# Patient Record
Sex: MALE | Race: OTHER | HISPANIC OR LATINO | URBAN - METROPOLITAN AREA
[De-identification: names, ages, dates, MRNs, and addresses within clinical notes are randomized per-mention and may not be internally consistent; named-entity substitution may affect disease eponyms.]

---

## 2020-11-15 ENCOUNTER — INPATIENT (INPATIENT)
Facility: HOSPITAL | Age: 55
LOS: 32 days | Discharge: NURSING, REHAB OR LONG-TERM CARE | DRG: 870 | End: 2020-12-18
Attending: SURGERY | Admitting: SURGERY
Payer: MEDICAID

## 2020-11-15 VITALS
HEIGHT: 62 IN | WEIGHT: 169.98 LBS | OXYGEN SATURATION: 64 % | RESPIRATION RATE: 30 BRPM | HEART RATE: 124 BPM | TEMPERATURE: 99 F

## 2020-11-15 DIAGNOSIS — U07.1 COVID-19: ICD-10-CM

## 2020-11-15 LAB
ALBUMIN SERPL ELPH-MCNC: 2.9 G/DL — LOW (ref 3.5–5)
ALP SERPL-CCNC: 68 U/L — SIGNIFICANT CHANGE UP (ref 40–120)
ALT FLD-CCNC: 55 U/L DA — SIGNIFICANT CHANGE UP (ref 10–60)
ANION GAP SERPL CALC-SCNC: 17 MMOL/L — SIGNIFICANT CHANGE UP (ref 5–17)
APTT BLD: 35.1 SEC — SIGNIFICANT CHANGE UP (ref 27.5–35.5)
AST SERPL-CCNC: 92 U/L — HIGH (ref 10–40)
BASE EXCESS BLDA CALC-SCNC: -9.6 MMOL/L — LOW (ref -2–2)
BASE EXCESS BLDCOV CALC-SCNC: -6.9 MMOL/L — LOW (ref -6–0.3)
BASOPHILS # BLD AUTO: 0.04 K/UL — SIGNIFICANT CHANGE UP (ref 0–0.2)
BASOPHILS NFR BLD AUTO: 0.2 % — SIGNIFICANT CHANGE UP (ref 0–2)
BILIRUB SERPL-MCNC: 0.6 MG/DL — SIGNIFICANT CHANGE UP (ref 0.2–1.2)
BLOOD GAS COMMENTS ARTERIAL: SIGNIFICANT CHANGE UP
BLOOD GAS COMMENTS, CORD VENOUS: SIGNIFICANT CHANGE UP
BUN SERPL-MCNC: 49 MG/DL — HIGH (ref 7–18)
CALCIUM SERPL-MCNC: 8.5 MG/DL — SIGNIFICANT CHANGE UP (ref 8.4–10.5)
CHLORIDE SERPL-SCNC: 102 MMOL/L — SIGNIFICANT CHANGE UP (ref 96–108)
CO2 SERPL-SCNC: 16 MMOL/L — LOW (ref 22–31)
CREAT SERPL-MCNC: 2.2 MG/DL — HIGH (ref 0.5–1.3)
D DIMER BLD IA.RAPID-MCNC: 559 NG/ML DDU — HIGH
EOSINOPHIL # BLD AUTO: 0.02 K/UL — SIGNIFICANT CHANGE UP (ref 0–0.5)
EOSINOPHIL NFR BLD AUTO: 0.1 % — SIGNIFICANT CHANGE UP (ref 0–6)
FIO2 CORD, VENOUS: 100 — SIGNIFICANT CHANGE UP
GAS PNL BLDCOV: 7.32 — SIGNIFICANT CHANGE UP (ref 7.25–7.45)
GLUCOSE SERPL-MCNC: 170 MG/DL — HIGH (ref 70–99)
HCO3 BLDA-SCNC: 18 MMOL/L — LOW (ref 23–27)
HCO3 BLDCOV-SCNC: 18 MMOL/L — SIGNIFICANT CHANGE UP (ref 17–25)
HCT VFR BLD CALC: 43.7 % — SIGNIFICANT CHANGE UP (ref 39–50)
HGB BLD-MCNC: 14.1 G/DL — SIGNIFICANT CHANGE UP (ref 13–17)
HOROWITZ INDEX BLDA+IHG-RTO: 100 — SIGNIFICANT CHANGE UP
IMM GRANULOCYTES NFR BLD AUTO: 1.8 % — HIGH (ref 0–1.5)
INR BLD: 1.2 RATIO — HIGH (ref 0.88–1.16)
LACTATE SERPL-SCNC: 2 MMOL/L — SIGNIFICANT CHANGE UP (ref 0.7–2)
LACTATE SERPL-SCNC: 8.1 MMOL/L — CRITICAL HIGH (ref 0.7–2)
LYMPHOCYTES # BLD AUTO: 2.13 K/UL — SIGNIFICANT CHANGE UP (ref 1–3.3)
LYMPHOCYTES # BLD AUTO: 8.7 % — LOW (ref 13–44)
MCHC RBC-ENTMCNC: 29.2 PG — SIGNIFICANT CHANGE UP (ref 27–34)
MCHC RBC-ENTMCNC: 32.3 GM/DL — SIGNIFICANT CHANGE UP (ref 32–36)
MCV RBC AUTO: 90.5 FL — SIGNIFICANT CHANGE UP (ref 80–100)
MONOCYTES # BLD AUTO: 0.54 K/UL — SIGNIFICANT CHANGE UP (ref 0–0.9)
MONOCYTES NFR BLD AUTO: 2.2 % — SIGNIFICANT CHANGE UP (ref 2–14)
NEUTROPHILS # BLD AUTO: 21.19 K/UL — HIGH (ref 1.8–7.4)
NEUTROPHILS NFR BLD AUTO: 87 % — HIGH (ref 43–77)
NRBC # BLD: 0 /100 WBCS — SIGNIFICANT CHANGE UP (ref 0–0)
PCO2 BLDA: 50 MMHG — HIGH (ref 32–46)
PCO2 BLDCOV: 36 MMHG — SIGNIFICANT CHANGE UP (ref 27–49)
PH BLDA: 7.19 — CRITICAL LOW (ref 7.35–7.45)
PLATELET # BLD AUTO: 253 K/UL — SIGNIFICANT CHANGE UP (ref 150–400)
PO2 BLDA: 49 MMHG — CRITICAL LOW (ref 74–108)
PO2 BLDCOA: 21 MMHG — SIGNIFICANT CHANGE UP (ref 17–41)
POTASSIUM SERPL-MCNC: 3.5 MMOL/L — SIGNIFICANT CHANGE UP (ref 3.5–5.3)
POTASSIUM SERPL-SCNC: 3.5 MMOL/L — SIGNIFICANT CHANGE UP (ref 3.5–5.3)
PROT SERPL-MCNC: 8.8 G/DL — HIGH (ref 6–8.3)
PROTHROM AB SERPL-ACNC: 14.2 SEC — HIGH (ref 10.6–13.6)
RAPID RVP RESULT: DETECTED
RBC # BLD: 4.83 M/UL — SIGNIFICANT CHANGE UP (ref 4.2–5.8)
RBC # FLD: 13.7 % — SIGNIFICANT CHANGE UP (ref 10.3–14.5)
SAO2 % BLDA: 71 % — LOW (ref 92–96)
SAO2 % BLDCOV: 22 % — SIGNIFICANT CHANGE UP (ref 20–75)
SARS-COV-2 RNA SPEC QL NAA+PROBE: DETECTED
SODIUM SERPL-SCNC: 135 MMOL/L — SIGNIFICANT CHANGE UP (ref 135–145)
WBC # BLD: 24.37 K/UL — HIGH (ref 3.8–10.5)
WBC # FLD AUTO: 24.37 K/UL — HIGH (ref 3.8–10.5)

## 2020-11-15 PROCEDURE — 99291 CRITICAL CARE FIRST HOUR: CPT

## 2020-11-15 PROCEDURE — 71045 X-RAY EXAM CHEST 1 VIEW: CPT | Mod: 26,77

## 2020-11-15 PROCEDURE — 71045 X-RAY EXAM CHEST 1 VIEW: CPT | Mod: 26

## 2020-11-15 RX ORDER — SODIUM CHLORIDE 9 MG/ML
2000 INJECTION INTRAMUSCULAR; INTRAVENOUS; SUBCUTANEOUS ONCE
Refills: 0 | Status: COMPLETED | OUTPATIENT
Start: 2020-11-15 | End: 2020-11-15

## 2020-11-15 RX ORDER — PROPOFOL 10 MG/ML
100 INJECTION, EMULSION INTRAVENOUS ONCE
Refills: 0 | Status: DISCONTINUED | OUTPATIENT
Start: 2020-11-15 | End: 2020-11-16

## 2020-11-15 RX ORDER — FENTANYL CITRATE 50 UG/ML
0.5 INJECTION INTRAVENOUS
Qty: 2500 | Refills: 0 | Status: DISCONTINUED | OUTPATIENT
Start: 2020-11-15 | End: 2020-11-16

## 2020-11-15 RX ORDER — PIPERACILLIN AND TAZOBACTAM 4; .5 G/20ML; G/20ML
3.75 INJECTION, POWDER, LYOPHILIZED, FOR SOLUTION INTRAVENOUS ONCE
Refills: 0 | Status: COMPLETED | OUTPATIENT
Start: 2020-11-15 | End: 2020-11-15

## 2020-11-15 RX ORDER — VANCOMYCIN HCL 1 G
1000 VIAL (EA) INTRAVENOUS ONCE
Refills: 0 | Status: COMPLETED | OUTPATIENT
Start: 2020-11-15 | End: 2020-11-15

## 2020-11-15 RX ORDER — PANTOPRAZOLE SODIUM 20 MG/1
40 TABLET, DELAYED RELEASE ORAL DAILY
Refills: 0 | Status: DISCONTINUED | OUTPATIENT
Start: 2020-11-15 | End: 2020-12-08

## 2020-11-15 RX ORDER — DEXAMETHASONE 0.5 MG/5ML
6 ELIXIR ORAL DAILY
Refills: 0 | Status: DISCONTINUED | OUTPATIENT
Start: 2020-11-15 | End: 2020-11-21

## 2020-11-15 RX ORDER — ERGOCALCIFEROL 1.25 MG/1
50000 CAPSULE ORAL
Refills: 0 | Status: DISCONTINUED | OUTPATIENT
Start: 2020-11-15 | End: 2020-11-17

## 2020-11-15 RX ORDER — PHENYLEPHRINE HYDROCHLORIDE 10 MG/ML
0.5 INJECTION INTRAVENOUS
Qty: 40 | Refills: 0 | Status: DISCONTINUED | OUTPATIENT
Start: 2020-11-15 | End: 2020-11-16

## 2020-11-15 RX ORDER — ETOMIDATE 2 MG/ML
20 INJECTION INTRAVENOUS ONCE
Refills: 0 | Status: COMPLETED | OUTPATIENT
Start: 2020-11-15 | End: 2020-11-15

## 2020-11-15 RX ORDER — ZINC SULFATE TAB 220 MG (50 MG ZINC EQUIVALENT) 220 (50 ZN) MG
220 TAB ORAL DAILY
Refills: 0 | Status: DISCONTINUED | OUTPATIENT
Start: 2020-11-15 | End: 2020-11-20

## 2020-11-15 RX ORDER — DEXAMETHASONE 0.5 MG/5ML
6 ELIXIR ORAL ONCE
Refills: 0 | Status: COMPLETED | OUTPATIENT
Start: 2020-11-15 | End: 2020-11-15

## 2020-11-15 RX ORDER — CHLORHEXIDINE GLUCONATE 213 G/1000ML
15 SOLUTION TOPICAL EVERY 12 HOURS
Refills: 0 | Status: DISCONTINUED | OUTPATIENT
Start: 2020-11-15 | End: 2020-11-15

## 2020-11-15 RX ORDER — ACETAMINOPHEN 500 MG
650 TABLET ORAL EVERY 6 HOURS
Refills: 0 | Status: DISCONTINUED | OUTPATIENT
Start: 2020-11-15 | End: 2020-11-17

## 2020-11-15 RX ORDER — INSULIN LISPRO 100/ML
VIAL (ML) SUBCUTANEOUS EVERY 6 HOURS
Refills: 0 | Status: DISCONTINUED | OUTPATIENT
Start: 2020-11-15 | End: 2020-11-20

## 2020-11-15 RX ORDER — CHLORHEXIDINE GLUCONATE 213 G/1000ML
1 SOLUTION TOPICAL
Refills: 0 | Status: DISCONTINUED | OUTPATIENT
Start: 2020-11-15 | End: 2020-12-18

## 2020-11-15 RX ORDER — ENOXAPARIN SODIUM 100 MG/ML
40 INJECTION SUBCUTANEOUS EVERY 12 HOURS
Refills: 0 | Status: DISCONTINUED | OUTPATIENT
Start: 2020-11-15 | End: 2020-11-17

## 2020-11-15 RX ORDER — PROPOFOL 10 MG/ML
30 INJECTION, EMULSION INTRAVENOUS
Qty: 1000 | Refills: 0 | Status: DISCONTINUED | OUTPATIENT
Start: 2020-11-15 | End: 2020-11-16

## 2020-11-15 RX ADMIN — Medication 2: at 23:59

## 2020-11-15 RX ADMIN — Medication 250 MILLIGRAM(S): at 20:12

## 2020-11-15 RX ADMIN — SODIUM CHLORIDE 2000 MILLILITER(S): 9 INJECTION INTRAMUSCULAR; INTRAVENOUS; SUBCUTANEOUS at 20:13

## 2020-11-15 RX ADMIN — ETOMIDATE 20 MILLIGRAM(S): 2 INJECTION INTRAVENOUS at 22:00

## 2020-11-15 RX ADMIN — Medication 6 MILLIGRAM(S): at 18:41

## 2020-11-15 RX ADMIN — FENTANYL CITRATE 3.86 MICROGRAM(S)/KG/HR: 50 INJECTION INTRAVENOUS at 22:59

## 2020-11-15 RX ADMIN — PROPOFOL 13.9 MICROGRAM(S)/KG/MIN: 10 INJECTION, EMULSION INTRAVENOUS at 23:32

## 2020-11-15 RX ADMIN — PIPERACILLIN AND TAZOBACTAM 200 GRAM(S): 4; .5 INJECTION, POWDER, LYOPHILIZED, FOR SOLUTION INTRAVENOUS at 20:12

## 2020-11-15 RX ADMIN — SODIUM CHLORIDE 2000 MILLILITER(S): 9 INJECTION INTRAMUSCULAR; INTRAVENOUS; SUBCUTANEOUS at 18:41

## 2020-11-15 NOTE — ED PROVIDER NOTE - OBJECTIVE STATEMENT
55 year old male with no significant PMHx or PSHx presents to the ED with his son with complaints of progressively worsening shortness of breath and difficulty breathing since testing positive for COVID-19 five days ago. Patient's son states that he brought patient into the ED as he had an oxygen saturation of 64% earlier today. Patient otherwise denies any fevers, chills, and all other acute complaints. Patient reports that currently multiple family members are also sick with COVID-19. NKDA.

## 2020-11-15 NOTE — H&P ADULT - ASSESSMENT
55 year old male with no significant PMHx or PSHx presents to the ED with his son with complaints of progressively worsening shortness of breath and difficulty breathing since testing positive for COVID-19 five days ago. Patient's son states that he brought patient into the ED as he had an oxygen saturation of 64% earlier today. Patient otherwise denies any fevers, chills, and all other acute complaints. Patient reports that currently multiple family members are also sick with COVID-19. NKDA.       55 year old male from home ambulates independently with no significant PMHx or PSHx presents to the ED with his son with complaints of progressively worsening shortness of breath and difficulty breathing since testing positive for COVID-19 five days ago outpatient . Patient's son states that he brought patient into the ED as he had an oxygen saturation of 64% earlier today. Patient otherwise denies any fevers, chills, and all other acute complaints. Patient reports that currently multiple family members are also sick with COVID-19. Denies smoking, alcohol, illicit drug use. NKDA.  Oxygen saturation came up to 85-90% on 15 L nonrebreather, and patient appears less pale and is less short of breath. later Pt was desaturating on NRB 81% on NRB so was started on HFNC . Pt will be transferred to ICU     Assessment:  1. Acute Hypoxic Respiratory failure   2. COVID Pneumonia   3. Acute Kidney Injury   4. elevated LFTs             Plan:    =====================[CNS ]==============================  # No active issues:   - Currently at baseline mental status     =====================[CVS ]===============================  # No active issues:   - BP currently acceptable   - monitor BP   -     =====================[RESP ]==============================  # No issues   Oxygen saturation came up to 85-90% on 15 L nonrebreather, and patient appears less pale and is less short of breath. later Pt was desaturating on NRB 81% on NRB so was started on HFNC   PE : Diffuse B/L Rhonchi ,  Patient short of breath, and is answering questions with one word responses  Not a candidate for Remdesvir giving requoirment of HFNC   f/u Cx   s/p Vanc and Zosyn   CXR : diffuse B/L opacities (f/u official repoprt)   - Decadron   HFNC     =====================[ GI ]================================  #elevated LFTs:     ====================[ RENAL ]=============================   #Acute Kidney Injury:   unlknown         =====================[ ID ]================================  #COVID Pneumonia:     ===================[ HEME/ONC ]===========================  # No active issues:  - elevated D-Dimers 559 likely due to COVID   - monitor CBC daily     =====================[ ENDO ]=============================  # No active issues:  - target CBG < 180  - f/u HgA1c   - c/w NPO and FSQ6   - HSS while on Steroids     ================= Skin/Catheters============================  # No active issues   - No rashes.  - Chris     ==================[ PROPHYLAXIS ]==========================   # Dvt : Lovenox BID and SCD (BMI>30, ICU admission)  # Gi : Protonix     ====================[ DISPO ]==============================   - Monitor in ICU     ===================[ GOC ]===========================  - Full Code          55 year old male from home ambulates independently with no significant PMHx or PSHx presents to the ED with his son with complaints of progressively worsening shortness of breath and difficulty breathing since testing positive for COVID-19 five days ago outpatient . Patient's son states that he brought patient into the ED as he had an oxygen saturation of 64% earlier today. Patient otherwise denies any fevers, chills, and all other acute complaints. Patient reports that currently multiple family members are also sick with COVID-19. Denies smoking, alcohol, illicit drug use. NKDA.  Oxygen saturation came up to 85-90% on 15 L nonrebreather, and patient appears less pale and is less short of breath. later Pt was desaturating on NRB 81% on NRB so was started on HFNC . Pt will be transferred to ICU     Assessment:  1. Acute Hypoxic Respiratory failure   2. COVID Pneumonia   3. Acute Kidney Injury   4. elevated LFTs             Plan:    =====================[CNS ]==============================  # No active issues:   - Currently at baseline mental status     =====================[CVS ]===============================  # No active issues:   - BP currently acceptable   - monitor BP   -     =====================[RESP ]==============================  #Acute Hypoxic Respiratory failure :  - p/w worsening SOB   - PE : B/L expiatory wheezing with rhonchi  , short of breath, and is answering questions with one word responses with tachypnea,  - CXR : diffuse B/L opacities (f/u official repoprt)   - Leukocytosis 24 K   - lymphocyte; WNl   - pt is afebrile  - ABG WNL   - Lactate 8.1 -->2.0  - Oxygen saturation came up to 85-90% on 15 L nonrebreather, later Pt was desaturating on NRB 81% on NRB so was started on HFNC   then got intubated   - s/p 1 dose of Vanc and Zosyn in ED.  - contact and airborne isolation precaution , Aspiration Precautions .  - will start on decadron given requirments of O2   - prone therapy for lung recruitment if needed   - c/w zinc , vitamin d 50,000 U  - Supportive care , AntPyeretic Tylenol PRN   - Not a candidate for Remdesvir giving requoirment of HFNC   - f/u Lactate until Normalization q3hrs  - f/u RVP   - f/u Bl Cx   - f/u CXR AM  - f/u Procalcitonin    - f/u Coags , D-Dimers , ESR , CRP , LDH, ferritin , Cardiac enzymes   ** (please get CRP daily and get ESR ,D-Dimers , LDH, ferritin , Cardiac enzymes , Coags every 3 days)     =====================[ GI ]================================  #elevated LFTs:     ====================[ RENAL ]=============================   #Acute Kidney Injury:   unlknown         =====================[ ID ]================================  #COVID Pneumonia:  - p/w worsening SOB   - PE : B/L expiatory wheezing with rhonchi  , short of breath, and is answering questions with one word responses with tachypnea,  - CXR : diffuse B/L opacities (f/u official repoprt)   - Leukocytosis 24 K   - lymphocyte; WNl   - pt is afebrile  - ABG WNL   - Lactate 8.1 -->2.0  - Oxygen saturation came up to 85-90% on 15 L nonrebreather, later Pt was desaturating on NRB 81% on NRB so was started on HFNC   then got intubated   - s/p 1 dose of Vanc and Zosyn in ED.  - contact and airborne isolation precaution , Aspiration Precautions .  - will start on decadron given requirments of O2   - prone therapy for lung recruitment if needed   - c/w zinc , vitamin d 50,000 U  - Supportive care , AntPyeretic Tylenol PRN   - Not a candidate for Remdesvir giving requoirment of HFNC   - f/u Lactate until Normalization q3hrs  - f/u RVP   - f/u Bl Cx   - f/u CXR AM  - f/u Procalcitonin    - f/u Coags , D-Dimers , ESR , CRP , LDH, ferritin , Cardiac enzymes   ** (please get CRP daily and get ESR ,D-Dimers , LDH, ferritin , Cardiac enzymes , Coags every 3 days)          ===================[ HEME/ONC ]===========================  # No active issues:  - elevated D-Dimers 559 likely due to COVID   - monitor CBC daily     =====================[ ENDO ]=============================  # No active issues:  - target CBG < 180  - f/u HgA1c   - c/w NPO and FSQ6   - HSS while on Steroids     ================= Skin/Catheters============================  # No active issues   - No rashes.  - Chris     ==================[ PROPHYLAXIS ]==========================   # Dvt : Lovenox BID and SCD (BMI>30, ICU admission)  # Gi : Protonix     ====================[ DISPO ]==============================   - Monitor in ICU     ===================[ GOC ]===========================  - Full Code          55 year old male from home ambulates independently with no significant PMHx or PSHx presents to the ED with his son with complaints of progressively worsening shortness of breath and difficulty breathing since testing positive for COVID-19 five days ago outpatient . Patient's son states that he brought patient into the ED as he had an oxygen saturation of 64% earlier today. Patient otherwise denies any fevers, chills, and all other acute complaints. Patient reports that currently multiple family members are also sick with COVID-19. Denies smoking, alcohol, illicit drug use. NKDA.  Oxygen saturation came up to 85-90% on 15 L nonrebreather, and patient appears less pale and is less short of breath. later Pt was desaturating on NRB 81% on NRB so was started on HFNC was still desaturated then got intubated in ED .  Pt will be transferred to ICU     Assessment:  1. Acute Hypoxic Respiratory failure   2. COVID Pneumonia   3. Acute Kidney Injury   4. elevated LFTs           Plan:    =====================[CNS ]==============================  #Intubated:   - sedated with fentanyl and Propofol (get TG levels q3d)    =====================[CVS ]===============================  # No active issues:   - BP currently acceptable   - monitor BP   -     =====================[RESP ]==============================  #Acute Hypoxic Respiratory failure :  - p/w worsening SOB   - PE : B/L expiatory wheezing with rhonchi  , short of breath, and is answering questions with one word responses with tachypnea,  - CXR : diffuse B/L opacities (f/u official repoprt)   - Leukocytosis 24 K   - lymphocyte; WNl   - pt is afebrile  - ABG WNL   - Lactate 8.1 -->2.0  - Oxygen saturation came up to 85-90% on 15 L nonrebreather, later Pt was desaturating on NRB 81% on NRB so was started on HFNC   then got intubated   - s/p 1 dose of Vanc and Zosyn in ED.  - contact and airborne isolation precaution , Aspiration Precautions .  - will start on decadron given requirments of O2   - prone therapy for lung recruitment if needed   - c/w zinc , vitamin d 50,000 U  - Supportive care , AntPyeretic Tylenol PRN   - Not a candidate for Remdesvir giving requoirment of HFNC   - f/u Lactate until Normalization q3hrs  - f/u RVP   - f/u Bl Cx   - f/u CXR AM  - f/u Procalcitonin    - f/u Coags , D-Dimers , ESR , CRP , LDH, ferritin , Cardiac enzymes   ** (please get CRP daily and get ESR ,D-Dimers , LDH, ferritin , Cardiac enzymes , Coags every 3 days)     =====================[ GI ]================================  #elevated LFTs:   - likely 2/2 covid   - f/u LFTs daily       ====================[ RENAL ]=============================   #Acute Kidney Injury:   - p/w BUN/Cr 49/2.2  - unknown baseline  - started IV fluids  - Monitor renal function , intake and output   - f/u BMP , Cr urine , Pr/Cr ratio , Urine lytes , Sodium urine , total protein urine  - f/u FeNa  - f/u UA           =====================[ ID ]================================  #COVID Pneumonia:  - p/w worsening SOB   - PE : B/L expiatory wheezing with rhonchi  , short of breath, and is answering questions with one word responses with tachypnea,  - CXR : diffuse B/L opacities (f/u official repoprt)   - Leukocytosis 24 K   - lymphocyte; WNl   - pt is afebrile  - ABG WNL   - Lactate 8.1 -->2.0  - Oxygen saturation came up to 85-90% on 15 L nonrebreather, later Pt was desaturating on NRB 81% on NRB so was started on HFNC   then got intubated   - s/p 1 dose of Vanc and Zosyn in ED.  - contact and airborne isolation precaution , Aspiration Precautions .  - will start on decadron given requirments of O2   - prone therapy for lung recruitment if needed   - c/w zinc , vitamin d 50,000 U  - Supportive care , AntPyeretic Tylenol PRN   - Not a candidate for Remdesvir giving requoirment of HFNC   - f/u Lactate until Normalization q3hrs  - f/u RVP   - f/u Bl Cx   - f/u CXR AM  - f/u Procalcitonin    - f/u Coags , D-Dimers , ESR , CRP , LDH, ferritin , Cardiac enzymes   ** (please get CRP daily and get ESR ,D-Dimers , LDH, ferritin , Cardiac enzymes , Coags every 3 days)      ===================[ HEME/ONC ]===========================  # No active issues:  - elevated D-Dimers 559 likely due to COVID   - monitor CBC daily     =====================[ ENDO ]=============================  # No active issues:  - target CBG < 180  - f/u HgA1c   - c/w NPO and FSQ6   - HSS while on Steroids     ================= Skin/Catheters============================  # No active issues   - No rashes.  - Chris     ==================[ PROPHYLAXIS ]==========================   # Dvt : Lovenox BID and SCD (BMI>30, ICU admission)  # Gi : Protonix     ====================[ DISPO ]==============================   - Monitor in ICU     ===================[ GOC ]===========================  - Full Code      55 year old male from home ambulates independently with no significant PMHx or PSHx presents to the ED with his son with complaints of progressively worsening shortness of breath and difficulty breathing since testing positive for COVID-19 five days ago outpatient . Patient's son states that he brought patient into the ED as he had an oxygen saturation of 64% earlier today. Patient otherwise denies any fevers, chills, and all other acute complaints. Patient reports that currently multiple family members are also sick with COVID-19. Denies smoking, alcohol, illicit drug use. NKDA.  Oxygen saturation came up to 85-90% on 15 L nonrebreather, and patient appears less pale and is less short of breath. later Pt was desaturating on NRB 81% on NRB so was started on HFNC was still desaturated then got intubated in ED .  Pt will be transferred to ICU     Assessment:  1. Acute Hypoxic Respiratory failure   2. COVID Pneumonia   3. Acute Kidney Injury   4. elevated LFTs           Plan:    =====================[CNS ]==============================  #Intubated:   - sedated with fentanyl and Propofol (get TG levels q3d)    =====================[CVS ]===============================  # No active issues:   - BP currently acceptable   - monitor BP   -     =====================[RESP ]==============================  #Acute Hypoxic Respiratory failure :  - p/w worsening SOB   - PE : B/L expiatory wheezing with rhonchi  , short of breath, and is answering questions with one word responses with tachypnea,  - CXR : diffuse B/L opacities (f/u official repoprt)   - Leukocytosis 24 K   - lymphocyte; WNl   - pt is afebrile  - ABG WNL   - Lactate 8.1 -->2.0  - Oxygen saturation came up to 85-90% on 15 L nonrebreather, later Pt was desaturating on NRB 81% on NRB so was started on HFNC   then got intubated   - s/p 1 dose of Vanc and Zosyn in ED.  - contact and airborne isolation precaution , Aspiration Precautions .  - will start on decadron given requirments of O2   - prone therapy for lung recruitment if needed   - c/w zinc , vitamin d 50,000 U  - Supportive care , AntPyeretic Tylenol PRN   - Not a candidate for Remdisivir giving requirement for invasive mechanical ventilation   - f/u Lactate until Normalization q3hrs  - f/u RVP   - f/u Bl Cx   - f/u CXR AM  - f/u Procalcitonin    - f/u Coags , D-Dimers , ESR , CRP , LDH, ferritin , Cardiac enzymes   ** (please get CRP daily and get ESR ,D-Dimers , LDH, ferritin , Cardiac enzymes , Coags every 3 days)     =====================[ GI ]================================  #elevated LFTs:   - likely 2/2 covid   - f/u LFTs daily       ====================[ RENAL ]=============================   #Acute Kidney Injury:   - p/w BUN/Cr 49/2.2  - unknown baseline  - started IV fluids  - Monitor renal function , intake and output   - f/u BMP , Cr urine , Pr/Cr ratio , Urine lytes , Sodium urine , total protein urine  - f/u FeNa  - f/u UA           =====================[ ID ]================================  #COVID Pneumonia:  - p/w worsening SOB   - PE : B/L expiatory wheezing with rhonchi  , short of breath, and is answering questions with one word responses with tachypnea,  - CXR : diffuse B/L opacities (f/u official repoprt)   - Leukocytosis 24 K   - lymphocyte; WNl   - pt is afebrile  - ABG WNL   - Lactate 8.1 -->2.0  - Oxygen saturation came up to 85-90% on 15 L nonrebreather, later Pt was desaturating on NRB 81% on NRB so was started on HFNC   then got intubated   - s/p 1 dose of Vanc and Zosyn in ED.  - contact and airborne isolation precaution , Aspiration Precautions .  - will start on decadron given requirments of O2   - prone therapy for lung recruitment if needed   - c/w zinc , vitamin d 50,000 U  - Supportive care , AntPyeretic Tylenol PRN   - Not a candidate for Remdesvir giving requoirment of HFNC   - f/u Lactate until Normalization q3hrs  - f/u RVP   - f/u Bl Cx   - f/u CXR AM  - f/u Procalcitonin    - f/u Coags , D-Dimers , ESR , CRP , LDH, ferritin , Cardiac enzymes   ** (please get CRP daily and get ESR ,D-Dimers , LDH, ferritin , Cardiac enzymes , Coags every 3 days)      ===================[ HEME/ONC ]===========================  # No active issues:  - elevated D-Dimers 559 likely due to COVID   - monitor CBC daily     =====================[ ENDO ]=============================  # No active issues:  - target CBG < 180  - f/u HgA1c   - c/w NPO and FSQ6   - HSS while on Steroids     ================= Skin/Catheters============================  # No active issues   - No rashes.  - Chris     ==================[ PROPHYLAXIS ]==========================   # Dvt : Lovenox BID and SCD (BMI>30, ICU admission)  # Gi : Protonix     ====================[ DISPO ]==============================   - Monitor in ICU     ===================[ GOC ]===========================  - Full Code

## 2020-11-15 NOTE — ED ADULT TRIAGE NOTE - ARRIVAL FROM
FUTURE VISIT INFORMATION      SURGERY INFORMATION:    Date: 19    Location: [facility] OR    Surgeon:  Diego Hi    Anesthesia Type:   Other    RECORDS REQUESTED FROM:       Primary Care Provider:  Destin Timmons    Pertinent Medical History: Chronic Kidney Disease stage 3, Anemia    Most recent EKG+ Tracin/3/19      
Home

## 2020-11-15 NOTE — H&P ADULT - ATTENDING COMMENTS
Patient is a 56 y/o M ,with flu like sx admitted with chief complaint of SOB. No significant PMH.  CXR shows bilateral infiltrates and there was arterial desaturation in the ED. ABG- . Consideration being given to coronavirus infection ( patient reports testing + five days ago ) and a nasal specimen was sent for PCR. Will treat with remdesivir and decadron. Patient on a non rebreather mask. Await ABG. Pulseoximetry reads 90%. Dx- severe pneumonia, acute respiratory failure with hypoxia, r/o COVID -19. I reviewed all roentgenographic and laboratory data, nurses notes and discussed the case with the referring/ED physician. I reviewed all laboratory and roentgenographic data and any previous medical record from [facility] that existed. I also discussed the case with the ED attending or referring physician.   I have personally provided  45 minutes of critical care concurrently with the resident. This time excludes time spent teaching and time spent on separate procedures. I have reviewed the resident's documentation and agree with the assessment and plan of care.

## 2020-11-15 NOTE — ED ADULT NURSE NOTE - OBJECTIVE STATEMENT
Pt AOx4, ambulatory, c/o SOB, and fever, worsening since last night. Pt was recently DX Covid positive. Pt hypoxic on arrival to ED. Pt placed on NRB mask by MD Caputo, O2 sat improved. EKG done, pt placed on cardiac monitor.

## 2020-11-15 NOTE — ED ADULT NURSE NOTE - NSIMPLEMENTINTERV_GEN_ALL_ED
Implemented All Fall with Harm Risk Interventions:  Chaplin to call system. Call bell, personal items and telephone within reach. Instruct patient to call for assistance. Room bathroom lighting operational. Non-slip footwear when patient is off stretcher. Physically safe environment: no spills, clutter or unnecessary equipment. Stretcher in lowest position, wheels locked, appropriate side rails in place. Provide visual cue, wrist band, yellow gown, etc. Monitor gait and stability. Monitor for mental status changes and reorient to person, place, and time. Review medications for side effects contributing to fall risk. Reinforce activity limits and safety measures with patient and family. Provide visual clues: red socks.

## 2020-11-15 NOTE — ED PROVIDER NOTE - PROGRESS NOTE DETAILS
Oxygen saturation came up to 85-90% on 15 L nonrebreather, and patient appears less pale and is less short of breath. However, patient is still not saturating to 100%.

## 2020-11-15 NOTE — H&P ADULT - HISTORY OF PRESENT ILLNESS
55 year old male from home ambulates independently with no significant PMHx or PSHx presents to the ED with his son with complaints of progressively worsening shortness of breath and difficulty breathing since testing positive for COVID-19 five days ago outpatient . Patient's son states that he brought patient into the ED as he had an oxygen saturation of 64% earlier today. Patient otherwise denies any fevers, chills, and all other acute complaints. Patient reports that currently multiple family members are also sick with COVID-19. Denies smoking, alcohol, illicit drug use. NKDA.  Oxygen saturation came up to 85-90% on 15 L nonrebreather, and patient appears less pale and is less short of breath. later Pt was desaturating on NRB 81% on NRB so was started on HFNC was still desaturated then got intubated in ED .  Pt will be transferred to ICU

## 2020-11-15 NOTE — H&P ADULT - NSHPPHYSICALEXAM_GEN_ALL_CORE
ICU Vital Signs Last 24 Hrs  T(C): 37.3 (15 Nov 2020 18:07), Max: 37.3 (15 Nov 2020 18:07)  T(F): 99.2 (15 Nov 2020 18:07), Max: 99.2 (15 Nov 2020 18:07)  HR: 104 (15 Nov 2020 18:30) (104 - 124)  BP: --  BP(mean): --  ABP: --  ABP(mean): --  RR: 30 (15 Nov 2020 18:07) (30 - 30)  SpO2: 64% (15 Nov 2020 18:07) (64% - 64%) ICU Vital Signs Last 24 Hrs  T(C): 37.3 (15 Nov 2020 18:07), Max: 37.3 (15 Nov 2020 18:07)  T(F): 99.2 (15 Nov 2020 18:07), Max: 99.2 (15 Nov 2020 18:07)  HR: 104 (15 Nov 2020 18:30) (104 - 124)  BP: --  BP(mean): --  ABP: --  ABP(mean): --  RR: 30 (15 Nov 2020 18:07) (30 - 30)  SpO2: 64% (15 Nov 2020 18:07) (64% - 64%)    PHYSICAL EXAM:   · CONSTITUTIONAL: Patient's face appears gray/ blue  · ENMT: Airway patent, Nasal mucosa clear. Mouth with normal mucosa. Throat has no vesicles, no oropharyngeal exudates and uvula is midline.  · EYES: Clear bilaterally, pupils equal, round and reactive to light.  · CARDIAC: Normal rate, regular rhythm.  Heart sounds S1, S2.  No murmurs, rubs or gallops.  · RESPIRATORY: Diffuse B/L Rhonchi ,  Patient short of breath, and is answering questions with one word responses  · GASTROINTESTINAL: Abdomen soft, non-tender, no guarding.  · MUSCULOSKELETAL: Spine appears normal, range of motion is not limited, no muscle or joint tenderness  · NEUROLOGICAL: Alert and oriented, no focal deficits, no motor or sensory deficits. ICU Vital Signs Last 24 Hrs  T(C): 37.3 (15 Nov 2020 18:07), Max: 37.3 (15 Nov 2020 18:07)  T(F): 99.2 (15 Nov 2020 18:07), Max: 99.2 (15 Nov 2020 18:07)  HR: 104 (15 Nov 2020 18:30) (104 - 124)  BP: --  BP(mean): --  ABP: --  ABP(mean): --  RR: 30 (15 Nov 2020 18:07) (30 - 30)  SpO2: 64% (15 Nov 2020 18:07) (64% - 64%)    PHYSICAL EXAM:   · CONSTITUTIONAL: Patient's face appears gray/ blue  · ENMT: Airway patent, Nasal mucosa clear. Mouth with normal mucosa. Throat has no vesicles, no oropharyngeal exudates and uvula is midline.  · EYES: Clear bilaterally, pupils equal, round and reactive to light.  · CARDIAC: Normal rate, regular rhythm.  Heart sounds S1, S2.  No murmurs, rubs or gallops.  · RESPIRATORY: Intubated Diffuse B/L Rhonchi ,  Patient short of breath, and is answering questions with one word responses  · GASTROINTESTINAL: Abdomen soft, non-tender, no guarding.  · MUSCULOSKELETAL: Spine appears normal, range of motion is not limited, no muscle or joint tenderness  · NEUROLOGICAL: Sedated , no focal deficits, no motor or sensory deficits.

## 2020-11-15 NOTE — ED PROVIDER NOTE - NSCAREINITIATED _GEN_ER
H&P reviewed  After examining the patient I find no changes in the patients condition since the H&P had been written 
Carmela Caputo(Attending)

## 2020-11-16 LAB
24R-OH-CALCIDIOL SERPL-MCNC: 13.5 NG/ML — LOW (ref 30–80)
A1C WITH ESTIMATED AVERAGE GLUCOSE RESULT: 6.1 % — HIGH (ref 4–5.6)
ALBUMIN SERPL ELPH-MCNC: 1.8 G/DL — LOW (ref 3.5–5)
ALBUMIN SERPL ELPH-MCNC: 2.3 G/DL — LOW (ref 3.5–5)
ALP SERPL-CCNC: 80 U/L — SIGNIFICANT CHANGE UP (ref 40–120)
ALP SERPL-CCNC: 82 U/L — SIGNIFICANT CHANGE UP (ref 40–120)
ALT FLD-CCNC: 125 U/L DA — HIGH (ref 10–60)
ALT FLD-CCNC: 197 U/L DA — HIGH (ref 10–60)
ANION GAP SERPL CALC-SCNC: 8 MMOL/L — SIGNIFICANT CHANGE UP (ref 5–17)
ANION GAP SERPL CALC-SCNC: 8 MMOL/L — SIGNIFICANT CHANGE UP (ref 5–17)
ANION GAP SERPL CALC-SCNC: 9 MMOL/L — SIGNIFICANT CHANGE UP (ref 5–17)
APPEARANCE UR: CLEAR — SIGNIFICANT CHANGE UP
AST SERPL-CCNC: 285 U/L — HIGH (ref 10–40)
AST SERPL-CCNC: 425 U/L — HIGH (ref 10–40)
BASE EXCESS BLDA CALC-SCNC: -11.7 MMOL/L — LOW (ref -2–2)
BASE EXCESS BLDA CALC-SCNC: -13 MMOL/L — LOW (ref -2–2)
BASE EXCESS BLDA CALC-SCNC: -4.2 MMOL/L — LOW (ref -2–2)
BASE EXCESS BLDA CALC-SCNC: -5.1 MMOL/L — LOW (ref -2–2)
BASE EXCESS BLDA CALC-SCNC: -6.1 MMOL/L — LOW (ref -2–2)
BASE EXCESS BLDA CALC-SCNC: -9.8 MMOL/L — LOW (ref -2–2)
BASOPHILS # BLD AUTO: 0 K/UL — SIGNIFICANT CHANGE UP (ref 0–0.2)
BASOPHILS NFR BLD AUTO: 0 % — SIGNIFICANT CHANGE UP (ref 0–2)
BILIRUB SERPL-MCNC: 0.4 MG/DL — SIGNIFICANT CHANGE UP (ref 0.2–1.2)
BILIRUB SERPL-MCNC: 0.5 MG/DL — SIGNIFICANT CHANGE UP (ref 0.2–1.2)
BILIRUB UR-MCNC: NEGATIVE — SIGNIFICANT CHANGE UP
BLOOD GAS COMMENTS ARTERIAL: SIGNIFICANT CHANGE UP
BUN SERPL-MCNC: 40 MG/DL — HIGH (ref 7–18)
BUN SERPL-MCNC: 43 MG/DL — HIGH (ref 7–18)
BUN SERPL-MCNC: 49 MG/DL — HIGH (ref 7–18)
CALCIUM SERPL-MCNC: 5.8 MG/DL — CRITICAL LOW (ref 8.4–10.5)
CALCIUM SERPL-MCNC: 6.8 MG/DL — LOW (ref 8.4–10.5)
CALCIUM SERPL-MCNC: 6.8 MG/DL — LOW (ref 8.4–10.5)
CHLORIDE SERPL-SCNC: 109 MMOL/L — HIGH (ref 96–108)
CHLORIDE SERPL-SCNC: 109 MMOL/L — HIGH (ref 96–108)
CHLORIDE SERPL-SCNC: 110 MMOL/L — HIGH (ref 96–108)
CHLORIDE UR-SCNC: 44 MMOL/L — SIGNIFICANT CHANGE UP
CHOLEST SERPL-MCNC: 110 MG/DL — SIGNIFICANT CHANGE UP
CK MB BLD-MCNC: 1.9 % — SIGNIFICANT CHANGE UP (ref 0–3.5)
CK MB BLD-MCNC: 2.1 % — SIGNIFICANT CHANGE UP (ref 0–3.5)
CK MB CFR SERPL CALC: 3.4 NG/ML — SIGNIFICANT CHANGE UP (ref 0–3.6)
CK MB CFR SERPL CALC: 3.5 NG/ML — SIGNIFICANT CHANGE UP (ref 0–3.6)
CK SERPL-CCNC: 170 U/L — SIGNIFICANT CHANGE UP (ref 35–232)
CK SERPL-CCNC: 175 U/L — SIGNIFICANT CHANGE UP (ref 35–232)
CO2 SERPL-SCNC: 21 MMOL/L — LOW (ref 22–31)
CO2 SERPL-SCNC: 22 MMOL/L — SIGNIFICANT CHANGE UP (ref 22–31)
CO2 SERPL-SCNC: 24 MMOL/L — SIGNIFICANT CHANGE UP (ref 22–31)
COLOR SPEC: YELLOW — SIGNIFICANT CHANGE UP
CREAT SERPL-MCNC: 1.88 MG/DL — HIGH (ref 0.5–1.3)
CREAT SERPL-MCNC: 2.2 MG/DL — HIGH (ref 0.5–1.3)
CREAT SERPL-MCNC: 2.73 MG/DL — HIGH (ref 0.5–1.3)
CRP SERPL-MCNC: 30.16 MG/DL — HIGH (ref 0–0.4)
CRP SERPL-MCNC: 34.37 MG/DL — HIGH (ref 0–0.4)
DIFF PNL FLD: ABNORMAL
EOSINOPHIL # BLD AUTO: 0 K/UL — SIGNIFICANT CHANGE UP (ref 0–0.5)
EOSINOPHIL NFR BLD AUTO: 0 % — SIGNIFICANT CHANGE UP (ref 0–6)
ERYTHROCYTE [SEDIMENTATION RATE] IN BLOOD: 78 MM/HR — HIGH (ref 0–20)
ESTIMATED AVERAGE GLUCOSE: 128 MG/DL — HIGH (ref 68–114)
FERRITIN SERPL-MCNC: 4814 NG/ML — HIGH (ref 30–400)
FOLATE SERPL-MCNC: 11.3 NG/ML — SIGNIFICANT CHANGE UP
GLUCOSE SERPL-MCNC: 152 MG/DL — HIGH (ref 70–99)
GLUCOSE SERPL-MCNC: 180 MG/DL — HIGH (ref 70–99)
GLUCOSE SERPL-MCNC: 183 MG/DL — HIGH (ref 70–99)
GLUCOSE UR QL: NEGATIVE — SIGNIFICANT CHANGE UP
HCO3 BLDA-SCNC: 18 MMOL/L — LOW (ref 23–27)
HCO3 BLDA-SCNC: 20 MMOL/L — LOW (ref 23–27)
HCO3 BLDA-SCNC: 21 MMOL/L — LOW (ref 23–27)
HCO3 BLDA-SCNC: 22 MMOL/L — LOW (ref 23–27)
HCO3 BLDA-SCNC: 23 MMOL/L — SIGNIFICANT CHANGE UP (ref 23–27)
HCO3 BLDA-SCNC: 24 MMOL/L — SIGNIFICANT CHANGE UP (ref 23–27)
HCT VFR BLD CALC: 41.5 % — SIGNIFICANT CHANGE UP (ref 39–50)
HDLC SERPL-MCNC: 13 MG/DL — LOW
HGB BLD-MCNC: 12.8 G/DL — LOW (ref 13–17)
HOROWITZ INDEX BLDA+IHG-RTO: 100 — SIGNIFICANT CHANGE UP
HOROWITZ INDEX BLDA+IHG-RTO: 80 — SIGNIFICANT CHANGE UP
HOROWITZ INDEX BLDA+IHG-RTO: 80 — SIGNIFICANT CHANGE UP
KETONES UR-MCNC: NEGATIVE — SIGNIFICANT CHANGE UP
LACTATE SERPL-SCNC: 1.5 MMOL/L — SIGNIFICANT CHANGE UP (ref 0.7–2)
LDH SERPL L TO P-CCNC: 1095 U/L — HIGH (ref 120–225)
LEUKOCYTE ESTERASE UR-ACNC: NEGATIVE — SIGNIFICANT CHANGE UP
LIPID PNL WITH DIRECT LDL SERPL: 8 MG/DL — SIGNIFICANT CHANGE UP
LYMPHOCYTES # BLD AUTO: 1.28 K/UL — SIGNIFICANT CHANGE UP (ref 1–3.3)
LYMPHOCYTES # BLD AUTO: 5 % — LOW (ref 13–44)
MAGNESIUM SERPL-MCNC: 3 MG/DL — HIGH (ref 1.6–2.6)
MCHC RBC-ENTMCNC: 29.5 PG — SIGNIFICANT CHANGE UP (ref 27–34)
MCHC RBC-ENTMCNC: 30.8 GM/DL — LOW (ref 32–36)
MCV RBC AUTO: 95.6 FL — SIGNIFICANT CHANGE UP (ref 80–100)
MONOCYTES # BLD AUTO: 1.03 K/UL — HIGH (ref 0–0.9)
MONOCYTES NFR BLD AUTO: 4 % — SIGNIFICANT CHANGE UP (ref 2–14)
NEUTROPHILS # BLD AUTO: 23.35 K/UL — HIGH (ref 1.8–7.4)
NEUTROPHILS NFR BLD AUTO: 84 % — HIGH (ref 43–77)
NITRITE UR-MCNC: NEGATIVE — SIGNIFICANT CHANGE UP
NON HDL CHOLESTEROL: 97 MG/DL — SIGNIFICANT CHANGE UP
NT-PROBNP SERPL-SCNC: 3079 PG/ML — HIGH (ref 0–125)
OSMOLALITY UR: 543 MOS/KG — SIGNIFICANT CHANGE UP (ref 50–1200)
PCO2 BLDA: 55 MMHG — HIGH (ref 32–46)
PCO2 BLDA: 56 MMHG — HIGH (ref 32–46)
PCO2 BLDA: 61 MMHG — HIGH (ref 32–46)
PCO2 BLDA: 66 MMHG — HIGH (ref 32–46)
PCO2 BLDA: 68 MMHG — HIGH (ref 32–46)
PCO2 BLDA: 83 MMHG — CRITICAL HIGH (ref 32–46)
PH BLDA: 7.03 — CRITICAL LOW (ref 7.35–7.45)
PH BLDA: 7.05 — CRITICAL LOW (ref 7.35–7.45)
PH BLDA: 7.14 — CRITICAL LOW (ref 7.35–7.45)
PH BLDA: 7.18 — CRITICAL LOW (ref 7.35–7.45)
PH BLDA: 7.22 — LOW (ref 7.35–7.45)
PH BLDA: 7.24 — LOW (ref 7.35–7.45)
PH UR: 5 — SIGNIFICANT CHANGE UP (ref 5–8)
PHOSPHATE SERPL-MCNC: 6.1 MG/DL — HIGH (ref 2.5–4.5)
PLATELET # BLD AUTO: 336 K/UL — SIGNIFICANT CHANGE UP (ref 150–400)
PO2 BLDA: 120 MMHG — HIGH (ref 74–108)
PO2 BLDA: 130 MMHG — HIGH (ref 74–108)
PO2 BLDA: 172 MMHG — HIGH (ref 74–108)
PO2 BLDA: 58 MMHG — LOW (ref 74–108)
PO2 BLDA: 62 MMHG — LOW (ref 74–108)
PO2 BLDA: 67 MMHG — LOW (ref 74–108)
POTASSIUM SERPL-MCNC: 4.7 MMOL/L — SIGNIFICANT CHANGE UP (ref 3.5–5.3)
POTASSIUM SERPL-MCNC: 4.9 MMOL/L — SIGNIFICANT CHANGE UP (ref 3.5–5.3)
POTASSIUM SERPL-MCNC: 4.9 MMOL/L — SIGNIFICANT CHANGE UP (ref 3.5–5.3)
POTASSIUM SERPL-SCNC: 4.7 MMOL/L — SIGNIFICANT CHANGE UP (ref 3.5–5.3)
POTASSIUM SERPL-SCNC: 4.9 MMOL/L — SIGNIFICANT CHANGE UP (ref 3.5–5.3)
POTASSIUM SERPL-SCNC: 4.9 MMOL/L — SIGNIFICANT CHANGE UP (ref 3.5–5.3)
PROCALCITONIN SERPL-MCNC: 1.82 NG/ML — HIGH (ref 0.02–0.1)
PROT ?TM UR-MCNC: 136 MG/DL — HIGH (ref 0–12)
PROT SERPL-MCNC: 6 G/DL — SIGNIFICANT CHANGE UP (ref 6–8.3)
PROT SERPL-MCNC: 7 G/DL — SIGNIFICANT CHANGE UP (ref 6–8.3)
PROT UR-MCNC: 100
RBC # BLD: 4.34 M/UL — SIGNIFICANT CHANGE UP (ref 4.2–5.8)
RBC # FLD: 14.3 % — SIGNIFICANT CHANGE UP (ref 10.3–14.5)
SAO2 % BLDA: 76 % — LOW (ref 92–96)
SAO2 % BLDA: 78 % — LOW (ref 92–96)
SAO2 % BLDA: 91 % — LOW (ref 92–96)
SAO2 % BLDA: 95 % — SIGNIFICANT CHANGE UP (ref 92–96)
SAO2 % BLDA: 98 % — HIGH (ref 92–96)
SAO2 % BLDA: 98 % — HIGH (ref 92–96)
SODIUM SERPL-SCNC: 139 MMOL/L — SIGNIFICANT CHANGE UP (ref 135–145)
SODIUM SERPL-SCNC: 140 MMOL/L — SIGNIFICANT CHANGE UP (ref 135–145)
SODIUM SERPL-SCNC: 141 MMOL/L — SIGNIFICANT CHANGE UP (ref 135–145)
SODIUM UR-SCNC: 45 MMOL/L — SIGNIFICANT CHANGE UP
SP GR SPEC: 1.02 — SIGNIFICANT CHANGE UP (ref 1.01–1.02)
TRIGL SERPL-MCNC: 445 MG/DL — HIGH
TROPONIN I SERPL-MCNC: 0.3 NG/ML — HIGH (ref 0–0.04)
TROPONIN I SERPL-MCNC: 0.33 NG/ML — HIGH (ref 0–0.04)
TROPONIN I SERPL-MCNC: 1 NG/ML — HIGH (ref 0–0.04)
TROPONIN I SERPL-MCNC: 2.59 NG/ML — HIGH (ref 0–0.04)
TSH SERPL-MCNC: 0.42 UU/ML — SIGNIFICANT CHANGE UP (ref 0.34–4.82)
UROBILINOGEN FLD QL: NEGATIVE — SIGNIFICANT CHANGE UP
VIT B12 SERPL-MCNC: 1814 PG/ML — HIGH (ref 232–1245)
WBC # BLD: 25.66 K/UL — HIGH (ref 3.8–10.5)
WBC # FLD AUTO: 25.66 K/UL — HIGH (ref 3.8–10.5)

## 2020-11-16 PROCEDURE — 71045 X-RAY EXAM CHEST 1 VIEW: CPT | Mod: 26

## 2020-11-16 RX ORDER — PHENYLEPHRINE HYDROCHLORIDE 10 MG/ML
0.5 INJECTION INTRAVENOUS
Qty: 40 | Refills: 0 | Status: DISCONTINUED | OUTPATIENT
Start: 2020-11-16 | End: 2020-11-17

## 2020-11-16 RX ORDER — AZITHROMYCIN 500 MG/1
500 TABLET, FILM COATED ORAL ONCE
Refills: 0 | Status: COMPLETED | OUTPATIENT
Start: 2020-11-16 | End: 2020-11-16

## 2020-11-16 RX ORDER — CHLORHEXIDINE GLUCONATE 213 G/1000ML
1 SOLUTION TOPICAL
Refills: 0 | Status: DISCONTINUED | OUTPATIENT
Start: 2020-11-16 | End: 2020-11-16

## 2020-11-16 RX ORDER — AZITHROMYCIN 500 MG/1
TABLET, FILM COATED ORAL
Refills: 0 | Status: DISCONTINUED | OUTPATIENT
Start: 2020-11-16 | End: 2020-11-20

## 2020-11-16 RX ORDER — SODIUM BICARBONATE 1 MEQ/ML
100 SYRINGE (ML) INTRAVENOUS ONCE
Refills: 0 | Status: COMPLETED | OUTPATIENT
Start: 2020-11-16 | End: 2020-11-16

## 2020-11-16 RX ORDER — FENTANYL CITRATE 50 UG/ML
5 INJECTION INTRAVENOUS
Qty: 2500 | Refills: 0 | Status: DISCONTINUED | OUTPATIENT
Start: 2020-11-16 | End: 2020-11-17

## 2020-11-16 RX ORDER — PROPOFOL 10 MG/ML
30.05 INJECTION, EMULSION INTRAVENOUS
Qty: 1000 | Refills: 0 | Status: DISCONTINUED | OUTPATIENT
Start: 2020-11-16 | End: 2020-11-16

## 2020-11-16 RX ORDER — MIDAZOLAM HYDROCHLORIDE 1 MG/ML
0.1 INJECTION, SOLUTION INTRAMUSCULAR; INTRAVENOUS
Qty: 100 | Refills: 0 | Status: DISCONTINUED | OUTPATIENT
Start: 2020-11-16 | End: 2020-11-24

## 2020-11-16 RX ORDER — AZITHROMYCIN 500 MG/1
500 TABLET, FILM COATED ORAL EVERY 24 HOURS
Refills: 0 | Status: DISCONTINUED | OUTPATIENT
Start: 2020-11-17 | End: 2020-11-20

## 2020-11-16 RX ORDER — VECURONIUM BROMIDE 20 MG/1
10 INJECTION, POWDER, FOR SOLUTION INTRAVENOUS ONCE
Refills: 0 | Status: COMPLETED | OUTPATIENT
Start: 2020-11-16 | End: 2020-11-16

## 2020-11-16 RX ORDER — SODIUM BICARBONATE 1 MEQ/ML
0.15 SYRINGE (ML) INTRAVENOUS
Qty: 150 | Refills: 0 | Status: DISCONTINUED | OUTPATIENT
Start: 2020-11-16 | End: 2020-11-16

## 2020-11-16 RX ORDER — PHENYLEPHRINE HYDROCHLORIDE 10 MG/ML
1.5 INJECTION INTRAVENOUS
Qty: 40 | Refills: 0 | Status: DISCONTINUED | OUTPATIENT
Start: 2020-11-16 | End: 2020-11-16

## 2020-11-16 RX ORDER — SODIUM CHLORIDE 9 MG/ML
10 INJECTION INTRAMUSCULAR; INTRAVENOUS; SUBCUTANEOUS
Refills: 0 | Status: DISCONTINUED | OUTPATIENT
Start: 2020-11-16 | End: 2020-12-18

## 2020-11-16 RX ORDER — SODIUM CHLORIDE 9 MG/ML
500 INJECTION INTRAMUSCULAR; INTRAVENOUS; SUBCUTANEOUS ONCE
Refills: 0 | Status: COMPLETED | OUTPATIENT
Start: 2020-11-16 | End: 2020-11-16

## 2020-11-16 RX ORDER — CEFTRIAXONE 500 MG/1
1000 INJECTION, POWDER, FOR SOLUTION INTRAMUSCULAR; INTRAVENOUS EVERY 24 HOURS
Refills: 0 | Status: COMPLETED | OUTPATIENT
Start: 2020-11-16 | End: 2020-11-20

## 2020-11-16 RX ORDER — MIDAZOLAM HYDROCHLORIDE 1 MG/ML
0.02 INJECTION, SOLUTION INTRAMUSCULAR; INTRAVENOUS
Qty: 100 | Refills: 0 | Status: DISCONTINUED | OUTPATIENT
Start: 2020-11-16 | End: 2020-11-16

## 2020-11-16 RX ORDER — CALCIUM GLUCONATE 100 MG/ML
2 VIAL (ML) INTRAVENOUS ONCE
Refills: 0 | Status: COMPLETED | OUTPATIENT
Start: 2020-11-16 | End: 2020-11-16

## 2020-11-16 RX ORDER — SODIUM BICARBONATE 1 MEQ/ML
0.09 SYRINGE (ML) INTRAVENOUS
Qty: 150 | Refills: 0 | Status: DISCONTINUED | OUTPATIENT
Start: 2020-11-16 | End: 2020-11-16

## 2020-11-16 RX ORDER — SODIUM CHLORIDE 9 MG/ML
1000 INJECTION INTRAMUSCULAR; INTRAVENOUS; SUBCUTANEOUS
Refills: 0 | Status: DISCONTINUED | OUTPATIENT
Start: 2020-11-16 | End: 2020-11-17

## 2020-11-16 RX ORDER — CISATRACURIUM BESYLATE 2 MG/ML
3 INJECTION INTRAVENOUS
Qty: 200 | Refills: 0 | Status: DISCONTINUED | OUTPATIENT
Start: 2020-11-16 | End: 2020-11-19

## 2020-11-16 RX ORDER — SODIUM CHLORIDE 9 MG/ML
500 INJECTION INTRAMUSCULAR; INTRAVENOUS; SUBCUTANEOUS ONCE
Refills: 0 | Status: DISCONTINUED | OUTPATIENT
Start: 2020-11-16 | End: 2020-11-16

## 2020-11-16 RX ADMIN — Medication 200 GRAM(S): at 15:00

## 2020-11-16 RX ADMIN — Medication 75 MEQ/KG/HR: at 05:05

## 2020-11-16 RX ADMIN — FENTANYL CITRATE 38.6 MICROGRAM(S)/KG/HR: 50 INJECTION INTRAVENOUS at 02:21

## 2020-11-16 RX ADMIN — PHENYLEPHRINE HYDROCHLORIDE 14.5 MICROGRAM(S)/KG/MIN: 10 INJECTION INTRAVENOUS at 10:21

## 2020-11-16 RX ADMIN — ENOXAPARIN SODIUM 40 MILLIGRAM(S): 100 INJECTION SUBCUTANEOUS at 18:39

## 2020-11-16 RX ADMIN — AZITHROMYCIN 255 MILLIGRAM(S): 500 TABLET, FILM COATED ORAL at 12:34

## 2020-11-16 RX ADMIN — MIDAZOLAM HYDROCHLORIDE 8.54 MG/KG/HR: 1 INJECTION, SOLUTION INTRAMUSCULAR; INTRAVENOUS at 21:45

## 2020-11-16 RX ADMIN — SODIUM CHLORIDE 75 MILLILITER(S): 9 INJECTION INTRAMUSCULAR; INTRAVENOUS; SUBCUTANEOUS at 18:40

## 2020-11-16 RX ADMIN — PROPOFOL 13.9 MICROGRAM(S)/KG/MIN: 10 INJECTION, EMULSION INTRAVENOUS at 01:11

## 2020-11-16 RX ADMIN — CISATRACURIUM BESYLATE 13.9 MICROGRAM(S)/KG/MIN: 2 INJECTION INTRAVENOUS at 23:47

## 2020-11-16 RX ADMIN — Medication 6 MILLIGRAM(S): at 05:06

## 2020-11-16 RX ADMIN — PHENYLEPHRINE HYDROCHLORIDE 14.5 MICROGRAM(S)/KG/MIN: 10 INJECTION INTRAVENOUS at 05:49

## 2020-11-16 RX ADMIN — FENTANYL CITRATE 38.6 MICROGRAM(S)/KG/HR: 50 INJECTION INTRAVENOUS at 21:45

## 2020-11-16 RX ADMIN — FENTANYL CITRATE 38.6 MICROGRAM(S)/KG/HR: 50 INJECTION INTRAVENOUS at 10:21

## 2020-11-16 RX ADMIN — ENOXAPARIN SODIUM 40 MILLIGRAM(S): 100 INJECTION SUBCUTANEOUS at 05:06

## 2020-11-16 RX ADMIN — PROPOFOL 13.9 MICROGRAM(S)/KG/MIN: 10 INJECTION, EMULSION INTRAVENOUS at 05:06

## 2020-11-16 RX ADMIN — FENTANYL CITRATE 38.6 MICROGRAM(S)/KG/HR: 50 INJECTION INTRAVENOUS at 03:24

## 2020-11-16 RX ADMIN — PHENYLEPHRINE HYDROCHLORIDE 14.5 MICROGRAM(S)/KG/MIN: 10 INJECTION INTRAVENOUS at 00:00

## 2020-11-16 RX ADMIN — Medication 1: at 05:07

## 2020-11-16 RX ADMIN — CHLORHEXIDINE GLUCONATE 1 APPLICATION(S): 213 SOLUTION TOPICAL at 05:07

## 2020-11-16 RX ADMIN — FENTANYL CITRATE 38.6 MICROGRAM(S)/KG/HR: 50 INJECTION INTRAVENOUS at 16:21

## 2020-11-16 RX ADMIN — CEFTRIAXONE 100 MILLIGRAM(S): 500 INJECTION, POWDER, FOR SOLUTION INTRAMUSCULAR; INTRAVENOUS at 12:35

## 2020-11-16 RX ADMIN — PROPOFOL 13.9 MICROGRAM(S)/KG/MIN: 10 INJECTION, EMULSION INTRAVENOUS at 02:22

## 2020-11-16 RX ADMIN — PHENYLEPHRINE HYDROCHLORIDE 43.4 MICROGRAM(S)/KG/MIN: 10 INJECTION INTRAVENOUS at 02:23

## 2020-11-16 RX ADMIN — VECURONIUM BROMIDE 10 MILLIGRAM(S): 20 INJECTION, POWDER, FOR SOLUTION INTRAVENOUS at 01:12

## 2020-11-16 RX ADMIN — CHLORHEXIDINE GLUCONATE 1 APPLICATION(S): 213 SOLUTION TOPICAL at 05:06

## 2020-11-16 RX ADMIN — CISATRACURIUM BESYLATE 13.9 MICROGRAM(S)/KG/MIN: 2 INJECTION INTRAVENOUS at 07:05

## 2020-11-16 RX ADMIN — SODIUM CHLORIDE 1000 MILLILITER(S): 9 INJECTION INTRAMUSCULAR; INTRAVENOUS; SUBCUTANEOUS at 02:21

## 2020-11-16 RX ADMIN — CISATRACURIUM BESYLATE 13.9 MICROGRAM(S)/KG/MIN: 2 INJECTION INTRAVENOUS at 02:21

## 2020-11-16 RX ADMIN — Medication 100 MILLIEQUIVALENT(S): at 05:06

## 2020-11-16 RX ADMIN — PANTOPRAZOLE SODIUM 40 MILLIGRAM(S): 20 TABLET, DELAYED RELEASE ORAL at 12:38

## 2020-11-16 RX ADMIN — PROPOFOL 13.9 MICROGRAM(S)/KG/MIN: 10 INJECTION, EMULSION INTRAVENOUS at 09:15

## 2020-11-16 NOTE — PROGRESS NOTE ADULT - ASSESSMENT
55 year old male from home ambulates independently with no significant PMHx or PSHx presents to the ED with his son with complaints of progressively worsening shortness of breath and difficulty breathing since testing positive for COVID-19 five days ago outpatient . Patient's son states that he brought patient into the ED as he had an oxygen saturation of 64% earlier today. Patient otherwise denies any fevers, chills, and all other acute complaints. Patient reports that currently multiple family members are also sick with COVID-19. Denies smoking, alcohol, illicit drug use. NKDA.  Oxygen saturation came up to 85-90% on 15 L nonrebreather, and patient appears less pale and is less short of breath. later Pt was desaturating on NRB 81% on NRB so was started on HFNC was still desaturated then got intubated in ED .  Pt will be transferred to ICU     Assessment:  1. Acute Hypoxic Respiratory failure   2. COVID Pneumonia   3. Acute Kidney Injury   4. elevated LFTs           Plan:    =====================[CNS ]==============================  #Intubated:   - sedated with fentanyl and Propofol (get TG levels q3d)    =====================[CVS ]===============================  # No active issues:   - BP currently acceptable   - monitor BP   -     =====================[RESP ]==============================  #Acute Hypoxic Respiratory failure :  - p/w worsening SOB   - PE : B/L expiatory wheezing with rhonchi  , short of breath, and is answering questions with one word responses with tachypnea,  - CXR : diffuse B/L opacities (f/u official repoprt)   - Leukocytosis 24 K   - lymphocyte; WNl   - pt is afebrile  - ABG WNL   - Lactate 8.1 -->2.0  - Oxygen saturation came up to 85-90% on 15 L nonrebreather, later Pt was desaturating on NRB 81% on NRB so was started on HFNC   then got intubated   - s/p 1 dose of Vanc and Zosyn in ED.  - contact and airborne isolation precaution , Aspiration Precautions .  - will start on decadron given requirments of O2   - prone therapy for lung recruitment if needed   - c/w zinc , vitamin d 50,000 U  - Supportive care , AntPyeretic Tylenol PRN   - Not a candidate for Remdisivir giving requirement for invasive mechanical ventilation   - f/u Lactate until Normalization q3hrs  - f/u RVP   - f/u Bl Cx   - f/u CXR AM  - f/u Procalcitonin    - f/u Coags , D-Dimers , ESR , CRP , LDH, ferritin , Cardiac enzymes   ** (please get CRP daily and get ESR ,D-Dimers , LDH, ferritin , Cardiac enzymes , Coags every 3 days)     =====================[ GI ]================================  #elevated LFTs:   - likely 2/2 covid   - f/u LFTs daily       ====================[ RENAL ]=============================   #Acute Kidney Injury:   - p/w BUN/Cr 49/2.2  - unknown baseline  - started IV fluids  - Monitor renal function , intake and output   - f/u BMP , Cr urine , Pr/Cr ratio , Urine lytes , Sodium urine , total protein urine  - f/u FeNa  - f/u UA           =====================[ ID ]================================  #COVID Pneumonia:  - p/w worsening SOB   - PE : B/L expiatory wheezing with rhonchi  , short of breath, and is answering questions with one word responses with tachypnea,  - CXR : diffuse B/L opacities (f/u official repoprt)   - Leukocytosis 24 K   - lymphocyte; WNl   - pt is afebrile  - ABG WNL   - Lactate 8.1 -->2.0  - Oxygen saturation came up to 85-90% on 15 L nonrebreather, later Pt was desaturating on NRB 81% on NRB so was started on HFNC   then got intubated   - s/p 1 dose of Vanc and Zosyn in ED.  - contact and airborne isolation precaution , Aspiration Precautions .  - will start on decadron given requirments of O2   - prone therapy for lung recruitment if needed   - c/w zinc , vitamin d 50,000 U  - Supportive care , AntPyeretic Tylenol PRN   - Not a candidate for Remdesvir giving requoirment of HFNC   - f/u Lactate until Normalization q3hrs  - f/u RVP   - f/u Bl Cx   - f/u CXR AM  - f/u Procalcitonin    - f/u Coags , D-Dimers , ESR , CRP , LDH, ferritin , Cardiac enzymes   ** (please get CRP daily and get ESR ,D-Dimers , LDH, ferritin , Cardiac enzymes , Coags every 3 days)      ===================[ HEME/ONC ]===========================  # No active issues:  - elevated D-Dimers 559 likely due to COVID   - monitor CBC daily     =====================[ ENDO ]=============================  # No active issues:  - target CBG < 180  - f/u HgA1c   - c/w NPO and FSQ6   - HSS while on Steroids     ================= Skin/Catheters============================  # No active issues   - No rashes.  - Chris     ==================[ PROPHYLAXIS ]==========================   # Dvt : Lovenox BID and SCD (BMI>30, ICU admission)  # Gi : Protonix     ====================[ DISPO ]==============================   - Monitor in ICU     ===================[ GOC ]===========================  - Full Code      55 year old male from home ambulates independently with no significant PMHx or PSHx presents to the ED with his son with complaints of progressively worsening shortness of breath and difficulty breathing since testing positive for COVID-19 five days ago outpatient . Patient's son states that he brought patient into the ED as he had an oxygen saturation of 64% earlier today. Patient otherwise denies any fevers, chills, and all other acute complaints. Patient reports that currently multiple family members are also sick with COVID-19. Denies smoking, alcohol, illicit drug use. NKDA.  Oxygen saturation came up to 85-90% on 15 L nonrebreather, and patient appears less pale and is less short of breath. later Pt was desaturating on NRB 81% on NRB so was started on HFNC was still desaturated then got intubated in ED .  Pt will be transferred to ICU     Assessment:  1. Acute Hypoxic Respiratory failure   2. COVID Pneumonia   3. Acute Kidney Injury   4. elevated LFTs           Plan:    =====================[CNS ]==============================  #Intubated:   - sedated with fentany  - Propofol changed to versed for TG elevation    =====================[CVS ]===============================  # No active issues:   - BP currently acceptable   - BP noted to be stable  Monitor BP       =====================[RESP ]==============================  #Acute Hypoxic Respiratory failure :  - PE : B/L expiatory wheezing with rhonchi  , short of breath, and is answering questions with one word responses with tachypnea,  - CXR : diffuse B/L opacities   - Leukocytosis 24 K , procalcitonin 1.8   - ABG - 7.22/ 55/67/22( Improved since am) on PC 25/20/80/14  - Lactate 8.1 -->2.0/ 1.5   - Started on Cef and Azithro for CAP coverage   - c/w decadron 6mg X 10 days   - prone therapy for lung recruitment if needed   - Not a candidate for Remdisivir as RFT and LFT's are elevated   - f/u Bl Cx    - f/u Coags , D-Dimers , ESR , CRP , LDH, ferritin , Cardiac enzymes  daily   ** (please get CRP daily and get ESR ,D-Dimers , LDH, ferritin , Cardiac enzymes , Coags every 3 days)     =====================[ GI ]================================  #elevated LFTs:   - likely 2/2 covid   - f/u LFTs daily       ====================[ RENAL ]=============================   #Acute Kidney Injury:   - p/w BUN/Cr 49/2.2, unknown baseline  Cr - 2.2  - Monitor renal function , intake and output             =====================[ ID ]================================  #COVID Pneumonia:  - p/w worsening SOB   - PE : B/L expiatory wheezing with rhonchi  , short of breath, and is answering questions with one word responses with tachypnea,  - CXR : diffuse B/L opacities   - Leukocytosis 24 K   - lymphocyte; WNl   - ABG - 7.22/ 55/67/22( Improved since am) on PC 25/20/80/14  - Lactate 8.1 -->2.0/ 1.5   - Started on Cef and Azithro for CAP coverage   - c/w decadron 6mg X 10 days - s/p 1 dose of Vanc and Zosyn in ED.  - contact and airborne isolation precaution , Aspiration Precautions .  - prone therapy for lung recruitment if needed   - c/w zinc , vitamin d 50,000 U  - Supportive care , AntPyeretic Tylenol PRN   - not a candidate of remdesevir due to elevated RFT and LFTS   - f/u Coags , D-Dimers , ESR , CRP , LDH, ferritin , Cardiac enzymes   ** (please get CRP daily and get ESR ,D-Dimers , LDH, ferritin , Cardiac enzymes , Coags every 3 days)      ===================[ HEME/ONC ]===========================  # No active issues:  - elevated D-Dimers 559 likely due to COVID   - monitor CBC daily     =====================[ ENDO ]=============================  # No active issues:  - target CBG < 180  - f/u HgA1c   - c/w NPO and FSQ6   - HSS while on Steroids     ================= Skin/Catheters============================  # No active issues   - No rashes.  - Chris     ==================[ PROPHYLAXIS ]==========================   # Dvt : Lovenox BID and SCD (BMI>30, ICU admission)  # Gi : Protonix     ====================[ DISPO ]==============================   - Monitor in ICU     ===================[ GOC ]===========================  - Full Code

## 2020-11-16 NOTE — PROGRESS NOTE ADULT - SUBJECTIVE AND OBJECTIVE BOX
INTERVAL HPI/OVERNIGHT EVENTS: ***    PRESSORS: [ ] YES [ ] NO  WHICH:    ANTIBIOTICS:                  DATE STARTED:  ANTIBIOTICS:                  DATE STARTED:  ANTIBIOTICS:                  DATE STARTED:    Antimicrobial:  azithromycin  IVPB      azithromycin  IVPB 500 milliGRAM(s) IV Intermittent once  cefTRIAXone   IVPB 1000 milliGRAM(s) IV Intermittent every 24 hours    Cardiovascular:  phenylephrine    Infusion 0.502 MICROgram(s)/kG/Min IV Continuous <Continuous>    Pulmonary:    Hematalogic:  enoxaparin Injectable 40 milliGRAM(s) SubCutaneous every 12 hours    Other:  acetaminophen   Tablet .. 650 milliGRAM(s) Oral every 6 hours PRN  chlorhexidine 2% Cloths 1 Application(s) Topical <User Schedule>  cisatracurium Infusion 3 MICROgram(s)/kG/Min IV Continuous <Continuous>  dexAMETHasone  Injectable 6 milliGRAM(s) IV Push daily  ergocalciferol 46325 Unit(s) Oral <User Schedule>  etomidate Injectable 20 milliGRAM(s) IV Push once  fentaNYL   Infusion. 5 MICROgram(s)/kG/Hr IV Continuous <Continuous>  insulin lispro (ADMELOG) corrective regimen sliding scale   SubCutaneous every 6 hours  midazolam Infusion 0.02 mG/kG/Hr IV Continuous <Continuous>  pantoprazole  Injectable 40 milliGRAM(s) IV Push daily  sodium bicarbonate  Infusion 0.146 mEq/kG/Hr IV Continuous <Continuous>  sodium chloride 0.9% lock flush 10 milliLiter(s) IV Push every 1 hour PRN  zinc sulfate 220 milliGRAM(s) Oral daily    acetaminophen   Tablet .. 650 milliGRAM(s) Oral every 6 hours PRN  azithromycin  IVPB      azithromycin  IVPB 500 milliGRAM(s) IV Intermittent once  cefTRIAXone   IVPB 1000 milliGRAM(s) IV Intermittent every 24 hours  chlorhexidine 2% Cloths 1 Application(s) Topical <User Schedule>  cisatracurium Infusion 3 MICROgram(s)/kG/Min IV Continuous <Continuous>  dexAMETHasone  Injectable 6 milliGRAM(s) IV Push daily  enoxaparin Injectable 40 milliGRAM(s) SubCutaneous every 12 hours  ergocalciferol 74044 Unit(s) Oral <User Schedule>  etomidate Injectable 20 milliGRAM(s) IV Push once  fentaNYL   Infusion. 5 MICROgram(s)/kG/Hr IV Continuous <Continuous>  insulin lispro (ADMELOG) corrective regimen sliding scale   SubCutaneous every 6 hours  midazolam Infusion 0.02 mG/kG/Hr IV Continuous <Continuous>  pantoprazole  Injectable 40 milliGRAM(s) IV Push daily  phenylephrine    Infusion 0.502 MICROgram(s)/kG/Min IV Continuous <Continuous>  sodium bicarbonate  Infusion 0.146 mEq/kG/Hr IV Continuous <Continuous>  sodium chloride 0.9% lock flush 10 milliLiter(s) IV Push every 1 hour PRN  zinc sulfate 220 milliGRAM(s) Oral daily    Drug Dosing Weight  Height (cm): 157.5 (15 Nov 2020 23:00)  Weight (kg): 85.4 (15 Nov 2020 23:00)  BMI (kg/m2): 34.4 (15 Nov 2020 23:00)  BSA (m2): 1.86 (15 Nov 2020 23:00)    CENTRAL LINE: [ ] YES [ ] NO  LOCATION:         ANN: [ ] YES [ ] NO          A-LINE:  [ ] YES [ ] NO  LOCATION:             ICU Vital Signs Last 24 Hrs  T(C): 37.9 (2020 07:15), Max: 37.9 (2020 07:15)  T(F): 100.3 (2020 07:15), Max: 100.3 (2020 07:15)  HR: 85 (2020 10:15) (77 - 124)  BP: 109/65 (2020 10:15) (78/50 - 147/73)  BP(mean): 76 (2020 10:15) (34 - 108)  ABP: --  ABP(mean): --  RR: 25 (2020 10:15) (0 - 30)  SpO2: 96% (2020 10:15) (64% - 98%)      ABG - ( 2020 09:03 )  pH, Arterial: 7.22  pH, Blood: x     /  pCO2: 55    /  pO2: 67    / HCO3: 22    / Base Excess: -6.1  /  SaO2: 91                    15 @ 07:01  -   @ 07:00  --------------------------------------------------------  IN: 1320.7 mL / OUT: 475 mL / NET: 845.7 mL        Mode: AC/ CMV (Assist Control/ Continuous Mandatory Ventilation)  RR (machine): 25  FiO2: 80  PEEP: 14  ITime: 1.1  MAP: 23  PC: 20  PIP: 34      REVIEW OF SYSTEMS:    CONSTITUTIONAL: No weakness, fevers or chills  NECK: No pain or stiffness  RESPIRATORY: No cough, wheezing, hemoptysis; No shortness of breath  CARDIOVASCULAR: No chest pain or palpitations  GASTROINTESTINAL: No abdominal or epigastric pain. No nausea, vomiting, No diarrhea or constipation. No melena or hematochezia.  GENITOURINARY: No dysuria, frequency or hematuria  NEUROLOGICAL: No numbness or weakness  All other review of systems is negative unless indicated above      PHYSICAL EXAM:    GENERAL: NAD, well-groomed, well-developed  EYES: EOMI, PERRLA,   NECK: Supple, No JVD; Normal thyroid; Trachea midline  NERVOUS SYSTEM:  Alert & Oriented X3,  Motor Strength 5/5 B/L upper and lower extremities; DTRs 2+ intact and symmetric  CHEST/LUNG: No rales, rhonchi, wheezing   HEART: Regular rate and rhythm; No murmurs,   ABDOMEN: Soft, Nontender, Nondistended; Bowel sounds present  EXTREMITIES:  2+ Peripheral Pulses, No clubbing, cyanosis, or edema        LABS:  CBC Full  -  ( 2020 05:48 )  WBC Count : 25.66 K/uL  RBC Count : 4.34 M/uL  Hemoglobin : 12.8 g/dL  Hematocrit : 41.5 %  Platelet Count - Automated : 336 K/uL  Mean Cell Volume : 95.6 fl  Mean Cell Hemoglobin : 29.5 pg  Mean Cell Hemoglobin Concentration : 30.8 gm/dL  Auto Neutrophil # : 23.35 K/uL  Auto Lymphocyte # : 1.28 K/uL  Auto Monocyte # : 1.03 K/uL  Auto Eosinophil # : 0.00 K/uL  Auto Basophil # : 0.00 K/uL  Auto Neutrophil % : 84.0 %  Auto Lymphocyte % : 5.0 %  Auto Monocyte % : 4.0 %  Auto Eosinophil % : 0.0 %  Auto Basophil % : 0.0 %        139  |  110<H>  |  40<H>  ----------------------------<  152<H>  4.9   |  21<L>  |  1.88<H>    Ca    6.8<L>      2020 05:48  Phos  6.1       Mg     3.0         TPro  7.0  /  Alb  2.3<L>  /  TBili  0.5  /  DBili  x   /  AST  285<H>  /  ALT  125<H>  /  AlkPhos  80  -    PT/INR - ( 15 Nov 2020 18:39 )   PT: 14.2 sec;   INR: 1.20 ratio         PTT - ( 15 Nov 2020 18:39 )  PTT:35.1 sec  Urinalysis Basic - ( 2020 01:30 )    Color: Yellow / Appearance: Clear / S.020 / pH: x  Gluc: x / Ketone: Negative  / Bili: Negative / Urobili: Negative   Blood: x / Protein: 100 / Nitrite: Negative   Leuk Esterase: Negative / RBC: 2-5 /HPF / WBC 0-2 /HPF   Sq Epi: x / Non Sq Epi: Few /HPF / Bacteria: Moderate /HPF          RADIOLOGY & ADDITIONAL STUDIES REVIEWED:  ***    [ ]GOALS OF CARE DISCUSSION WITH PATIENT/FAMILY/PROXY:    CRITICAL CARE TIME SPENT: 35 minutes INTERVAL HPI/OVERNIGHT EVENTS: Pt noted to have Elevated troponins. Pt has abnormal RFT and LFT and thus not a  candidate for Remdesevir. Pt noted to have hypocalcemia ( corrected 7.6), replaced. TG elevation noted. Propofol changed to versed      PRESSORS: [x ] YES [ ] NO  WHICH: PE     ANTIBIOTICS:                  DATE STARTED:  ANTIBIOTICS:                  DATE STARTED:  ANTIBIOTICS:                  DATE STARTED:    Antimicrobial:  azithromycin  IVPB      azithromycin  IVPB 500 milliGRAM(s) IV Intermittent once  cefTRIAXone   IVPB 1000 milliGRAM(s) IV Intermittent every 24 hours    Cardiovascular:  phenylephrine    Infusion 0.502 MICROgram(s)/kG/Min IV Continuous <Continuous>    Pulmonary:    Hematalogic:  enoxaparin Injectable 40 milliGRAM(s) SubCutaneous every 12 hours    Other:  acetaminophen   Tablet .. 650 milliGRAM(s) Oral every 6 hours PRN  chlorhexidine 2% Cloths 1 Application(s) Topical <User Schedule>  cisatracurium Infusion 3 MICROgram(s)/kG/Min IV Continuous <Continuous>  dexAMETHasone  Injectable 6 milliGRAM(s) IV Push daily  ergocalciferol 67581 Unit(s) Oral <User Schedule>  etomidate Injectable 20 milliGRAM(s) IV Push once  fentaNYL   Infusion. 5 MICROgram(s)/kG/Hr IV Continuous <Continuous>  insulin lispro (ADMELOG) corrective regimen sliding scale   SubCutaneous every 6 hours  midazolam Infusion 0.02 mG/kG/Hr IV Continuous <Continuous>  pantoprazole  Injectable 40 milliGRAM(s) IV Push daily  sodium bicarbonate  Infusion 0.146 mEq/kG/Hr IV Continuous <Continuous>  sodium chloride 0.9% lock flush 10 milliLiter(s) IV Push every 1 hour PRN  zinc sulfate 220 milliGRAM(s) Oral daily    acetaminophen   Tablet .. 650 milliGRAM(s) Oral every 6 hours PRN  azithromycin  IVPB      azithromycin  IVPB 500 milliGRAM(s) IV Intermittent once  cefTRIAXone   IVPB 1000 milliGRAM(s) IV Intermittent every 24 hours  chlorhexidine 2% Cloths 1 Application(s) Topical <User Schedule>  cisatracurium Infusion 3 MICROgram(s)/kG/Min IV Continuous <Continuous>  dexAMETHasone  Injectable 6 milliGRAM(s) IV Push daily  enoxaparin Injectable 40 milliGRAM(s) SubCutaneous every 12 hours  ergocalciferol 71619 Unit(s) Oral <User Schedule>  etomidate Injectable 20 milliGRAM(s) IV Push once  fentaNYL   Infusion. 5 MICROgram(s)/kG/Hr IV Continuous <Continuous>  insulin lispro (ADMELOG) corrective regimen sliding scale   SubCutaneous every 6 hours  midazolam Infusion 0.02 mG/kG/Hr IV Continuous <Continuous>  pantoprazole  Injectable 40 milliGRAM(s) IV Push daily  phenylephrine    Infusion 0.502 MICROgram(s)/kG/Min IV Continuous <Continuous>  sodium bicarbonate  Infusion 0.146 mEq/kG/Hr IV Continuous <Continuous>  sodium chloride 0.9% lock flush 10 milliLiter(s) IV Push every 1 hour PRN  zinc sulfate 220 milliGRAM(s) Oral daily    Drug Dosing Weight  Height (cm): 157.5 (15 Nov 2020 23:00)  Weight (kg): 85.4 (15 Nov 2020 23:00)  BMI (kg/m2): 34.4 (15 Nov 2020 23:00)  BSA (m2): 1.86 (15 Nov 2020 23:00)    CENTRAL LINE: [ X] YES [ ] NO  LOCATION:     [facility] 11/15         ANN: [ X] YES [ ] NO          A-LINE:  [ ] YES [ ] NO  LOCATION:             ICU Vital Signs Last 24 Hrs  T(C): 37.9 (2020 07:15), Max: 37.9 (2020 07:15)  T(F): 100.3 (2020 07:15), Max: 100.3 (2020 07:15)  HR: 85 (2020 10:15) (77 - 124)  BP: 109/65 (2020 10:15) (78/50 - 147/73)  BP(mean): 76 (2020 10:15) (34 - 108)  ABP: --  ABP(mean): --  RR: 25 (2020 10:15) (0 - 30)  SpO2: 96% (2020 10:15) (64% - 98%)      ABG - ( 2020 09:03 )  pH, Arterial: 7.22  pH, Blood: x     /  pCO2: 55    /  pO2: 67    / HCO3: 22    / Base Excess: -6.1  /  SaO2: 91                    11-15 @ 07:01  -   @ 07:00  --------------------------------------------------------  IN: 1320.7 mL / OUT: 475 mL / NET: 845.7 mL        Mode: AC/ CMV (Assist Control/ Continuous Mandatory Ventilation)  RR (machine): 25  FiO2: 80  PEEP: 14  ITime: 1.1  MAP: 23  PC: 20  PIP: 34      REVIEW OF SYSTEMS:  could not be obatined as pt is on sedation     PHYSICAL EXAM:    GENERAL: Young ,male, on sedation.   EYES: symm pupillary reaction  NECK: Supple, No JVD; Normal thyroid; Trachea midline  NERVOUS SYSTEM:  sedated for ETT. Eyes open   CHEST/LUNG: b/l diff crackles   HEART: Regular rate and rhythm; No murmurs,   ABDOMEN: Soft, Nontender, Nondistended; Bowel sounds present  EXTREMITIES:  2+ Peripheral Pulses, No clubbing, cyanosis, or edema        LABS:  CBC Full  -  ( 2020 05:48 )  WBC Count : 25.66 K/uL  RBC Count : 4.34 M/uL  Hemoglobin : 12.8 g/dL  Hematocrit : 41.5 %  Platelet Count - Automated : 336 K/uL  Mean Cell Volume : 95.6 fl  Mean Cell Hemoglobin : 29.5 pg  Mean Cell Hemoglobin Concentration : 30.8 gm/dL  Auto Neutrophil # : 23.35 K/uL  Auto Lymphocyte # : 1.28 K/uL  Auto Monocyte # : 1.03 K/uL  Auto Eosinophil # : 0.00 K/uL  Auto Basophil # : 0.00 K/uL  Auto Neutrophil % : 84.0 %  Auto Lymphocyte % : 5.0 %  Auto Monocyte % : 4.0 %  Auto Eosinophil % : 0.0 %  Auto Basophil % : 0.0 %        139  |  110<H>  |  40<H>  ----------------------------<  152<H>  4.9   |  21<L>  |  1.88<H>    Ca    6.8<L>      2020 05:48  Phos  6.1       Mg     3.0         TPro  7.0  /  Alb  2.3<L>  /  TBili  0.5  /  DBili  x   /  AST  285<H>  /  ALT  125<H>  /  AlkPhos  80      PT/INR - ( 15 Nov 2020 18:39 )   PT: 14.2 sec;   INR: 1.20 ratio         PTT - ( 15 Nov 2020 18:39 )  PTT:35.1 sec  Urinalysis Basic - ( 2020 01:30 )    Color: Yellow / Appearance: Clear / S.020 / pH: x  Gluc: x / Ketone: Negative  / Bili: Negative / Urobili: Negative   Blood: x / Protein: 100 / Nitrite: Negative   Leuk Esterase: Negative / RBC: 2-5 /HPF / WBC 0-2 /HPF   Sq Epi: x / Non Sq Epi: Few /HPF / Bacteria: Moderate /HPF          RADIOLOGY & ADDITIONAL STUDIES REVIEWED:  YES     [ ]GOALS OF CARE DISCUSSION WITH PATIENT/FAMILY/PROXY:    CRITICAL CARE TIME SPENT: 35 minutes

## 2020-11-17 LAB
ALBUMIN SERPL ELPH-MCNC: 1.9 G/DL — LOW (ref 3.5–5)
ALP SERPL-CCNC: 75 U/L — SIGNIFICANT CHANGE UP (ref 40–120)
ALT FLD-CCNC: 145 U/L DA — HIGH (ref 10–60)
ANION GAP SERPL CALC-SCNC: 8 MMOL/L — SIGNIFICANT CHANGE UP (ref 5–17)
APTT BLD: 30.2 SEC — SIGNIFICANT CHANGE UP (ref 27.5–35.5)
AST SERPL-CCNC: 169 U/L — HIGH (ref 10–40)
BASE EXCESS BLDA CALC-SCNC: -3.8 MMOL/L — LOW (ref -2–2)
BASOPHILS # BLD AUTO: 0 K/UL — SIGNIFICANT CHANGE UP (ref 0–0.2)
BASOPHILS NFR BLD AUTO: 0 % — SIGNIFICANT CHANGE UP (ref 0–2)
BILIRUB SERPL-MCNC: 0.3 MG/DL — SIGNIFICANT CHANGE UP (ref 0.2–1.2)
BLOOD GAS COMMENTS ARTERIAL: SIGNIFICANT CHANGE UP
BUN SERPL-MCNC: 56 MG/DL — HIGH (ref 7–18)
CALCIUM SERPL-MCNC: 6.7 MG/DL — LOW (ref 8.4–10.5)
CHLORIDE SERPL-SCNC: 110 MMOL/L — HIGH (ref 96–108)
CK MB BLD-MCNC: 4.1 % — HIGH (ref 0–3.5)
CK MB CFR SERPL CALC: 5 NG/ML — HIGH (ref 0–3.6)
CK SERPL-CCNC: 121 U/L — SIGNIFICANT CHANGE UP (ref 35–232)
CO2 SERPL-SCNC: 24 MMOL/L — SIGNIFICANT CHANGE UP (ref 22–31)
CREAT SERPL-MCNC: 3.33 MG/DL — HIGH (ref 0.5–1.3)
CRP SERPL-MCNC: 16.47 MG/DL — HIGH (ref 0–0.4)
CULTURE RESULTS: NO GROWTH — SIGNIFICANT CHANGE UP
EOSINOPHIL # BLD AUTO: 0 K/UL — SIGNIFICANT CHANGE UP (ref 0–0.5)
EOSINOPHIL NFR BLD AUTO: 0 % — SIGNIFICANT CHANGE UP (ref 0–6)
GLUCOSE SERPL-MCNC: 150 MG/DL — HIGH (ref 70–99)
GRAM STN FLD: SIGNIFICANT CHANGE UP
HCO3 BLDA-SCNC: 22 MMOL/L — LOW (ref 23–27)
HCT VFR BLD CALC: 35.1 % — LOW (ref 39–50)
HGB BLD-MCNC: 10.7 G/DL — LOW (ref 13–17)
HOROWITZ INDEX BLDA+IHG-RTO: 70 — SIGNIFICANT CHANGE UP
INR BLD: 1.05 RATIO — SIGNIFICANT CHANGE UP (ref 0.88–1.16)
LG PLATELETS BLD QL AUTO: SLIGHT — SIGNIFICANT CHANGE UP
LYMPHOCYTES # BLD AUTO: 1.16 K/UL — SIGNIFICANT CHANGE UP (ref 1–3.3)
LYMPHOCYTES # BLD AUTO: 10 % — LOW (ref 13–44)
MACROCYTES BLD QL: SLIGHT — SIGNIFICANT CHANGE UP
MAGNESIUM SERPL-MCNC: 2.6 MG/DL — SIGNIFICANT CHANGE UP (ref 1.6–2.6)
MANUAL SMEAR VERIFICATION: SIGNIFICANT CHANGE UP
MCHC RBC-ENTMCNC: 28.9 PG — SIGNIFICANT CHANGE UP (ref 27–34)
MCHC RBC-ENTMCNC: 30.5 GM/DL — LOW (ref 32–36)
MCV RBC AUTO: 94.9 FL — SIGNIFICANT CHANGE UP (ref 80–100)
MONOCYTES # BLD AUTO: 0.46 K/UL — SIGNIFICANT CHANGE UP (ref 0–0.9)
MONOCYTES NFR BLD AUTO: 4 % — SIGNIFICANT CHANGE UP (ref 2–14)
NEUTROPHILS # BLD AUTO: 9.83 K/UL — HIGH (ref 1.8–7.4)
NEUTROPHILS NFR BLD AUTO: 83 % — HIGH (ref 43–77)
NEUTS BAND # BLD: 2 % — SIGNIFICANT CHANGE UP (ref 0–8)
NRBC # BLD: 0 /100 — SIGNIFICANT CHANGE UP (ref 0–0)
PCO2 BLDA: 47 MMHG — HIGH (ref 32–46)
PH BLDA: 7.3 — LOW (ref 7.35–7.45)
PHOSPHATE SERPL-MCNC: 5.6 MG/DL — HIGH (ref 2.5–4.5)
PLAT MORPH BLD: NORMAL — SIGNIFICANT CHANGE UP
PLATELET # BLD AUTO: 237 K/UL — SIGNIFICANT CHANGE UP (ref 150–400)
PO2 BLDA: 92 MMHG — SIGNIFICANT CHANGE UP (ref 74–108)
POLYCHROMASIA BLD QL SMEAR: SLIGHT — SIGNIFICANT CHANGE UP
POTASSIUM SERPL-MCNC: 4.6 MMOL/L — SIGNIFICANT CHANGE UP (ref 3.5–5.3)
POTASSIUM SERPL-SCNC: 4.6 MMOL/L — SIGNIFICANT CHANGE UP (ref 3.5–5.3)
PROT SERPL-MCNC: 5.8 G/DL — LOW (ref 6–8.3)
PROTHROM AB SERPL-ACNC: 12.5 SEC — SIGNIFICANT CHANGE UP (ref 10.6–13.6)
RBC # BLD: 3.7 M/UL — LOW (ref 4.2–5.8)
RBC # FLD: 14.2 % — SIGNIFICANT CHANGE UP (ref 10.3–14.5)
RBC BLD AUTO: ABNORMAL
SAO2 % BLDA: 97 % — HIGH (ref 92–96)
SARS-COV-2 IGG SERPL QL IA: POSITIVE
SARS-COV-2 IGM SERPL IA-ACNC: 3.96 INDEX — HIGH
SODIUM SERPL-SCNC: 142 MMOL/L — SIGNIFICANT CHANGE UP (ref 135–145)
SPECIMEN SOURCE: SIGNIFICANT CHANGE UP
SPECIMEN SOURCE: SIGNIFICANT CHANGE UP
TRIGL SERPL-MCNC: 348 MG/DL — HIGH
TROPONIN I SERPL-MCNC: 2.11 NG/ML — HIGH (ref 0–0.04)
VARIANT LYMPHS # BLD: 1 % — SIGNIFICANT CHANGE UP (ref 0–6)
WBC # BLD: 11.56 K/UL — HIGH (ref 3.8–10.5)
WBC # FLD AUTO: 11.56 K/UL — HIGH (ref 3.8–10.5)

## 2020-11-17 PROCEDURE — 71045 X-RAY EXAM CHEST 1 VIEW: CPT | Mod: 26

## 2020-11-17 PROCEDURE — 71045 X-RAY EXAM CHEST 1 VIEW: CPT | Mod: 26,77

## 2020-11-17 RX ORDER — SENNA PLUS 8.6 MG/1
2 TABLET ORAL AT BEDTIME
Refills: 0 | Status: DISCONTINUED | OUTPATIENT
Start: 2020-11-17 | End: 2020-11-17

## 2020-11-17 RX ORDER — SENNA PLUS 8.6 MG/1
10 TABLET ORAL AT BEDTIME
Refills: 0 | Status: DISCONTINUED | OUTPATIENT
Start: 2020-11-17 | End: 2020-12-05

## 2020-11-17 RX ORDER — ERGOCALCIFEROL 1.25 MG/1
50000 CAPSULE ORAL
Refills: 0 | Status: DISCONTINUED | OUTPATIENT
Start: 2020-11-17 | End: 2020-11-20

## 2020-11-17 RX ORDER — FUROSEMIDE 40 MG
80 TABLET ORAL ONCE
Refills: 0 | Status: COMPLETED | OUTPATIENT
Start: 2020-11-17 | End: 2020-11-17

## 2020-11-17 RX ORDER — ACETAMINOPHEN 500 MG
650 TABLET ORAL EVERY 6 HOURS
Refills: 0 | Status: DISCONTINUED | OUTPATIENT
Start: 2020-11-17 | End: 2020-12-06

## 2020-11-17 RX ORDER — FUROSEMIDE 40 MG
40 TABLET ORAL ONCE
Refills: 0 | Status: COMPLETED | OUTPATIENT
Start: 2020-11-17 | End: 2020-11-17

## 2020-11-17 RX ORDER — HEPARIN SODIUM 5000 [USP'U]/ML
INJECTION INTRAVENOUS; SUBCUTANEOUS
Qty: 25000 | Refills: 0 | Status: DISCONTINUED | OUTPATIENT
Start: 2020-11-17 | End: 2020-11-17

## 2020-11-17 RX ORDER — FENTANYL CITRATE 50 UG/ML
3 INJECTION INTRAVENOUS
Qty: 2500 | Refills: 0 | Status: DISCONTINUED | OUTPATIENT
Start: 2020-11-17 | End: 2020-11-24

## 2020-11-17 RX ORDER — FUROSEMIDE 40 MG
80 TABLET ORAL ONCE
Refills: 0 | Status: DISCONTINUED | OUTPATIENT
Start: 2020-11-17 | End: 2020-11-17

## 2020-11-17 RX ORDER — HEPARIN SODIUM 5000 [USP'U]/ML
INJECTION INTRAVENOUS; SUBCUTANEOUS
Qty: 25000 | Refills: 0 | Status: DISCONTINUED | OUTPATIENT
Start: 2020-11-17 | End: 2020-11-23

## 2020-11-17 RX ORDER — HEPARIN SODIUM 5000 [USP'U]/ML
5000 INJECTION INTRAVENOUS; SUBCUTANEOUS ONCE
Refills: 0 | Status: DISCONTINUED | OUTPATIENT
Start: 2020-11-17 | End: 2020-11-17

## 2020-11-17 RX ORDER — HEPARIN SODIUM 5000 [USP'U]/ML
5000 INJECTION INTRAVENOUS; SUBCUTANEOUS EVERY 6 HOURS
Refills: 0 | Status: DISCONTINUED | OUTPATIENT
Start: 2020-11-17 | End: 2020-11-17

## 2020-11-17 RX ORDER — POLYETHYLENE GLYCOL 3350 17 G/17G
17 POWDER, FOR SOLUTION ORAL DAILY
Refills: 0 | Status: DISCONTINUED | OUTPATIENT
Start: 2020-11-17 | End: 2020-12-04

## 2020-11-17 RX ADMIN — HEPARIN SODIUM 1500 UNIT(S)/HR: 5000 INJECTION INTRAVENOUS; SUBCUTANEOUS at 18:47

## 2020-11-17 RX ADMIN — Medication 1 DROP(S): at 05:08

## 2020-11-17 RX ADMIN — SENNA PLUS 10 MILLILITER(S): 8.6 TABLET ORAL at 21:18

## 2020-11-17 RX ADMIN — FENTANYL CITRATE 38.6 MICROGRAM(S)/KG/HR: 50 INJECTION INTRAVENOUS at 08:24

## 2020-11-17 RX ADMIN — Medication 1: at 00:12

## 2020-11-17 RX ADMIN — FENTANYL CITRATE 38.6 MICROGRAM(S)/KG/HR: 50 INJECTION INTRAVENOUS at 11:43

## 2020-11-17 RX ADMIN — MIDAZOLAM HYDROCHLORIDE 8.54 MG/KG/HR: 1 INJECTION, SOLUTION INTRAMUSCULAR; INTRAVENOUS at 06:29

## 2020-11-17 RX ADMIN — ENOXAPARIN SODIUM 40 MILLIGRAM(S): 100 INJECTION SUBCUTANEOUS at 05:05

## 2020-11-17 RX ADMIN — MIDAZOLAM HYDROCHLORIDE 8.54 MG/KG/HR: 1 INJECTION, SOLUTION INTRAMUSCULAR; INTRAVENOUS at 08:24

## 2020-11-17 RX ADMIN — Medication 40 MILLIGRAM(S): at 06:18

## 2020-11-17 RX ADMIN — Medication 116 MILLIGRAM(S): at 21:18

## 2020-11-17 RX ADMIN — CHLORHEXIDINE GLUCONATE 1 APPLICATION(S): 213 SOLUTION TOPICAL at 05:05

## 2020-11-17 RX ADMIN — ZINC SULFATE TAB 220 MG (50 MG ZINC EQUIVALENT) 220 MILLIGRAM(S): 220 (50 ZN) TAB at 11:43

## 2020-11-17 RX ADMIN — PANTOPRAZOLE SODIUM 40 MILLIGRAM(S): 20 TABLET, DELAYED RELEASE ORAL at 11:43

## 2020-11-17 RX ADMIN — FENTANYL CITRATE 38.6 MICROGRAM(S)/KG/HR: 50 INJECTION INTRAVENOUS at 20:33

## 2020-11-17 RX ADMIN — POLYETHYLENE GLYCOL 3350 17 GRAM(S): 17 POWDER, FOR SOLUTION ORAL at 16:35

## 2020-11-17 RX ADMIN — AZITHROMYCIN 255 MILLIGRAM(S): 500 TABLET, FILM COATED ORAL at 11:43

## 2020-11-17 RX ADMIN — CISATRACURIUM BESYLATE 13.9 MICROGRAM(S)/KG/MIN: 2 INJECTION INTRAVENOUS at 23:17

## 2020-11-17 RX ADMIN — Medication 1: at 23:37

## 2020-11-17 RX ADMIN — Medication 6 MILLIGRAM(S): at 05:05

## 2020-11-17 RX ADMIN — Medication 1 DROP(S): at 08:24

## 2020-11-17 RX ADMIN — CEFTRIAXONE 100 MILLIGRAM(S): 500 INJECTION, POWDER, FOR SOLUTION INTRAMUSCULAR; INTRAVENOUS at 11:43

## 2020-11-17 RX ADMIN — CISATRACURIUM BESYLATE 13.9 MICROGRAM(S)/KG/MIN: 2 INJECTION INTRAVENOUS at 11:43

## 2020-11-17 NOTE — PROGRESS NOTE ADULT - ASSESSMENT
55M from home without PMH with progressively worsening SOB and +COVID test x5 days prior to admission presented with SpO2 64% at home, intubated 2/2 deteriorating respiratory status and admitted to ICU.    Assessment:  1. Acute Hypoxic Respiratory failure   2. COVID Pneumonia   3. Acute Kidney Injury   4. elevated LFTs           Plan:    CNS: #Intubated:   -sedated with fentanyl and versed gtt, paralyzed with Nimbex gtt  -propofol dc yesterday 2/2 TG elevation, 445->348    CVS: #No active issues:   -SBPs stably soft, high 90s-low 100s, monitor  -mild troponemia overnight, 0.997->2.59->2.11, no EKG changes  -follow up TTE     RESP: #AHRF 2/2 COVID  -CXR on admission with diffuse B/L opacities and small left pleural effusion, stable on repeat today  -WBC 24.4 on admission, improved to 11.6 today  -APCs elevated with procal 1.82, CRP 34.4->30.2->16.5, LDH 1095, lactate 8.1->2->1.5, ferritin 4814, d-dimer 559, CK wnl  -ABG continuing to improve, 7.3/92/22/47 this am  -continuing on cftx and azithromycin for CAP coverage (day 1 11/16)  -continuing on Decadron 6mg Qd x10 days (day 1 11/15)  -not candidate for remdesivir 2/2 elevated LFTs  -increased to therpeutic AC given elevated d-dimer  -will prone as needed   -BCx NGTD   -will follow daily CRP and coags, d-dimer, esr, LDH, ferritin, trop every three days to monitor disease progression    GI: #elevated LFTs:   -likely 2/2 COVID  -follow daily, this am AP AST/ALT 75 169/145  -propofol held as above     RENAL: #BECKY:   -BUN/Cr 49/2.2 on admission, unknown baseline  -Cr worsened to 3.3 this am  -s/p NS 75cc/hr x10hr  -holding Lovenox, started heparin gtt  -nephro Dr. Camara consulted  -I/O 30-40cc/hr    ID: #COVID PNA  -Tylenol prn fevers, none given to date  -continue zinc and vit D supplementation  -remainder of history and management as above     HEME/ONC: #hypercoagulability 2/2 COVID  -D-Dimer 559 likely 2/2 COVID   -changed Lovenox to heparin, dose changed to therapeutic (gtt)   -monitor daily CBC     ENDO: #No issues:  -target CBG <180, currently meeting goal  -HgA1c 6.1  -NPO, OG placed, started TF  -HSS while on steroids     Skin/Catheters: #no issues  -no rashes noted  -FC in place     PPx:  #DVT: heparin gtt for therapeutic dosing as above SCD (BMI>30, ICU admission)  #GI: Protonix     Dispo:  -monitor in ICU     GOC: Full Code

## 2020-11-17 NOTE — CONSULT NOTE ADULT - SUBJECTIVE AND OBJECTIVE BOX
[facility] NEPHROLOGY- CONSULTATION NOTE    Patient is a 55y Male with PMH as below presents with     INCOMPLETE FULL NOTE TO FOLLOW.    PAST MEDICAL & SURGICAL HISTORY:  COVID-19    No significant past surgical history      No Known Allergies    Home Medications Reviewed  Hospital Medications:   MEDICATIONS  (STANDING):  azithromycin  IVPB 500 milliGRAM(s) IV Intermittent every 24 hours  azithromycin  IVPB      cefTRIAXone   IVPB 1000 milliGRAM(s) IV Intermittent every 24 hours  chlorhexidine 2% Cloths 1 Application(s) Topical <User Schedule>  cisatracurium Infusion 3 MICROgram(s)/kG/Min (13.9 mL/Hr) IV Continuous <Continuous>  dexAMETHasone  Injectable 6 milliGRAM(s) IV Push daily  ergocalciferol Drops 88644 Unit(s) Enteral Tube <User Schedule>  fentaNYL   Infusion. 5 MICROgram(s)/kG/Hr (38.6 mL/Hr) IV Continuous <Continuous>  heparin  Infusion.  Unit(s)/Hr (15 mL/Hr) IV Continuous <Continuous>  insulin lispro (ADMELOG) corrective regimen sliding scale   SubCutaneous every 6 hours  midazolam Infusion 0.1 mG/kG/Hr (8.54 mL/Hr) IV Continuous <Continuous>  pantoprazole  Injectable 40 milliGRAM(s) IV Push daily  polyethylene glycol 3350 17 Gram(s) Oral daily  senna Syrup 10 milliLiter(s) Oral at bedtime  sodium chloride 0.9%. 1000 milliLiter(s) (75 mL/Hr) IV Continuous <Continuous>  zinc sulfate 220 milliGRAM(s) Oral daily        REVIEW OF SYSTEMS: Unable to be obtained; intubated    VITALS:  T(F): 97.8 (20 @ 15:42), Max: 99.2 (20 @ 20:00)  HR: 65 (20 @ 18:00)  BP: 120/75 (20 @ 18:00)  RR: 25 (20 @ 18:00)  SpO2: 95% (20 @ 18:00)  Wt(kg): --     @ 07:01  -   @ 07:00  --------------------------------------------------------  IN: 4100.2 mL / OUT: 995 mL / NET: 3105.2 mL     @ 07:01  -   @ 18:56  --------------------------------------------------------  IN: 1960.7 mL / OUT: 665 mL / NET: 1295.7 mL        PHYSICAL EXAM:  Gen: NAD, calm  HEENT: MMM  Neck: no JVD  Cards: RRR, +S1/S2, no M/G/R  Resp: CTA B/L  GI: soft, NT/ND, NABS  : no CVA tenderness  Extremities: no LE edema B/L  Derm: no rashes  Neuro: non-focal    LABS:      142  |  110<H>  |  56<H>  ----------------------------<  150<H>  4.6   |  24  |  3.33<H>    Ca    6.7<L>      2020 03:57  Phos  5.6       Mg     2.6         TPro  5.8<L>  /  Alb  1.9<L>  /  TBili  0.3  /  DBili      /  AST  169<H>  /  ALT  145<H>  /  AlkPhos  75      Creatinine Trend: 3.33 <--, 2.73 <--, 2.20 <--, 1.88 <--, 2.20 <--                        10.7   11.56 )-----------( 237      ( 2020 03:57 )             35.1     Urine Studies:  Urinalysis Basic - ( 2020 01:30 )    Color: Yellow / Appearance: Clear / S.020 / pH:   Gluc:  / Ketone: Negative  / Bili: Negative / Urobili: Negative   Blood:  / Protein: 100 / Nitrite: Negative   Leuk Esterase: Negative / RBC: 2-5 /HPF / WBC 0-2 /HPF   Sq Epi:  / Non Sq Epi: Few /HPF / Bacteria: Moderate /HPF      Osmolality, Random Urine: 543 mos/kg ( @ 01:29)  Sodium, Random Urine: 45 mmol/L ( @ :29)  Chloride, Random Urine: 44 mmol/L (:29)    RADIOLOGY & ADDITIONAL STUDIES:                 [facility] NEPHROLOGY- CONSULTATION NOTE    Patient is a 54yo Male with no PMH recently diagnosed with COVID-19 (5 days PTA) p/w SOB a/w acute hypoxic respiratory failure s/p intubated in the ER. Pt a/w ICU with acute hypoxic respiratory failure 2/2 COVID-19 PNA s/p intubated, BECKY with transaminitis. Nephrology consulted for Elevated serum creatinine.    Unable to obtain history from patient; pt sedated and intubated     INCOMPLETE FULL NOTE TO FOLLOW.    PAST MEDICAL & SURGICAL HISTORY:  COVID-19    No significant past surgical history      No Known Allergies    Home Medications Reviewed  Hospital Medications:   MEDICATIONS  (STANDING):  azithromycin  IVPB 500 milliGRAM(s) IV Intermittent every 24 hours  azithromycin  IVPB      cefTRIAXone   IVPB 1000 milliGRAM(s) IV Intermittent every 24 hours  chlorhexidine 2% Cloths 1 Application(s) Topical <User Schedule>  cisatracurium Infusion 3 MICROgram(s)/kG/Min (13.9 mL/Hr) IV Continuous <Continuous>  dexAMETHasone  Injectable 6 milliGRAM(s) IV Push daily  ergocalciferol Drops 63135 Unit(s) Enteral Tube <User Schedule>  fentaNYL   Infusion. 5 MICROgram(s)/kG/Hr (38.6 mL/Hr) IV Continuous <Continuous>  heparin  Infusion.  Unit(s)/Hr (15 mL/Hr) IV Continuous <Continuous>  insulin lispro (ADMELOG) corrective regimen sliding scale   SubCutaneous every 6 hours  midazolam Infusion 0.1 mG/kG/Hr (8.54 mL/Hr) IV Continuous <Continuous>  pantoprazole  Injectable 40 milliGRAM(s) IV Push daily  polyethylene glycol 3350 17 Gram(s) Oral daily  senna Syrup 10 milliLiter(s) Oral at bedtime  sodium chloride 0.9%. 1000 milliLiter(s) (75 mL/Hr) IV Continuous <Continuous>  zinc sulfate 220 milliGRAM(s) Oral daily        REVIEW OF SYSTEMS: Unable to be obtained; intubated    VITALS:  T(F): 97.8 (20 @ 15:42), Max: 99.2 (20 @ 20:00)  HR: 65 (20 @ 18:00)  BP: 120/75 (20 @ 18:00)  RR: 25 (20 @ 18:00)  SpO2: 95% (20 @ 18:00)  Wt(kg): --     @ 07:01  -   @ 07:00  --------------------------------------------------------  IN: 4100.2 mL / OUT: 995 mL / NET: 3105.2 mL     @ 07:01  -   @ 18:56  --------------------------------------------------------  IN: 1960.7 mL / OUT: 665 mL / NET: 1295.7 mL        PHYSICAL EXAM:  Gen: Sedated  HEENT: +ETT, +scleral edema b/l  Neck: no JVD  Cards: RRR, +S1/S2,   Resp: +mechanical BS  GI: soft, ND,   : +isaacs with yellow urine  Extremities: no LE edema B/L  Derm: no rashes  Neuro: sedated    LABS:      142  |  110<H>  |  56<H>  ----------------------------<  150<H>  4.6   |  24  |  3.33<H>    Ca    6.7<L>      2020 03:57  Phos  5.6       Mg     2.6         TPro  5.8<L>  /  Alb  1.9<L>  /  TBili  0.3  /  DBili      /  AST  169<H>  /  ALT  145<H>  /  AlkPhos  75      Creatinine Trend: 3.33 <--, 2.73 <--, 2.20 <--, 1.88 <--, 2.20 <--                        10.7   . )-----------( 237      ( 2020 03:57 )             35.1     Urine Studies:  Urinalysis Basic - ( 2020 01:30 )    Color: Yellow / Appearance: Clear / S.020 / pH:   Gluc:  / Ketone: Negative  / Bili: Negative / Urobili: Negative   Blood:  / Protein: 100 / Nitrite: Negative   Leuk Esterase: Negative / RBC: 2-5 /HPF / WBC 0-2 /HPF   Sq Epi:  / Non Sq Epi: Few /HPF / Bacteria: Moderate /HPF      Osmolality, Random Urine: 543 mos/kg ( @ 01:29)  Sodium, Random Urine: 45 mmol/L ( @ 01:29)  Chloride, Random Urine: 44 mmol/L ( @ 01:29)    RADIOLOGY & ADDITIONAL STUDIES:          EXAM:  XR CHEST PORTABLE URGENT 1V                            PROCEDURE DATE:  2020          INTERPRETATION:  DATE OF STUDY: 2020 at 9:16AM    PRIOR:Earlier 20 exam.    CLINICAL INDICATION: Has Covid. Assess enteric tube.    TECHNIQUE: portable chest.    FINDINGS:  ET tube tip is 1.5cm above the gilbert.  The right central line is unchanged.  NG tube in stomach - tip is off the film.  The cardiomediastinal silhouette is within normal limits.  Persistent diffuse bilateral infiltrates -not significantly changed compared with prior.  Stable small left pleural effusion.  No pneumothorax.  No acute bony finding.    IMPRESSION:  Persistent diffuse bilateral airspace infiltrates.  Stable small left pleural effusion.            LEONA PONCE MD; Attending Radiologist  This document has been electronically signed. 2020 11:21AM

## 2020-11-17 NOTE — DIETITIAN INITIAL EVALUATION ADULT. - ADD RECOMMEND
Please check diet order. Trickle feeds ("10 Ml/hr"  w / Nepro) as feasible. MD to monitor. RD available.

## 2020-11-17 NOTE — DIETITIAN INITIAL EVALUATION ADULT. - PERTINENT MEDS FT
MEDICATIONS  (STANDING):  azithromycin  IVPB 500 milliGRAM(s) IV Intermittent every 24 hours  azithromycin  IVPB      cefTRIAXone   IVPB 1000 milliGRAM(s) IV Intermittent every 24 hours  chlorhexidine 2% Cloths 1 Application(s) Topical <User Schedule>  cisatracurium Infusion 3 MICROgram(s)/kG/Min (13.9 mL/Hr) IV Continuous <Continuous>  dexAMETHasone  Injectable 6 milliGRAM(s) IV Push daily  ergocalciferol Drops 34086 Unit(s) Enteral Tube <User Schedule>  fentaNYL   Infusion. 5 MICROgram(s)/kG/Hr (38.6 mL/Hr) IV Continuous <Continuous>  heparin  Infusion.  Unit(s)/Hr (15 mL/Hr) IV Continuous <Continuous>  insulin lispro (ADMELOG) corrective regimen sliding scale   SubCutaneous every 6 hours  midazolam Infusion 0.1 mG/kG/Hr (8.54 mL/Hr) IV Continuous <Continuous>  pantoprazole  Injectable 40 milliGRAM(s) IV Push daily  polyethylene glycol 3350 17 Gram(s) Oral daily  senna Syrup 10 milliLiter(s) Oral at bedtime  sodium chloride 0.9%. 1000 milliLiter(s) (75 mL/Hr) IV Continuous <Continuous>  zinc sulfate 220 milliGRAM(s) Oral daily    MEDICATIONS  (PRN):  acetaminophen    Suspension .. 650 milliGRAM(s) Enteral Tube every 6 hours PRN Temp greater or equal to 38C (100.4F)  artificial  tears Solution 1 Drop(s) Both EYES every 4 hours PRN Dry Eyes  sodium chloride 0.9% lock flush 10 milliLiter(s) IV Push every 1 hour PRN Pre/post blood products, medications, blood draw, and to maintain line patency

## 2020-11-17 NOTE — DIETITIAN INITIAL EVALUATION ADULT. - PERTINENT LABORATORY DATA
11-17 Na142 mmol/L Glu 150 mg/dL<H> K+ 4.6 mmol/L Cr  3.33 mg/dL<H> BUN 56 mg/dL<H>   11-17 Phos 5.6 mg/dL<H>   11-17 Alb 1.9 g/dL<L>     11-17 Chol --    LDL --    HDL --    Trig 348 mg/dL<H>    11-16-20 @ 10:51 HgbA1C 6.1

## 2020-11-17 NOTE — CONSULT NOTE ADULT - ASSESSMENT
Patient is a 56yo Male with no PMH recently diagnosed with COVID-19 (5 days PTA) p/w SOB s/p intubated in the ER. Pt admitted to ICU with acute hypoxic respiratory failure 2/2 COVID-19 PNA s/p intubated, BECKY with transaminitis. Nephrology consulted for Elevated serum creatinine.    1. BECKY- unknown baseline SCr. BECKY ikely hemodynamically mediated in the setting of septic shock / infection 2/2 COVID-19, hypotension on pressors (now on hold);  likely ATN. UA with 100 protein and trace blood with hyaline cast. Check FeUrea.   Pt with worsening urine o/p with declining urine o/p for which pt was given Lasix 40mg IV x1 with good urine response. No need for HD at this time. Will defer Renal US due to COVID status.   Strict I/Os. Avoid nephrotoxins/ NSAIDs/ RCA. Monitor BMP.  2. Septic Shock due to Multifocal PNA 2/2 COVID-19- Plan per ICU  3. Hypotension- BP improved. Pressors held as per ICU. Monitor BP  4. Acute hypoxic respiratory failure- vent support as per ICU.  5. Hypocalcemia- corrected serum Ca for hypoalbuminemia  9.10. Monitor ionized calcium.

## 2020-11-18 LAB
ALBUMIN SERPL ELPH-MCNC: 1.9 G/DL — LOW (ref 3.5–5)
ALP SERPL-CCNC: 70 U/L — SIGNIFICANT CHANGE UP (ref 40–120)
ALT FLD-CCNC: 106 U/L DA — HIGH (ref 10–60)
ANION GAP SERPL CALC-SCNC: 11 MMOL/L — SIGNIFICANT CHANGE UP (ref 5–17)
ANION GAP SERPL CALC-SCNC: 9 MMOL/L — SIGNIFICANT CHANGE UP (ref 5–17)
APTT BLD: 66.2 SEC — HIGH (ref 27.5–35.5)
APTT BLD: 87.1 SEC — HIGH (ref 27.5–35.5)
AST SERPL-CCNC: 83 U/L — HIGH (ref 10–40)
BASE EXCESS BLDA CALC-SCNC: -4 MMOL/L — LOW (ref -2–2)
BASE EXCESS BLDA CALC-SCNC: -4.1 MMOL/L — LOW (ref -2–2)
BASE EXCESS BLDA CALC-SCNC: -5.3 MMOL/L — LOW (ref -2–2)
BILIRUB SERPL-MCNC: 0.3 MG/DL — SIGNIFICANT CHANGE UP (ref 0.2–1.2)
BLOOD GAS COMMENTS ARTERIAL: SIGNIFICANT CHANGE UP
BUN SERPL-MCNC: 75 MG/DL — HIGH (ref 7–18)
BUN SERPL-MCNC: 82 MG/DL — HIGH (ref 7–18)
CALCIUM SERPL-MCNC: 6.6 MG/DL — LOW (ref 8.4–10.5)
CALCIUM SERPL-MCNC: 6.9 MG/DL — LOW (ref 8.4–10.5)
CHLORIDE SERPL-SCNC: 106 MMOL/L — SIGNIFICANT CHANGE UP (ref 96–108)
CHLORIDE SERPL-SCNC: 108 MMOL/L — SIGNIFICANT CHANGE UP (ref 96–108)
CO2 SERPL-SCNC: 24 MMOL/L — SIGNIFICANT CHANGE UP (ref 22–31)
CO2 SERPL-SCNC: 24 MMOL/L — SIGNIFICANT CHANGE UP (ref 22–31)
CREAT SERPL-MCNC: 4.3 MG/DL — HIGH (ref 0.5–1.3)
CREAT SERPL-MCNC: 4.43 MG/DL — HIGH (ref 0.5–1.3)
CRP SERPL-MCNC: 7.17 MG/DL — HIGH (ref 0–0.4)
D DIMER BLD IA.RAPID-MCNC: 1977 NG/ML DDU — HIGH
GLUCOSE SERPL-MCNC: 159 MG/DL — HIGH (ref 70–99)
GLUCOSE SERPL-MCNC: 164 MG/DL — HIGH (ref 70–99)
HCO3 BLDA-SCNC: 22 MMOL/L — LOW (ref 23–27)
HCT VFR BLD CALC: 36.8 % — LOW (ref 39–50)
HCT VFR BLD CALC: 37.2 % — LOW (ref 39–50)
HGB BLD-MCNC: 11.2 G/DL — LOW (ref 13–17)
HGB BLD-MCNC: 11.3 G/DL — LOW (ref 13–17)
HOROWITZ INDEX BLDA+IHG-RTO: 60 — SIGNIFICANT CHANGE UP
HOROWITZ INDEX BLDA+IHG-RTO: 60 — SIGNIFICANT CHANGE UP
HOROWITZ INDEX BLDA+IHG-RTO: 70 — SIGNIFICANT CHANGE UP
MAGNESIUM SERPL-MCNC: 2.7 MG/DL — HIGH (ref 1.6–2.6)
MCHC RBC-ENTMCNC: 28.8 PG — SIGNIFICANT CHANGE UP (ref 27–34)
MCHC RBC-ENTMCNC: 29 PG — SIGNIFICANT CHANGE UP (ref 27–34)
MCHC RBC-ENTMCNC: 30.1 GM/DL — LOW (ref 32–36)
MCHC RBC-ENTMCNC: 30.7 GM/DL — LOW (ref 32–36)
MCV RBC AUTO: 94.6 FL — SIGNIFICANT CHANGE UP (ref 80–100)
MCV RBC AUTO: 95.6 FL — SIGNIFICANT CHANGE UP (ref 80–100)
NRBC # BLD: 0 /100 WBCS — SIGNIFICANT CHANGE UP (ref 0–0)
NRBC # BLD: 0 /100 WBCS — SIGNIFICANT CHANGE UP (ref 0–0)
PCO2 BLDA: 43 MMHG — SIGNIFICANT CHANGE UP (ref 32–46)
PCO2 BLDA: 49 MMHG — HIGH (ref 32–46)
PCO2 BLDA: 52 MMHG — HIGH (ref 32–46)
PH BLDA: 7.24 — LOW (ref 7.35–7.45)
PH BLDA: 7.28 — LOW (ref 7.35–7.45)
PH BLDA: 7.32 — LOW (ref 7.35–7.45)
PHOSPHATE SERPL-MCNC: 6.9 MG/DL — HIGH (ref 2.5–4.5)
PLATELET # BLD AUTO: 294 K/UL — SIGNIFICANT CHANGE UP (ref 150–400)
PLATELET # BLD AUTO: 306 K/UL — SIGNIFICANT CHANGE UP (ref 150–400)
PO2 BLDA: 53 MMHG — LOW (ref 74–108)
PO2 BLDA: 68 MMHG — LOW (ref 74–108)
PO2 BLDA: 83 MMHG — SIGNIFICANT CHANGE UP (ref 74–108)
POTASSIUM SERPL-MCNC: 4.7 MMOL/L — SIGNIFICANT CHANGE UP (ref 3.5–5.3)
POTASSIUM SERPL-MCNC: 4.8 MMOL/L — SIGNIFICANT CHANGE UP (ref 3.5–5.3)
POTASSIUM SERPL-SCNC: 4.7 MMOL/L — SIGNIFICANT CHANGE UP (ref 3.5–5.3)
POTASSIUM SERPL-SCNC: 4.8 MMOL/L — SIGNIFICANT CHANGE UP (ref 3.5–5.3)
PROT SERPL-MCNC: 5.8 G/DL — LOW (ref 6–8.3)
RBC # BLD: 3.89 M/UL — LOW (ref 4.2–5.8)
RBC # BLD: 3.89 M/UL — LOW (ref 4.2–5.8)
RBC # FLD: 14.4 % — SIGNIFICANT CHANGE UP (ref 10.3–14.5)
RBC # FLD: 14.5 % — SIGNIFICANT CHANGE UP (ref 10.3–14.5)
SAO2 % BLDA: 85 % — LOW (ref 92–96)
SAO2 % BLDA: 91 % — LOW (ref 92–96)
SAO2 % BLDA: 95 % — SIGNIFICANT CHANGE UP (ref 92–96)
SODIUM SERPL-SCNC: 141 MMOL/L — SIGNIFICANT CHANGE UP (ref 135–145)
SODIUM SERPL-SCNC: 141 MMOL/L — SIGNIFICANT CHANGE UP (ref 135–145)
TRIGL SERPL-MCNC: 301 MG/DL — HIGH
UUN UR-MCNC: 502 MG/DL — SIGNIFICANT CHANGE UP
WBC # BLD: 12.27 K/UL — HIGH (ref 3.8–10.5)
WBC # BLD: 12.96 K/UL — HIGH (ref 3.8–10.5)
WBC # FLD AUTO: 12.27 K/UL — HIGH (ref 3.8–10.5)
WBC # FLD AUTO: 12.96 K/UL — HIGH (ref 3.8–10.5)

## 2020-11-18 PROCEDURE — 71045 X-RAY EXAM CHEST 1 VIEW: CPT | Mod: 26

## 2020-11-18 RX ADMIN — HEPARIN SODIUM 1500 UNIT(S)/HR: 5000 INJECTION INTRAVENOUS; SUBCUTANEOUS at 01:03

## 2020-11-18 RX ADMIN — PANTOPRAZOLE SODIUM 40 MILLIGRAM(S): 20 TABLET, DELAYED RELEASE ORAL at 11:53

## 2020-11-18 RX ADMIN — CEFTRIAXONE 100 MILLIGRAM(S): 500 INJECTION, POWDER, FOR SOLUTION INTRAMUSCULAR; INTRAVENOUS at 11:56

## 2020-11-18 RX ADMIN — Medication 1 DROP(S): at 22:01

## 2020-11-18 RX ADMIN — Medication 1: at 11:53

## 2020-11-18 RX ADMIN — Medication 1: at 05:32

## 2020-11-18 RX ADMIN — MIDAZOLAM HYDROCHLORIDE 8.54 MG/KG/HR: 1 INJECTION, SOLUTION INTRAMUSCULAR; INTRAVENOUS at 21:16

## 2020-11-18 RX ADMIN — Medication 6 MILLIGRAM(S): at 05:13

## 2020-11-18 RX ADMIN — MIDAZOLAM HYDROCHLORIDE 8.54 MG/KG/HR: 1 INJECTION, SOLUTION INTRAMUSCULAR; INTRAVENOUS at 05:32

## 2020-11-18 RX ADMIN — CISATRACURIUM BESYLATE 13.9 MICROGRAM(S)/KG/MIN: 2 INJECTION INTRAVENOUS at 12:54

## 2020-11-18 RX ADMIN — CHLORHEXIDINE GLUCONATE 1 APPLICATION(S): 213 SOLUTION TOPICAL at 05:14

## 2020-11-18 RX ADMIN — FENTANYL CITRATE 25.6 MICROGRAM(S)/KG/HR: 50 INJECTION INTRAVENOUS at 05:32

## 2020-11-18 RX ADMIN — FENTANYL CITRATE 25.6 MICROGRAM(S)/KG/HR: 50 INJECTION INTRAVENOUS at 15:53

## 2020-11-18 RX ADMIN — SENNA PLUS 10 MILLILITER(S): 8.6 TABLET ORAL at 21:14

## 2020-11-18 RX ADMIN — POLYETHYLENE GLYCOL 3350 17 GRAM(S): 17 POWDER, FOR SOLUTION ORAL at 11:54

## 2020-11-18 RX ADMIN — ZINC SULFATE TAB 220 MG (50 MG ZINC EQUIVALENT) 220 MILLIGRAM(S): 220 (50 ZN) TAB at 11:53

## 2020-11-18 RX ADMIN — AZITHROMYCIN 255 MILLIGRAM(S): 500 TABLET, FILM COATED ORAL at 11:57

## 2020-11-18 RX ADMIN — Medication 1: at 17:16

## 2020-11-18 RX ADMIN — HEPARIN SODIUM 1500 UNIT(S)/HR: 5000 INJECTION INTRAVENOUS; SUBCUTANEOUS at 07:06

## 2020-11-18 NOTE — PROGRESS NOTE ADULT - ASSESSMENT
Patient is a 56yo Male with no PMH recently diagnosed with COVID-19 (5 days PTA) p/w SOB s/p intubated in the ER. Pt admitted to ICU with acute hypoxic respiratory failure 2/2 COVID-19 PNA s/p intubated, BECKY with transaminitis. Nephrology consulted for Elevated serum creatinine.    1. BECKY- unknown baseline SCr. BECKY likely hemodynamically mediated in the setting of septic shock / infection 2/2 COVID-19, hypotension on pressors (now off pressors);  likely ATN. UA with 100 protein and trace blood with hyaline cast. Check FeUrea. Pt with worsening renal function but good urine o/p (s/p lasix 40mg IV x1 and 80mg IV x1 on 11/17). Pt now urinating 50-100ml/hr. If declining urine o/p can give another Lasix 80mg IVx 1.   No need for HD at this time. Will defer Renal US due to COVID status. Strict I/Os. Avoid nephrotoxins/ NSAIDs/ RCA. Monitor BMP.  2. Septic Shock due to Multifocal PNA 2/2 COVID-19- Plan per ICU  3. Hypotension- BP improved. Now off Pressors as per ICU. Monitor BP  4. Acute hypoxic respiratory failure- vent support as per ICU.  5. Hypocalcemia- corrected serum Ca for hypoalbuminemia  8.28 with hyperphosphatemia; recc calcium carbonate 500mg PO tid. Monitor ionized calcium and serum phos.

## 2020-11-18 NOTE — PROGRESS NOTE ADULT - ASSESSMENT
55M from home without PMH with progressively worsening SOB and +COVID test x5 days prior to admission presented with SpO2 64% at home, intubated 2/2 deteriorating respiratory status and admitted to ICU.    Assessment:  1. Acute Hypoxic Respiratory failure   2. COVID Pneumonia   3. Acute Kidney Injury   4. elevated LFTs       Plan:    CNS: #Intubated:   -sedated with fentanyl and versed gtt, paralyzed with Nimbex gtt    CVS: #No active issues:   -SBPs stable 110s, monitor  -follow up TTE     RESP: #AHRF 2/2 COVID  -CXR on admission with diffuse B/L opacities and small left pleural effusion, stable on repeat 11/17 and 11/18  -WBC 24.4 on admission, stably 12.3 today  -APCs elevated with procal 1.82, CRP 34.4->30.2->16.5->7.17, LDH 1095, lactate 8.1->2->1.5, ferritin 4814, d-dimer 559->1977, CK wnl  -ABG worsened to 7.28/49/83/22, increased TV for 25/400/60/8, will repeat ABG in pm   -continuing on cftx and azithromycin for CAP coverage (day 1 11/16)  -continuing on Decadron 6mg Qd x10 days (day 1 11/15)  -not candidate for remdesivir 2/2 elevated LFTs  -continue therapeutic AC given elevated d-dimer  -will prone if needed   -BCx NGTD   -will follow daily CRP with coags, d-dimer, esr, LDH, ferritin, trop every three days to monitor disease progression    GI: #elevated LFTs:   -likely 2/2 COVID  -follow daily, this am AP 70 AST/ALT 83/106  -->348->301    RENAL: #BECKY:   -BUN/Cr 49/2.2 on admission, unknown baseline  -Cr worsened to 4.3 this am, fu repeat BMP  -continuing heparin gtt, Lovenox held with worsening Cr  -nephro Dr. Camara following, no HD at this time, give Lasix if u/o worsens  -s/p x1 Lasix 80mg overnight, u/o 100-200cc/hr overnight, today approx 75-100cc/hr    ID: #COVID PNA  -Tylenol prn fevers, none given to date  -continue zinc and vit D supplementation  -remainder of history and management as above     HEME/ONC: #hypercoagulability 2/2 COVID  -D-Dimer 559->1977 likely 2/2 COVID   -continue heparin therapeutic (gtt), Lovenox held 2/2 worsening Cr  -monitor daily CBC     ENDO: #No issues:  -target CBG <180, currently meeting goal  -HgA1c 6.1  -NPO, OG placed, started TF  -HSS while on steroids     Skin/Catheters: #no issues  -no rashes noted  -FC in place     PPx:  #DVT: heparin gtt for therapeutic dosing as above SCD (BMI>30, ICU admission)  #GI: Protonix     Dispo:  -monitor in ICU     GOC: Full Code

## 2020-11-18 NOTE — PROGRESS NOTE ADULT - SUBJECTIVE AND OBJECTIVE BOX
Patient is a 55y old  Male who presents with a chief complaint of Acute Hypoxic Respiratory failure (17 Nov 2020 18:55)      INTERVAL HPI/OVERNIGHT EVENTS:  ICU Vital Signs Last 24 Hrs  T(C): 35.8 (18 Nov 2020 04:00), Max: 36.6 (17 Nov 2020 15:42)  T(F): 96.4 (18 Nov 2020 04:00), Max: 97.8 (17 Nov 2020 15:42)  HR: 77 (18 Nov 2020 14:00) (57 - 77)  BP: 103/64 (18 Nov 2020 14:00) (99/60 - 120/75)  BP(mean): 73 (18 Nov 2020 14:00) (69 - 89)  ABP: --  ABP(mean): --  RR: 25 (18 Nov 2020 14:00) (18 - 25)  SpO2: 91% (18 Nov 2020 14:00) (87% - 97%)    I&O's Summary    17 Nov 2020 07:01  -  18 Nov 2020 07:00  --------------------------------------------------------  IN: 3145.8 mL / OUT: 2095 mL / NET: 1050.8 mL    18 Nov 2020 07:01  -  18 Nov 2020 15:01  --------------------------------------------------------  IN: 438.9 mL / OUT: 700 mL / NET: -261.1 mL      Mode: AC/ CMV (Assist Control/ Continuous Mandatory Ventilation)  RR (machine): 25  TV (machine): 350  FiO2: 60  PEEP: 8  ITime: 0.8  MAP: 15  PIP: 29      LABS:                        11.2   12.27 )-----------( 306      ( 18 Nov 2020 04:15 )             37.2     11-18    141  |  108  |  75<H>  ----------------------------<  159<H>  4.8   |  24  |  4.30<H>    Ca    6.9<L>      18 Nov 2020 04:15  Phos  6.9     11-18  Mg     2.7     11-18    TPro  5.8<L>  /  Alb  1.9<L>  /  TBili  0.3  /  DBili  x   /  AST  83<H>  /  ALT  106<H>  /  AlkPhos  70  11-18    PT/INR - ( 17 Nov 2020 15:35 )   PT: 12.5 sec;   INR: 1.05 ratio         PTT - ( 18 Nov 2020 06:43 )  PTT:87.1 sec    CAPILLARY BLOOD GLUCOSE      POCT Blood Glucose.: 160 mg/dL (18 Nov 2020 11:30)  POCT Blood Glucose.: 151 mg/dL (18 Nov 2020 05:23)  POCT Blood Glucose.: 168 mg/dL (17 Nov 2020 23:22)  POCT Blood Glucose.: 155 mg/dL (17 Nov 2020 16:41)    ABG - ( 18 Nov 2020 13:53 )  pH, Arterial: 7.24  pH, Blood: x     /  pCO2: 52    /  pO2: 68    / HCO3: 22    / Base Excess: -5.3  /  SaO2: 91                  RADIOLOGY & ADDITIONAL TESTS:    Consultant(s) Notes Reviewed:  [x ] YES  [ ] NO    MEDICATIONS  (STANDING):  azithromycin  IVPB 500 milliGRAM(s) IV Intermittent every 24 hours  azithromycin  IVPB      cefTRIAXone   IVPB 1000 milliGRAM(s) IV Intermittent every 24 hours  chlorhexidine 2% Cloths 1 Application(s) Topical <User Schedule>  cisatracurium Infusion 3 MICROgram(s)/kG/Min (13.9 mL/Hr) IV Continuous <Continuous>  dexAMETHasone  Injectable 6 milliGRAM(s) IV Push daily  ergocalciferol Drops 67254 Unit(s) Enteral Tube <User Schedule>  fentaNYL   Infusion. 3 MICROgram(s)/kG/Hr (25.6 mL/Hr) IV Continuous <Continuous>  heparin  Infusion.  Unit(s)/Hr (15 mL/Hr) IV Continuous <Continuous>  insulin lispro (ADMELOG) corrective regimen sliding scale   SubCutaneous every 6 hours  midazolam Infusion 0.1 mG/kG/Hr (8.54 mL/Hr) IV Continuous <Continuous>  pantoprazole  Injectable 40 milliGRAM(s) IV Push daily  polyethylene glycol 3350 17 Gram(s) Oral daily  senna Syrup 10 milliLiter(s) Oral at bedtime  zinc sulfate 220 milliGRAM(s) Oral daily    MEDICATIONS  (PRN):  acetaminophen    Suspension .. 650 milliGRAM(s) Enteral Tube every 6 hours PRN Temp greater or equal to 38C (100.4F)  artificial  tears Solution 1 Drop(s) Both EYES every 4 hours PRN Dry Eyes  sodium chloride 0.9% lock flush 10 milliLiter(s) IV Push every 1 hour PRN Pre/post blood products, medications, blood draw, and to maintain line patency      PHYSICAL EXAM:  GENERAL: well built, well nourished  HEAD:  Atraumatic, Normocephalic  EYES: EOMI, PERRLA, conjunctiva and sclera clear  ENT: No tonsillar erythema, exudates, or enlargement; Moist mucous membranes, Good dentition, No lesions  NECK: Supple, No JVD, Normal thyroid, no enlarged nodes  NERVOUS SYSTEM:  Alert & Oriented X3, Good concentration; Motor Strength 5/5 B/L upper and lower extremities; DTRs 2+ intact and symmetric, sensory intact  CHEST/LUNG: B/L good air entry; No rales, rhonchi, or wheezing  HEART: S1S2 normal, no S3, Regular rate and rhythm; No murmurs, rubs, or gallops  ABDOMEN: Soft, Nontender, Nondistended; Bowel sounds present  EXTREMITIES:  2+ Peripheral Pulses, No clubbing, cyanosis, or edema  LYMPH: No lymphadenopathy noted  SKIN: No rashes or lesions    Care Discussed with Consultants/Other Providers [ x] YES  [ ] NO HPI: 55M from home without PMH with progressively worsening SOB and +COVID test x5 days prior to admission presented following SpO2 64% at home.  Started on 15L NRB in ED 2/2 SpO2s 85-90%, desaturated to low 80s and was placed on HFNC, continued to desaturate, was intubated and admitted to ICU.     INTERVAL HPI/OVERNIGHT EVENTS:   ICU Vital Signs Last 24 Hrs  T(C): 35.8 (18 Nov 2020 04:00), Max: 36.6 (17 Nov 2020 15:42)  T(F): 96.4 (18 Nov 2020 04:00), Max: 97.8 (17 Nov 2020 15:42)  HR: 77 (18 Nov 2020 14:00) (57 - 77)  BP: 103/64 (18 Nov 2020 14:00) (99/60 - 120/75)  BP(mean): 73 (18 Nov 2020 14:00) (69 - 89)  ABP: --  ABP(mean): --  RR: 25 (18 Nov 2020 14:00) (18 - 25)  SpO2: 91% (18 Nov 2020 14:00) (87% - 97%)    I&O's Summary    17 Nov 2020 07:01  -  18 Nov 2020 07:00  --------------------------------------------------------  IN: 3145.8 mL / OUT: 2095 mL / NET: 1050.8 mL    18 Nov 2020 07:01  -  18 Nov 2020 15:01  --------------------------------------------------------  IN: 438.9 mL / OUT: 700 mL / NET: -261.1 mL      Mode: AC/ CMV (Assist Control/ Continuous Mandatory Ventilation)  RR (machine): 25  TV (machine): 350  FiO2: 60  PEEP: 8  ITime: 0.8  MAP: 15  PIP: 29      LABS:                        11.2   12.27 )-----------( 306      ( 18 Nov 2020 04:15 )             37.2     11-18    141  |  108  |  75<H>  ----------------------------<  159<H>  4.8   |  24  |  4.30<H>    Ca    6.9<L>      18 Nov 2020 04:15  Phos  6.9     11-18  Mg     2.7     11-18    TPro  5.8<L>  /  Alb  1.9<L>  /  TBili  0.3  /  DBili  x   /  AST  83<H>  /  ALT  106<H>  /  AlkPhos  70  11-18    PT/INR - ( 17 Nov 2020 15:35 )   PT: 12.5 sec;   INR: 1.05 ratio         PTT - ( 18 Nov 2020 06:43 )  PTT:87.1 sec    CAPILLARY BLOOD GLUCOSE      POCT Blood Glucose.: 160 mg/dL (18 Nov 2020 11:30)  POCT Blood Glucose.: 151 mg/dL (18 Nov 2020 05:23)  POCT Blood Glucose.: 168 mg/dL (17 Nov 2020 23:22)  POCT Blood Glucose.: 155 mg/dL (17 Nov 2020 16:41)    ABG - ( 18 Nov 2020 13:53 )  pH, Arterial: 7.24  pH, Blood: x     /  pCO2: 52    /  pO2: 68    / HCO3: 22    / Base Excess: -5.3  /  SaO2: 91                  RADIOLOGY & ADDITIONAL TESTS:    Consultant(s) Notes Reviewed:  [x ] YES  [ ] NO    MEDICATIONS  (STANDING):  azithromycin  IVPB 500 milliGRAM(s) IV Intermittent every 24 hours  azithromycin  IVPB      cefTRIAXone   IVPB 1000 milliGRAM(s) IV Intermittent every 24 hours  chlorhexidine 2% Cloths 1 Application(s) Topical <User Schedule>  cisatracurium Infusion 3 MICROgram(s)/kG/Min (13.9 mL/Hr) IV Continuous <Continuous>  dexAMETHasone  Injectable 6 milliGRAM(s) IV Push daily  ergocalciferol Drops 83402 Unit(s) Enteral Tube <User Schedule>  fentaNYL   Infusion. 3 MICROgram(s)/kG/Hr (25.6 mL/Hr) IV Continuous <Continuous>  heparin  Infusion.  Unit(s)/Hr (15 mL/Hr) IV Continuous <Continuous>  insulin lispro (ADMELOG) corrective regimen sliding scale   SubCutaneous every 6 hours  midazolam Infusion 0.1 mG/kG/Hr (8.54 mL/Hr) IV Continuous <Continuous>  pantoprazole  Injectable 40 milliGRAM(s) IV Push daily  polyethylene glycol 3350 17 Gram(s) Oral daily  senna Syrup 10 milliLiter(s) Oral at bedtime  zinc sulfate 220 milliGRAM(s) Oral daily    MEDICATIONS  (PRN):  acetaminophen    Suspension .. 650 milliGRAM(s) Enteral Tube every 6 hours PRN Temp greater or equal to 38C (100.4F)  artificial  tears Solution 1 Drop(s) Both EYES every 4 hours PRN Dry Eyes  sodium chloride 0.9% lock flush 10 milliLiter(s) IV Push every 1 hour PRN Pre/post blood products, medications, blood draw, and to maintain line patency      PHYSICAL EXAM:  GENERAL: well built, well nourished  HEAD:  Atraumatic, Normocephalic  EYES: EOMI, PERRLA, conjunctiva and sclera clear  ENT: No tonsillar erythema, exudates, or enlargement; Moist mucous membranes, Good dentition, No lesions  NECK: Supple, No JVD, Normal thyroid, no enlarged nodes  NERVOUS SYSTEM:  Alert & Oriented X3, Good concentration; Motor Strength 5/5 B/L upper and lower extremities; DTRs 2+ intact and symmetric, sensory intact  CHEST/LUNG: B/L good air entry; No rales, rhonchi, or wheezing  HEART: S1S2 normal, no S3, Regular rate and rhythm; No murmurs, rubs, or gallops  ABDOMEN: Soft, Nontender, Nondistended; Bowel sounds present  EXTREMITIES:  2+ Peripheral Pulses, No clubbing, cyanosis, or edema  LYMPH: No lymphadenopathy noted  SKIN: No rashes or lesions    Care Discussed with Consultants/Other Providers [ x] YES  [ ] NO HPI: 55M from home without PMH with progressively worsening SOB and +COVID test x5 days prior to admission presented following SpO2 64% at home.  Started on 15L NRB in ED 2/2 SpO2s 85-90%, desaturated to low 80s and was placed on HFNC, continued to desaturate, was intubated and admitted to ICU.     INTERVAL HPI/OVERNIGHT EVENTS: NAEON. Eyad to 50-60 overnight, fentanyl decreased, continuing on fentanyl 3mcg/kg/min, versed 0.1mg/kg/min, Nimbex 3mg/kg/min, and heparin gtt. VS otherwise wnl. ABG early am 7.28/49/83/22, vent settings adjusted to 25/350/60/8. Given x1 dose Lasix 80mg overnight, u/o improved to 100-200cc/hr, continuing to monitor. Cr worsened to 4.3 today. Nephro Dr. Camara following, does not recommend HD at this time.     ICU Vital Signs Last 24 Hrs  T(C): 35.8 (18 Nov 2020 04:00), Max: 36.6 (17 Nov 2020 15:42)  T(F): 96.4 (18 Nov 2020 04:00), Max: 97.8 (17 Nov 2020 15:42)  HR: 77 (18 Nov 2020 14:00) (57 - 77)  BP: 103/64 (18 Nov 2020 14:00) (99/60 - 120/75)  BP(mean): 73 (18 Nov 2020 14:00) (69 - 89)  ABP: --  ABP(mean): --  RR: 25 (18 Nov 2020 14:00) (18 - 25)  SpO2: 91% (18 Nov 2020 14:00) (87% - 97%)    I&O's Summary    17 Nov 2020 07:01  -  18 Nov 2020 07:00  --------------------------------------------------------  IN: 3145.8 mL / OUT: 2095 mL / NET: 1050.8 mL    18 Nov 2020 07:01  -  18 Nov 2020 15:01  --------------------------------------------------------  IN: 438.9 mL / OUT: 700 mL / NET: -261.1 mL      Mode: AC/ CMV (Assist Control/ Continuous Mandatory Ventilation)  RR (machine): 25  TV (machine): 350  FiO2: 60  PEEP: 8  ITime: 0.8  MAP: 15  PIP: 29      LABS:                        11.2   12.27 )-----------( 306      ( 18 Nov 2020 04:15 )             37.2     11-18    141  |  108  |  75<H>  ----------------------------<  159<H>  4.8   |  24  |  4.30<H>    Ca    6.9<L>      18 Nov 2020 04:15  Phos  6.9     11-18  Mg     2.7     11-18    TPro  5.8<L>  /  Alb  1.9<L>  /  TBili  0.3  /  DBili  x   /  AST  83<H>  /  ALT  106<H>  /  AlkPhos  70  11-18    PT/INR - ( 17 Nov 2020 15:35 )   PT: 12.5 sec;   INR: 1.05 ratio         PTT - ( 18 Nov 2020 06:43 )  PTT:87.1 sec    CAPILLARY BLOOD GLUCOSE      POCT Blood Glucose.: 160 mg/dL (18 Nov 2020 11:30)  POCT Blood Glucose.: 151 mg/dL (18 Nov 2020 05:23)  POCT Blood Glucose.: 168 mg/dL (17 Nov 2020 23:22)  POCT Blood Glucose.: 155 mg/dL (17 Nov 2020 16:41)    ABG - ( 18 Nov 2020 13:53 )  pH, Arterial: 7.24  pH, Blood: x     /  pCO2: 52    /  pO2: 68    / HCO3: 22    / Base Excess: -5.3  /  SaO2: 91                  RADIOLOGY & ADDITIONAL TESTS:    Consultant(s) Notes Reviewed:  [x ] YES  [ ] NO    MEDICATIONS  (STANDING):  azithromycin  IVPB 500 milliGRAM(s) IV Intermittent every 24 hours  azithromycin  IVPB      cefTRIAXone   IVPB 1000 milliGRAM(s) IV Intermittent every 24 hours  chlorhexidine 2% Cloths 1 Application(s) Topical <User Schedule>  cisatracurium Infusion 3 MICROgram(s)/kG/Min (13.9 mL/Hr) IV Continuous <Continuous>  dexAMETHasone  Injectable 6 milliGRAM(s) IV Push daily  ergocalciferol Drops 80331 Unit(s) Enteral Tube <User Schedule>  fentaNYL   Infusion. 3 MICROgram(s)/kG/Hr (25.6 mL/Hr) IV Continuous <Continuous>  heparin  Infusion.  Unit(s)/Hr (15 mL/Hr) IV Continuous <Continuous>  insulin lispro (ADMELOG) corrective regimen sliding scale   SubCutaneous every 6 hours  midazolam Infusion 0.1 mG/kG/Hr (8.54 mL/Hr) IV Continuous <Continuous>  pantoprazole  Injectable 40 milliGRAM(s) IV Push daily  polyethylene glycol 3350 17 Gram(s) Oral daily  senna Syrup 10 milliLiter(s) Oral at bedtime  zinc sulfate 220 milliGRAM(s) Oral daily    MEDICATIONS  (PRN):  acetaminophen    Suspension .. 650 milliGRAM(s) Enteral Tube every 6 hours PRN Temp greater or equal to 38C (100.4F)  artificial  tears Solution 1 Drop(s) Both EYES every 4 hours PRN Dry Eyes  sodium chloride 0.9% lock flush 10 milliLiter(s) IV Push every 1 hour PRN Pre/post blood products, medications, blood draw, and to maintain line patency    ROS: unable to obtain 2/2 sedation     PHYSICAL EXAM:  GENERAL: sedated, +ETT, +OG  HEAD:  Atraumatic, Normocephalic  EYES: b/l edema noted  ENT: moist mucous membranes  NECK: Supple  NERVOUS SYSTEM: sedated  CHEST/LUNG: B/L good air entry  HEART: S1S2 normal, RRR  ABDOMEN: Soft, Nontender, distended 2/2 body habitus, Bowel sounds present  EXTREMITIES:  2+ Peripheral Pulses, b/l 1+ edema  SKIN: No rashes or lesions noted      Care Discussed with Consultants/Other Providers [ x] YES  [ ] NO

## 2020-11-18 NOTE — PROGRESS NOTE ADULT - SUBJECTIVE AND OBJECTIVE BOX
[facility] NEPHROLOGY- PROGRESS NOTE    Patient is a 54yo Male with no PMH recently diagnosed with COVID-19 (5 days PTA) p/w SOB s/p intubated in the ER. Pt admitted to ICU with acute hypoxic respiratory failure 2/2 COVID-19 PNA s/p intubated, BECKY with transaminitis. Nephrology consulted for Elevated serum creatinine.    Hospital Medications: Medications reviewed.  REVIEW OF SYSTEMS: Unable to obtain/ intubated & sedated    VITALS:  T(F): 97 (20 @ 16:32), Max: 97 (20 @ 16:32)  HR: 75 (20 @ 16:24)  BP: 103/64 (20 15:00)  RR: 25 (11-18-20 @ 15:)  SpO2: 90% (20 16:24)  Wt(kg): --  Height (cm): 157.5 (11-15 @ 23:00)  Weight (kg): 85.4 (11-15 @ 23:)  BMI (kg/m2): 34.4 (11-15 @ 23:)  BSA (m2): 1.86 (11-15 @ 23:00)     @ 07:01  -   @ 07:00  --------------------------------------------------------  IN: 3145.8 mL / OUT: 2095 mL / NET: 1050.8 mL     @ 07:01  -   @ 16:53  --------------------------------------------------------  IN: 438.9 mL / OUT: 700 mL / NET: -261.1 mL      PHYSICAL EXAM:  Gen: Sedated  HEENT: +ETT, +scleral edema b/l  Cards: RRR, +S1/S2,   Resp: +mechanical BS  GI: soft, ND,   : +isaacs with yellow urine  Extremities: no LE edema, +mild UE edema B/L  Neuro: sedated    LABS:      141  |  106  |  82<H>  ----------------------------<  164<H>  4.7   |  24  |  4.43<H>    Ca    6.6<L>      2020 16:22  Phos  6.9       Mg     2.7         TPro  5.8<L>  /  Alb  1.9<L>  /  TBili  0.3  /  DBili      /  AST  83<H>  /  ALT  106<H>  /  AlkPhos  70      Creatinine Trend: 4.43 <--, 4.30 <--, 3.33 <--, 2.73 <--, 2.20 <--, 1.88 <--, 2.20 <--                        11.2   12.27 )-----------( 306      ( 2020 04:15 )             37.2     Urine Studies:  Urinalysis Basic - ( 2020 01:30 )    Color: Yellow / Appearance: Clear / S.020 / pH:   Gluc:  / Ketone: Negative  / Bili: Negative / Urobili: Negative   Blood:  / Protein: 100 / Nitrite: Negative   Leuk Esterase: Negative / RBC: 2-5 /HPF / WBC 0-2 /HPF   Sq Epi:  / Non Sq Epi: Few /HPF / Bacteria: Moderate /HPF      Osmolality, Random Urine: 543 mos/kg ( @ 01:29)  Sodium, Random Urine: 45 mmol/L ( @ 01:29)  Chloride, Random Urine: 44 mmol/L ( @ 01:29)    RADIOLOGY & ADDITIONAL STUDIES:

## 2020-11-19 LAB
ALBUMIN SERPL ELPH-MCNC: 1.9 G/DL — LOW (ref 3.5–5)
ALP SERPL-CCNC: 67 U/L — SIGNIFICANT CHANGE UP (ref 40–120)
ALT FLD-CCNC: 83 U/L DA — HIGH (ref 10–60)
ANION GAP SERPL CALC-SCNC: 11 MMOL/L — SIGNIFICANT CHANGE UP (ref 5–17)
APTT BLD: 104.6 SEC — HIGH (ref 27.5–35.5)
APTT BLD: 108 SEC — HIGH (ref 27.5–35.5)
APTT BLD: 76.5 SEC — HIGH (ref 27.5–35.5)
AST SERPL-CCNC: 52 U/L — HIGH (ref 10–40)
BASE EXCESS BLDA CALC-SCNC: -3.2 MMOL/L — LOW (ref -2–2)
BASE EXCESS BLDA CALC-SCNC: -3.3 MMOL/L — LOW (ref -2–2)
BILIRUB SERPL-MCNC: 0.3 MG/DL — SIGNIFICANT CHANGE UP (ref 0.2–1.2)
BLOOD GAS COMMENTS ARTERIAL: SIGNIFICANT CHANGE UP
BLOOD GAS COMMENTS ARTERIAL: SIGNIFICANT CHANGE UP
BUN SERPL-MCNC: 87 MG/DL — HIGH (ref 7–18)
CALCIUM SERPL-MCNC: 7.1 MG/DL — LOW (ref 8.4–10.5)
CHLORIDE SERPL-SCNC: 106 MMOL/L — SIGNIFICANT CHANGE UP (ref 96–108)
CO2 SERPL-SCNC: 24 MMOL/L — SIGNIFICANT CHANGE UP (ref 22–31)
CREAT SERPL-MCNC: 4.36 MG/DL — HIGH (ref 0.5–1.3)
CRP SERPL-MCNC: 3.75 MG/DL — HIGH (ref 0–0.4)
CULTURE RESULTS: SIGNIFICANT CHANGE UP
GLUCOSE SERPL-MCNC: 124 MG/DL — HIGH (ref 70–99)
HCO3 BLDA-SCNC: 22 MMOL/L — LOW (ref 23–27)
HCO3 BLDA-SCNC: 22 MMOL/L — LOW (ref 23–27)
HCT VFR BLD CALC: 35.2 % — LOW (ref 39–50)
HGB BLD-MCNC: 10.9 G/DL — LOW (ref 13–17)
HOROWITZ INDEX BLDA+IHG-RTO: 70 — SIGNIFICANT CHANGE UP
HOROWITZ INDEX BLDA+IHG-RTO: 80 — SIGNIFICANT CHANGE UP
MAGNESIUM SERPL-MCNC: 3.1 MG/DL — HIGH (ref 1.6–2.6)
MCHC RBC-ENTMCNC: 29.1 PG — SIGNIFICANT CHANGE UP (ref 27–34)
MCHC RBC-ENTMCNC: 31 GM/DL — LOW (ref 32–36)
MCV RBC AUTO: 94.1 FL — SIGNIFICANT CHANGE UP (ref 80–100)
NRBC # BLD: 0 /100 WBCS — SIGNIFICANT CHANGE UP (ref 0–0)
PCO2 BLDA: 40 MMHG — SIGNIFICANT CHANGE UP (ref 32–46)
PCO2 BLDA: 41 MMHG — SIGNIFICANT CHANGE UP (ref 32–46)
PH BLDA: 7.35 — SIGNIFICANT CHANGE UP (ref 7.35–7.45)
PH BLDA: 7.35 — SIGNIFICANT CHANGE UP (ref 7.35–7.45)
PHOSPHATE SERPL-MCNC: 5.7 MG/DL — HIGH (ref 2.5–4.5)
PLATELET # BLD AUTO: 343 K/UL — SIGNIFICANT CHANGE UP (ref 150–400)
PO2 BLDA: 81 MMHG — SIGNIFICANT CHANGE UP (ref 74–108)
PO2 BLDA: 92 MMHG — SIGNIFICANT CHANGE UP (ref 74–108)
POTASSIUM SERPL-MCNC: 4.5 MMOL/L — SIGNIFICANT CHANGE UP (ref 3.5–5.3)
POTASSIUM SERPL-SCNC: 4.5 MMOL/L — SIGNIFICANT CHANGE UP (ref 3.5–5.3)
PROT SERPL-MCNC: 6.2 G/DL — SIGNIFICANT CHANGE UP (ref 6–8.3)
RBC # BLD: 3.74 M/UL — LOW (ref 4.2–5.8)
RBC # FLD: 14.2 % — SIGNIFICANT CHANGE UP (ref 10.3–14.5)
SAO2 % BLDA: 95 % — SIGNIFICANT CHANGE UP (ref 92–96)
SAO2 % BLDA: 97 % — HIGH (ref 92–96)
SODIUM SERPL-SCNC: 141 MMOL/L — SIGNIFICANT CHANGE UP (ref 135–145)
SPECIMEN SOURCE: SIGNIFICANT CHANGE UP
WBC # BLD: 10.49 K/UL — SIGNIFICANT CHANGE UP (ref 3.8–10.5)
WBC # FLD AUTO: 10.49 K/UL — SIGNIFICANT CHANGE UP (ref 3.8–10.5)

## 2020-11-19 PROCEDURE — 71045 X-RAY EXAM CHEST 1 VIEW: CPT | Mod: 26

## 2020-11-19 RX ORDER — CHLORHEXIDINE GLUCONATE 213 G/1000ML
15 SOLUTION TOPICAL
Refills: 0 | Status: DISCONTINUED | OUTPATIENT
Start: 2020-11-19 | End: 2020-12-02

## 2020-11-19 RX ORDER — CHLORHEXIDINE GLUCONATE 213 G/1000ML
1 SOLUTION TOPICAL DAILY
Refills: 0 | Status: DISCONTINUED | OUTPATIENT
Start: 2020-11-19 | End: 2020-11-23

## 2020-11-19 RX ADMIN — Medication 6 MILLIGRAM(S): at 05:15

## 2020-11-19 RX ADMIN — CHLORHEXIDINE GLUCONATE 1 APPLICATION(S): 213 SOLUTION TOPICAL at 05:15

## 2020-11-19 RX ADMIN — HEPARIN SODIUM 1100 UNIT(S)/HR: 5000 INJECTION INTRAVENOUS; SUBCUTANEOUS at 11:58

## 2020-11-19 RX ADMIN — FENTANYL CITRATE 25.6 MICROGRAM(S)/KG/HR: 50 INJECTION INTRAVENOUS at 16:43

## 2020-11-19 RX ADMIN — POLYETHYLENE GLYCOL 3350 17 GRAM(S): 17 POWDER, FOR SOLUTION ORAL at 12:03

## 2020-11-19 RX ADMIN — MIDAZOLAM HYDROCHLORIDE 8.54 MG/KG/HR: 1 INJECTION, SOLUTION INTRAMUSCULAR; INTRAVENOUS at 16:42

## 2020-11-19 RX ADMIN — HEPARIN SODIUM 1100 UNIT(S)/HR: 5000 INJECTION INTRAVENOUS; SUBCUTANEOUS at 18:58

## 2020-11-19 RX ADMIN — SENNA PLUS 10 MILLILITER(S): 8.6 TABLET ORAL at 21:20

## 2020-11-19 RX ADMIN — Medication 1: at 05:57

## 2020-11-19 RX ADMIN — MIDAZOLAM HYDROCHLORIDE 8.54 MG/KG/HR: 1 INJECTION, SOLUTION INTRAMUSCULAR; INTRAVENOUS at 10:15

## 2020-11-19 RX ADMIN — CEFTRIAXONE 100 MILLIGRAM(S): 500 INJECTION, POWDER, FOR SOLUTION INTRAMUSCULAR; INTRAVENOUS at 11:56

## 2020-11-19 RX ADMIN — AZITHROMYCIN 255 MILLIGRAM(S): 500 TABLET, FILM COATED ORAL at 10:16

## 2020-11-19 RX ADMIN — CISATRACURIUM BESYLATE 13.9 MICROGRAM(S)/KG/MIN: 2 INJECTION INTRAVENOUS at 01:43

## 2020-11-19 RX ADMIN — FENTANYL CITRATE 25.6 MICROGRAM(S)/KG/HR: 50 INJECTION INTRAVENOUS at 01:35

## 2020-11-19 RX ADMIN — FENTANYL CITRATE 25.6 MICROGRAM(S)/KG/HR: 50 INJECTION INTRAVENOUS at 10:15

## 2020-11-19 RX ADMIN — PANTOPRAZOLE SODIUM 40 MILLIGRAM(S): 20 TABLET, DELAYED RELEASE ORAL at 11:56

## 2020-11-19 RX ADMIN — HEPARIN SODIUM 1300 UNIT(S)/HR: 5000 INJECTION INTRAVENOUS; SUBCUTANEOUS at 06:49

## 2020-11-19 RX ADMIN — ZINC SULFATE TAB 220 MG (50 MG ZINC EQUIVALENT) 220 MILLIGRAM(S): 220 (50 ZN) TAB at 11:56

## 2020-11-19 NOTE — PROGRESS NOTE ADULT - SUBJECTIVE AND OBJECTIVE BOX
[facility] NEPHROLOGY- PROGRESS NOTE    Patient is a 56yo Male with no PMH recently diagnosed with COVID-19 (5 days PTA) p/w SOB s/p intubated in the ER. Pt admitted to ICU with acute hypoxic respiratory failure 2/2 COVID-19 PNA s/p intubated, BECKY with transaminitis. Nephrology consulted for Elevated serum creatinine.    Hospital Medications: Medications reviewed.  REVIEW OF SYSTEMS: Unable to obtain/ intubated & sedated    VITALS:  T(F): 99 (20 @ 07:00), Max: 99 (20 @ 07:00)  HR: 61 (20 @ 14:00)  BP: 126/73 (20 @ 14:00)  RR: 22 (20 @ 14:00)  SpO2: 90% (20 @ 14:00)  Wt(kg): --     @ 07:01  -   @ 07:00  --------------------------------------------------------  IN: 1762.8 mL / OUT: 2150 mL / NET: -387.2 mL     @ 07:01  -   @ 16:08  --------------------------------------------------------  IN: 945 mL / OUT: 845 mL / NET: 100 mL        PHYSICAL EXAM:  Gen: Sedated  HEENT: +ETT, +scleral edema b/l  Cards: RRR, +S1/S2,   Resp: +mechanical BS  GI: soft, ND,   : +isaacs with yellow urine  Extremities: no LE edema, +mild UE edema B/L  Neuro: sedated    LABS:      141  |  106  |  87<H>  ----------------------------<  124<H>  4.5   |  24  |  4.36<H>    Ca    7.1<L>      2020 06:11  Phos  5.7       Mg     3.1         TPro  6.2  /  Alb  1.9<L>  /  TBili  0.3  /  DBili      /  AST  52<H>  /  ALT  83<H>  /  AlkPhos  67      Creatinine Trend: 4.36 <--, 4.43 <--, 4.30 <--, 3.33 <--, 2.73 <--, 2.20 <--, 1.88 <--, 2.20 <--                        10.9   10.49 )-----------( 343      ( 2020 06:11 )             35.2     Urine Studies:  Urinalysis Basic - ( 2020 01:30 )    Color: Yellow / Appearance: Clear / S.020 / pH:   Gluc:  / Ketone: Negative  / Bili: Negative / Urobili: Negative   Blood:  / Protein: 100 / Nitrite: Negative   Leuk Esterase: Negative / RBC: 2-5 /HPF / WBC 0-2 /HPF   Sq Epi:  / Non Sq Epi: Few /HPF / Bacteria: Moderate /HPF      Osmolality, Random Urine: 543 mos/kg ( @ 01:29)  Sodium, Random Urine: 45 mmol/L ( @ 01:29)  Chloride, Random Urine: 44 mmol/L ( @ 01:29)

## 2020-11-19 NOTE — PROGRESS NOTE ADULT - ASSESSMENT
Patient is a 54yo Male with no PMH recently diagnosed with COVID-19 (5 days PTA) p/w SOB s/p intubated in the ER. Pt admitted to ICU with acute hypoxic respiratory failure 2/2 COVID-19 PNA s/p intubated, BECKY with transaminitis. Nephrology consulted for Elevated serum creatinine.    1. BECKY- unknown baseline SCr. BECKY likely hemodynamically mediated in the setting of septic shock / infection 2/2 COVID-19, hypotension on pressors (now off pressors);  likely ATN. UA with 100 protein and trace blood with hyaline cast. Renal function plateaued with good urine o/p; will monitor for recovery. Pt now urinating >50-100ml/hr. Will hold off on Lasix for now.    No need for HD at this time. Will defer Renal US due to COVID status. Strict I/Os. Avoid nephrotoxins/ NSAIDs/ RCA. Monitor BMP.  2. Septic Shock due to Multifocal PNA 2/2 COVID-19- Plan per ICU  3. Hypotension- BP improved. PT off Pressors as per ICU. Monitor BP  4. Acute hypoxic respiratory failure- vent support as per ICU.  5. Hypocalcemia- corrected serum Ca for hypoalbuminemia  8.78 with mild hyperphosphatemia; If worsening serum phos- will consider sevelamer powder.  Monitor ionized calcium and serum phos.

## 2020-11-19 NOTE — PROGRESS NOTE ADULT - SUBJECTIVE AND OBJECTIVE BOX
HPI: 55M from home without PMH with progressively worsening SOB and +COVID test x5 days prior to admission presented following SpO2 64% at home.  Started on 15L NRB in ED 2/2 SpO2s 85-90%, desaturated to low 80s and was placed on HFNC, continued to desaturate, was intubated and admitted to ICU.     INTERVAL HPI/OVERNIGHT EVENTS: NAEON. VSS. +ETT, this am vent settings 25/400/80>70/8. Continuing on fentanyl, versed, heparin gtt, holding Nimbex this am in preparation for SBT. U/o 75-100cc/hr overnight and this am, no Lasix required at this time. Cr stably elevated to 4.36. Continuing on azithromycin and cftx, will dc tomorrow after 5-day course of each. Continuing with day 5 of Decadron.       ICU Vital Signs Last 24 Hrs  T(C): 37.2 (19 Nov 2020 07:00), Max: 37.2 (19 Nov 2020 07:00)  T(F): 99 (19 Nov 2020 07:00), Max: 99 (19 Nov 2020 07:00)  HR: 79 (19 Nov 2020 12:00) (58 - 79)  BP: 123/72 (19 Nov 2020 12:00) (99/63 - 139/73)  BP(mean): 84 (19 Nov 2020 12:00) (71 - 94)  ABP: --  ABP(mean): --  RR: 25 (19 Nov 2020 12:00) (25 - 25)  SpO2: 90% (19 Nov 2020 12:00) (87% - 98%)    I&O's Summary    18 Nov 2020 07:01  -  19 Nov 2020 07:00  --------------------------------------------------------  IN: 1762.8 mL / OUT: 2150 mL / NET: -387.2 mL    19 Nov 2020 07:01  -  19 Nov 2020 12:32  --------------------------------------------------------  IN: 788.9 mL / OUT: 520 mL / NET: 269 mL      Mode: AC/ CMV (Assist Control/ Continuous Mandatory Ventilation)  RR (machine): 25  TV (machine): 400  FiO2: 70  PEEP: 8  ITime: 0.85  MAP: 16  PIP: 31      LABS:                        10.9   10.49 )-----------( 343      ( 19 Nov 2020 06:11 )             35.2     11-19    141  |  106  |  87<H>  ----------------------------<  124<H>  4.5   |  24  |  4.36<H>    Ca    7.1<L>      19 Nov 2020 06:11  Phos  5.7     11-19  Mg     3.1     11-19    TPro  6.2  /  Alb  1.9<L>  /  TBili  0.3  /  DBili  x   /  AST  52<H>  /  ALT  83<H>  /  AlkPhos  67  11-19    PT/INR - ( 17 Nov 2020 15:35 )   PT: 12.5 sec;   INR: 1.05 ratio         PTT - ( 19 Nov 2020 10:44 )  PTT:108.0 sec    CAPILLARY BLOOD GLUCOSE      POCT Blood Glucose.: 149 mg/dL (19 Nov 2020 10:42)  POCT Blood Glucose.: 157 mg/dL (19 Nov 2020 05:19)  POCT Blood Glucose.: 138 mg/dL (18 Nov 2020 23:25)  POCT Blood Glucose.: 160 mg/dL (18 Nov 2020 16:22)    ABG - ( 19 Nov 2020 04:48 )  pH, Arterial: 7.35  pH, Blood: x     /  pCO2: 40    /  pO2: 92    / HCO3: 22    / Base Excess: -3.2  /  SaO2: 97        RADIOLOGY & ADDITIONAL TESTS:    Consultant(s) Notes Reviewed:  [x ] YES  [ ] NO    MEDICATIONS  (STANDING):  azithromycin  IVPB      azithromycin  IVPB 500 milliGRAM(s) IV Intermittent every 24 hours  cefTRIAXone   IVPB 1000 milliGRAM(s) IV Intermittent every 24 hours  chlorhexidine 2% Cloths 1 Application(s) Topical <User Schedule>  dexAMETHasone  Injectable 6 milliGRAM(s) IV Push daily  ergocalciferol Drops 65282 Unit(s) Enteral Tube <User Schedule>  fentaNYL   Infusion. 3 MICROgram(s)/kG/Hr (25.6 mL/Hr) IV Continuous <Continuous>  heparin  Infusion.  Unit(s)/Hr (15 mL/Hr) IV Continuous <Continuous>  insulin lispro (ADMELOG) corrective regimen sliding scale   SubCutaneous every 6 hours  midazolam Infusion 0.1 mG/kG/Hr (8.54 mL/Hr) IV Continuous <Continuous>  pantoprazole  Injectable 40 milliGRAM(s) IV Push daily  polyethylene glycol 3350 17 Gram(s) Oral daily  senna Syrup 10 milliLiter(s) Oral at bedtime  zinc sulfate 220 milliGRAM(s) Oral daily    MEDICATIONS  (PRN):  acetaminophen    Suspension .. 650 milliGRAM(s) Enteral Tube every 6 hours PRN Temp greater or equal to 38C (100.4F)  artificial  tears Solution 1 Drop(s) Both EYES every 4 hours PRN Dry Eyes  sodium chloride 0.9% lock flush 10 milliLiter(s) IV Push every 1 hour PRN Pre/post blood products, medications, blood draw, and to maintain line patency    ROS: unable to obtain 2/2 sedation     PHYSICAL EXAM:  GENERAL: sedated, +ETT, +OG  HEAD:  Atraumatic, Normocephalic  EYES: b/l edema noted  ENT: moist mucous membranes  NECK: Supple  NERVOUS SYSTEM: sedated  CHEST/LUNG: B/L good air entry  HEART: S1S2 normal, RRR  ABDOMEN: Soft, Nontender, distended 2/2 body habitus, Bowel sounds present  EXTREMITIES:  2+ Peripheral Pulses, b/l 1+ edema  SKIN: No rashes or lesions noted    Care Discussed with Consultants/Other Providers [ x] YES  [ ] NO

## 2020-11-19 NOTE — PROGRESS NOTE ADULT - ASSESSMENT
55M from home without PMH with progressively worsening SOB and +COVID test x5 days prior to admission presented with SpO2 64% at home, intubated 2/2 deteriorating respiratory status and admitted to ICU.    Assessment:  1. Acute Hypoxic Respiratory failure   2. COVID Pneumonia   3. Acute Kidney Injury   4. elevated LFTs       Plan:    CNS: #intubated:   -sedated with fentanyl and versed gtt,  -holding Nimbex gtt this am in preparation for SBT    CVS: #no active issues:   -SBPs stable 120s, monitor  -follow up TTE (likely once off isolation)     RESP: #AHRF 2/2 COVID  -CXR on admission with diffuse B/L opacities and small left pleural effusion, stable on repeat 11/17-19  -WBC 24.4 on admission, improved to 10.5 today  -APCs elevated with procal 1.82, CRP 34.4->30.2->16.5->7.17->3.75, LDH 1095, lactate 8.1->2->1.5, ferritin 4814, d-dimer 559->1977, CK wnl  -ABG improved to 7.35/40/92/22, decreased FiO2 to 80->70 this am   -off Nimbex in preparation for SBT as above given stable CXR, slowly decreasing FiO2 requirements, and improving ABG  -continuing on cftx and azithromycin for CAP coverage (day 1 11/16, last day 11/20)  -continuing on Decadron 6mg Qd x10 days (day 1 11/15)  -not candidate for remdesivir 2/2 elevated LFTs  -continue therapeutic AC given elevated d-dimer  -will prone if needed   -BCx NGTD   -will follow daily CRP with coags, d-dimer, esr, LDH, ferritin, trop every three days to monitor disease progression, next results tomorrow am (11/20)    GI: #elevated LFTs:   -likely 2/2 COVID  -follow daily, this am AP 67 AST/ALT 52/83  -->348->301    RENAL: #BECKY:   -BUN/Cr 49/2.2 on admission, unknown baseline  -Cr stably high to 4.36 this am  -continuing heparin gtt, Lovenox held with worsening Cr  -nephro Dr. Camara following, no HD at this time, give Lasix if u/o worsens  -no Lasix needed overnight, u/o 75-100cc/hr overnight    ID: #COVID PNA  -Tylenol prn fevers, none given to date  -continue zinc and vit D supplementation  -remainder of history and management as above     HEME/ONC: #hypercoagulability 2/2 COVID  -D-Dimer 559->1977 likely 2/2 COVID, will repeat in am  -continue heparin therapeutic (gtt), Lovenox held 2/2 worsening Cr  -monitor daily CBC     ENDO: #No issues:  -target CBG <180, currently meeting goal  -HgA1c 6.1  -NPO, OG placed, started TF  -HSS while on steroids     Skin/Catheters: #no issues  -no rashes noted  -FC in place     PPx:  #DVT: heparin gtt for therapeutic dosing as above SCD (BMI>30, ICU admission)  #GI: Protonix     Dispo:  -monitor in ICU     GOC: Full Code

## 2020-11-20 LAB
ALBUMIN SERPL ELPH-MCNC: 2 G/DL — LOW (ref 3.5–5)
ALBUMIN SERPL ELPH-MCNC: 2 G/DL — LOW (ref 3.5–5)
ALP SERPL-CCNC: 70 U/L — SIGNIFICANT CHANGE UP (ref 40–120)
ALP SERPL-CCNC: 72 U/L — SIGNIFICANT CHANGE UP (ref 40–120)
ALT FLD-CCNC: 76 U/L DA — HIGH (ref 10–60)
ALT FLD-CCNC: 77 U/L DA — HIGH (ref 10–60)
ANION GAP SERPL CALC-SCNC: 11 MMOL/L — SIGNIFICANT CHANGE UP (ref 5–17)
ANION GAP SERPL CALC-SCNC: 6 MMOL/L — SIGNIFICANT CHANGE UP (ref 5–17)
APTT BLD: 64.1 SEC — HIGH (ref 27.5–35.5)
AST SERPL-CCNC: 65 U/L — HIGH (ref 10–40)
AST SERPL-CCNC: 73 U/L — HIGH (ref 10–40)
BASE EXCESS BLDA CALC-SCNC: -1.9 MMOL/L — SIGNIFICANT CHANGE UP (ref -2–2)
BASE EXCESS BLDA CALC-SCNC: -2 MMOL/L — SIGNIFICANT CHANGE UP (ref -2–2)
BILIRUB SERPL-MCNC: 0.7 MG/DL — SIGNIFICANT CHANGE UP (ref 0.2–1.2)
BILIRUB SERPL-MCNC: 0.7 MG/DL — SIGNIFICANT CHANGE UP (ref 0.2–1.2)
BLOOD GAS COMMENTS ARTERIAL: SIGNIFICANT CHANGE UP
BLOOD GAS COMMENTS ARTERIAL: SIGNIFICANT CHANGE UP
BUN SERPL-MCNC: 88 MG/DL — HIGH (ref 7–18)
BUN SERPL-MCNC: 88 MG/DL — HIGH (ref 7–18)
CALCIUM SERPL-MCNC: 7.6 MG/DL — LOW (ref 8.4–10.5)
CALCIUM SERPL-MCNC: 7.6 MG/DL — LOW (ref 8.4–10.5)
CHLORIDE SERPL-SCNC: 111 MMOL/L — HIGH (ref 96–108)
CHLORIDE SERPL-SCNC: 112 MMOL/L — HIGH (ref 96–108)
CO2 SERPL-SCNC: 21 MMOL/L — LOW (ref 22–31)
CO2 SERPL-SCNC: 27 MMOL/L — SIGNIFICANT CHANGE UP (ref 22–31)
CREAT SERPL-MCNC: 3.22 MG/DL — HIGH (ref 0.5–1.3)
CREAT SERPL-MCNC: 3.44 MG/DL — HIGH (ref 0.5–1.3)
CRP SERPL-MCNC: 3.07 MG/DL — HIGH (ref 0–0.4)
D DIMER BLD IA.RAPID-MCNC: 2763 NG/ML DDU — HIGH
ERYTHROCYTE [SEDIMENTATION RATE] IN BLOOD: 62 MM/HR — HIGH (ref 0–20)
FERRITIN SERPL-MCNC: 1097 NG/ML — HIGH (ref 30–400)
GLUCOSE SERPL-MCNC: 121 MG/DL — HIGH (ref 70–99)
GLUCOSE SERPL-MCNC: 88 MG/DL — SIGNIFICANT CHANGE UP (ref 70–99)
HCO3 BLDA-SCNC: 23 MMOL/L — SIGNIFICANT CHANGE UP (ref 23–27)
HCO3 BLDA-SCNC: 24 MMOL/L — SIGNIFICANT CHANGE UP (ref 23–27)
HCT VFR BLD CALC: 37 % — LOW (ref 39–50)
HGB BLD-MCNC: 11.4 G/DL — LOW (ref 13–17)
HOROWITZ INDEX BLDA+IHG-RTO: 100 — SIGNIFICANT CHANGE UP
HOROWITZ INDEX BLDA+IHG-RTO: 100 — SIGNIFICANT CHANGE UP
INR BLD: 1.08 RATIO — SIGNIFICANT CHANGE UP (ref 0.88–1.16)
LDH SERPL L TO P-CCNC: 706 U/L — HIGH (ref 120–225)
MCHC RBC-ENTMCNC: 28.6 PG — SIGNIFICANT CHANGE UP (ref 27–34)
MCHC RBC-ENTMCNC: 30.8 GM/DL — LOW (ref 32–36)
MCV RBC AUTO: 92.7 FL — SIGNIFICANT CHANGE UP (ref 80–100)
NRBC # BLD: 0 /100 WBCS — SIGNIFICANT CHANGE UP (ref 0–0)
PCO2 BLDA: 42 MMHG — SIGNIFICANT CHANGE UP (ref 32–46)
PCO2 BLDA: 46 MMHG — SIGNIFICANT CHANGE UP (ref 32–46)
PH BLDA: 7.32 — LOW (ref 7.35–7.45)
PH BLDA: 7.36 — SIGNIFICANT CHANGE UP (ref 7.35–7.45)
PLATELET # BLD AUTO: 349 K/UL — SIGNIFICANT CHANGE UP (ref 150–400)
PO2 BLDA: 123 MMHG — HIGH (ref 74–108)
PO2 BLDA: 68 MMHG — LOW (ref 74–108)
POTASSIUM SERPL-MCNC: 4.5 MMOL/L — SIGNIFICANT CHANGE UP (ref 3.5–5.3)
POTASSIUM SERPL-MCNC: 5 MMOL/L — SIGNIFICANT CHANGE UP (ref 3.5–5.3)
POTASSIUM SERPL-SCNC: 4.5 MMOL/L — SIGNIFICANT CHANGE UP (ref 3.5–5.3)
POTASSIUM SERPL-SCNC: 5 MMOL/L — SIGNIFICANT CHANGE UP (ref 3.5–5.3)
PROT SERPL-MCNC: 6.3 G/DL — SIGNIFICANT CHANGE UP (ref 6–8.3)
PROT SERPL-MCNC: 6.4 G/DL — SIGNIFICANT CHANGE UP (ref 6–8.3)
PROTHROM AB SERPL-ACNC: 12.8 SEC — SIGNIFICANT CHANGE UP (ref 10.6–13.6)
RBC # BLD: 3.99 M/UL — LOW (ref 4.2–5.8)
RBC # FLD: 14 % — SIGNIFICANT CHANGE UP (ref 10.3–14.5)
SAO2 % BLDA: 92 % — SIGNIFICANT CHANGE UP (ref 92–96)
SAO2 % BLDA: 98 % — HIGH (ref 92–96)
SODIUM SERPL-SCNC: 143 MMOL/L — SIGNIFICANT CHANGE UP (ref 135–145)
SODIUM SERPL-SCNC: 145 MMOL/L — SIGNIFICANT CHANGE UP (ref 135–145)
TROPONIN I SERPL-MCNC: 0.46 NG/ML — HIGH (ref 0–0.04)
WBC # BLD: 11.77 K/UL — HIGH (ref 3.8–10.5)
WBC # FLD AUTO: 11.77 K/UL — HIGH (ref 3.8–10.5)

## 2020-11-20 PROCEDURE — 71045 X-RAY EXAM CHEST 1 VIEW: CPT | Mod: 26

## 2020-11-20 RX ORDER — INSULIN LISPRO 100/ML
VIAL (ML) SUBCUTANEOUS EVERY 6 HOURS
Refills: 0 | Status: DISCONTINUED | OUTPATIENT
Start: 2020-11-20 | End: 2020-12-02

## 2020-11-20 RX ADMIN — HEPARIN SODIUM 1100 UNIT(S)/HR: 5000 INJECTION INTRAVENOUS; SUBCUTANEOUS at 01:04

## 2020-11-20 RX ADMIN — CHLORHEXIDINE GLUCONATE 15 MILLILITER(S): 213 SOLUTION TOPICAL at 17:20

## 2020-11-20 RX ADMIN — Medication 6 MILLIGRAM(S): at 05:02

## 2020-11-20 RX ADMIN — SENNA PLUS 10 MILLILITER(S): 8.6 TABLET ORAL at 21:00

## 2020-11-20 RX ADMIN — CHLORHEXIDINE GLUCONATE 15 MILLILITER(S): 213 SOLUTION TOPICAL at 05:02

## 2020-11-20 RX ADMIN — Medication 650 MILLIGRAM(S): at 23:41

## 2020-11-20 RX ADMIN — AZITHROMYCIN 255 MILLIGRAM(S): 500 TABLET, FILM COATED ORAL at 10:56

## 2020-11-20 RX ADMIN — CHLORHEXIDINE GLUCONATE 1 APPLICATION(S): 213 SOLUTION TOPICAL at 12:01

## 2020-11-20 RX ADMIN — MIDAZOLAM HYDROCHLORIDE 8.54 MG/KG/HR: 1 INJECTION, SOLUTION INTRAMUSCULAR; INTRAVENOUS at 07:52

## 2020-11-20 RX ADMIN — MIDAZOLAM HYDROCHLORIDE 8.54 MG/KG/HR: 1 INJECTION, SOLUTION INTRAMUSCULAR; INTRAVENOUS at 22:42

## 2020-11-20 RX ADMIN — CEFTRIAXONE 100 MILLIGRAM(S): 500 INJECTION, POWDER, FOR SOLUTION INTRAMUSCULAR; INTRAVENOUS at 12:00

## 2020-11-20 RX ADMIN — Medication 10 MILLIGRAM(S): at 12:00

## 2020-11-20 RX ADMIN — FENTANYL CITRATE 25.6 MICROGRAM(S)/KG/HR: 50 INJECTION INTRAVENOUS at 12:58

## 2020-11-20 RX ADMIN — POLYETHYLENE GLYCOL 3350 17 GRAM(S): 17 POWDER, FOR SOLUTION ORAL at 12:01

## 2020-11-20 RX ADMIN — Medication 650 MILLIGRAM(S): at 23:05

## 2020-11-20 RX ADMIN — Medication 10 MILLIGRAM(S): at 12:04

## 2020-11-20 RX ADMIN — CHLORHEXIDINE GLUCONATE 1 APPLICATION(S): 213 SOLUTION TOPICAL at 05:02

## 2020-11-20 RX ADMIN — ZINC SULFATE TAB 220 MG (50 MG ZINC EQUIVALENT) 220 MILLIGRAM(S): 220 (50 ZN) TAB at 12:01

## 2020-11-20 RX ADMIN — PANTOPRAZOLE SODIUM 40 MILLIGRAM(S): 20 TABLET, DELAYED RELEASE ORAL at 12:01

## 2020-11-20 RX ADMIN — FENTANYL CITRATE 25.6 MICROGRAM(S)/KG/HR: 50 INJECTION INTRAVENOUS at 03:53

## 2020-11-20 NOTE — PROGRESS NOTE ADULT - ASSESSMENT
Patient is a 54yo Male with no PMH recently diagnosed with COVID-19 (5 days PTA) p/w SOB s/p intubated in the ER. Pt admitted to ICU with acute hypoxic respiratory failure 2/2 COVID-19 PNA s/p intubated, BECKY with transaminitis. Nephrology consulted for Elevated serum creatinine.    1. BECKY- unknown baseline SCr. BECKY likely hemodynamically mediated in the setting of septic shock / infection 2/2 COVID-19, hypotension on pressors (now off pressors);  likely ATN. UA with 100 protein and trace blood with hyaline cast. Renal function improving with good urine o/p; renal recovery. Will hold off on Lasix for now.    No need for HD at this time. Will defer Renal US due to COVID status. Strict I/Os. Avoid nephrotoxins/ NSAIDs/ RCA. Monitor BMP.  2. Septic Shock due to Multifocal PNA 2/2 COVID-19- Plan per ICU  3. Hypotension- BP improved. Pt off Pressors as per ICU. Monitor BP  4. Acute hypoxic respiratory failure- vent support as per ICU.  5. Hypocalcemia- corrected serum Ca for hypoalbuminemia  9.2 with mild hyperphosphatemia; If worsening serum phos- will consider sevelamer powder.  Monitor ionized calcium and serum phos.

## 2020-11-20 NOTE — PROGRESS NOTE ADULT - SUBJECTIVE AND OBJECTIVE BOX
[facility] NEPHROLOGY- PROGRESS NOTE    Patient is a 54yo Male with no PMH recently diagnosed with COVID-19 (5 days PTA) p/w SOB s/p intubated in the ER. Pt admitted to ICU with acute hypoxic respiratory failure 2/2 COVID-19 PNA s/p intubated, BECKY with transaminitis. Nephrology consulted for Elevated serum creatinine.    Hospital Medications: Medications reviewed.  REVIEW OF SYSTEMS: Unable to obtain/ intubated & sedated    VITALS:  T(F): 97.9 (20 @ 07:00), Max: 97.9 (20 @ 07:00)  HR: 81 (20 @ 14:00)  BP: 129/72 (20 @ 14:00)  RR: 17 (20 @ 14:00)  SpO2: 93% (20 @ 14:00)  Wt(kg): --     @ 07:01  -   @ 07:00  --------------------------------------------------------  IN: 1758.4 mL / OUT: 2795 mL / NET: -1036.6 mL     @ 07:01  -   @ 14:54  --------------------------------------------------------  IN: 610.4 mL / OUT: 1150 mL / NET: -539.6 mL      PHYSICAL EXAM:  Gen: Sedated  HEENT: +ETT, +eyes covered  Cards: RRR, +S1/S2,   Resp: +mechanical BS  GI: soft, ND,   : +isaacs with yellow urine  Extremities: no LE edema, +mild UE edema B/L  Neuro: sedated    LABS:      143  |  111<H>  |  88<H>  ----------------------------<  88  4.5   |  21<L>  |  3.44<H>    Ca    7.6<L>      2020 06:28  Phos  5.7       Mg     3.1         TPro  6.4  /  Alb  2.0<L>  /  TBili  0.7  /  DBili      /  AST  65<H>  /  ALT  76<H>  /  AlkPhos  70      Creatinine Trend: 3.44 <--, 4.36 <--, 4.43 <--, 4.30 <--, 3.33 <--, 2.73 <--, 2.20 <--, 1.88 <--, 2.20 <--                        11.4   11.77 )-----------( 349      ( 2020 06:28 )             37.0     Urine Studies:  Urinalysis Basic - ( 2020 01:30 )    Color: Yellow / Appearance: Clear / S.020 / pH:   Gluc:  / Ketone: Negative  / Bili: Negative / Urobili: Negative   Blood:  / Protein: 100 / Nitrite: Negative   Leuk Esterase: Negative / RBC: 2-5 /HPF / WBC 0-2 /HPF   Sq Epi:  / Non Sq Epi: Few /HPF / Bacteria: Moderate /HPF      Osmolality, Random Urine: 543 mos/kg ( @ 01:29)  Sodium, Random Urine: 45 mmol/L ( @ 01:29)  Chloride, Random Urine: 44 mmol/L ( @ 01:29)

## 2020-11-20 NOTE — PROGRESS NOTE ADULT - SUBJECTIVE AND OBJECTIVE BOX
HPI: 55M from home without PMH with progressively worsening SOB and +COVID test x5 days prior to admission presented following SpO2 64% at home.  Started on 15L NRB in ED 2/2 SpO2s 85-90%, desaturated to low 80s and was placed on HFNC, continued to desaturate, was intubated and admitted to ICU.     INTERVAL HPI/OVERNIGHT EVENTS: NAEON. SpO2 overnight/early am high 80-90s, SBPs 140s, HR 50s. Off of nimbex since yesterday, continuing with Versed 0.08, fentanyl 3mcg. Vent 25/400/70/8, will continue to titrate down vent requirements as tolerated. ABG this am 7.36/42/68/23, will repeat 3pm. Continuing day 6 decadron. Final day cftx and azithromycin today, 5 days total. Awaiting CXR. LDH, ferritin, trop improving, ddimer worsening, will continue to trend APCs.     ICU Vital Signs Last 24 Hrs  T(C): 36.6 (20 Nov 2020 07:00), Max: 36.6 (20 Nov 2020 04:00)  T(F): 97.9 (20 Nov 2020 07:00), Max: 97.9 (20 Nov 2020 07:00)  HR: 72 (20 Nov 2020 12:09) (55 - 96)  BP: 133/68 (20 Nov 2020 12:00) (117/70 - 152/85)  BP(mean): 84 (20 Nov 2020 12:00) (80 - 105)  ABP: --  ABP(mean): --  RR: 17 (20 Nov 2020 12:00) (0 - 26)  SpO2: 93% (20 Nov 2020 12:09) (88% - 99%)    I&O's Summary    19 Nov 2020 07:01  -  20 Nov 2020 07:00  --------------------------------------------------------  IN: 1758.4 mL / OUT: 2795 mL / NET: -1036.6 mL    20 Nov 2020 07:01  -  20 Nov 2020 12:38  --------------------------------------------------------  IN: 491.2 mL / OUT: 850 mL / NET: -358.8 mL      Mode: AC/ CMV (Assist Control/ Continuous Mandatory Ventilation)  RR (machine): 25  TV (machine): 400  FiO2: 100  PEEP: 8  ITime: 0.85  MAP: 18  PIP: 34      LABS:                        11.4   11.77 )-----------( 349      ( 20 Nov 2020 06:28 )             37.0     11-20    143  |  111<H>  |  88<H>  ----------------------------<  88  4.5   |  21<L>  |  3.44<H>    Ca    7.6<L>      20 Nov 2020 06:28  Phos  5.7     11-19  Mg     3.1     11-19    TPro  6.4  /  Alb  2.0<L>  /  TBili  0.7  /  DBili  x   /  AST  65<H>  /  ALT  76<H>  /  AlkPhos  70  11-20    PT/INR - ( 20 Nov 2020 06:28 )   PT: 12.8 sec;   INR: 1.08 ratio         PTT - ( 20 Nov 2020 00:43 )  PTT:64.1 sec    CAPILLARY BLOOD GLUCOSE      POCT Blood Glucose.: 145 mg/dL (20 Nov 2020 12:03)  POCT Blood Glucose.: 98 mg/dL (20 Nov 2020 05:53)  POCT Blood Glucose.: 137 mg/dL (19 Nov 2020 22:57)  POCT Blood Glucose.: 81 mg/dL (19 Nov 2020 17:14)    ABG - ( 20 Nov 2020 05:40 )  pH, Arterial: 7.36  pH, Blood: x     /  pCO2: 42    /  pO2: 68    / HCO3: 23    / Base Excess: -2.0  /  SaO2: 92        RADIOLOGY & ADDITIONAL TESTS:    Consultant(s) Notes Reviewed:  [x ] YES  [ ] NO    MEDICATIONS  (STANDING):  azithromycin  IVPB 500 milliGRAM(s) IV Intermittent every 24 hours  azithromycin  IVPB      chlorhexidine 0.12% Liquid 15 milliLiter(s) Oral Mucosa two times a day  chlorhexidine 2% Cloths 1 Application(s) Topical daily  chlorhexidine 2% Cloths 1 Application(s) Topical <User Schedule>  dexAMETHasone  Injectable 6 milliGRAM(s) IV Push daily  ergocalciferol Drops 63654 Unit(s) Enteral Tube <User Schedule>  fentaNYL   Infusion. 3 MICROgram(s)/kG/Hr (25.6 mL/Hr) IV Continuous <Continuous>  heparin  Infusion.  Unit(s)/Hr (15 mL/Hr) IV Continuous <Continuous>  insulin lispro (ADMELOG) corrective regimen sliding scale   SubCutaneous every 6 hours  midazolam Infusion 0.1 mG/kG/Hr (8.54 mL/Hr) IV Continuous <Continuous>  pantoprazole  Injectable 40 milliGRAM(s) IV Push daily  polyethylene glycol 3350 17 Gram(s) Oral daily  senna Syrup 10 milliLiter(s) Oral at bedtime  zinc sulfate 220 milliGRAM(s) Oral daily    MEDICATIONS  (PRN):  acetaminophen    Suspension .. 650 milliGRAM(s) Enteral Tube every 6 hours PRN Temp greater or equal to 38C (100.4F)  artificial  tears Solution 1 Drop(s) Both EYES every 4 hours PRN Dry Eyes  sodium chloride 0.9% lock flush 10 milliLiter(s) IV Push every 1 hour PRN Pre/post blood products, medications, blood draw, and to maintain line patency    PHYSICAL EXAM:  GENERAL: sedated, +ETT, +OG  HEAD:  Atraumatic, Normocephalic  EYES: b/l edema continuing  ENT: moist mucous membranes  NECK: Supple  NERVOUS SYSTEM: sedated  CHEST/LUNG: B/L good air entry  HEART: S1S2 normal, RRR  ABDOMEN: Soft, Nontender, distended 2/2 body habitus, Bowel sounds present  EXTREMITIES:  2+ Peripheral Pulses, b/l 1+ edema, mildly improved since yesterday  SKIN: No rashes or lesions noted    Care Discussed with Consultants/Other Providers [ x] YES  [ ] NO

## 2020-11-20 NOTE — PROGRESS NOTE ADULT - ASSESSMENT
55M from home without PMH with progressively worsening SOB and +COVID test x5 days prior to admission presented with SpO2 64% at home, intubated 2/2 deteriorating respiratory status and admitted to ICU.    Assessment:  1. Acute Hypoxic Respiratory failure   2. COVID Pneumonia   3. Acute Kidney Injury   4. elevated LFTs       Plan:    CNS: #intubated:   -sedated with fentanyl and versed gtt  -holding Nimbex gtt since 11/19 am in preparation for SBT    CVS: #no active issues:   -SBPs stable 120-140s, monitor  -follow up TTE (likely once off isolation)     RESP: #AHRF 2/2 COVID  -CXR on admission with diffuse B/L opacities and small left pleural effusion, stable on repeat 11/17-19, awaiting CXR 11/20  -WBC 24.4 on admission, improved to 11.8  -APCs elevated with procal 1.82, CRP 34.4->3.75, LDH 1095->706, ferritin 4814->1097, d-dimer 559->1977->2763, ESR 62, CK and PT/INR wnl, trop 2.1->0.46 (initial and most recent results from 11/19-20 shown)  -ABG improved to 7.36/42/68/223, continuing 25/400/70/8 and will titrate down as tolerated   -off Nimbex in preparation for SBT   -completed 5 days cftx and azithromycin for CAP coverage today (11/16-20)  -continuing on Decadron 6mg Qd x10 days (day 1 11/15)  -not candidate for remdesivir 2/2 CrCl <45 (29.7)  -continue therapeutic AC given elevated d-dimer  -will prone if needed   -BCx NGTD   -will follow daily CRP with coags, d-dimer, esr, LDH, ferritin, trop every three days to monitor disease progression, next results am 11/23    GI: #elevated LFTs:   -likely 2/2 COVID  -follow daily, this am AP 70 AST/ALT 65/76  -->348->301  -continue senna, added dulcolax for bowel regimen    RENAL: #BECKY:   -BUN/Cr 49/2.2 on admission, unknown baseline  -Cr improved to 3.44 this am (peak 4.43)  -continuing heparin gtt, Lovenox held 2/2 elevated Cr  -nephro Dr. Camara following, no HD at this time, hold additional Lasix at this time  -no Lasix needed overnight, u/o continuing with improved 50-100cc/hr, likely diuretic phase ATN    ID: #COVID PNA  -Tylenol prn fevers, none given to date  -dc zinc and vit D supplementation  -remainder of history and management as above     HEME/ONC: #hypercoagulability 2/2 COVID  -D-Dimer 559->1977->2763 likely 2/2 COVID, will repeat in am  -continue heparin therapeutic (gtt), Lovenox held 2/2 worsening Cr  -monitor daily CBC, Hb stable    ENDO: #No issues:  -target CBG <180, 80s-140s overnight  -HgA1c 6.1  -NPO, OG placed, started TF  -HSS while on steroids, decreased to low HSS    Skin/Catheters: #no issues  -no rashes noted  -cw artificial tears  -FC in place     PPx:  #DVT: heparin gtt for therapeutic dosing as above SCD (BMI>30, ICU admission)  #GI: Protonix     Dispo:  -monitor in ICU     GOC: Full Code      55M from home without PMH with progressively worsening SOB and +COVID test x5 days prior to admission presented with SpO2 64% at home, intubated 2/2 deteriorating respiratory status and admitted to ICU.    Assessment:  1. Acute Hypoxic Respiratory failure   2. COVID Pneumonia   3. Acute Kidney Injury   4. elevated LFTs       Plan:    CNS: #intubated:   -sedated with fentanyl and versed gtt  -holding Nimbex gtt since 11/19 am in preparation for SBT    CVS: #no active issues:   -SBPs stable 120-140s, monitor  -follow up TTE (likely once off isolation)     RESP: #AHRF 2/2 COVID  -CXR on admission with diffuse B/L opacities and small left pleural effusion, stable on repeat 11/17-19, awaiting CXR 11/20  -WBC 24.4 on admission, improved to 11.8  -APCs elevated with procal 1.82, CRP 34.4->3.75, LDH 1095->706, ferritin 4814->1097, d-dimer 559->1977->2763, ESR 62, CK and PT/INR wnl, trop 2.1->0.46 (initial and most recent results from 11/19-20 shown)  -ABG improved to 7.36/42/68/223, continuing 25/400/70/8 and will titrate down as tolerated   -off Nimbex in preparation for SBT   -completed 5 days cftx and azithromycin for CAP coverage today (11/16-20)  -continuing on Decadron 6mg Qd x10 days (day 1 11/15)  -not candidate for remdesivir 2/2 CrCl <45 (29.7)  -continue therapeutic AC given elevated d-dimer  -will prone if needed   -BCx NGTD   -will follow daily CRP with coags, d-dimer, esr, LDH, ferritin, trop every three days to monitor disease progression, next results am 11/23    GI: #elevated LFTs:   -likely 2/2 COVID  -follow daily, this am AP 70 AST/ALT 65/76  -->348->301  -continue senna, added dulcolax for bowel regimen    RENAL: #BECKY:   -BUN/Cr 49/2.2 on admission, unknown baseline  -Cr improved to 3.44 this am (peak 4.43)  -continuing heparin gtt, Lovenox held 2/2 elevated Cr  -nephro Dr. Camara following, no HD at this time, hold additional Lasix at this time  -no Lasix needed overnight, u/o continuing with improved 50-100cc/hr, likely diuretic phase ATN    ID: #COVID PNA  -Tylenol prn fevers, none given to date  -dc zinc and vit D supplementation  -remainder of history and management as above     HEME/ONC: #hypercoagulability 2/2 COVID  -D-Dimer 559->1977->2763 likely 2/2 COVID, will repeat in am  -continue heparin therapeutic (gtt), Lovenox held 2/2 worsening Cr  -monitor daily CBC, Hb stable    ENDO: #No issues:  -target CBG <180, 80s-140s overnight  -HgA1c 6.1  -NPO, OG placed, started TF  -HSS while on steroids, decreased to low HSS    Skin/Catheters: #no issues  -no rashes noted  -cw artificial tears  -FC in place     PPx:  #DVT: heparin gtt for therapeutic dosing as above SCD (BMI>30, ICU admission)  #GI: Protonix     Dispo:  -monitor in ICU     GOC: Full Code   -brother Yg updated 11/18, 11/19, 11/20     55M from home without PMH with progressively worsening SOB and +COVID test x5 days prior to admission presented with SpO2 64% at home, intubated 2/2 deteriorating respiratory status and admitted to ICU.    Assessment:  1. Acute Hypoxic Respiratory failure   2. COVID Pneumonia   3. Acute Kidney Injury   4. elevated LFTs       Plan:    CNS: #intubated:   -sedated with fentanyl and versed gtt  -holding Nimbex gtt since 11/19 am in preparation for SBT    CVS: #no active issues:   -SBPs stable 120-140s, monitor  -follow up TTE (likely once off isolation)     RESP: #AHRF 2/2 COVID  -CXR on admission with diffuse B/L opacities and small left pleural effusion, stable on repeat 11/17-19, awaiting CXR 11/20  -WBC 24.4 on admission, improved to 11.8  -APCs elevated with procal 1.82, CRP 34.4->3.75, LDH 1095->706, ferritin 4814->1097, d-dimer 559->1977->2763, ESR 62, CK and PT/INR wnl, trop 2.1->0.46 (initial and most recent results from 11/19-20 shown)  -ABG improved to 7.36/42/68/223, continuing 25/400/70/8 and will titrate down as tolerated   -off Nimbex in preparation for SBT   -completed 5 days cftx and azithromycin for CAP coverage today (11/16-20)  -continuing on Decadron 6mg Qd x10 days (day 1 11/15)  -not candidate for remdesivir 2/2 CrCl <45 (29.7)  -continue therapeutic AC given elevated d-dimer  -will prone if needed   -BCx NGTD   -will follow daily CRP with coags, d-dimer, esr, LDH, ferritin, trop every three days to monitor disease progression, next results am 11/23    GI: #elevated LFTs:   -likely 2/2 COVID  -follow daily, this am AP 70 AST/ALT 65/76  -->348->301  -continue senna, added miralax and dulcolax PA for bowel regimen    RENAL: #BECKY:   -BUN/Cr 49/2.2 on admission, unknown baseline  -Cr improved to 3.44 this am (peak 4.43)  -continuing heparin gtt, Lovenox held 2/2 elevated Cr  -nephro Dr. Camara following, no HD at this time, hold additional Lasix at this time  -no Lasix needed overnight, u/o continuing with improved 50-100cc/hr, likely diuretic phase ATN    ID: #COVID PNA  -Tylenol prn fevers, none given to date  -dc zinc and vit D supplementation  -remainder of history and management as above     HEME/ONC: #hypercoagulability 2/2 COVID  -D-Dimer 559->1977->2763 likely 2/2 COVID, will repeat in am  -continue heparin therapeutic (gtt), Lovenox held 2/2 worsening Cr  -monitor daily CBC, Hb stable    ENDO: #No issues:  -target CBG <180, 80s-140s overnight  -HgA1c 6.1  -NPO, OG placed, started TF  -HSS while on steroids, decreased to low HSS    Skin/Catheters: #no issues  -no rashes noted  -cw artificial tears  -FC in place     PPx:  #DVT: heparin gtt for therapeutic dosing as above SCD (BMI>30, ICU admission)  #GI: Protonix     Dispo:  -monitor in ICU     GOC: Full Code   -brother Yg updated 11/18, 11/19, 11/20

## 2020-11-21 LAB
ALBUMIN SERPL ELPH-MCNC: 1.9 G/DL — LOW (ref 3.5–5)
ALP SERPL-CCNC: 81 U/L — SIGNIFICANT CHANGE UP (ref 40–120)
ALT FLD-CCNC: 78 U/L DA — HIGH (ref 10–60)
ANION GAP SERPL CALC-SCNC: 6 MMOL/L — SIGNIFICANT CHANGE UP (ref 5–17)
APTT BLD: 54.1 SEC — HIGH (ref 27.5–35.5)
APTT BLD: 65.8 SEC — HIGH (ref 27.5–35.5)
APTT BLD: 70 SEC — HIGH (ref 27.5–35.5)
AST SERPL-CCNC: 84 U/L — HIGH (ref 10–40)
BASE EXCESS BLDA CALC-SCNC: -0.1 MMOL/L — SIGNIFICANT CHANGE UP (ref -2–2)
BASE EXCESS BLDA CALC-SCNC: -0.8 MMOL/L — SIGNIFICANT CHANGE UP (ref -2–2)
BASE EXCESS BLDA CALC-SCNC: -2.3 MMOL/L — LOW (ref -2–2)
BASE EXCESS BLDA CALC-SCNC: 0.6 MMOL/L — SIGNIFICANT CHANGE UP (ref -2–2)
BILIRUB SERPL-MCNC: 0.7 MG/DL — SIGNIFICANT CHANGE UP (ref 0.2–1.2)
BLOOD GAS COMMENTS ARTERIAL: SIGNIFICANT CHANGE UP
BUN SERPL-MCNC: 78 MG/DL — HIGH (ref 7–18)
CALCIUM SERPL-MCNC: 8.2 MG/DL — LOW (ref 8.4–10.5)
CHLORIDE SERPL-SCNC: 114 MMOL/L — HIGH (ref 96–108)
CO2 SERPL-SCNC: 26 MMOL/L — SIGNIFICANT CHANGE UP (ref 22–31)
CREAT SERPL-MCNC: 2.75 MG/DL — HIGH (ref 0.5–1.3)
CRP SERPL-MCNC: 9.21 MG/DL — HIGH (ref 0–0.4)
CULTURE RESULTS: SIGNIFICANT CHANGE UP
CULTURE RESULTS: SIGNIFICANT CHANGE UP
GLUCOSE SERPL-MCNC: 88 MG/DL — SIGNIFICANT CHANGE UP (ref 70–99)
HCO3 BLDA-SCNC: 25 MMOL/L — SIGNIFICANT CHANGE UP (ref 23–27)
HCO3 BLDA-SCNC: 25 MMOL/L — SIGNIFICANT CHANGE UP (ref 23–27)
HCO3 BLDA-SCNC: 26 MMOL/L — SIGNIFICANT CHANGE UP (ref 23–27)
HCO3 BLDA-SCNC: 27 MMOL/L — SIGNIFICANT CHANGE UP (ref 23–27)
HCT VFR BLD CALC: 35.8 % — LOW (ref 39–50)
HGB BLD-MCNC: 10.8 G/DL — LOW (ref 13–17)
HOROWITZ INDEX BLDA+IHG-RTO: 100 — SIGNIFICANT CHANGE UP
HOROWITZ INDEX BLDA+IHG-RTO: 80 — SIGNIFICANT CHANGE UP
MAGNESIUM SERPL-MCNC: 3 MG/DL — HIGH (ref 1.6–2.6)
MCHC RBC-ENTMCNC: 28.3 PG — SIGNIFICANT CHANGE UP (ref 27–34)
MCHC RBC-ENTMCNC: 30.2 GM/DL — LOW (ref 32–36)
MCV RBC AUTO: 94 FL — SIGNIFICANT CHANGE UP (ref 80–100)
NRBC # BLD: 0 /100 WBCS — SIGNIFICANT CHANGE UP (ref 0–0)
PCO2 BLDA: 44 MMHG — SIGNIFICANT CHANGE UP (ref 32–46)
PCO2 BLDA: 45 MMHG — SIGNIFICANT CHANGE UP (ref 32–46)
PCO2 BLDA: 52 MMHG — HIGH (ref 32–46)
PCO2 BLDA: 73 MMHG — CRITICAL HIGH (ref 32–46)
PH BLDA: 7.19 — CRITICAL LOW (ref 7.35–7.45)
PH BLDA: 7.31 — LOW (ref 7.35–7.45)
PH BLDA: 7.36 — SIGNIFICANT CHANGE UP (ref 7.35–7.45)
PH BLDA: 7.38 — SIGNIFICANT CHANGE UP (ref 7.35–7.45)
PHOSPHATE SERPL-MCNC: 4.6 MG/DL — HIGH (ref 2.5–4.5)
PLATELET # BLD AUTO: 332 K/UL — SIGNIFICANT CHANGE UP (ref 150–400)
PO2 BLDA: 163 MMHG — HIGH (ref 74–108)
PO2 BLDA: 201 MMHG — HIGH (ref 74–108)
PO2 BLDA: 72 MMHG — LOW (ref 74–108)
PO2 BLDA: 73 MMHG — LOW (ref 74–108)
POTASSIUM SERPL-MCNC: 4.4 MMOL/L — SIGNIFICANT CHANGE UP (ref 3.5–5.3)
POTASSIUM SERPL-SCNC: 4.4 MMOL/L — SIGNIFICANT CHANGE UP (ref 3.5–5.3)
PROT SERPL-MCNC: 6.5 G/DL — SIGNIFICANT CHANGE UP (ref 6–8.3)
RBC # BLD: 3.81 M/UL — LOW (ref 4.2–5.8)
RBC # FLD: 13.9 % — SIGNIFICANT CHANGE UP (ref 10.3–14.5)
SAO2 % BLDA: 92 % — SIGNIFICANT CHANGE UP (ref 92–96)
SAO2 % BLDA: 94 % — SIGNIFICANT CHANGE UP (ref 92–96)
SAO2 % BLDA: 98 % — HIGH (ref 92–96)
SAO2 % BLDA: 99 % — HIGH (ref 92–96)
SODIUM SERPL-SCNC: 146 MMOL/L — HIGH (ref 135–145)
SPECIMEN SOURCE: SIGNIFICANT CHANGE UP
SPECIMEN SOURCE: SIGNIFICANT CHANGE UP
WBC # BLD: 13.67 K/UL — HIGH (ref 3.8–10.5)
WBC # FLD AUTO: 13.67 K/UL — HIGH (ref 3.8–10.5)

## 2020-11-21 PROCEDURE — 71045 X-RAY EXAM CHEST 1 VIEW: CPT | Mod: 26

## 2020-11-21 RX ORDER — CISATRACURIUM BESYLATE 2 MG/ML
3 INJECTION INTRAVENOUS
Qty: 200 | Refills: 0 | Status: DISCONTINUED | OUTPATIENT
Start: 2020-11-21 | End: 2020-11-23

## 2020-11-21 RX ORDER — DEXAMETHASONE 0.5 MG/5ML
6 ELIXIR ORAL DAILY
Refills: 0 | Status: DISCONTINUED | OUTPATIENT
Start: 2020-11-21 | End: 2020-11-24

## 2020-11-21 RX ADMIN — PANTOPRAZOLE SODIUM 40 MILLIGRAM(S): 20 TABLET, DELAYED RELEASE ORAL at 11:46

## 2020-11-21 RX ADMIN — CHLORHEXIDINE GLUCONATE 15 MILLILITER(S): 213 SOLUTION TOPICAL at 17:03

## 2020-11-21 RX ADMIN — CISATRACURIUM BESYLATE 15.4 MICROGRAM(S)/KG/MIN: 2 INJECTION INTRAVENOUS at 10:07

## 2020-11-21 RX ADMIN — HEPARIN SODIUM 1300 UNIT(S)/HR: 5000 INJECTION INTRAVENOUS; SUBCUTANEOUS at 21:02

## 2020-11-21 RX ADMIN — CHLORHEXIDINE GLUCONATE 1 APPLICATION(S): 213 SOLUTION TOPICAL at 05:07

## 2020-11-21 RX ADMIN — HEPARIN SODIUM 1300 UNIT(S)/HR: 5000 INJECTION INTRAVENOUS; SUBCUTANEOUS at 08:25

## 2020-11-21 RX ADMIN — FENTANYL CITRATE 25.6 MICROGRAM(S)/KG/HR: 50 INJECTION INTRAVENOUS at 08:24

## 2020-11-21 RX ADMIN — CHLORHEXIDINE GLUCONATE 1 APPLICATION(S): 213 SOLUTION TOPICAL at 11:01

## 2020-11-21 RX ADMIN — MIDAZOLAM HYDROCHLORIDE 8.54 MG/KG/HR: 1 INJECTION, SOLUTION INTRAMUSCULAR; INTRAVENOUS at 13:15

## 2020-11-21 RX ADMIN — HEPARIN SODIUM 1300 UNIT(S)/HR: 5000 INJECTION INTRAVENOUS; SUBCUTANEOUS at 14:44

## 2020-11-21 RX ADMIN — Medication 6 MILLIGRAM(S): at 05:08

## 2020-11-21 RX ADMIN — CISATRACURIUM BESYLATE 15.4 MICROGRAM(S)/KG/MIN: 2 INJECTION INTRAVENOUS at 18:42

## 2020-11-21 RX ADMIN — MIDAZOLAM HYDROCHLORIDE 8.54 MG/KG/HR: 1 INJECTION, SOLUTION INTRAMUSCULAR; INTRAVENOUS at 13:17

## 2020-11-21 RX ADMIN — FENTANYL CITRATE 25.6 MICROGRAM(S)/KG/HR: 50 INJECTION INTRAVENOUS at 18:41

## 2020-11-21 RX ADMIN — CHLORHEXIDINE GLUCONATE 15 MILLILITER(S): 213 SOLUTION TOPICAL at 05:07

## 2020-11-21 RX ADMIN — CISATRACURIUM BESYLATE 15.4 MICROGRAM(S)/KG/MIN: 2 INJECTION INTRAVENOUS at 21:46

## 2020-11-21 NOTE — PROGRESS NOTE ADULT - ASSESSMENT
55M from home without PMH with progressively worsening SOB and +COVID test x5 days prior to admission presented with SpO2 64% at home, intubated 2/2 deteriorating respiratory status and admitted to ICU.    Assessment:  1. Acute Hypoxic Respiratory failure   2. COVID Pneumonia   3. Acute Kidney Injury   4. elevated LFTs       Plan:    CNS: #intubated:   -sedated with fentanyl and versed gtt  -holding Nimbex gtt since 11/19 am in preparation for SBT    CVS: #no active issues:   -SBPs stable 120-140s, monitor  -follow up TTE (likely once off isolation)     RESP: #AHRF 2/2 COVID  -CXR on admission with diffuse B/L opacities and small left pleural effusion, stable on repeat 11/17-19, awaiting CXR 11/20  -WBC 24.4 on admission, improved to 11.8  -APCs elevated with procal 1.82, CRP 34.4->3.75, LDH 1095->706, ferritin 4814->1097, d-dimer 559->1977->2763, ESR 62, CK and PT/INR wnl, trop 2.1->0.46 (initial and most recent results from 11/19-20 shown)  -ABG improved to 7.36/42/68/223, continuing 25/400/70/8 and will titrate down as tolerated   -off Nimbex in preparation for SBT   -completed 5 days cftx and azithromycin for CAP coverage today (11/16-20)  -continuing on Decadron 6mg Qd x10 days (day 1 11/15)  -not candidate for remdesivir 2/2 CrCl <45 (29.7)  -continue therapeutic AC given elevated d-dimer  -will prone if needed   -BCx NGTD   -will follow daily CRP with coags, d-dimer, esr, LDH, ferritin, trop every three days to monitor disease progression, next results am 11/23    GI: #elevated LFTs:   -likely 2/2 COVID  -follow daily, this am AP 70 AST/ALT 65/76  -->348->301  -continue senna, added miralax and dulcolax [place] for bowel regimen    RENAL: #BECKY:   -BUN/Cr 49/2.2 on admission, unknown baseline  -Cr improved to 3.44 this am (peak 4.43)  -continuing heparin gtt, Lovenox held 2/2 elevated Cr  -nephro Dr. Camara following, no HD at this time, hold additional Lasix at this time  -no Lasix needed overnight, u/o continuing with improved 50-100cc/hr, likely diuretic phase ATN    ID: #COVID PNA  -Tylenol prn fevers, none given to date  -dc zinc and vit D supplementation  -remainder of history and management as above     HEME/ONC: #hypercoagulability 2/2 COVID  -D-Dimer 559->1977->2763 likely 2/2 COVID, will repeat in am  -continue heparin therapeutic (gtt), Lovenox held 2/2 worsening Cr  -monitor daily CBC, Hb stable    ENDO: #No issues:  -target CBG <180, 80s-140s overnight  -HgA1c 6.1  -NPO, OG placed, started TF  -HSS while on steroids, decreased to low HSS    Skin/Catheters: #no issues  -no rashes noted  -cw artificial tears  -FC in place     PPx:  #DVT: heparin gtt for therapeutic dosing as above SCD (BMI>30, ICU admission)  #GI: Protonix     Dispo:  -monitor in ICU     GOC: Full Code   -brother Yg updated 11/18, 11/19, 11/20     55M from home without PMH with progressively worsening SOB and +COVID test x5 days prior to admission presented with SpO2 64% at home, intubated 2/2 deteriorating respiratory status and admitted to ICU.    Assessment:  1. Acute Hypoxic Respiratory failure   2. COVID Pneumonia   3. Acute Kidney Injury   4. elevated LFTs       Plan:    CNS: #intubated:   -sedated with fentanyl and versed gtt  -holding Nimbex gtt since 11/19 am in preparation for SBT    CVS: #no active issues:   -SBPs stable 120-140s, monitor  -follow up TTE (likely once off isolation)     RESP: #AHRF 2/2 COVID  -CXR on admission with diffuse B/L opacities and small left pleural effusion, stable on repeat 11/17-19,  -WBC 24.4 on admission, improved to 11.8  -APCs elevated with procal 1.82, CRP 34.4->3.75, LDH 1095->706, ferritin 4814->1097, d-dimer 559->1977->2763, ESR 62, CK and PT/INR wnl, trop 2.1->0.46 (initial and most recent results from 11/19-20 shown)  -ABG  7.32/46/123/24, continuing 25/400/80/8 and will titrate down as tolerated   -off Nimbex in preparation for SBT   -completed 5 days cftx and azithromycin for CAP coverage (11/16-20)  -continuing on Decadron 6mg Qd x10 days (day 1 11/15)  -not candidate for remdesivir 2/2 CrCl <45 (29.7)  -continue therapeutic AC given elevated d-dimer  -will prone if needed   -BCx NGTD   -will follow daily CRP with coags, d-dimer, esr, LDH, ferritin, trop every three days to monitor disease progression, next results am 11/23    GI: #elevated LFTs:   -likely 2/2 COVID  -follow daily, this am AP 70 AST/ALT 65/76  -->348->301  -continue senna, added miralax and dulcolax [place] for bowel regimen    RENAL: #BECKY:   -BUN/Cr 49/2.2 on admission, unknown baseline  -Cr improving to 3.22 (peak 4.43)  -continuing heparin gtt for elevated D-dimer, Lovenox held 2/2 elevated Cr  -nephro Dr. Camara following, no HD at this time, hold additional Lasix at this time  -no Lasix needed overnight, u/o  100cc/hr, likely diuretic phase ATN    ID: #COVID PNA  -Tylenol prn fevers, none given to date  -dc zinc and vit D supplementation  -remainder of history and management as above     HEME/ONC: #hypercoagulability 2/2 COVID  -D-Dimer 559->1977->2763 likely 2/2 COVID, will repeat in am  -continue heparin therapeutic (gtt), Lovenox held 2/2 worsening Cr  -monitor daily CBC, Hb stable    ENDO: #No issues:  -target CBG <180, 100s overnight  -HgA1c 6.1  -NPO, OG placed, on TF  -HSS while on steroids, decreased to low HSS    Skin/Catheters: #no issues  -no rashes noted  -cw artificial tears  -FC in place     PPx:  #DVT: heparin gtt for therapeutic dosing as above SCD (BMI>30, ICU admission)  #GI: Protonix     Dispo:  -monitor in ICU     GOC: Full Code   -brother Yg updated 11/18, 11/19, 11/20     55M from home without PMH with progressively worsening SOB and +COVID test x5 days prior to admission presented with SpO2 64% at home, intubated 2/2 deteriorating respiratory status and admitted to ICU.    Assessment:  1. Acute Hypoxic Respiratory failure   2. COVID Pneumonia   3. Acute Kidney Injury   4. elevated LFTs       Plan:    CNS: #intubated:   -sedated with fentanyl and versed gtt  -n added Nimbex today     CVS: #no active issues:   -SBPs stable 120-140s, monitor  -follow up TTE (likely once off isolation)     RESP: #AHRF 2/2 COVID  -CXR on admission with diffuse B/L opacities and small left pleural effusion, stable on repeat 11/17-19,  -WBC 24.4 on admission, improved to 11.8  -APCs elevated with procal 1.82, CRP 34.4->3.75, LDH 1095->706, ferritin 4814->1097, d-dimer 559->1977->2763, ESR 62, CK and PT/INR wnl, trop 2.1->0.46 (initial and most recent results from 11/19-20 shown)  -completed 5 days cftx and azithromycin for CAP coverage (11/16-20)  -continuing on Decadron 6mg Qd x10 days (day 1 11/15)  -not candidate for remdesivir 2/2 CrCl <45 (29.7)  - Pt proned today at 4.30pm   -BCx NGTD   -will follow daily CRP with coags, d-dimer, esr, LDH, ferritin, trop every three days to monitor disease progression, next results am 11/23    GI: #elevated LFTs:   -likely 2/2 COVID  -follow daily, this am AP 70 AST/ALT 65/76  -->348->301  -continue senna, added miralax and dulcolax [place] for bowel regimen    RENAL: #BECKY:   -BUN/Cr 49/2.2 on admission, unknown baseline  -Cr improving to 3.22 (peak 4.43)  -continuing heparin gtt for elevated D-dimer, Lovenox held 2/2 elevated Cr  -nephro Dr. Camara following, no HD at this time, hold additional Lasix at this time  -no Lasix needed overnight, u/o  100cc/hr, likely diuretic phase ATN    ID: #COVID PNA  -Tylenol prn fevers, none given to date  -dc zinc and vit D supplementation  -remainder of history and management as above     HEME/ONC: #hypercoagulability 2/2 COVID  -D-Dimer 559->1977->2763 likely 2/2 COVID, will repeat in am  -continue heparin therapeutic (gtt), Lovenox held 2/2 worsening Cr  -monitor daily CBC, Hb stable    ENDO: #No issues:  -target CBG <180, 100s overnight  -HgA1c 6.1  -NPO, OG placed, on TF  -HSS while on steroids, decreased to low HSS    Skin/Catheters: #no issues  -no rashes noted  -cw artificial tears  -FC in place     PPx:  #DVT: heparin gtt for therapeutic dosing as above SCD (BMI>30, ICU admission)  #GI: Protonix     Dispo:  -monitor in ICU     GOC: Full Code   -brother Yg updated 11/18, 11/19, 11/20

## 2020-11-21 NOTE — PROGRESS NOTE ADULT - SUBJECTIVE AND OBJECTIVE BOX
[facility] NEPHROLOGY- PROGRESS NOTE    Patient is a 54yo Male with no PMH recently diagnosed with COVID-19 (5 days PTA) p/w SOB s/p intubated in the ER. Pt admitted to ICU with acute hypoxic respiratory failure 2/2 COVID-19 PNA s/p intubated, BECKY with transaminitis. Nephrology consulted for Elevated serum creatinine.    Hospital Medications: Medications reviewed.  REVIEW OF SYSTEMS: Unable to obtain/ intubated & sedated    VITALS:  T(F): 97 (20 @ 12:00), Max: 101.6 (20 @ 02:00)  HR: 82 (20 @ 14:00)  BP: 132/91 (20 @ 14:00)  RR: 20 (20 @ 14:00)  SpO2: 90% (20 @ 14:00)  Wt(kg): --     @ 07:01  -   @ 07:00  --------------------------------------------------------  IN: 1080.5 mL / OUT: 3450 mL / NET: -2369.5 mL     @ 07:01  -   @ 15:18  --------------------------------------------------------  IN: 426.6 mL / OUT: 600 mL / NET: -173.4 mL        PHYSICAL EXAM:  Gen: Sedated  HEENT: +ETT, +eyes covered  Cards: RRR, +S1/S2,   Resp: +mechanical BS  GI: soft, ND,   : +isaacs with yellow urine  Extremities: no LE edema,   Neuro: sedated    LABS:      146<H>  |  114<H>  |  78<H>  ----------------------------<  88  4.4   |  26  |  2.75<H>    Ca    8.2<L>      2020 06:17  Phos  4.6       Mg     3.0         TPro  6.5  /  Alb  1.9<L>  /  TBili  0.7  /  DBili      /  AST  84<H>  /  ALT  78<H>  /  AlkPhos  81      Creatinine Trend: 2.75 <--, 3.22 <--, 3.44 <--, 4.36 <--, 4.43 <--, 4.30 <--, 3.33 <--, 2.73 <--, 2.20 <--, 1.88 <--, 2.20 <--                        10.8   13.67 )-----------( 332      ( 2020 06:17 )             35.8     Urine Studies:  Urinalysis Basic - ( 2020 01:30 )    Color: Yellow / Appearance: Clear / S.020 / pH:   Gluc:  / Ketone: Negative  / Bili: Negative / Urobili: Negative   Blood:  / Protein: 100 / Nitrite: Negative   Leuk Esterase: Negative / RBC: 2-5 /HPF / WBC 0-2 /HPF   Sq Epi:  / Non Sq Epi: Few /HPF / Bacteria: Moderate /HPF      Osmolality, Random Urine: 543 mos/kg ( @ 01:29)  Sodium, Random Urine: 45 mmol/L ( @ 01:29)  Chloride, Random Urine: 44 mmol/L ( @ 01:29)

## 2020-11-21 NOTE — PROGRESS NOTE ADULT - SUBJECTIVE AND OBJECTIVE BOX
INTERVAL HPI/OVERNIGHT EVENTS: ***    PRESSORS: [ ] YES [ ] NO  WHICH:    ANTIBIOTICS:                  DATE STARTED:  ANTIBIOTICS:                  DATE STARTED:  ANTIBIOTICS:                  DATE STARTED:    Antimicrobial:    Cardiovascular:    Pulmonary:    Hematalogic:  heparin  Infusion.  Unit(s)/Hr IV Continuous <Continuous>    Other:  acetaminophen    Suspension .. 650 milliGRAM(s) Enteral Tube every 6 hours PRN  artificial  tears Solution 1 Drop(s) Both EYES every 4 hours PRN  bisacodyl Suppository 10 milliGRAM(s) Rectal daily  chlorhexidine 0.12% Liquid 15 milliLiter(s) Oral Mucosa two times a day  chlorhexidine 2% Cloths 1 Application(s) Topical daily  chlorhexidine 2% Cloths 1 Application(s) Topical <User Schedule>  dexAMETHasone  Injectable 6 milliGRAM(s) IV Push daily  fentaNYL   Infusion. 3 MICROgram(s)/kG/Hr IV Continuous <Continuous>  insulin lispro (ADMELOG) corrective regimen sliding scale   SubCutaneous every 6 hours  midazolam Infusion 0.1 mG/kG/Hr IV Continuous <Continuous>  pantoprazole  Injectable 40 milliGRAM(s) IV Push daily  polyethylene glycol 3350 17 Gram(s) Oral daily  senna Syrup 10 milliLiter(s) Oral at bedtime  sodium chloride 0.9% lock flush 10 milliLiter(s) IV Push every 1 hour PRN    acetaminophen    Suspension .. 650 milliGRAM(s) Enteral Tube every 6 hours PRN  artificial  tears Solution 1 Drop(s) Both EYES every 4 hours PRN  bisacodyl Suppository 10 milliGRAM(s) Rectal daily  chlorhexidine 0.12% Liquid 15 milliLiter(s) Oral Mucosa two times a day  chlorhexidine 2% Cloths 1 Application(s) Topical daily  chlorhexidine 2% Cloths 1 Application(s) Topical <User Schedule>  dexAMETHasone  Injectable 6 milliGRAM(s) IV Push daily  fentaNYL   Infusion. 3 MICROgram(s)/kG/Hr IV Continuous <Continuous>  heparin  Infusion.  Unit(s)/Hr IV Continuous <Continuous>  insulin lispro (ADMELOG) corrective regimen sliding scale   SubCutaneous every 6 hours  midazolam Infusion 0.1 mG/kG/Hr IV Continuous <Continuous>  pantoprazole  Injectable 40 milliGRAM(s) IV Push daily  polyethylene glycol 3350 17 Gram(s) Oral daily  senna Syrup 10 milliLiter(s) Oral at bedtime  sodium chloride 0.9% lock flush 10 milliLiter(s) IV Push every 1 hour PRN    Drug Dosing Weight  Height (cm): 157.5 (15 Nov 2020 23:00)  Weight (kg): 85.4 (15 Nov 2020 23:00)  BMI (kg/m2): 34.4 (15 Nov 2020 23:00)  BSA (m2): 1.86 (15 Nov 2020 23:00)    CENTRAL LINE: [ ] YES [ ] NO  LOCATION:   DATE INSERTED:  REMOVE: [ ] YES [ ] NO  EXPLAIN:    ANN: [ ] YES [ ] NO    DATE INSERTED:  REMOVE:  [ ] YES [ ] NO  EXPLAIN:    A-LINE:  [ ] YES [ ] NO  LOCATION:   DATE INSERTED:  REMOVE:  [ ] YES [ ] NO  EXPLAIN:    PMH -reviewed admission note, no change since admission  PAST MEDICAL & SURGICAL HISTORY:  COVID-19    No significant past surgical history        ICU Vital Signs Last 24 Hrs  T(C): 38.3 (20 Nov 2020 23:37), Max: 38.3 (20 Nov 2020 23:37)  T(F): 101 (20 Nov 2020 23:37), Max: 101 (20 Nov 2020 23:37)  HR: 89 (21 Nov 2020 00:00) (61 - 97)  BP: 145/75 (21 Nov 2020 00:00) (126/64 - 152/85)  BP(mean): 92 (21 Nov 2020 00:00) (77 - 105)  ABP: --  ABP(mean): --  RR: 17 (21 Nov 2020 00:00) (0 - 26)  SpO2: 95% (21 Nov 2020 00:00) (91% - 99%)      ABG - ( 20 Nov 2020 16:58 )  pH, Arterial: 7.32  pH, Blood: x     /  pCO2: 46    /  pO2: 123   / HCO3: 24    / Base Excess: -1.9  /  SaO2: 98                    11-19 @ 07:01  -  11-20 @ 07:00  --------------------------------------------------------  IN: 1758.4 mL / OUT: 2795 mL / NET: -1036.6 mL        Mode: AC/ CMV (Assist Control/ Continuous Mandatory Ventilation)  RR (machine): 20  TV (machine): 400  FiO2: 100  PEEP: 8  ITime: 0.85  MAP: 18  PIP: 32      PHYSICAL EXAM:    GENERAL: [ ]NAD, [ ]well-groomed, [ ]well-developed  HEAD:  [ ]Atraumatic, [ ]Normocephalic  EYES: [ ]EOMI, [ ]PERRLA, [ ]conjunctiva and sclera clear  ENMT: [ ]No tonsillar erythema, exudates, or enlargement; [ ]Moist mucous membranes, [ ]Good dentition, [ ]No lesions  NECK: [ ]Supple, normal appearance, [ ]No JVD; [ ]Normal thyroid; [ ]Trachea midline  NERVOUS SYSTEM:  [ ]Alert & Oriented X3, [ ]Good concentration; [ ]Motor Strength 5/5 B/L upper and lower extremities; [ ]DTRs 2+ intact and symmetric  CHEST/LUNG: [ ]No chest deformity; [ ]Normal percussion bilaterally; [ ]No rales, rhonchi, wheezing   HEART: [ ]Regular rate and rhythm; [ ]No murmurs, rubs, or gallops  ABDOMEN: [ ]Soft, Nontender, Nondistended; [ ]Bowel sounds present  EXTREMITIES:  [ ]2+ Peripheral Pulses, [ ]No clubbing, cyanosis, or edema  LYMPH: [ ]No lymphadenopathy noted  SKIN: [ ]No rashes or lesions; [ ]Good capillary refill      LABS:  CBC Full  -  ( 20 Nov 2020 06:28 )  WBC Count : 11.77 K/uL  RBC Count : 3.99 M/uL  Hemoglobin : 11.4 g/dL  Hematocrit : 37.0 %  Platelet Count - Automated : 349 K/uL  Mean Cell Volume : 92.7 fl  Mean Cell Hemoglobin : 28.6 pg  Mean Cell Hemoglobin Concentration : 30.8 gm/dL  Auto Neutrophil # : x  Auto Lymphocyte # : x  Auto Monocyte # : x  Auto Eosinophil # : x  Auto Basophil # : x  Auto Neutrophil % : x  Auto Lymphocyte % : x  Auto Monocyte % : x  Auto Eosinophil % : x  Auto Basophil % : x    11-20    145  |  112<H>  |  88<H>  ----------------------------<  121<H>  5.0   |  27  |  3.22<H>    Ca    7.6<L>      20 Nov 2020 16:34  Phos  5.7     11-19  Mg     3.1     11-19    TPro  6.3  /  Alb  2.0<L>  /  TBili  0.7  /  DBili  x   /  AST  73<H>  /  ALT  77<H>  /  AlkPhos  72  11-20    PT/INR - ( 20 Nov 2020 06:28 )   PT: 12.8 sec;   INR: 1.08 ratio         PTT - ( 20 Nov 2020 00:43 )  PTT:64.1 sec        RADIOLOGY & ADDITIONAL STUDIES REVIEWED:  ***    [ ]GOALS OF CARE DISCUSSION WITH PATIENT/FAMILY/PROXY:    CRITICAL CARE TIME SPENT: 35 minutes INTERVAL HPI/OVERNIGHT EVENTS:   Pt spiked fever 101F, didn't go down with tylenol, started on cooling blanket.      PRESSORS: [ ] YES [ ] NO  WHICH:    Antimicrobial:    Cardiovascular:    Pulmonary:    Hematalogic:  heparin  Infusion.  Unit(s)/Hr IV Continuous <Continuous>    Other:  acetaminophen    Suspension .. 650 milliGRAM(s) Enteral Tube every 6 hours PRN  artificial  tears Solution 1 Drop(s) Both EYES every 4 hours PRN  bisacodyl Suppository 10 milliGRAM(s) Rectal daily  chlorhexidine 0.12% Liquid 15 milliLiter(s) Oral Mucosa two times a day  chlorhexidine 2% Cloths 1 Application(s) Topical daily  chlorhexidine 2% Cloths 1 Application(s) Topical <User Schedule>  dexAMETHasone  Injectable 6 milliGRAM(s) IV Push daily  fentaNYL   Infusion. 3 MICROgram(s)/kG/Hr IV Continuous <Continuous>  insulin lispro (ADMELOG) corrective regimen sliding scale   SubCutaneous every 6 hours  midazolam Infusion 0.1 mG/kG/Hr IV Continuous <Continuous>  pantoprazole  Injectable 40 milliGRAM(s) IV Push daily  polyethylene glycol 3350 17 Gram(s) Oral daily  senna Syrup 10 milliLiter(s) Oral at bedtime  sodium chloride 0.9% lock flush 10 milliLiter(s) IV Push every 1 hour PRN    acetaminophen    Suspension .. 650 milliGRAM(s) Enteral Tube every 6 hours PRN  artificial  tears Solution 1 Drop(s) Both EYES every 4 hours PRN  bisacodyl Suppository 10 milliGRAM(s) Rectal daily  chlorhexidine 0.12% Liquid 15 milliLiter(s) Oral Mucosa two times a day  chlorhexidine 2% Cloths 1 Application(s) Topical daily  chlorhexidine 2% Cloths 1 Application(s) Topical <User Schedule>  dexAMETHasone  Injectable 6 milliGRAM(s) IV Push daily  fentaNYL   Infusion. 3 MICROgram(s)/kG/Hr IV Continuous <Continuous>  heparin  Infusion.  Unit(s)/Hr IV Continuous <Continuous>  insulin lispro (ADMELOG) corrective regimen sliding scale   SubCutaneous every 6 hours  midazolam Infusion 0.1 mG/kG/Hr IV Continuous <Continuous>  pantoprazole  Injectable 40 milliGRAM(s) IV Push daily  polyethylene glycol 3350 17 Gram(s) Oral daily  senna Syrup 10 milliLiter(s) Oral at bedtime  sodium chloride 0.9% lock flush 10 milliLiter(s) IV Push every 1 hour PRN    Drug Dosing Weight  Height (cm): 157.5 (15 Nov 2020 23:00)  Weight (kg): 85.4 (15 Nov 2020 23:00)  BMI (kg/m2): 34.4 (15 Nov 2020 23:00)  BSA (m2): 1.86 (15 Nov 2020 23:00)    CENTRAL LINE: [x ] YES [ ] NO  LOCATION: [facility]  DATE INSERTED: 11/15  REMOVE: [ ] YES [ ] NO  EXPLAIN:    ANN: [x ] YES [ ] NO    DATE INSERTED:  REMOVE:  [ ] YES [ ] NO  EXPLAIN:    A-LINE:  [ ] YES [x ] NO  LOCATION:   DATE INSERTED:  REMOVE:  [ ] YES [ ] NO  EXPLAIN:    PMH -reviewed admission note, no change since admission  PAST MEDICAL & SURGICAL HISTORY:  COVID-19    No significant past surgical history        ICU Vital Signs Last 24 Hrs  T(C): 38.3 (20 Nov 2020 23:37), Max: 38.3 (20 Nov 2020 23:37)  T(F): 101 (20 Nov 2020 23:37), Max: 101 (20 Nov 2020 23:37)  HR: 89 (21 Nov 2020 00:00) (61 - 97)  BP: 145/75 (21 Nov 2020 00:00) (126/64 - 152/85)  BP(mean): 92 (21 Nov 2020 00:00) (77 - 105)  ABP: --  ABP(mean): --  RR: 17 (21 Nov 2020 00:00) (0 - 26)  SpO2: 95% (21 Nov 2020 00:00) (91% - 99%)      ABG - ( 20 Nov 2020 16:58 )  pH, Arterial: 7.32  pH, Blood: x     /  pCO2: 46    /  pO2: 123   / HCO3: 24    / Base Excess: -1.9  /  SaO2: 98                    11-19 @ 07:01  -  11-20 @ 07:00  --------------------------------------------------------  IN: 1758.4 mL / OUT: 2795 mL / NET: -1036.6 mL        Mode: AC/ CMV (Assist Control/ Continuous Mandatory Ventilation)  RR (machine): 20  TV (machine): 400  FiO2: 100  PEEP: 8  ITime: 0.85  MAP: 18  PIP: 32      PHYSICAL EXAM:    GENERAL: sedated, +ETT, +OG  HEAD:  Atraumatic, Normocephalic  EYES: b/l edema continuing  ENT: moist mucous membranes  NECK: Supple  NERVOUS SYSTEM: sedated  CHEST/LUNG: B/L good air entry  HEART: S1S2 normal, RRR  ABDOMEN: Soft, Nontender, distended 2/2 body habitus, Bowel sounds present  EXTREMITIES:  2+ Peripheral Pulses, b/l 1+ edema, mildly improved since yesterday  SKIN: No rashes or lesions noted      LABS:  CBC Full  -  ( 20 Nov 2020 06:28 )  WBC Count : 11.77 K/uL  RBC Count : 3.99 M/uL  Hemoglobin : 11.4 g/dL  Hematocrit : 37.0 %  Platelet Count - Automated : 349 K/uL  Mean Cell Volume : 92.7 fl  Mean Cell Hemoglobin : 28.6 pg  Mean Cell Hemoglobin Concentration : 30.8 gm/dL  Auto Neutrophil # : x  Auto Lymphocyte # : x  Auto Monocyte # : x  Auto Eosinophil # : x  Auto Basophil # : x  Auto Neutrophil % : x  Auto Lymphocyte % : x  Auto Monocyte % : x  Auto Eosinophil % : x  Auto Basophil % : x    11-20    145  |  112<H>  |  88<H>  ----------------------------<  121<H>  5.0   |  27  |  3.22<H>    Ca    7.6<L>      20 Nov 2020 16:34  Phos  5.7     11-19  Mg     3.1     11-19    TPro  6.3  /  Alb  2.0<L>  /  TBili  0.7  /  DBili  x   /  AST  73<H>  /  ALT  77<H>  /  AlkPhos  72  11-20    PT/INR - ( 20 Nov 2020 06:28 )   PT: 12.8 sec;   INR: 1.08 ratio         PTT - ( 20 Nov 2020 00:43 )  PTT:64.1 sec        RADIOLOGY & ADDITIONAL STUDIES REVIEWED:  ***    [ ]GOALS OF CARE DISCUSSION WITH PATIENT/FAMILY/PROXY:    CRITICAL CARE TIME SPENT: 35 minutes INTERVAL HPI/OVERNIGHT EVENTS:   Pt spiked fever 101F, didn't go down with tylenol, started on cooling blanket. Slowly desaturated to 88%, FiO2 went up from 90% to 100%.      PRESSORS: [ ] YES [ ] NO  WHICH:    Antimicrobial:    Cardiovascular:    Pulmonary:    Hematalogic:  heparin  Infusion.  Unit(s)/Hr IV Continuous <Continuous>    Other:  acetaminophen    Suspension .. 650 milliGRAM(s) Enteral Tube every 6 hours PRN  artificial  tears Solution 1 Drop(s) Both EYES every 4 hours PRN  bisacodyl Suppository 10 milliGRAM(s) Rectal daily  chlorhexidine 0.12% Liquid 15 milliLiter(s) Oral Mucosa two times a day  chlorhexidine 2% Cloths 1 Application(s) Topical daily  chlorhexidine 2% Cloths 1 Application(s) Topical <User Schedule>  dexAMETHasone  Injectable 6 milliGRAM(s) IV Push daily  fentaNYL   Infusion. 3 MICROgram(s)/kG/Hr IV Continuous <Continuous>  insulin lispro (ADMELOG) corrective regimen sliding scale   SubCutaneous every 6 hours  midazolam Infusion 0.1 mG/kG/Hr IV Continuous <Continuous>  pantoprazole  Injectable 40 milliGRAM(s) IV Push daily  polyethylene glycol 3350 17 Gram(s) Oral daily  senna Syrup 10 milliLiter(s) Oral at bedtime  sodium chloride 0.9% lock flush 10 milliLiter(s) IV Push every 1 hour PRN    acetaminophen    Suspension .. 650 milliGRAM(s) Enteral Tube every 6 hours PRN  artificial  tears Solution 1 Drop(s) Both EYES every 4 hours PRN  bisacodyl Suppository 10 milliGRAM(s) Rectal daily  chlorhexidine 0.12% Liquid 15 milliLiter(s) Oral Mucosa two times a day  chlorhexidine 2% Cloths 1 Application(s) Topical daily  chlorhexidine 2% Cloths 1 Application(s) Topical <User Schedule>  dexAMETHasone  Injectable 6 milliGRAM(s) IV Push daily  fentaNYL   Infusion. 3 MICROgram(s)/kG/Hr IV Continuous <Continuous>  heparin  Infusion.  Unit(s)/Hr IV Continuous <Continuous>  insulin lispro (ADMELOG) corrective regimen sliding scale   SubCutaneous every 6 hours  midazolam Infusion 0.1 mG/kG/Hr IV Continuous <Continuous>  pantoprazole  Injectable 40 milliGRAM(s) IV Push daily  polyethylene glycol 3350 17 Gram(s) Oral daily  senna Syrup 10 milliLiter(s) Oral at bedtime  sodium chloride 0.9% lock flush 10 milliLiter(s) IV Push every 1 hour PRN    Drug Dosing Weight  Height (cm): 157.5 (15 Nov 2020 23:00)  Weight (kg): 85.4 (15 Nov 2020 23:00)  BMI (kg/m2): 34.4 (15 Nov 2020 23:00)  BSA (m2): 1.86 (15 Nov 2020 23:00)    CENTRAL LINE: [x ] YES [ ] NO  LOCATION: [facility]  DATE INSERTED: 11/15  REMOVE: [ ] YES [ ] NO  EXPLAIN:    ANN: [x ] YES [ ] NO    DATE INSERTED:  REMOVE:  [ ] YES [ ] NO  EXPLAIN:    A-LINE:  [ ] YES [x ] NO  LOCATION:   DATE INSERTED:  REMOVE:  [ ] YES [ ] NO  EXPLAIN:    PMH -reviewed admission note, no change since admission  PAST MEDICAL & SURGICAL HISTORY:  COVID-19    No significant past surgical history        ICU Vital Signs Last 24 Hrs  T(C): 38.3 (20 Nov 2020 23:37), Max: 38.3 (20 Nov 2020 23:37)  T(F): 101 (20 Nov 2020 23:37), Max: 101 (20 Nov 2020 23:37)  HR: 89 (21 Nov 2020 00:00) (61 - 97)  BP: 145/75 (21 Nov 2020 00:00) (126/64 - 152/85)  BP(mean): 92 (21 Nov 2020 00:00) (77 - 105)  ABP: --  ABP(mean): --  RR: 17 (21 Nov 2020 00:00) (0 - 26)  SpO2: 95% (21 Nov 2020 00:00) (91% - 99%)      ABG - ( 20 Nov 2020 16:58 )  pH, Arterial: 7.32  pH, Blood: x     /  pCO2: 46    /  pO2: 123   / HCO3: 24    / Base Excess: -1.9  /  SaO2: 98                    11-19 @ 07:01  -  11-20 @ 07:00  --------------------------------------------------------  IN: 1758.4 mL / OUT: 2795 mL / NET: -1036.6 mL        Mode: AC/ CMV (Assist Control/ Continuous Mandatory Ventilation)  RR (machine): 20  TV (machine): 400  FiO2: 100  PEEP: 8  ITime: 0.85  MAP: 18  PIP: 32      PHYSICAL EXAM:    GENERAL: sedated, +ETT, +OG  HEAD:  Atraumatic, Normocephalic  EYES: b/l edema continuing  ENT: moist mucous membranes  NECK: Supple  NERVOUS SYSTEM: sedated  CHEST/LUNG: B/L good air entry  HEART: S1S2 normal, RRR  ABDOMEN: Soft, Nontender, distended 2/2 body habitus, Bowel sounds present  EXTREMITIES:  2+ Peripheral Pulses, b/l 1+ edema, mildly improved since yesterday  SKIN: No rashes or lesions noted      LABS:  CBC Full  -  ( 20 Nov 2020 06:28 )  WBC Count : 11.77 K/uL  RBC Count : 3.99 M/uL  Hemoglobin : 11.4 g/dL  Hematocrit : 37.0 %  Platelet Count - Automated : 349 K/uL  Mean Cell Volume : 92.7 fl  Mean Cell Hemoglobin : 28.6 pg  Mean Cell Hemoglobin Concentration : 30.8 gm/dL  Auto Neutrophil # : x  Auto Lymphocyte # : x  Auto Monocyte # : x  Auto Eosinophil # : x  Auto Basophil # : x  Auto Neutrophil % : x  Auto Lymphocyte % : x  Auto Monocyte % : x  Auto Eosinophil % : x  Auto Basophil % : x    11-20    145  |  112<H>  |  88<H>  ----------------------------<  121<H>  5.0   |  27  |  3.22<H>    Ca    7.6<L>      20 Nov 2020 16:34  Phos  5.7     11-19  Mg     3.1     11-19    TPro  6.3  /  Alb  2.0<L>  /  TBili  0.7  /  DBili  x   /  AST  73<H>  /  ALT  77<H>  /  AlkPhos  72  11-20    PT/INR - ( 20 Nov 2020 06:28 )   PT: 12.8 sec;   INR: 1.08 ratio         PTT - ( 20 Nov 2020 00:43 )  PTT:64.1 sec        RADIOLOGY & ADDITIONAL STUDIES REVIEWED:  ***    [ ]GOALS OF CARE DISCUSSION WITH PATIENT/FAMILY/PROXY:    CRITICAL CARE TIME SPENT: 35 minutes INTERVAL HPI/OVERNIGHT EVENTS:   Pt spiked a fever last night, Pt was started on Nimbex and proned today. ABG's improved after pronation.       PRESSORS: [x ] YES [ ] NO  WHICH:    Antimicrobial:    Cardiovascular:    Pulmonary:    Hematalogic:  heparin  Infusion.  Unit(s)/Hr IV Continuous <Continuous>    Other:  acetaminophen    Suspension .. 650 milliGRAM(s) Enteral Tube every 6 hours PRN  artificial  tears Solution 1 Drop(s) Both EYES every 4 hours PRN  bisacodyl Suppository 10 milliGRAM(s) Rectal daily  chlorhexidine 0.12% Liquid 15 milliLiter(s) Oral Mucosa two times a day  chlorhexidine 2% Cloths 1 Application(s) Topical daily  chlorhexidine 2% Cloths 1 Application(s) Topical <User Schedule>  dexAMETHasone  Injectable 6 milliGRAM(s) IV Push daily  fentaNYL   Infusion. 3 MICROgram(s)/kG/Hr IV Continuous <Continuous>  insulin lispro (ADMELOG) corrective regimen sliding scale   SubCutaneous every 6 hours  midazolam Infusion 0.1 mG/kG/Hr IV Continuous <Continuous>  pantoprazole  Injectable 40 milliGRAM(s) IV Push daily  polyethylene glycol 3350 17 Gram(s) Oral daily  senna Syrup 10 milliLiter(s) Oral at bedtime  sodium chloride 0.9% lock flush 10 milliLiter(s) IV Push every 1 hour PRN    acetaminophen    Suspension .. 650 milliGRAM(s) Enteral Tube every 6 hours PRN  artificial  tears Solution 1 Drop(s) Both EYES every 4 hours PRN  bisacodyl Suppository 10 milliGRAM(s) Rectal daily  chlorhexidine 0.12% Liquid 15 milliLiter(s) Oral Mucosa two times a day  chlorhexidine 2% Cloths 1 Application(s) Topical daily  chlorhexidine 2% Cloths 1 Application(s) Topical <User Schedule>  dexAMETHasone  Injectable 6 milliGRAM(s) IV Push daily  fentaNYL   Infusion. 3 MICROgram(s)/kG/Hr IV Continuous <Continuous>  heparin  Infusion.  Unit(s)/Hr IV Continuous <Continuous>  insulin lispro (ADMELOG) corrective regimen sliding scale   SubCutaneous every 6 hours  midazolam Infusion 0.1 mG/kG/Hr IV Continuous <Continuous>  pantoprazole  Injectable 40 milliGRAM(s) IV Push daily  polyethylene glycol 3350 17 Gram(s) Oral daily  senna Syrup 10 milliLiter(s) Oral at bedtime  sodium chloride 0.9% lock flush 10 milliLiter(s) IV Push every 1 hour PRN    Drug Dosing Weight  Height (cm): 157.5 (15 Nov 2020 23:00)  Weight (kg): 85.4 (15 Nov 2020 23:00)  BMI (kg/m2): 34.4 (15 Nov 2020 23:00)  BSA (m2): 1.86 (15 Nov 2020 23:00)    CENTRAL LINE: [x ] YES [ ] NO  LOCATION: [facility]  DATE INSERTED: 11/15  REMOVE: [ ] YES [ ] NO  EXPLAIN:    ANN: [x ] YES [ ] NO    DATE INSERTED:  REMOVE:  [ ] YES [ ] NO  EXPLAIN:    A-LINE:  [ ] YES [x ] NO  LOCATION:   DATE INSERTED:  REMOVE:  [ ] YES [ ] NO  EXPLAIN:    PMH -reviewed admission note, no change since admission  PAST MEDICAL & SURGICAL HISTORY:  COVID-19    No significant past surgical history        ICU Vital Signs Last 24 Hrs  T(C): 38.3 (20 Nov 2020 23:37), Max: 38.3 (20 Nov 2020 23:37)  T(F): 101 (20 Nov 2020 23:37), Max: 101 (20 Nov 2020 23:37)  HR: 89 (21 Nov 2020 00:00) (61 - 97)  BP: 145/75 (21 Nov 2020 00:00) (126/64 - 152/85)  BP(mean): 92 (21 Nov 2020 00:00) (77 - 105)  ABP: --  ABP(mean): --  RR: 17 (21 Nov 2020 00:00) (0 - 26)  SpO2: 95% (21 Nov 2020 00:00) (91% - 99%)      ABG - ( 20 Nov 2020 16:58 )  pH, Arterial: 7.32  pH, Blood: x     /  pCO2: 46    /  pO2: 123   / HCO3: 24    / Base Excess: -1.9  /  SaO2: 98                    11-19 @ 07:01  -  11-20 @ 07:00  --------------------------------------------------------  IN: 1758.4 mL / OUT: 2795 mL / NET: -1036.6 mL        Mode: AC/ CMV (Assist Control/ Continuous Mandatory Ventilation)  RR (machine): 20  TV (machine): 400  FiO2: 100  PEEP: 8  ITime: 0.85  MAP: 18  PIP: 32      PHYSICAL EXAM:    GENERAL: sedated, +ETT, +OG  HEAD:  Atraumatic, Normocephalic  EYES: b/l edema continuing  ENT: moist mucous membranes  NECK: Supple  NERVOUS SYSTEM: sedated  CHEST/LUNG: B/L good air entry  HEART: S1S2 normal, RRR  ABDOMEN: Soft, Nontender, distended 2/2 body habitus, Bowel sounds present  EXTREMITIES:  2+ Peripheral Pulses, b/l 1+ edema, mildly improved since yesterday  SKIN: No rashes or lesions noted      LABS:  CBC Full  -  ( 20 Nov 2020 06:28 )  WBC Count : 11.77 K/uL  RBC Count : 3.99 M/uL  Hemoglobin : 11.4 g/dL  Hematocrit : 37.0 %  Platelet Count - Automated : 349 K/uL  Mean Cell Volume : 92.7 fl  Mean Cell Hemoglobin : 28.6 pg  Mean Cell Hemoglobin Concentration : 30.8 gm/dL  Auto Neutrophil # : x  Auto Lymphocyte # : x  Auto Monocyte # : x  Auto Eosinophil # : x  Auto Basophil # : x  Auto Neutrophil % : x  Auto Lymphocyte % : x  Auto Monocyte % : x  Auto Eosinophil % : x  Auto Basophil % : x    11-20    145  |  112<H>  |  88<H>  ----------------------------<  121<H>  5.0   |  27  |  3.22<H>    Ca    7.6<L>      20 Nov 2020 16:34  Phos  5.7     11-19  Mg     3.1     11-19    TPro  6.3  /  Alb  2.0<L>  /  TBili  0.7  /  DBili  x   /  AST  73<H>  /  ALT  77<H>  /  AlkPhos  72  11-20    PT/INR - ( 20 Nov 2020 06:28 )   PT: 12.8 sec;   INR: 1.08 ratio         PTT - ( 20 Nov 2020 00:43 )  PTT:64.1 sec        RADIOLOGY & ADDITIONAL STUDIES REVIEWED:  ***    [ ]GOALS OF CARE DISCUSSION WITH PATIENT/FAMILY/PROXY:    CRITICAL CARE TIME SPENT: 35 minutes

## 2020-11-21 NOTE — PROGRESS NOTE ADULT - ASSESSMENT
Patient is a 56yo Male with no PMH recently diagnosed with COVID-19 (5 days PTA) p/w SOB s/p intubated in the ER. Pt admitted to ICU with acute hypoxic respiratory failure 2/2 COVID-19 PNA s/p intubated, BECKY with transaminitis. Nephrology consulted for Elevated serum creatinine.    1. BECKY- unknown baseline SCr. BECKY likely hemodynamically mediated in the setting of septic shock / infection 2/2 COVID-19, hypotension on pressors (now off pressors);  likely ATN. UA with 100 protein and trace blood with hyaline cast. Renal function improving with good urine o/p; likely diuretic phos; Renal recovery. Recc free water via NGT 300ml q6hrs.   No need for HD. Will defer Renal US due to COVID status. Strict I/Os. Avoid nephrotoxins/ NSAIDs/ RCA. Monitor BMP.  2. Septic Shock due to Multifocal PNA 2/2 COVID-19- Plan per ICU  3. Hypotension- BP improved. Pt off Pressors as per ICU. Monitor BP  4. Acute hypoxic respiratory failure- vent support as per ICU.  5. Hypocalcemia- corrected serum Ca for hypoalbuminemia  9.88 with mild hyperphosphatemia; No need for binders at this time.  Monitor ionized calcium and serum phos.

## 2020-11-22 LAB
ALBUMIN SERPL ELPH-MCNC: 1.8 G/DL — LOW (ref 3.5–5)
ALP SERPL-CCNC: 73 U/L — SIGNIFICANT CHANGE UP (ref 40–120)
ALT FLD-CCNC: 68 U/L DA — HIGH (ref 10–60)
ANION GAP SERPL CALC-SCNC: 4 MMOL/L — LOW (ref 5–17)
APTT BLD: 63 SEC — HIGH (ref 27.5–35.5)
AST SERPL-CCNC: 46 U/L — HIGH (ref 10–40)
BASE EXCESS BLDA CALC-SCNC: 1.7 MMOL/L — SIGNIFICANT CHANGE UP (ref -2–2)
BASE EXCESS BLDA CALC-SCNC: 2.4 MMOL/L — HIGH (ref -2–2)
BILIRUB SERPL-MCNC: 0.5 MG/DL — SIGNIFICANT CHANGE UP (ref 0.2–1.2)
BLOOD GAS COMMENTS ARTERIAL: SIGNIFICANT CHANGE UP
BLOOD GAS COMMENTS ARTERIAL: SIGNIFICANT CHANGE UP
BUN SERPL-MCNC: 68 MG/DL — HIGH (ref 7–18)
CALCIUM SERPL-MCNC: 8.5 MG/DL — SIGNIFICANT CHANGE UP (ref 8.4–10.5)
CHLORIDE SERPL-SCNC: 114 MMOL/L — HIGH (ref 96–108)
CO2 SERPL-SCNC: 29 MMOL/L — SIGNIFICANT CHANGE UP (ref 22–31)
CREAT SERPL-MCNC: 2.1 MG/DL — HIGH (ref 0.5–1.3)
CRP SERPL-MCNC: 17.02 MG/DL — HIGH (ref 0–0.4)
GLUCOSE SERPL-MCNC: 112 MG/DL — HIGH (ref 70–99)
HCO3 BLDA-SCNC: 26 MMOL/L — SIGNIFICANT CHANGE UP (ref 23–27)
HCO3 BLDA-SCNC: 26 MMOL/L — SIGNIFICANT CHANGE UP (ref 23–27)
HCT VFR BLD CALC: 33.7 % — LOW (ref 39–50)
HGB BLD-MCNC: 10.4 G/DL — LOW (ref 13–17)
HOROWITZ INDEX BLDA+IHG-RTO: 70 — SIGNIFICANT CHANGE UP
HOROWITZ INDEX BLDA+IHG-RTO: 90 — SIGNIFICANT CHANGE UP
MAGNESIUM SERPL-MCNC: 2.8 MG/DL — HIGH (ref 1.6–2.6)
MCHC RBC-ENTMCNC: 29.1 PG — SIGNIFICANT CHANGE UP (ref 27–34)
MCHC RBC-ENTMCNC: 30.9 GM/DL — LOW (ref 32–36)
MCV RBC AUTO: 94.4 FL — SIGNIFICANT CHANGE UP (ref 80–100)
NRBC # BLD: 0 /100 WBCS — SIGNIFICANT CHANGE UP (ref 0–0)
PCO2 BLDA: 38 MMHG — SIGNIFICANT CHANGE UP (ref 32–46)
PCO2 BLDA: 40 MMHG — SIGNIFICANT CHANGE UP (ref 32–46)
PH BLDA: 7.43 — SIGNIFICANT CHANGE UP (ref 7.35–7.45)
PH BLDA: 7.45 — SIGNIFICANT CHANGE UP (ref 7.35–7.45)
PHOSPHATE SERPL-MCNC: 4.5 MG/DL — SIGNIFICANT CHANGE UP (ref 2.5–4.5)
PLATELET # BLD AUTO: 319 K/UL — SIGNIFICANT CHANGE UP (ref 150–400)
PO2 BLDA: 123 MMHG — HIGH (ref 74–108)
PO2 BLDA: 70 MMHG — LOW (ref 74–108)
POTASSIUM SERPL-MCNC: 4.9 MMOL/L — SIGNIFICANT CHANGE UP (ref 3.5–5.3)
POTASSIUM SERPL-SCNC: 4.9 MMOL/L — SIGNIFICANT CHANGE UP (ref 3.5–5.3)
PROT SERPL-MCNC: 6.5 G/DL — SIGNIFICANT CHANGE UP (ref 6–8.3)
RBC # BLD: 3.57 M/UL — LOW (ref 4.2–5.8)
RBC # FLD: 14 % — SIGNIFICANT CHANGE UP (ref 10.3–14.5)
SAO2 % BLDA: 94 % — SIGNIFICANT CHANGE UP (ref 92–96)
SAO2 % BLDA: 98 % — HIGH (ref 92–96)
SODIUM SERPL-SCNC: 147 MMOL/L — HIGH (ref 135–145)
WBC # BLD: 11.74 K/UL — HIGH (ref 3.8–10.5)
WBC # FLD AUTO: 11.74 K/UL — HIGH (ref 3.8–10.5)

## 2020-11-22 PROCEDURE — 71045 X-RAY EXAM CHEST 1 VIEW: CPT | Mod: 26

## 2020-11-22 PROCEDURE — 71045 X-RAY EXAM CHEST 1 VIEW: CPT | Mod: 26,77

## 2020-11-22 RX ADMIN — CISATRACURIUM BESYLATE 15.4 MICROGRAM(S)/KG/MIN: 2 INJECTION INTRAVENOUS at 17:45

## 2020-11-22 RX ADMIN — FENTANYL CITRATE 25.6 MICROGRAM(S)/KG/HR: 50 INJECTION INTRAVENOUS at 06:03

## 2020-11-22 RX ADMIN — MIDAZOLAM HYDROCHLORIDE 8.54 MG/KG/HR: 1 INJECTION, SOLUTION INTRAMUSCULAR; INTRAVENOUS at 00:38

## 2020-11-22 RX ADMIN — HEPARIN SODIUM 1300 UNIT(S)/HR: 5000 INJECTION INTRAVENOUS; SUBCUTANEOUS at 06:18

## 2020-11-22 RX ADMIN — MIDAZOLAM HYDROCHLORIDE 8.54 MG/KG/HR: 1 INJECTION, SOLUTION INTRAMUSCULAR; INTRAVENOUS at 07:43

## 2020-11-22 RX ADMIN — CISATRACURIUM BESYLATE 15.4 MICROGRAM(S)/KG/MIN: 2 INJECTION INTRAVENOUS at 04:30

## 2020-11-22 RX ADMIN — FENTANYL CITRATE 25.6 MICROGRAM(S)/KG/HR: 50 INJECTION INTRAVENOUS at 12:39

## 2020-11-22 RX ADMIN — Medication 6 MILLIGRAM(S): at 05:42

## 2020-11-22 RX ADMIN — MIDAZOLAM HYDROCHLORIDE 8.54 MG/KG/HR: 1 INJECTION, SOLUTION INTRAMUSCULAR; INTRAVENOUS at 23:07

## 2020-11-22 RX ADMIN — Medication 0: at 12:53

## 2020-11-22 RX ADMIN — CHLORHEXIDINE GLUCONATE 15 MILLILITER(S): 213 SOLUTION TOPICAL at 17:03

## 2020-11-22 RX ADMIN — CHLORHEXIDINE GLUCONATE 1 APPLICATION(S): 213 SOLUTION TOPICAL at 05:42

## 2020-11-22 RX ADMIN — CHLORHEXIDINE GLUCONATE 1 APPLICATION(S): 213 SOLUTION TOPICAL at 12:38

## 2020-11-22 RX ADMIN — CHLORHEXIDINE GLUCONATE 15 MILLILITER(S): 213 SOLUTION TOPICAL at 05:42

## 2020-11-22 RX ADMIN — PANTOPRAZOLE SODIUM 40 MILLIGRAM(S): 20 TABLET, DELAYED RELEASE ORAL at 12:53

## 2020-11-22 NOTE — PROGRESS NOTE ADULT - SUBJECTIVE AND OBJECTIVE BOX
[facility] NEPHROLOGY- PROGRESS NOTE    Patient is a 54yo Male with no PMH recently diagnosed with COVID-19 (5 days PTA) p/w SOB s/p intubated in the ER. Pt admitted to ICU with acute hypoxic respiratory failure 2/2 COVID-19 PNA s/p intubated, BECKY with transaminitis. Nephrology consulted for Elevated serum creatinine.    Hospital Medications: Medications reviewed.  REVIEW OF SYSTEMS: Unable to obtain/ intubated & sedated    VITALS:  T(F): 99.1 (20 @ 08:00), Max: 99.1 (20 @ 08:00)  HR: 64 (20 @ 12:17)  BP: 132/70 (20 @ 10:00)  RR: 18 (20 @ 10:00)  SpO2: 94% (20 @ 12:17)  Wt(kg): --     @ 07:01  -   @ 07:00  --------------------------------------------------------  IN: 1624.4 mL / OUT: 2715 mL / NET: -1090.6 mL     @ 07:01  -   @ 14:31  --------------------------------------------------------  IN: 200.7 mL / OUT: 350 mL / NET: -149.3 mL      PHYSICAL EXAM:  Gen: Sedated  HEENT: +ETT, +eyes covered  Cards: RRR, +S1/S2,   Resp: +mechanical BS  GI: soft, ND,   : +isaacs with yellow urine  Extremities: no LE edema, +UE edema  Neuro: sedated    LABS:      147<H>  |  114<H>  |  68<H>  ----------------------------<  112<H>  4.9   |  29  |  2.10<H>    Ca    8.5      2020 05:44  Phos  4.5       Mg     2.8         TPro  6.5  /  Alb  1.8<L>  /  TBili  0.5  /  DBili      /  AST  46<H>  /  ALT  68<H>  /  AlkPhos  73      Creatinine Trend: 2.10 <--, 2.75 <--, 3.22 <--, 3.44 <--, 4.36 <--, 4.43 <--, 4.30 <--, 3.33 <--, 2.73 <--, 2.20 <--, 1.88 <--, 2.20 <--                        10.4   11.74 )-----------( 319      ( 2020 05:44 )             33.7     Urine Studies:  Urinalysis Basic - ( 2020 01:30 )    Color: Yellow / Appearance: Clear / S.020 / pH:   Gluc:  / Ketone: Negative  / Bili: Negative / Urobili: Negative   Blood:  / Protein: 100 / Nitrite: Negative   Leuk Esterase: Negative / RBC: 2-5 /HPF / WBC 0-2 /HPF   Sq Epi:  / Non Sq Epi: Few /HPF / Bacteria: Moderate /HPF      Osmolality, Random Urine: 543 mos/kg ( @ 01:29)  Sodium, Random Urine: 45 mmol/L ( @ 01:29)  Chloride, Random Urine: 44 mmol/L ( @ 01:29)

## 2020-11-22 NOTE — PROGRESS NOTE ADULT - ASSESSMENT
Patient is a 54yo Male with no PMH recently diagnosed with COVID-19 (5 days PTA) p/w SOB s/p intubated in the ER. Pt admitted to ICU with acute hypoxic respiratory failure 2/2 COVID-19 PNA s/p intubated, BECKY with transaminitis. Nephrology consulted for Elevated serum creatinine.    1. BECKY- unknown baseline SCr. BECKY likely hemodynamically mediated in the setting of septic shock / infection 2/2 COVID-19, hypotension on pressors (now off pressors);  likely ATN. UA with 100 protein and trace blood with hyaline cast. Renal function improving with good urine o/p; likely diuretic/ recovery phase. Recc free water via NGT @ 350ml q6hrs.   Will defer Renal US due to COVID status. Strict I/Os. Avoid nephrotoxins/ NSAIDs/ RCA. Monitor BMP.  2. Septic Shock due to Multifocal PNA 2/2 COVID-19- Plan per ICU  3. Hypotension- BP improved. Pt off Pressors as per ICU. Monitor BP  4. Acute hypoxic respiratory failure- vent support as per ICU.  5. Hypocalcemia- corrected serum Ca for hypoalbuminemia  10.26  with resolving hyperphosphatemia; If elevated Ca, recc IVF. Monitor ionized calcium and serum phos.

## 2020-11-22 NOTE — PROGRESS NOTE ADULT - SUBJECTIVE AND OBJECTIVE BOX
HPI: 55M from home without PMH with progressively worsening SOB and +COVID test x5 days prior to admission presented following SpO2 64% at home.  Started on 15L NRB in ED 2/2 SpO2s 85-90%, desaturated to low 80s and was placed on HFNC, continued to desaturate, was intubated and admitted to ICU.     INTERVAL HPI/OVERNIGHT EVENTS: NAEON. VS wnl, SpO2 90s on 25/450/90/8. ABG overnight improved to 7.38/44/201/25. Continuing with fentanyl, versed, and Nimbex gtt. Proned at 4:30pm 11/21. CXR with worsened b/l opacities. U/O continues to remain improved, approx. 100cc/hr, Cr decreasing to 2.75, free water boluses added per nephro.     ICU Vital Signs Last 24 Hrs  T(C): 36.4 (22 Nov 2020 00:00), Max: 36.4 (22 Nov 2020 00:00)  T(F): 97.6 (22 Nov 2020 00:00), Max: 97.6 (22 Nov 2020 00:00)  HR: 59 (22 Nov 2020 02:00) (55 - 84)  BP: 143/70 (22 Nov 2020 02:00) (111/58 - 143/70)  BP(mean): 88 (22 Nov 2020 02:00) (70 - 99)  ABP: --  ABP(mean): --  RR: 25 (22 Nov 2020 02:00) (16 - 25)  SpO2: 98% (22 Nov 2020 02:00) (90% - 100%)    I&O's Summary    20 Nov 2020 07:01  -  21 Nov 2020 07:00  --------------------------------------------------------  IN: 1080.5 mL / OUT: 3450 mL / NET: -2369.5 mL    21 Nov 2020 07:01  -  22 Nov 2020 03:50  --------------------------------------------------------  IN: 1289.9 mL / OUT: 2105 mL / NET: -815.1 mL      Mode: AC/ CMV (Assist Control/ Continuous Mandatory Ventilation)  RR (machine): 25  TV (machine): 450  FiO2: 90  PEEP: 8  ITime: 0.85  MAP: 17  PIP: 32      LABS:                        10.8   13.67 )-----------( 332      ( 21 Nov 2020 06:17 )             35.8     11-21    146<H>  |  114<H>  |  78<H>  ----------------------------<  88  4.4   |  26  |  2.75<H>    Ca    8.2<L>      21 Nov 2020 06:17  Phos  4.6     11-21  Mg     3.0     11-21    TPro  6.5  /  Alb  1.9<L>  /  TBili  0.7  /  DBili  x   /  AST  84<H>  /  ALT  78<H>  /  AlkPhos  81  11-21    PT/INR - ( 20 Nov 2020 06:28 )   PT: 12.8 sec;   INR: 1.08 ratio         PTT - ( 21 Nov 2020 20:47 )  PTT:65.8 sec    CAPILLARY BLOOD GLUCOSE      POCT Blood Glucose.: 115 mg/dL (21 Nov 2020 23:08)  POCT Blood Glucose.: 146 mg/dL (21 Nov 2020 18:01)  POCT Blood Glucose.: 131 mg/dL (21 Nov 2020 11:52)  POCT Blood Glucose.: 89 mg/dL (21 Nov 2020 05:38)    ABG - ( 21 Nov 2020 20:41 )  pH, Arterial: 7.38  pH, Blood: x     /  pCO2: 44    /  pO2: 201   / HCO3: 25    / Base Excess: 0.6   /  SaO2: 99        RADIOLOGY & ADDITIONAL TESTS:    Consultant(s) Notes Reviewed:  [x ] YES  [ ] NO    MEDICATIONS  (STANDING):  bisacodyl Suppository 10 milliGRAM(s) Rectal daily  chlorhexidine 0.12% Liquid 15 milliLiter(s) Oral Mucosa two times a day  chlorhexidine 2% Cloths 1 Application(s) Topical <User Schedule>  chlorhexidine 2% Cloths 1 Application(s) Topical daily  cisatracurium Infusion 3 MICROgram(s)/kG/Min (15.4 mL/Hr) IV Continuous <Continuous>  dexAMETHasone  Injectable 6 milliGRAM(s) IV Push daily  fentaNYL   Infusion. 3 MICROgram(s)/kG/Hr (25.6 mL/Hr) IV Continuous <Continuous>  heparin  Infusion.  Unit(s)/Hr (15 mL/Hr) IV Continuous <Continuous>  insulin lispro (ADMELOG) corrective regimen sliding scale   SubCutaneous every 6 hours  midazolam Infusion 0.1 mG/kG/Hr (8.54 mL/Hr) IV Continuous <Continuous>  pantoprazole  Injectable 40 milliGRAM(s) IV Push daily  polyethylene glycol 3350 17 Gram(s) Oral daily  senna Syrup 10 milliLiter(s) Oral at bedtime    MEDICATIONS  (PRN):  acetaminophen    Suspension .. 650 milliGRAM(s) Enteral Tube every 6 hours PRN Temp greater or equal to 38C (100.4F)  artificial  tears Solution 1 Drop(s) Both EYES every 4 hours PRN Dry Eyes  sodium chloride 0.9% lock flush 10 milliLiter(s) IV Push every 1 hour PRN Pre/post blood products, medications, blood draw, and to maintain line patency    PHYSICAL EXAM:    GENERAL: sedated, +ETT, +OG  HEAD:  Atraumatic, Normocephalic  EYES: b/l edema continuing  ENT: moist mucous membranes  NECK: Supple  NERVOUS SYSTEM: sedated  CHEST/LUNG: B/L good air entry  HEART: S1S2 normal, RRR  ABDOMEN: Soft, Nontender, distended 2/2 body habitus, Bowel sounds present  EXTREMITIES:  2+ Peripheral Pulses, b/l 1+ edema, mildly improved since yesterday  SKIN: No rashes or lesions noted    Care Discussed with Consultants/Other Providers [ x] YES  [ ] NO

## 2020-11-23 LAB
ALBUMIN SERPL ELPH-MCNC: 1.9 G/DL — LOW (ref 3.5–5)
ALP SERPL-CCNC: 62 U/L — SIGNIFICANT CHANGE UP (ref 40–120)
ALT FLD-CCNC: 53 U/L DA — SIGNIFICANT CHANGE UP (ref 10–60)
ANION GAP SERPL CALC-SCNC: 5 MMOL/L — SIGNIFICANT CHANGE UP (ref 5–17)
ANION GAP SERPL CALC-SCNC: 6 MMOL/L — SIGNIFICANT CHANGE UP (ref 5–17)
APTT BLD: 58.2 SEC — HIGH (ref 27.5–35.5)
AST SERPL-CCNC: 27 U/L — SIGNIFICANT CHANGE UP (ref 10–40)
BASE EXCESS BLDA CALC-SCNC: 2.1 MMOL/L — HIGH (ref -2–2)
BASE EXCESS BLDA CALC-SCNC: 3.2 MMOL/L — HIGH (ref -2–2)
BILIRUB SERPL-MCNC: 0.5 MG/DL — SIGNIFICANT CHANGE UP (ref 0.2–1.2)
BLOOD GAS COMMENTS ARTERIAL: SIGNIFICANT CHANGE UP
BUN SERPL-MCNC: 60 MG/DL — HIGH (ref 7–18)
BUN SERPL-MCNC: 65 MG/DL — HIGH (ref 7–18)
CALCIUM SERPL-MCNC: 8.3 MG/DL — LOW (ref 8.4–10.5)
CALCIUM SERPL-MCNC: 8.4 MG/DL — SIGNIFICANT CHANGE UP (ref 8.4–10.5)
CHLORIDE SERPL-SCNC: 112 MMOL/L — HIGH (ref 96–108)
CHLORIDE SERPL-SCNC: 112 MMOL/L — HIGH (ref 96–108)
CO2 SERPL-SCNC: 27 MMOL/L — SIGNIFICANT CHANGE UP (ref 22–31)
CO2 SERPL-SCNC: 30 MMOL/L — SIGNIFICANT CHANGE UP (ref 22–31)
CREAT SERPL-MCNC: 1.93 MG/DL — HIGH (ref 0.5–1.3)
CREAT SERPL-MCNC: 2.06 MG/DL — HIGH (ref 0.5–1.3)
CRP SERPL-MCNC: 6.77 MG/DL — HIGH (ref 0–0.4)
D DIMER BLD IA.RAPID-MCNC: 1200 NG/ML DDU — HIGH
ERYTHROCYTE [SEDIMENTATION RATE] IN BLOOD: 88 MM/HR — HIGH (ref 0–20)
FERRITIN SERPL-MCNC: 1355 NG/ML — HIGH (ref 30–400)
GLUCOSE SERPL-MCNC: 110 MG/DL — HIGH (ref 70–99)
GLUCOSE SERPL-MCNC: 149 MG/DL — HIGH (ref 70–99)
HCO3 BLDA-SCNC: 25 MMOL/L — SIGNIFICANT CHANGE UP (ref 23–27)
HCO3 BLDA-SCNC: 28 MMOL/L — HIGH (ref 23–27)
HCT VFR BLD CALC: 33.4 % — LOW (ref 39–50)
HGB BLD-MCNC: 10.3 G/DL — LOW (ref 13–17)
HOROWITZ INDEX BLDA+IHG-RTO: 60 — SIGNIFICANT CHANGE UP
HOROWITZ INDEX BLDA+IHG-RTO: 70 — SIGNIFICANT CHANGE UP
INR BLD: 1.19 RATIO — HIGH (ref 0.88–1.16)
LDH SERPL L TO P-CCNC: 427 U/L — HIGH (ref 120–225)
MAGNESIUM SERPL-MCNC: 2.5 MG/DL — SIGNIFICANT CHANGE UP (ref 1.6–2.6)
MCHC RBC-ENTMCNC: 28.9 PG — SIGNIFICANT CHANGE UP (ref 27–34)
MCHC RBC-ENTMCNC: 30.8 GM/DL — LOW (ref 32–36)
MCV RBC AUTO: 93.8 FL — SIGNIFICANT CHANGE UP (ref 80–100)
NRBC # BLD: 0 /100 WBCS — SIGNIFICANT CHANGE UP (ref 0–0)
PCO2 BLDA: 37 MMHG — SIGNIFICANT CHANGE UP (ref 32–46)
PCO2 BLDA: 44 MMHG — SIGNIFICANT CHANGE UP (ref 32–46)
PH BLDA: 7.42 — SIGNIFICANT CHANGE UP (ref 7.35–7.45)
PH BLDA: 7.46 — HIGH (ref 7.35–7.45)
PHOSPHATE SERPL-MCNC: 3.7 MG/DL — SIGNIFICANT CHANGE UP (ref 2.5–4.5)
PLATELET # BLD AUTO: 257 K/UL — SIGNIFICANT CHANGE UP (ref 150–400)
PO2 BLDA: 79 MMHG — SIGNIFICANT CHANGE UP (ref 74–108)
PO2 BLDA: 94 MMHG — SIGNIFICANT CHANGE UP (ref 74–108)
POTASSIUM SERPL-MCNC: 4.7 MMOL/L — SIGNIFICANT CHANGE UP (ref 3.5–5.3)
POTASSIUM SERPL-MCNC: 4.8 MMOL/L — SIGNIFICANT CHANGE UP (ref 3.5–5.3)
POTASSIUM SERPL-SCNC: 4.7 MMOL/L — SIGNIFICANT CHANGE UP (ref 3.5–5.3)
POTASSIUM SERPL-SCNC: 4.8 MMOL/L — SIGNIFICANT CHANGE UP (ref 3.5–5.3)
PROT SERPL-MCNC: 6.5 G/DL — SIGNIFICANT CHANGE UP (ref 6–8.3)
PROTHROM AB SERPL-ACNC: 14 SEC — HIGH (ref 10.6–13.6)
RBC # BLD: 3.56 M/UL — LOW (ref 4.2–5.8)
RBC # FLD: 13.9 % — SIGNIFICANT CHANGE UP (ref 10.3–14.5)
SAO2 % BLDA: 95 % — SIGNIFICANT CHANGE UP (ref 92–96)
SAO2 % BLDA: 97 % — HIGH (ref 92–96)
SODIUM SERPL-SCNC: 145 MMOL/L — SIGNIFICANT CHANGE UP (ref 135–145)
SODIUM SERPL-SCNC: 147 MMOL/L — HIGH (ref 135–145)
TROPONIN I SERPL-MCNC: 0.09 NG/ML — HIGH (ref 0–0.04)
WBC # BLD: 9.97 K/UL — SIGNIFICANT CHANGE UP (ref 3.8–10.5)
WBC # FLD AUTO: 9.97 K/UL — SIGNIFICANT CHANGE UP (ref 3.8–10.5)

## 2020-11-23 PROCEDURE — 71045 X-RAY EXAM CHEST 1 VIEW: CPT | Mod: 26

## 2020-11-23 RX ORDER — PIPERACILLIN AND TAZOBACTAM 4; .5 G/20ML; G/20ML
3.38 INJECTION, POWDER, LYOPHILIZED, FOR SOLUTION INTRAVENOUS EVERY 8 HOURS
Refills: 0 | Status: DISCONTINUED | OUTPATIENT
Start: 2020-11-23 | End: 2020-12-02

## 2020-11-23 RX ORDER — SODIUM CHLORIDE 9 MG/ML
1000 INJECTION INTRAMUSCULAR; INTRAVENOUS; SUBCUTANEOUS
Refills: 0 | Status: DISCONTINUED | OUTPATIENT
Start: 2020-11-23 | End: 2020-11-23

## 2020-11-23 RX ORDER — VANCOMYCIN HCL 1 G
1000 VIAL (EA) INTRAVENOUS EVERY 24 HOURS
Refills: 0 | Status: DISCONTINUED | OUTPATIENT
Start: 2020-11-23 | End: 2020-11-30

## 2020-11-23 RX ORDER — HEPARIN SODIUM 5000 [USP'U]/ML
1300 INJECTION INTRAVENOUS; SUBCUTANEOUS
Qty: 25000 | Refills: 0 | Status: DISCONTINUED | OUTPATIENT
Start: 2020-11-23 | End: 2020-11-24

## 2020-11-23 RX ORDER — PIPERACILLIN AND TAZOBACTAM 4; .5 G/20ML; G/20ML
3.38 INJECTION, POWDER, LYOPHILIZED, FOR SOLUTION INTRAVENOUS EVERY 8 HOURS
Refills: 0 | Status: DISCONTINUED | OUTPATIENT
Start: 2020-11-23 | End: 2020-11-23

## 2020-11-23 RX ORDER — HEPARIN SODIUM 5000 [USP'U]/ML
INJECTION INTRAVENOUS; SUBCUTANEOUS
Qty: 25000 | Refills: 0 | Status: DISCONTINUED | OUTPATIENT
Start: 2020-11-23 | End: 2020-11-23

## 2020-11-23 RX ORDER — SODIUM CHLORIDE 9 MG/ML
1000 INJECTION, SOLUTION INTRAVENOUS
Refills: 0 | Status: DISCONTINUED | OUTPATIENT
Start: 2020-11-23 | End: 2020-11-23

## 2020-11-23 RX ORDER — PIPERACILLIN AND TAZOBACTAM 4; .5 G/20ML; G/20ML
3.38 INJECTION, POWDER, LYOPHILIZED, FOR SOLUTION INTRAVENOUS ONCE
Refills: 0 | Status: COMPLETED | OUTPATIENT
Start: 2020-11-23 | End: 2020-11-23

## 2020-11-23 RX ADMIN — Medication 650 MILLIGRAM(S): at 08:50

## 2020-11-23 RX ADMIN — FENTANYL CITRATE 25.6 MICROGRAM(S)/KG/HR: 50 INJECTION INTRAVENOUS at 00:11

## 2020-11-23 RX ADMIN — SENNA PLUS 10 MILLILITER(S): 8.6 TABLET ORAL at 21:34

## 2020-11-23 RX ADMIN — Medication 650 MILLIGRAM(S): at 07:54

## 2020-11-23 RX ADMIN — CHLORHEXIDINE GLUCONATE 15 MILLILITER(S): 213 SOLUTION TOPICAL at 05:05

## 2020-11-23 RX ADMIN — CISATRACURIUM BESYLATE 15.4 MICROGRAM(S)/KG/MIN: 2 INJECTION INTRAVENOUS at 02:49

## 2020-11-23 RX ADMIN — HEPARIN SODIUM 1300 UNIT(S)/HR: 5000 INJECTION INTRAVENOUS; SUBCUTANEOUS at 05:39

## 2020-11-23 RX ADMIN — FENTANYL CITRATE 25.6 MICROGRAM(S)/KG/HR: 50 INJECTION INTRAVENOUS at 08:16

## 2020-11-23 RX ADMIN — CHLORHEXIDINE GLUCONATE 1 APPLICATION(S): 213 SOLUTION TOPICAL at 05:04

## 2020-11-23 RX ADMIN — HEPARIN SODIUM 1300 UNIT(S)/HR: 5000 INJECTION INTRAVENOUS; SUBCUTANEOUS at 23:55

## 2020-11-23 RX ADMIN — FENTANYL CITRATE 25.6 MICROGRAM(S)/KG/HR: 50 INJECTION INTRAVENOUS at 16:28

## 2020-11-23 RX ADMIN — PANTOPRAZOLE SODIUM 40 MILLIGRAM(S): 20 TABLET, DELAYED RELEASE ORAL at 11:57

## 2020-11-23 RX ADMIN — Medication 10 MILLIGRAM(S): at 14:56

## 2020-11-23 RX ADMIN — POLYETHYLENE GLYCOL 3350 17 GRAM(S): 17 POWDER, FOR SOLUTION ORAL at 11:57

## 2020-11-23 RX ADMIN — PIPERACILLIN AND TAZOBACTAM 200 GRAM(S): 4; .5 INJECTION, POWDER, LYOPHILIZED, FOR SOLUTION INTRAVENOUS at 12:04

## 2020-11-23 RX ADMIN — PIPERACILLIN AND TAZOBACTAM 25 GRAM(S): 4; .5 INJECTION, POWDER, LYOPHILIZED, FOR SOLUTION INTRAVENOUS at 21:34

## 2020-11-23 RX ADMIN — CHLORHEXIDINE GLUCONATE 15 MILLILITER(S): 213 SOLUTION TOPICAL at 17:47

## 2020-11-23 RX ADMIN — Medication 6 MILLIGRAM(S): at 05:04

## 2020-11-23 RX ADMIN — Medication 100 MILLIGRAM(S): at 11:59

## 2020-11-23 NOTE — PROGRESS NOTE ADULT - SUBJECTIVE AND OBJECTIVE BOX
HPI: 55M from home without PMH with progressively worsening SOB and +COVID test x5 days prior to admission presented following SpO2 64% at home.  Started on 15L NRB in ED 2/2 SpO2s 85-90%, desaturated to low 80s and was placed on HFNC, continued to desaturate, was intubated and admitted to ICU.     INTERVAL HPI/OVERNIGHT EVENTS: Newly febrile to 101.5F this am, given Tylenol. SBPs 130-160s, SpO2 high 90s. 25/450/70/8, ABG 7.42/44/94/28, will decrease FiO2. Na 147, started 0.45NS at 75cc/hr. U/o remaining improved to 200cc/hr. Will repeat BMP this pm. Added vanc and zosyn given new fevers, sent MRSA and procal. Will change central line today. CXR and Cr slightly improving.     ICU Vital Signs Last 24 Hrs  T(C): 37.2 (23 Nov 2020 13:00), Max: 38.6 (23 Nov 2020 08:00)  T(F): 98.9 (23 Nov 2020 13:00), Max: 101.5 (23 Nov 2020 08:00)  HR: 52 (23 Nov 2020 13:00) (52 - 75)  BP: 159/74 (23 Nov 2020 13:00) (128/66 - 160/84)  BP(mean): 95 (23 Nov 2020 13:00) (82 - 103)  ABP: --  ABP(mean): --  RR: 25 (23 Nov 2020 13:00) (20 - 26)  SpO2: 95% (23 Nov 2020 13:00) (91% - 100%)    I&O's Summary    22 Nov 2020 07:01  -  23 Nov 2020 07:00  --------------------------------------------------------  IN: 1471.8 mL / OUT: 2635 mL / NET: -1163.2 mL    23 Nov 2020 07:01  -  23 Nov 2020 14:42  --------------------------------------------------------  IN: 0 mL / OUT: 350 mL / NET: -350 mL      Mode: AC/ CMV (Assist Control/ Continuous Mandatory Ventilation)  RR (machine): 25  TV (machine): 450  FiO2: 70  PEEP: 8  ITime: 0.85  MAP: 16  PIP: 30      LABS:                        10.3   9.97  )-----------( 257      ( 23 Nov 2020 05:06 )             33.4     11-23    147<H>  |  112<H>  |  65<H>  ----------------------------<  110<H>  4.7   |  30  |  2.06<H>    Ca    8.4      23 Nov 2020 05:06  Phos  3.7     11-23  Mg     2.5     11-23    TPro  6.5  /  Alb  1.9<L>  /  TBili  0.5  /  DBili  x   /  AST  27  /  ALT  53  /  AlkPhos  62  11-23    PT/INR - ( 23 Nov 2020 05:06 )   PT: 14.0 sec;   INR: 1.19 ratio         PTT - ( 23 Nov 2020 05:06 )  PTT:58.2 sec    CAPILLARY BLOOD GLUCOSE      POCT Blood Glucose.: 135 mg/dL (23 Nov 2020 11:51)  POCT Blood Glucose.: 118 mg/dL (23 Nov 2020 05:17)  POCT Blood Glucose.: 130 mg/dL (22 Nov 2020 23:28)  POCT Blood Glucose.: 150 mg/dL (22 Nov 2020 17:01)    ABG - ( 23 Nov 2020 03:26 )  pH, Arterial: 7.42  pH, Blood: x     /  pCO2: 44    /  pO2: 94    / HCO3: 28    / Base Excess: 3.2   /  SaO2: 97                  RADIOLOGY & ADDITIONAL TESTS:    Consultant(s) Notes Reviewed:  [x ] YES  [ ] NO    MEDICATIONS  (STANDING):  bisacodyl Suppository 10 milliGRAM(s) Rectal daily  chlorhexidine 0.12% Liquid 15 milliLiter(s) Oral Mucosa two times a day  chlorhexidine 2% Cloths 1 Application(s) Topical <User Schedule>  dexAMETHasone  Injectable 6 milliGRAM(s) IV Push daily  fentaNYL   Infusion. 3 MICROgram(s)/kG/Hr (25.6 mL/Hr) IV Continuous <Continuous>  heparin  Infusion.  Unit(s)/Hr (15 mL/Hr) IV Continuous <Continuous>  insulin lispro (ADMELOG) corrective regimen sliding scale   SubCutaneous every 6 hours  midazolam Infusion 0.1 mG/kG/Hr (8.54 mL/Hr) IV Continuous <Continuous>  pantoprazole  Injectable 40 milliGRAM(s) IV Push daily  piperacillin/tazobactam IVPB.. 3.375 Gram(s) IV Intermittent every 8 hours  polyethylene glycol 3350 17 Gram(s) Oral daily  senna Syrup 10 milliLiter(s) Oral at bedtime  sodium chloride 0.45%. 1000 milliLiter(s) (75 mL/Hr) IV Continuous <Continuous>  vancomycin  IVPB 1000 milliGRAM(s) IV Intermittent every 24 hours    MEDICATIONS  (PRN):  acetaminophen    Suspension .. 650 milliGRAM(s) Enteral Tube every 6 hours PRN Temp greater or equal to 38C (100.4F)  artificial  tears Solution 1 Drop(s) Both EYES every 4 hours PRN Dry Eyes  sodium chloride 0.9% lock flush 10 milliLiter(s) IV Push every 1 hour PRN Pre/post blood products, medications, blood draw, and to maintain line patency    PHYSICAL EXAM:    GENERAL: sedated, +ETT, +OG  HEAD:  Atraumatic, Normocephalic  EYES: b/l edema continuing  ENT: moist mucous membranes  NECK: Supple  NERVOUS SYSTEM: sedated  CHEST/LUNG: B/L good air entry  HEART: S1S2 normal, RRR  ABDOMEN: Soft, Nontender, distended 2/2 body habitus, Bowel sounds present  EXTREMITIES:  2+ Peripheral Pulses, b/l 1+ edema, mildly improving daily  SKIN: No rashes or lesions noted    Care Discussed with Consultants/Other Providers [ x] YES  [ ] NO

## 2020-11-23 NOTE — PROGRESS NOTE ADULT - SUBJECTIVE AND OBJECTIVE BOX
[facility] NEPHROLOGY- PROGRESS NOTE    Patient is a 54yo Male with no PMH recently diagnosed with COVID-19 (5 days PTA) p/w SOB s/p intubated in the ER. Pt admitted to ICU with acute hypoxic respiratory failure 2/2 COVID-19 PNA s/p intubated, BECKY with transaminitis. Nephrology consulted for Elevated serum creatinine.    Hospital Medications: Medications reviewed.  REVIEW OF SYSTEMS: Unable to obtain/ intubated & sedated    VITALS:  T(F): 101.1 (11-23-20 @ 09:00), Max: 101.5 (11-23-20 @ 08:00)  HR: 57 (11-23-20 @ 12:14)  BP: 142/68 (11-23-20 @ 10:00)  RR: 25 (11-23-20 @ 10:00)  SpO2: 99% (11-23-20 @ 12:14)  Wt(kg): --    11-22 @ 07:01  -  11-23 @ 07:00  --------------------------------------------------------  IN: 1471.8 mL / OUT: 2635 mL / NET: -1163.2 mL    11-23 @ 07:01  -  11-23 @ 12:42  --------------------------------------------------------  IN: 0 mL / OUT: 350 mL / NET: -350 mL          PHYSICAL EXAM:  Gen: Sedated/ proned  HEENT: +ETT, +eyes covered  Cards: RRR, +S1/S2,   Resp: +mechanical BS  GI: soft, ND,   : +isaacs with yellow urine  Extremities: no LE edema, +UE edema  Neuro: sedated    LABS:  11-23    147<H>  |  112<H>  |  65<H>  ----------------------------<  110<H>  4.7   |  30  |  2.06<H>    Ca    8.4      23 Nov 2020 05:06  Phos  3.7     11-23  Mg     2.5     11-23    TPro  6.5  /  Alb  1.9<L>  /  TBili  0.5  /  DBili      /  AST  27  /  ALT  53  /  AlkPhos  62  11-23    Creatinine Trend: 2.06 <--, 2.10 <--, 2.75 <--, 3.22 <--, 3.44 <--, 4.36 <--, 4.43 <--, 4.30 <--, 3.33 <--, 2.73 <--, 2.20 <--                        10.3   9.97  )-----------( 257      ( 23 Nov 2020 05:06 )             33.4     Urine Studies:

## 2020-11-23 NOTE — PROGRESS NOTE ADULT - ASSESSMENT
55M from home without PMH with progressively worsening SOB and +COVID test x5 days prior to admission presented with SpO2 64% at home, intubated 2/2 deteriorating respiratory status and admitted to ICU.    Assessment:  1. Acute Hypoxic Respiratory failure   2. COVID Pneumonia   3. Acute Kidney Injury   4. elevated LFTs       Plan:    CNS: #intubated:   -sedated with fentanyl and versed gtt  -will hold Nimbex given 4/4 TOF and current supine position     CVS: #no active issues:   -SBPs stable 130-160s, monitor  -follow up TTE (likely once off isolation)     RESP: #AHRF 2/2 COVID  -CXR on admission with diffuse B/L opacities and small left pleural effusion, worsening b/l opacities 11/21, slight improvement on repeat this am                          ,  -WBC 24.4 on admission, wnl today  -APRs elevated with procal 1.82, CRP 34.4->3.7->17.025, LDH 1095->706->427, ferritin 4814->1097->1355, d-dimer 559->1977->2763->1200, ESR 62->88, CK and PT/INR mildly elevated, trop 2.1->0.46->0.091 (initial and most recent results from 11/19-23 shown)  -completed 5 days cftx and azithromycin for CAP coverage (11/16-20)  -will start vanc and zosyn given new fever  -fu MRSA and procal  -continuing on Decadron 6mg Qd x10 days (day 1 11/15)  -not candidate for remdesivir 2/2 CrCl <45 (29.7)  -supined at noon today   -BCx NGTD   -will follow daily CRP with coags, d-dimer, esr, LDH, ferritin, trop every three days to monitor disease progression, next results am 11/26    GI: #elevated LFTs:   -likely 2/2 COVID  -follow daily, wnl today  -->348->301  -continue senna, added miralax and dulcolax OK for bowel regimen    RENAL: #BECKY:   -BUN/Cr 49/2.2 on admission, unknown baseline  -Cr improving to 2.06 (peak 4.43)  -continuing heparin gtt for elevated D-dimer, Lovenox held 2/2 elevated Cr  -nephro Dr. Camara following, no HD at this time, hold additional Lasix at this time  -u/o 200cc/hr, likely diuretic phase ATN    ID: #COVID PNA  -Tylenol prn fevers  -new fever as above, will change central line today  -remainder of history and management as above     HEME/ONC: #hypercoagulability 2/2 COVID  -D-Dimer 559->1977->2763->1200 likely 2/2 COVID, will repeat in am  -continue heparin therapeutic (gtt), Lovenox held 2/2 worsening Cr  -monitor daily CBC, Hb stable    ENDO: #No issues:  -target CBG <180, 100s overnight  -HgA1c 6.1  -NPO, OG placed, on TF  -HSS while on steroids, decreased to low HSS    Skin/Catheters: #no issues  -RIJ 11/15, will change today  -no rashes noted  -cw artificial tears  -FC in place     PPx:  #DVT: heparin gtt for therapeutic dosing as above SCD (BMI>30, ICU admission)  #GI: Protonix     Dispo:  -monitor in ICU     GOC: Full Code   -brother Yg updated 11/18, 11/19, 11/20, 11/21, 11/23     55M from home without PMH with progressively worsening SOB and +COVID test x5 days prior to admission presented with SpO2 64% at home, intubated 2/2 deteriorating respiratory status and admitted to ICU.    Assessment:  1. Acute Hypoxic Respiratory failure   2. COVID Pneumonia   3. Acute Kidney Injury   4. elevated LFTs       Plan:    CNS: #intubated:   -sedated with fentanyl and versed gtt  -will hold Nimbex given 4/4 TOF and current supine position     CVS: #no active issues:   -SBPs stable 130-160s, monitor  -follow up TTE (likely once off isolation)     RESP: #AHRF 2/2 COVID  -CXR on admission with diffuse B/L opacities and small left pleural effusion, worsening b/l opacities 11/21, slight improvement on repeat this am                          ,  -WBC 24.4 on admission, wnl today  -APRs elevated with procal 1.82, CRP 34.4->3.7->17.025, LDH 1095->706->427, ferritin 4814->1097->1355, d-dimer 559->1977->2763->1200, ESR 62->88, CK and PT/INR mildly elevated, trop 2.1->0.46->0.091 (initial and most recent results from 11/19-23 shown)  -completed 5 days cftx and azithromycin for CAP coverage (11/16-20)  -will start vanc and zosyn given new fever  -fu MRSA and procal  -continuing on Decadron 6mg Qd x10 days (day 1 11/15)  -not candidate for remdesivir 2/2 CrCl <45 (29.7)  -supined at noon today   -BCx NGTD   -will follow daily CRP with coags, d-dimer, esr, LDH, ferritin, trop every three days to monitor disease progression, next results am 11/26    GI: #elevated LFTs:   -likely 2/2 COVID  -follow daily, wnl today  -->348->301  -continue senna, added miralax and dulcolax LA for bowel regimen    RENAL: #BECKY:   -BUN/Cr 49/2.2 on admission, unknown baseline  -Cr improving to 2.06 (peak 4.43)  -continuing heparin gtt for elevated D-dimer, Lovenox held 2/2 elevated Cr  -nephro Dr. Camara following, no HD at this time, hold additional Lasix at this time  -u/o 200cc/hr, likely diuretic phase ATN  -Na 147 today, started 0.45NS 75cc/hr    ID: #COVID PNA  -Tylenol prn fevers  -new fever as above, will change central line today  -remainder of history and management as above     HEME/ONC: #hypercoagulability 2/2 COVID  -D-Dimer 559->1977->2763->1200 likely 2/2 COVID, will repeat in am  -continue heparin therapeutic (gtt), Lovenox held 2/2 worsening Cr  -monitor daily CBC, Hb stable    ENDO: #No issues:  -target CBG <180, 100s overnight  -HgA1c 6.1  -NPO, OG placed, on TF  -HSS while on steroids, decreased to low HSS    Skin/Catheters: #no issues  -RIJ 11/15, will change today  -no rashes noted  -cw artificial tears  -FC in place     PPx:  #DVT: heparin gtt for therapeutic dosing as above SCD (BMI>30, ICU admission)  #GI: Protonix     Dispo:  -monitor in ICU     GOC: Full Code   -brother Yg updated 11/18, 11/19, 11/20, 11/21, 11/23

## 2020-11-23 NOTE — PROGRESS NOTE ADULT - ASSESSMENT
Patient is a 56yo Male with no PMH recently diagnosed with COVID-19 (5 days PTA) p/w SOB s/p intubated in the ER. Pt admitted to ICU with acute hypoxic respiratory failure 2/2 COVID-19 PNA s/p intubated, BECKY with transaminitis. Nephrology consulted for Elevated serum creatinine.    1. BECKY- unknown baseline SCr. BECKY likely hemodynamically mediated in the setting of septic shock / infection 2/2 COVID-19, hypotension on pressors (now off pressors);  likely ATN. UA with 100 protein and trace blood with hyaline cast. Renal function improving with good urine o/p; likely diuretic/ recovery phase. Recc to change IVF to 1/2 NS @75 ml/hr due ot hypernatremia. Recc free water via NGT @ 350ml q6hrs.   Will defer Renal US due to COVID status. Strict I/Os. Avoid nephrotoxins/ NSAIDs/ RCA. Monitor BMP.  2. Septic Shock due to Multifocal PNA 2/2 COVID-19- Plan per ICU  3. Hypotension- BP improved. Pt off Pressors as per ICU. Monitor BP  4. Acute hypoxic respiratory failure- vent support as per ICU.  5. Hypocalcemia- & Hyperphosphatemia resolved. Monitor ionized calcium and serum phos.

## 2020-11-24 LAB
ALBUMIN SERPL ELPH-MCNC: 2 G/DL — LOW (ref 3.5–5)
ALP SERPL-CCNC: 60 U/L — SIGNIFICANT CHANGE UP (ref 40–120)
ALT FLD-CCNC: 53 U/L DA — SIGNIFICANT CHANGE UP (ref 10–60)
ANION GAP SERPL CALC-SCNC: 8 MMOL/L — SIGNIFICANT CHANGE UP (ref 5–17)
APTT BLD: 53.6 SEC — HIGH (ref 27.5–35.5)
AST SERPL-CCNC: 38 U/L — SIGNIFICANT CHANGE UP (ref 10–40)
BASE EXCESS BLDA CALC-SCNC: 8.7 MMOL/L — HIGH (ref -2–2)
BILIRUB SERPL-MCNC: 0.8 MG/DL — SIGNIFICANT CHANGE UP (ref 0.2–1.2)
BLOOD GAS COMMENTS ARTERIAL: SIGNIFICANT CHANGE UP
BLOOD GAS COMMENTS ARTERIAL: SIGNIFICANT CHANGE UP
BUN SERPL-MCNC: 56 MG/DL — HIGH (ref 7–18)
CALCIUM SERPL-MCNC: 8.3 MG/DL — LOW (ref 8.4–10.5)
CHLORIDE SERPL-SCNC: 110 MMOL/L — HIGH (ref 96–108)
CO2 SERPL-SCNC: 26 MMOL/L — SIGNIFICANT CHANGE UP (ref 22–31)
CREAT SERPL-MCNC: 1.73 MG/DL — HIGH (ref 0.5–1.3)
CRP SERPL-MCNC: 3.29 MG/DL — HIGH (ref 0–0.4)
GLUCOSE SERPL-MCNC: 97 MG/DL — SIGNIFICANT CHANGE UP (ref 70–99)
HCO3 BLDA-SCNC: 26 MMOL/L — SIGNIFICANT CHANGE UP (ref 23–27)
HCT VFR BLD CALC: 32.3 % — LOW (ref 39–50)
HGB BLD-MCNC: 10.2 G/DL — LOW (ref 13–17)
HOROWITZ INDEX BLDA+IHG-RTO: 60 — SIGNIFICANT CHANGE UP
MAGNESIUM SERPL-MCNC: 2.2 MG/DL — SIGNIFICANT CHANGE UP (ref 1.6–2.6)
MCHC RBC-ENTMCNC: 29.3 PG — SIGNIFICANT CHANGE UP (ref 27–34)
MCHC RBC-ENTMCNC: 31.6 GM/DL — LOW (ref 32–36)
MCV RBC AUTO: 92.8 FL — SIGNIFICANT CHANGE UP (ref 80–100)
MRSA PCR RESULT.: SIGNIFICANT CHANGE UP
NRBC # BLD: 0 /100 WBCS — SIGNIFICANT CHANGE UP (ref 0–0)
PCO2 BLDA: 37 MMHG — SIGNIFICANT CHANGE UP (ref 32–46)
PH BLDA: 7.46 — HIGH (ref 7.35–7.45)
PHOSPHATE SERPL-MCNC: 3.8 MG/DL — SIGNIFICANT CHANGE UP (ref 2.5–4.5)
PLATELET # BLD AUTO: 217 K/UL — SIGNIFICANT CHANGE UP (ref 150–400)
PO2 BLDA: 67 MMHG — LOW (ref 74–108)
POTASSIUM SERPL-MCNC: 4.4 MMOL/L — SIGNIFICANT CHANGE UP (ref 3.5–5.3)
POTASSIUM SERPL-SCNC: 4.4 MMOL/L — SIGNIFICANT CHANGE UP (ref 3.5–5.3)
PROCALCITONIN SERPL-MCNC: 0.52 NG/ML — HIGH (ref 0.02–0.1)
PROT SERPL-MCNC: 6.5 G/DL — SIGNIFICANT CHANGE UP (ref 6–8.3)
RBC # BLD: 3.48 M/UL — LOW (ref 4.2–5.8)
RBC # FLD: 13.6 % — SIGNIFICANT CHANGE UP (ref 10.3–14.5)
S AUREUS DNA NOSE QL NAA+PROBE: SIGNIFICANT CHANGE UP
SAO2 % BLDA: 92 % — SIGNIFICANT CHANGE UP (ref 92–96)
SARS-COV-2 RNA SPEC QL NAA+PROBE: DETECTED
SODIUM SERPL-SCNC: 144 MMOL/L — SIGNIFICANT CHANGE UP (ref 135–145)
WBC # BLD: 10.43 K/UL — SIGNIFICANT CHANGE UP (ref 3.8–10.5)
WBC # FLD AUTO: 10.43 K/UL — SIGNIFICANT CHANGE UP (ref 3.8–10.5)

## 2020-11-24 PROCEDURE — 71045 X-RAY EXAM CHEST 1 VIEW: CPT | Mod: 26

## 2020-11-24 RX ORDER — MIDAZOLAM HYDROCHLORIDE 1 MG/ML
0.1 INJECTION, SOLUTION INTRAMUSCULAR; INTRAVENOUS
Qty: 100 | Refills: 0 | Status: DISCONTINUED | OUTPATIENT
Start: 2020-11-24 | End: 2020-11-26

## 2020-11-24 RX ORDER — HEPARIN SODIUM 5000 [USP'U]/ML
INJECTION INTRAVENOUS; SUBCUTANEOUS
Qty: 25000 | Refills: 0 | Status: DISCONTINUED | OUTPATIENT
Start: 2020-11-24 | End: 2020-11-25

## 2020-11-24 RX ADMIN — HEPARIN SODIUM 1500 UNIT(S)/HR: 5000 INJECTION INTRAVENOUS; SUBCUTANEOUS at 21:08

## 2020-11-24 RX ADMIN — Medication 10 MILLIGRAM(S): at 11:07

## 2020-11-24 RX ADMIN — CHLORHEXIDINE GLUCONATE 1 APPLICATION(S): 213 SOLUTION TOPICAL at 05:28

## 2020-11-24 RX ADMIN — PIPERACILLIN AND TAZOBACTAM 25 GRAM(S): 4; .5 INJECTION, POWDER, LYOPHILIZED, FOR SOLUTION INTRAVENOUS at 05:27

## 2020-11-24 RX ADMIN — FENTANYL CITRATE 25.6 MICROGRAM(S)/KG/HR: 50 INJECTION INTRAVENOUS at 17:57

## 2020-11-24 RX ADMIN — SENNA PLUS 10 MILLILITER(S): 8.6 TABLET ORAL at 21:06

## 2020-11-24 RX ADMIN — PIPERACILLIN AND TAZOBACTAM 25 GRAM(S): 4; .5 INJECTION, POWDER, LYOPHILIZED, FOR SOLUTION INTRAVENOUS at 13:13

## 2020-11-24 RX ADMIN — Medication 1 DROP(S): at 05:29

## 2020-11-24 RX ADMIN — CHLORHEXIDINE GLUCONATE 15 MILLILITER(S): 213 SOLUTION TOPICAL at 05:28

## 2020-11-24 RX ADMIN — PIPERACILLIN AND TAZOBACTAM 25 GRAM(S): 4; .5 INJECTION, POWDER, LYOPHILIZED, FOR SOLUTION INTRAVENOUS at 21:06

## 2020-11-24 RX ADMIN — MIDAZOLAM HYDROCHLORIDE 8.54 MG/KG/HR: 1 INJECTION, SOLUTION INTRAMUSCULAR; INTRAVENOUS at 21:39

## 2020-11-24 RX ADMIN — POLYETHYLENE GLYCOL 3350 17 GRAM(S): 17 POWDER, FOR SOLUTION ORAL at 11:06

## 2020-11-24 RX ADMIN — Medication 650 MILLIGRAM(S): at 11:06

## 2020-11-24 RX ADMIN — FENTANYL CITRATE 25.6 MICROGRAM(S)/KG/HR: 50 INJECTION INTRAVENOUS at 21:38

## 2020-11-24 RX ADMIN — HEPARIN SODIUM 1500 UNIT(S)/HR: 5000 INJECTION INTRAVENOUS; SUBCUTANEOUS at 06:46

## 2020-11-24 RX ADMIN — CHLORHEXIDINE GLUCONATE 15 MILLILITER(S): 213 SOLUTION TOPICAL at 17:07

## 2020-11-24 RX ADMIN — Medication 100 MILLIGRAM(S): at 11:05

## 2020-11-24 RX ADMIN — FENTANYL CITRATE 25.6 MICROGRAM(S)/KG/HR: 50 INJECTION INTRAVENOUS at 00:46

## 2020-11-24 RX ADMIN — MIDAZOLAM HYDROCHLORIDE 8.54 MG/KG/HR: 1 INJECTION, SOLUTION INTRAMUSCULAR; INTRAVENOUS at 00:45

## 2020-11-24 RX ADMIN — Medication 6 MILLIGRAM(S): at 05:28

## 2020-11-24 RX ADMIN — FENTANYL CITRATE 25.6 MICROGRAM(S)/KG/HR: 50 INJECTION INTRAVENOUS at 08:47

## 2020-11-24 RX ADMIN — Medication 650 MILLIGRAM(S): at 12:00

## 2020-11-24 RX ADMIN — PANTOPRAZOLE SODIUM 40 MILLIGRAM(S): 20 TABLET, DELAYED RELEASE ORAL at 11:06

## 2020-11-24 NOTE — PROGRESS NOTE ADULT - ASSESSMENT
Patient is a 54yo Male with no PMH recently diagnosed with COVID-19 (5 days PTA) p/w SOB s/p intubated in the ER. Pt admitted to ICU with acute hypoxic respiratory failure 2/2 COVID-19 PNA s/p intubated, BECKY with transaminitis. Nephrology consulted for Elevated serum creatinine.    1. BECKY- unknown baseline SCr. BECKY likely hemodynamically mediated in the setting of septic shock / infection 2/2 COVID-19, hypotension on pressors (now off pressors);  likely ATN. UA with 100 protein and trace blood with hyaline cast. Renal function improving with good urine o/p; likely diuretic/ recovery phase. s/p IVF for hypernatremia, now resolved. Recc free water via NGT @ 350ml q6hrs.   Will defer Renal US due to COVID status. Strict I/Os. Avoid nephrotoxins/ NSAIDs/ RCA. Monitor BMP.  2. Septic Shock due to Multifocal PNA 2/2 COVID-19- Plan per ICU  3. Hypotension- BP improved. Pt off Pressors as per ICU. Monitor BP  4. Acute hypoxic respiratory failure- vent support as per ICU.  5. Hypocalcemia- & Hyperphosphatemia resolved. Monitor ionized calcium and serum phos.

## 2020-11-24 NOTE — PROGRESS NOTE ADULT - ASSESSMENT
55M from home without PMH with progressively worsening SOB and +COVID test x5 days prior to admission presented with SpO2 64% at home, intubated 2/2 deteriorating respiratory status and admitted to ICU.    Assessment:  1. Acute Hypoxic Respiratory failure   2. COVID Pneumonia   3. Acute Kidney Injury   4. elevated LFTs       Plan:    CNS: #intubated:   -sedated with fentanyl and versed gtt  -will hold Nimbex given 4/4 TOF and current supine position     CVS: #no active issues:   -SBPs stable 130-160s, monitor  -follow up TTE (likely once off isolation)     RESP: #AHRF 2/2 COVID  -CXR on admission with diffuse B/L opacities and small left pleural effusion, worsening b/l opacities 11/21, slight improvement on repeat this am                          ,  -WBC 24.4 on admission, wnl today  -APRs elevated with procal 1.82, CRP 34.4->3.7->17.025, LDH 1095->706->427, ferritin 4814->1097->1355, d-dimer 559->1977->2763->1200, ESR 62->88, CK and PT/INR mildly elevated, trop 2.1->0.46->0.091 (initial and most recent results from 11/19-23 shown)  -completed 5 days cftx and azithromycin for CAP coverage (11/16-20)  -will start vanc and zosyn given new fever  -fu MRSA and procal  -continuing on Decadron 6mg Qd x10 days (day 1 11/15)  -not candidate for remdesivir 2/2 CrCl <45 (29.7)  -supined at noon today   -BCx NGTD   -will follow daily CRP with coags, d-dimer, esr, LDH, ferritin, trop every three days to monitor disease progression, next results am 11/26    GI: #elevated LFTs:   -likely 2/2 COVID  -follow daily, wnl today  -->348->301  -continue senna, added miralax and dulcolax [place] for bowel regimen    RENAL: #BECKY:   -BUN/Cr 49/2.2 on admission, unknown baseline  -Cr improving to 2.06 (peak 4.43)  -continuing heparin gtt for elevated D-dimer, Lovenox held 2/2 elevated Cr  -nephro Dr. Camara following, no HD at this time, hold additional Lasix at this time  -u/o 200cc/hr, likely diuretic phase ATN  -Na 147 today, started 0.45NS 75cc/hr    ID: #COVID PNA  -Tylenol prn fevers  -new fever as above, will change central line today  -remainder of history and management as above     HEME/ONC: #hypercoagulability 2/2 COVID  -D-Dimer 559->1977->2763->1200 likely 2/2 COVID, will repeat in am  -continue heparin therapeutic (gtt), Lovenox held 2/2 worsening Cr  -monitor daily CBC, Hb stable    ENDO: #No issues:  -target CBG <180, 100s overnight  -HgA1c 6.1  -NPO, OG placed, on TF  -HSS while on steroids, decreased to low HSS    Skin/Catheters: #no issues  -RIJ 11/15, will change today  -no rashes noted  -cw artificial tears  -FC in place     PPx:  #DVT: heparin gtt for therapeutic dosing as above SCD (BMI>30, ICU admission)  #GI: Protonix     Dispo:  -monitor in ICU     GOC: Full Code   -brother Yg updated 11/18, 11/19, 11/20, 11/21, 11/23     55M from home without PMH with progressively worsening SOB and +COVID test x5 days prior to admission presented with SpO2 64% at home, intubated 2/2 deteriorating respiratory status and admitted to ICU.    Assessment:  1. Acute Hypoxic Respiratory failure   2. COVID Pneumonia   3. Acute Kidney Injury   4. elevated LFTs       Plan:    CNS: #intubated:   -sedated with fentanyl and versed gtt  -will hold Nimbex given 4/4 TOF and current supine position     CVS: #no active issues:   -SBPs stable 130-150s, improved s/p holding fluids  -follow up TTE (likely once off isolation)     RESP: #AHRF 2/2 COVID  -vent 25/450/60/8, ABG improved at 7.46/37/67/26, will decrease PEEP to 5 today  -CXR on admission with diffuse B/L opacities and small left pleural effusion, worsening b/l opacities 11/21, slight improvement past x2 days                        ,  -WBC 24.4 on admission, wnl x2 days  -APRs elevated with procal 1.82->0.52, CRP 34.4->3.7->17->3.29, LDH 1095->706->427, ferritin 4814->1097->1355, d-dimer 559->1977->2763->1200, ESR 62->88, CK and PT/INR mildly elevated, trop 2.1->0.46->0.091 (initial and most recent results from 11/19-24 shown)  -completed 5 days cftx and azithromycin for CAP coverage (11/16-20)  -day 2 empiric vanc and zosyn given new fevers  -fu MRSA and COVID reswab  -last day of Decadron 6mg Qd x10 days (day 1 11/15)  -not candidate for remdesivir 2/2 CrCl <45 (29.7)   -BCx NGTD   -will follow daily CRP with coags, d-dimer, esr, LDH, ferritin, trop every three days to monitor disease progression, next results am 11/26    GI: #elevated LFTs:   -likely 2/2 COVID  -follow daily, wnl today  -continue senna, added miralax and dulcolax [place] for bowel regimen    RENAL: #BECKY:   -BUN/Cr 49/2.2 on admission, unknown baseline  -Cr improving to 1.73 (peak 4.43)  -continuing heparin gtt for elevated D-dimer, Lovenox held 2/2 elevated Cr  -nephro Dr. Camara following, no HD at this time, hold additional Lasix at this time  -u/o >100cc/hr, likely diuretic phase ATN  -hypernatremia resolved s/p FWB and 0.45NS 75cc/hr    ID: #COVID PNA  -Tylenol prn fevers  -new fever yesterday as above, failed attempt to change central line yesterday, will try again today (heparin gtt held for attempt/procedure and then restarted)  -remainder of history and management as above     HEME/ONC: #hypercoagulability 2/2 COVID  -D-Dimer 559->1977->2763->1200 likely 2/2 COVID, will repeat 11/26  -continue heparin therapeutic (gtt), Lovenox held 2/2 worsening Cr (hold for line placement as above)  -monitor daily CBC, Hb stable    ENDO: #No issues:  -target CBG <180, 100-130s overnight  -HgA1c 6.1  -NPO, OG placed, on TF  -low HSS while on steroids    Skin/Catheters: #no issues  -RIJ 11/15, will change today (failed attempt yesterday)  -no rashes noted  -cw artificial tears  -FC in place     PPx:  #DVT: heparin gtt for therapeutic dosing as above SCD (BMI>30, ICU admission)  #GI: Protonix     Dispo:  -monitor in ICU     GOC: Full Code   -brother Yg updated 11/18, 11/19, 11/20, 11/21, 11/23

## 2020-11-24 NOTE — PROGRESS NOTE ADULT - SUBJECTIVE AND OBJECTIVE BOX
[facility] NEPHROLOGY- PROGRESS NOTE    Patient is a 54yo Male with no PMH recently diagnosed with COVID-19 (5 days PTA) p/w SOB s/p intubated in the ER. Pt admitted to ICU with acute hypoxic respiratory failure 2/2 COVID-19 PNA s/p intubated, BECKY with transaminitis. Nephrology consulted for Elevated serum creatinine.    Hospital Medications: Medications reviewed.  REVIEW OF SYSTEMS: Unable to obtain/ intubated & sedated    VITALS:  T(F): 101 (11-24-20 @ 11:00), Max: 101 (11-24-20 @ 11:00)  HR: 71 (11-24-20 @ 12:02)  BP: 156/81 (11-24-20 @ 12:00)  RR: 25 (11-24-20 @ 12:00)  SpO2: 95% (11-24-20 @ 12:02)  Wt(kg): --    11-23 @ 07:01  -  11-24 @ 07:00  --------------------------------------------------------  IN: 1746.8 mL / OUT: 3165 mL / NET: -1418.2 mL    11-24 @ 07:01  -  11-24 @ 12:40  --------------------------------------------------------  IN: 189.8 mL / OUT: 250 mL / NET: -60.2 mL      PHYSICAL EXAM:  Gen: Sedated  HEENT: +ETT  Cards: RRR, +S1/S2,   Resp: +mechanical BS  GI: soft, ND,   : +isaacs with yellow urine  Extremities: no LE edema, +UE edema  Neuro: sedated    LABS:  11-24  144 <--, 145 <--, 147 <--, 147 <--, 146 <--, 145 <--, 143 <--  144  |  110<H>  |  56<H>  ----------------------------<  97  4.4   |  26  |  1.73<H>    Ca    8.3<L>      24 Nov 2020 06:21  Phos  3.8     11-24  Mg     2.2     11-24    TPro  6.5  /  Alb  2.0<L>  /  TBili  0.8  /  DBili      /  AST  38  /  ALT  53  /  AlkPhos  60  11-24    Creatinine Trend: 1.73 <--, 1.93 <--, 2.06 <--, 2.10 <--, 2.75 <--, 3.22 <--, 3.44 <--, 4.36 <--, 4.43 <--, 4.30 <--                        10.2   10.43 )-----------( 217      ( 24 Nov 2020 06:21 )             32.3     Urine Studies:

## 2020-11-24 NOTE — PROGRESS NOTE ADULT - SUBJECTIVE AND OBJECTIVE BOX
HPI: 55M from home without PMH with progressively worsening SOB and +COVID test x5 days prior to admission presented following SpO2 64% at home.  Started on 15L NRB in ED 2/2 SpO2s 85-90%, desaturated to low 80s and was placed on HFNC, continued to desaturate, was intubated and admitted to ICU.     INTERVAL HPI/OVERNIGHT EVENTS:  ICU Vital Signs Last 24 Hrs  T(C): 37.2 (24 Nov 2020 04:30), Max: 38.6 (23 Nov 2020 08:00)  T(F): 99 (24 Nov 2020 04:30), Max: 101.5 (23 Nov 2020 08:00)  HR: 54 (24 Nov 2020 07:00) (48 - 67)  BP: 134/68 (24 Nov 2020 07:00) (129/70 - 175/93)  BP(mean): 85 (24 Nov 2020 07:00) (82 - 113)  ABP: --  ABP(mean): --  RR: 25 (24 Nov 2020 07:00) (22 - 33)  SpO2: 94% (24 Nov 2020 07:00) (90% - 99%)    I&O's Summary    23 Nov 2020 07:01  -  24 Nov 2020 07:00  --------------------------------------------------------  IN: 1650.2 mL / OUT: 3100 mL / NET: -1449.8 mL      Mode: AC/ CMV (Assist Control/ Continuous Mandatory Ventilation)  RR (machine): 25  TV (machine): 450  FiO2: 60  PEEP: 8  ITime: 0.85  MAP: 17  PIP: 34      LABS:                        10.2   10.43 )-----------( 217      ( 24 Nov 2020 06:21 )             32.3     11-24    144  |  110<H>  |  56<H>  ----------------------------<  97  4.4   |  26  |  1.73<H>    Ca    8.3<L>      24 Nov 2020 06:21  Phos  3.8     11-24  Mg     2.2     11-24    TPro  6.5  /  Alb  2.0<L>  /  TBili  0.8  /  DBili  x   /  AST  38  /  ALT  53  /  AlkPhos  60  11-24    PT/INR - ( 23 Nov 2020 05:06 )   PT: 14.0 sec;   INR: 1.19 ratio         PTT - ( 24 Nov 2020 06:21 )  PTT:53.6 sec    CAPILLARY BLOOD GLUCOSE      POCT Blood Glucose.: 112 mg/dL (24 Nov 2020 05:39)  POCT Blood Glucose.: 126 mg/dL (23 Nov 2020 23:20)  POCT Blood Glucose.: 139 mg/dL (23 Nov 2020 17:29)  POCT Blood Glucose.: 135 mg/dL (23 Nov 2020 11:51)    ABG - ( 24 Nov 2020 04:38 )  pH, Arterial: 7.46  pH, Blood: x     /  pCO2: 37    /  pO2: 67    / HCO3: 26    / Base Excess: 8.7   /  SaO2: 92                  RADIOLOGY & ADDITIONAL TESTS:    Consultant(s) Notes Reviewed:  [x ] YES  [ ] NO    MEDICATIONS  (STANDING):  bisacodyl Suppository 10 milliGRAM(s) Rectal daily  chlorhexidine 0.12% Liquid 15 milliLiter(s) Oral Mucosa two times a day  chlorhexidine 2% Cloths 1 Application(s) Topical <User Schedule>  dexAMETHasone  Injectable 6 milliGRAM(s) IV Push daily  fentaNYL   Infusion. 3 MICROgram(s)/kG/Hr (25.6 mL/Hr) IV Continuous <Continuous>  heparin  Infusion. 1300 Unit(s)/Hr (13 mL/Hr) IV Continuous <Continuous>  insulin lispro (ADMELOG) corrective regimen sliding scale   SubCutaneous every 6 hours  midazolam Infusion 0.1 mG/kG/Hr (8.54 mL/Hr) IV Continuous <Continuous>  pantoprazole  Injectable 40 milliGRAM(s) IV Push daily  piperacillin/tazobactam IVPB.. 3.375 Gram(s) IV Intermittent every 8 hours  polyethylene glycol 3350 17 Gram(s) Oral daily  senna Syrup 10 milliLiter(s) Oral at bedtime  vancomycin  IVPB 1000 milliGRAM(s) IV Intermittent every 24 hours    MEDICATIONS  (PRN):  acetaminophen    Suspension .. 650 milliGRAM(s) Enteral Tube every 6 hours PRN Temp greater or equal to 38C (100.4F)  artificial  tears Solution 1 Drop(s) Both EYES every 4 hours PRN Dry Eyes  sodium chloride 0.9% lock flush 10 milliLiter(s) IV Push every 1 hour PRN Pre/post blood products, medications, blood draw, and to maintain line patency    PHYSICAL EXAM:    GENERAL: sedated, +ETT, +OG  HEAD:  Atraumatic, Normocephalic  EYES: b/l edema continuing  ENT: moist mucous membranes  NECK: Supple  NERVOUS SYSTEM: sedated  CHEST/LUNG: B/L good air entry  HEART: S1S2 normal, RRR  ABDOMEN: Soft, Nontender, distended 2/2 body habitus, Bowel sounds present  EXTREMITIES:  2+ Peripheral Pulses, b/l 1+ edema, mildly improving daily  SKIN: No rashes or lesions noted    Care Discussed with Consultants/Other Providers [ x] YES  [ ] NO HPI: 55M from home without PMH with progressively worsening SOB and +COVID test x5 days prior to admission presented following SpO2 64% at home.  Started on 15L NRB in ED 2/2 SpO2s 85-90%, desaturated to low 80s and was placed on HFNC, continued to desaturate, was intubated and admitted to ICU.     INTERVAL HPI/OVERNIGHT EVENTS: NAEON. Tmax 100F overnight, Spo2 90s on vent 25/450/60/8. ABG this am 7.46/37/67/26, will decrease PEEP to 5 and obtain repeat COVID swab. Continuing on day 2 vanc and zosyn, will obtain trough before 4th dose 11/26. U/O continuing to remain improved at >100cc/hr. Hypernatremia resolved, holding subsequent FWB. Procal decreased, MRSA pending. Attempted line change from RIJ to LIJ CVC failed, will attempt again today. Heparin gtt held 1pm-12am for line change, will hold from 1pm today for reattempt.     ICU Vital Signs Last 24 Hrs  T(C): 37.2 (24 Nov 2020 04:30), Max: 38.6 (23 Nov 2020 08:00)  T(F): 99 (24 Nov 2020 04:30), Max: 101.5 (23 Nov 2020 08:00)  HR: 54 (24 Nov 2020 07:00) (48 - 67)  BP: 134/68 (24 Nov 2020 07:00) (129/70 - 175/93)  BP(mean): 85 (24 Nov 2020 07:00) (82 - 113)  ABP: --  ABP(mean): --  RR: 25 (24 Nov 2020 07:00) (22 - 33)  SpO2: 94% (24 Nov 2020 07:00) (90% - 99%)    I&O's Summary    23 Nov 2020 07:01  -  24 Nov 2020 07:00  --------------------------------------------------------  IN: 1650.2 mL / OUT: 3100 mL / NET: -1449.8 mL      Mode: AC/ CMV (Assist Control/ Continuous Mandatory Ventilation)  RR (machine): 25  TV (machine): 450  FiO2: 60  PEEP: 8  ITime: 0.85  MAP: 17  PIP: 34      LABS:                        10.2   10.43 )-----------( 217      ( 24 Nov 2020 06:21 )             32.3     11-24    144  |  110<H>  |  56<H>  ----------------------------<  97  4.4   |  26  |  1.73<H>    Ca    8.3<L>      24 Nov 2020 06:21  Phos  3.8     11-24  Mg     2.2     11-24    TPro  6.5  /  Alb  2.0<L>  /  TBili  0.8  /  DBili  x   /  AST  38  /  ALT  53  /  AlkPhos  60  11-24    PT/INR - ( 23 Nov 2020 05:06 )   PT: 14.0 sec;   INR: 1.19 ratio         PTT - ( 24 Nov 2020 06:21 )  PTT:53.6 sec    CAPILLARY BLOOD GLUCOSE      POCT Blood Glucose.: 112 mg/dL (24 Nov 2020 05:39)  POCT Blood Glucose.: 126 mg/dL (23 Nov 2020 23:20)  POCT Blood Glucose.: 139 mg/dL (23 Nov 2020 17:29)  POCT Blood Glucose.: 135 mg/dL (23 Nov 2020 11:51)    ABG - ( 24 Nov 2020 04:38 )  pH, Arterial: 7.46  pH, Blood: x     /  pCO2: 37    /  pO2: 67    / HCO3: 26    / Base Excess: 8.7   /  SaO2: 92        RADIOLOGY & ADDITIONAL TESTS:    Consultant(s) Notes Reviewed:  [x ] YES  [ ] NO    MEDICATIONS  (STANDING):  bisacodyl Suppository 10 milliGRAM(s) Rectal daily  chlorhexidine 0.12% Liquid 15 milliLiter(s) Oral Mucosa two times a day  chlorhexidine 2% Cloths 1 Application(s) Topical <User Schedule>  dexAMETHasone  Injectable 6 milliGRAM(s) IV Push daily  fentaNYL   Infusion. 3 MICROgram(s)/kG/Hr (25.6 mL/Hr) IV Continuous <Continuous>  heparin  Infusion. 1300 Unit(s)/Hr (13 mL/Hr) IV Continuous <Continuous>  insulin lispro (ADMELOG) corrective regimen sliding scale   SubCutaneous every 6 hours  midazolam Infusion 0.1 mG/kG/Hr (8.54 mL/Hr) IV Continuous <Continuous>  pantoprazole  Injectable 40 milliGRAM(s) IV Push daily  piperacillin/tazobactam IVPB.. 3.375 Gram(s) IV Intermittent every 8 hours  polyethylene glycol 3350 17 Gram(s) Oral daily  senna Syrup 10 milliLiter(s) Oral at bedtime  vancomycin  IVPB 1000 milliGRAM(s) IV Intermittent every 24 hours    MEDICATIONS  (PRN):  acetaminophen    Suspension .. 650 milliGRAM(s) Enteral Tube every 6 hours PRN Temp greater or equal to 38C (100.4F)  artificial  tears Solution 1 Drop(s) Both EYES every 4 hours PRN Dry Eyes  sodium chloride 0.9% lock flush 10 milliLiter(s) IV Push every 1 hour PRN Pre/post blood products, medications, blood draw, and to maintain line patency    PHYSICAL EXAM:    GENERAL: sedated, +ETT, +OG  HEAD:  Atraumatic, Normocephalic  EYES: b/l edema improved  ENT: moist mucous membranes  NECK: Supple  NERVOUS SYSTEM: sedated  CHEST/LUNG: B/L good air entry  HEART: S1S2 normal, RRR  ABDOMEN: Soft, Nontender, distended 2/2 body habitus, Bowel sounds present  EXTREMITIES:  2+ Peripheral Pulses, b/l 1+ edema, mild improvements noted daily  SKIN: No rashes or lesions noted    Care Discussed with Consultants/Other Providers [ x] YES  [ ] NO

## 2020-11-25 LAB
ALBUMIN SERPL ELPH-MCNC: 2 G/DL — LOW (ref 3.5–5)
ALP SERPL-CCNC: 56 U/L — SIGNIFICANT CHANGE UP (ref 40–120)
ALT FLD-CCNC: 53 U/L DA — SIGNIFICANT CHANGE UP (ref 10–60)
ANION GAP SERPL CALC-SCNC: 9 MMOL/L — SIGNIFICANT CHANGE UP (ref 5–17)
APTT BLD: 123.5 SEC — CRITICAL HIGH (ref 27.5–35.5)
APTT BLD: 88.9 SEC — HIGH (ref 27.5–35.5)
AST SERPL-CCNC: 35 U/L — SIGNIFICANT CHANGE UP (ref 10–40)
BASE EXCESS BLDA CALC-SCNC: 2.8 MMOL/L — HIGH (ref -2–2)
BASE EXCESS BLDV CALC-SCNC: 2.7 MMOL/L — HIGH (ref -2–2)
BILIRUB SERPL-MCNC: 0.9 MG/DL — SIGNIFICANT CHANGE UP (ref 0.2–1.2)
BLOOD GAS COMMENTS ARTERIAL: SIGNIFICANT CHANGE UP
BLOOD GAS COMMENTS, VENOUS: SIGNIFICANT CHANGE UP
BUN SERPL-MCNC: 48 MG/DL — HIGH (ref 7–18)
CALCIUM SERPL-MCNC: 8 MG/DL — LOW (ref 8.4–10.5)
CHLORIDE SERPL-SCNC: 108 MMOL/L — SIGNIFICANT CHANGE UP (ref 96–108)
CO2 SERPL-SCNC: 25 MMOL/L — SIGNIFICANT CHANGE UP (ref 22–31)
CREAT SERPL-MCNC: 1.58 MG/DL — HIGH (ref 0.5–1.3)
GLUCOSE SERPL-MCNC: 104 MG/DL — HIGH (ref 70–99)
HCO3 BLDA-SCNC: 26 MMOL/L — SIGNIFICANT CHANGE UP (ref 23–27)
HCO3 BLDV-SCNC: 27 MMOL/L — SIGNIFICANT CHANGE UP (ref 21–29)
HCT VFR BLD CALC: 31.1 % — LOW (ref 39–50)
HGB BLD-MCNC: 9.8 G/DL — LOW (ref 13–17)
HOROWITZ INDEX BLDA+IHG-RTO: 50 — SIGNIFICANT CHANGE UP
HOROWITZ INDEX BLDV+IHG-RTO: 60 — SIGNIFICANT CHANGE UP
MAGNESIUM SERPL-MCNC: 2 MG/DL — SIGNIFICANT CHANGE UP (ref 1.6–2.6)
MCHC RBC-ENTMCNC: 28.7 PG — SIGNIFICANT CHANGE UP (ref 27–34)
MCHC RBC-ENTMCNC: 31.5 GM/DL — LOW (ref 32–36)
MCV RBC AUTO: 90.9 FL — SIGNIFICANT CHANGE UP (ref 80–100)
NRBC # BLD: 0 /100 WBCS — SIGNIFICANT CHANGE UP (ref 0–0)
PCO2 BLDA: 37 MMHG — SIGNIFICANT CHANGE UP (ref 32–46)
PCO2 BLDV: 40 MMHG — SIGNIFICANT CHANGE UP (ref 35–50)
PH BLDA: 7.46 — HIGH (ref 7.35–7.45)
PH BLDV: 7.43 — SIGNIFICANT CHANGE UP (ref 7.35–7.45)
PHOSPHATE SERPL-MCNC: 4 MG/DL — SIGNIFICANT CHANGE UP (ref 2.5–4.5)
PLATELET # BLD AUTO: 192 K/UL — SIGNIFICANT CHANGE UP (ref 150–400)
PO2 BLDA: 58 MMHG — LOW (ref 74–108)
PO2 BLDV: 39 MMHG — SIGNIFICANT CHANGE UP (ref 25–45)
POTASSIUM SERPL-MCNC: 3.7 MMOL/L — SIGNIFICANT CHANGE UP (ref 3.5–5.3)
POTASSIUM SERPL-SCNC: 3.7 MMOL/L — SIGNIFICANT CHANGE UP (ref 3.5–5.3)
PROT SERPL-MCNC: 6.1 G/DL — SIGNIFICANT CHANGE UP (ref 6–8.3)
RBC # BLD: 3.42 M/UL — LOW (ref 4.2–5.8)
RBC # FLD: 13.5 % — SIGNIFICANT CHANGE UP (ref 10.3–14.5)
SAO2 % BLDA: 88 % — LOW (ref 92–96)
SAO2 % BLDV: 70 % — SIGNIFICANT CHANGE UP (ref 67–88)
SODIUM SERPL-SCNC: 142 MMOL/L — SIGNIFICANT CHANGE UP (ref 135–145)
WBC # BLD: 11.75 K/UL — HIGH (ref 3.8–10.5)
WBC # FLD AUTO: 11.75 K/UL — HIGH (ref 3.8–10.5)

## 2020-11-25 PROCEDURE — 71045 X-RAY EXAM CHEST 1 VIEW: CPT | Mod: 26

## 2020-11-25 RX ORDER — HEPARIN SODIUM 5000 [USP'U]/ML
1300 INJECTION INTRAVENOUS; SUBCUTANEOUS
Qty: 25000 | Refills: 0 | Status: DISCONTINUED | OUTPATIENT
Start: 2020-11-25 | End: 2020-12-02

## 2020-11-25 RX ORDER — FENTANYL CITRATE 50 UG/ML
3 INJECTION INTRAVENOUS
Qty: 2500 | Refills: 0 | Status: DISCONTINUED | OUTPATIENT
Start: 2020-11-25 | End: 2020-11-26

## 2020-11-25 RX ADMIN — Medication 1 DROP(S): at 01:28

## 2020-11-25 RX ADMIN — Medication 10 MILLIGRAM(S): at 11:01

## 2020-11-25 RX ADMIN — FENTANYL CITRATE 25.6 MICROGRAM(S)/KG/HR: 50 INJECTION INTRAVENOUS at 07:25

## 2020-11-25 RX ADMIN — POLYETHYLENE GLYCOL 3350 17 GRAM(S): 17 POWDER, FOR SOLUTION ORAL at 11:00

## 2020-11-25 RX ADMIN — PANTOPRAZOLE SODIUM 40 MILLIGRAM(S): 20 TABLET, DELAYED RELEASE ORAL at 11:00

## 2020-11-25 RX ADMIN — HEPARIN SODIUM 1300 UNIT(S)/HR: 5000 INJECTION INTRAVENOUS; SUBCUTANEOUS at 16:18

## 2020-11-25 RX ADMIN — PIPERACILLIN AND TAZOBACTAM 25 GRAM(S): 4; .5 INJECTION, POWDER, LYOPHILIZED, FOR SOLUTION INTRAVENOUS at 05:10

## 2020-11-25 RX ADMIN — MIDAZOLAM HYDROCHLORIDE 8.54 MG/KG/HR: 1 INJECTION, SOLUTION INTRAMUSCULAR; INTRAVENOUS at 13:07

## 2020-11-25 RX ADMIN — CHLORHEXIDINE GLUCONATE 15 MILLILITER(S): 213 SOLUTION TOPICAL at 05:10

## 2020-11-25 RX ADMIN — HEPARIN SODIUM 1300 UNIT(S)/HR: 5000 INJECTION INTRAVENOUS; SUBCUTANEOUS at 12:57

## 2020-11-25 RX ADMIN — Medication 100 MILLIGRAM(S): at 10:51

## 2020-11-25 RX ADMIN — FENTANYL CITRATE 25.6 MICROGRAM(S)/KG/HR: 50 INJECTION INTRAVENOUS at 16:16

## 2020-11-25 RX ADMIN — CHLORHEXIDINE GLUCONATE 15 MILLILITER(S): 213 SOLUTION TOPICAL at 17:00

## 2020-11-25 RX ADMIN — PIPERACILLIN AND TAZOBACTAM 25 GRAM(S): 4; .5 INJECTION, POWDER, LYOPHILIZED, FOR SOLUTION INTRAVENOUS at 22:41

## 2020-11-25 RX ADMIN — HEPARIN SODIUM 1500 UNIT(S)/HR: 5000 INJECTION INTRAVENOUS; SUBCUTANEOUS at 05:12

## 2020-11-25 RX ADMIN — Medication 1 DROP(S): at 11:00

## 2020-11-25 RX ADMIN — PIPERACILLIN AND TAZOBACTAM 25 GRAM(S): 4; .5 INJECTION, POWDER, LYOPHILIZED, FOR SOLUTION INTRAVENOUS at 13:00

## 2020-11-25 RX ADMIN — SENNA PLUS 10 MILLILITER(S): 8.6 TABLET ORAL at 22:42

## 2020-11-25 RX ADMIN — CHLORHEXIDINE GLUCONATE 1 APPLICATION(S): 213 SOLUTION TOPICAL at 05:10

## 2020-11-25 NOTE — PROCEDURE NOTE - NSPROCDETAILS_GEN_ALL_CORE
lumen(s) aspirated and flushed/ultrasound guidance/sterile dressing applied/guidewire recovered/sterile technique, catheter placed
sterile technique, catheter placed/ultrasound guidance/lumen(s) aspirated and flushed/guidewire recovered/sterile dressing applied

## 2020-11-25 NOTE — PROCEDURE NOTE - ADDITIONAL PROCEDURE DETAILS
`Blood Gas Profile - Venous (11.25.20 @ 14:54)   pH, Venous: 7.43   pCO2, Venous: 40 mmHg   pO2, Venous: 39 mmHg   HCO3, Venous: 27 mmol/L   Base Excess, Venous: 2.7 mmol/L   Oxygen Saturation, Venous: 70 %   FIO2, Venous: 60   Blood Gas Comments, Venous: vbg drawn by md

## 2020-11-25 NOTE — PROGRESS NOTE ADULT - ASSESSMENT
55M from home without PMH with progressively worsening SOB and +COVID test x5 days prior to admission presented with SpO2 64% at home, intubated 2/2 deteriorating respiratory status and admitted to ICU.    Assessment:  1. Acute Hypoxic Respiratory failure   2. COVID Pneumonia   3. Acute Kidney Injury   4. elevated LFTs     Plan:    CNS: #intubated:   -sedated with fentanyl and versed gtt  -will hold Nimbex given 4/4 TOF and current supine position     CVS: #no active issues:   -SBPs stable 110-130s  -follow up TTE (likely once off isolation)     RESP: #AHRF 2/2 COVID  -vent this am 25/450/50/5, O2 80 overnight, ABG 7.46/37/58/26  -CXR on admission with diffuse B/L opacities and small left pleural effusion, worsening b/l opacities 11/21, slight right-sided worsening noted today  -WBC 24.4 on admission, 11.8 today  -APRs elevated with procal 1.82->0.52, CRP 34.4->3.7->17->3.29, LDH 1095->706->427, ferritin 4814->1097->1355, d-dimer 559->1977->2763->1200, ESR 62->88, CK and PT/INR mildly elevated, trop 2.1->0.46->0.091 (initial and most recent results from 11/19-24 shown)  -completed 5 days cftx and azithromycin for CAP coverage (11/16-20)  -day 2 empiric vanc and zosyn given new fevers  -fu MRSA and COVID reswab  -last day of Decadron 6mg Qd x10 days (day 1 11/15)  -not candidate for remdesivir 2/2 CrCl <45 (29.7)   -BCx NGTD   -will follow daily CRP with coags, d-dimer, esr, LDH, ferritin, trop every three days to monitor disease progression, next results am 11/26    GI: #elevated LFTs:   -likely 2/2 COVID  -follow daily, wnl today  -continue senna, added miralax and dulcolax [place] for bowel regimen    RENAL: #BECKY:   -BUN/Cr 49/2.2 on admission, unknown baseline  -Cr improving to 1.73 (peak 4.43)  -continuing heparin gtt for elevated D-dimer, Lovenox held 2/2 elevated Cr  -nephro Dr. Camara following, no HD at this time, hold additional Lasix at this time  -u/o >100cc/hr, likely diuretic phase ATN  -hypernatremia resolved s/p FWB and 0.45NS 75cc/hr    ID: #COVID PNA  -Tylenol prn fevers  -new fever yesterday as above, failed attempt to change central line yesterday, will try again today (heparin gtt held for attempt/procedure and then restarted)  -remainder of history and management as above     HEME/ONC: #hypercoagulability 2/2 COVID  -D-Dimer 559->1977->2763->1200 likely 2/2 COVID, will repeat 11/26  -continue heparin therapeutic (gtt), Lovenox held 2/2 worsening Cr (hold for line placement as above)  -monitor daily CBC, Hb stable    ENDO: #No issues:  -target CBG <180, 100-130s overnight  -HgA1c 6.1  -NPO, OG placed, on TF  -low HSS while on steroids    Skin/Catheters: #no issues  -RIJ 11/15, will change today (failed attempt yesterday)  -no rashes noted  -cw artificial tears  -FC in place     PPx:  #DVT: heparin gtt for therapeutic dosing as above SCD (BMI>30, ICU admission)  #GI: Protonix     Dispo:  -monitor in ICU     GOC: Full Code   -brother Yg updated 11/18, 11/19, 11/20, 11/21, 11/23     55M from home without PMH with progressively worsening SOB and +COVID test x5 days prior to admission presented with SpO2 64% at home, intubated 2/2 deteriorating respiratory status and admitted to ICU.    Assessment:  1. Acute Hypoxic Respiratory failure   2. COVID Pneumonia   3. Acute Kidney Injury   4. elevated LFTs     Plan:    CNS: #intubated:   -sedated with fentanyl and versed gtt  -will hold Nimbex given 4/4 TOF and current supine position     CVS: #no active issues:   -SBPs stable 110-130s  -follow up TTE (likely once off isolation)     RESP: #AHRF 2/2 COVID  -vent this am 25/450/50/5, O2 80 overnight, ABG 7.46/37/58/26  -CXR on admission with diffuse B/L opacities and small left pleural effusion, worsening b/l opacities 11/21, slight right-sided worsening noted today  -WBC 24.4 on admission, 11.8 today  -APRs elevated with procal 1.82->0.52, CRP 34.4->3.7->17->3.29, LDH 1095->706->427, ferritin 4814->1097->1355, d-dimer 559->1977->2763->1200, ESR 62->88, CK and PT/INR mildly elevated, trop 2.1->0.46->0.091 (initial and most recent results from 11/19-24 shown)  -completed 5 days cftx and azithromycin for CAP coverage (11/16-20)  -day 3 empiric vanc and zosyn given new fevers  -MRSA negative and COVID positive  -s/p Decadron 6mg Qd x10 days (day 1 11/15)  -not candidate for remdesivir 2/2 CrCl <45 (29.7)   -BCx NGTD   -will follow daily CRP with coags, d-dimer, esr, LDH, ferritin, trop every three days to monitor disease progression, next results am 11/26    GI: #elevated LFTs:   -likely 2/2 COVID  -follow daily, wnl today  -continue senna, added miralax and dulcolax [place] for bowel regimen, normal stooling    RENAL: #BECKY:   -BUN/Cr 49/2.2 on admission, unknown baseline  -Cr improving to 1.58 (peak 4.43)  -holding heparin gtt 2/2 elevated PTT, will resume once appropriate for elevated D-dimer, Lovenox held 2/2 elevated Cr  -nephro Dr. Camara following, no HD at this time, hold additional Lasix at this time  -u/o 100cc/hr, likely diuretic phase ATN  -hypernatremia resolved s/p FWB and 0.45NS 75cc/hr    ID: #COVID PNA  -Tylenol prn fevers  -new fever 11/23 RIJ changed to right femoral line, brothbronson Ronquillo consented/informed  -remainder of history and management as above     HEME/ONC: #hypercoagulability 2/2 COVID  -D-Dimer 559->1977->2763->1200 likely 2/2 COVID, will repeat 11/26  -heparin therapeutic (gtt) on temporary hold as above, Lovenox held 2/2 worsening Cr   -monitor daily CBC, Hb stable    ENDO: #No issues:  -target CBG <180, low 100s overnight  -HgA1c 6.1  -NPO, OG placed, on TF  -low HSS while on steroids    Skin/Catheters: #no issues  -RIJ 11/15-11/25  -r femoral line 11/25  -no rashes noted  -cw artificial tears, cover eyes  -FC in place     PPx:  #DVT: heparin gtt for therapeutic dosing, temporary hold as above, continue SCD (BMI>30, ICU admission)  #GI: Protonix     Dispo:  -monitor in ICU     GOC: Full Code   -brothbronson Ronquillo updated daily 11/18-25

## 2020-11-25 NOTE — PROGRESS NOTE ADULT - SUBJECTIVE AND OBJECTIVE BOX
HPI: 55M from home without PMH with progressively worsening SOB and +COVID test x5 days prior to admission presented following SpO2 64% at home.  Started on 15L NRB in ED 2/2 SpO2s 85-90%, desaturated to low 80s and was placed on HFNC, continued to desaturate, was intubated and admitted to ICU.     INTERVAL HPI/OVERNIGHT EVENTS: NAEON. Tmax 99.2, SBPs stably 110-130s. Satting low 90s on 25/450/50/5, ABG 7.46/37/58/26. U/o continues to remain acceptable, approx 100cc/hr, net negative 1134cc. Cr improved to 1.58. Stooling normally. Left-sided worsening noted on CXR this am despite clinical improvement. RIJ changed to r. femoral line today, brother Yg consented.     ICU Vital Signs Last 24 Hrs  T(C): 37.3 (25 Nov 2020 14:00), Max: 38.2 (24 Nov 2020 15:00)  T(F): 99.2 (25 Nov 2020 14:00), Max: 100.8 (24 Nov 2020 15:00)  HR: 64 (25 Nov 2020 14:00) (46 - 76)  BP: 107/56 (25 Nov 2020 14:00) (80/60 - 172/85)  BP(mean): 68 (25 Nov 2020 14:00) (64 - 105)  ABP: --  ABP(mean): --  RR: 13 (25 Nov 2020 14:00) (3 - 29)  SpO2: 92% (25 Nov 2020 14:00) (89% - 97%)    I&O's Summary    24 Nov 2020 07:01  -  25 Nov 2020 07:00  --------------------------------------------------------  IN: 1708.4 mL / OUT: 2861 mL / NET: -1152.6 mL    25 Nov 2020 07:01  -  25 Nov 2020 14:59  --------------------------------------------------------  IN: 175.2 mL / OUT: 345 mL / NET: -169.8 mL      Mode: AC/ CMV (Assist Control/ Continuous Mandatory Ventilation)  RR (machine): 25  TV (machine): 450  FiO2: 60  PEEP: 8  ITime: 0.85  MAP: 17  PIP: 35      LABS:                        9.8    11.75 )-----------( 192      ( 25 Nov 2020 04:15 )             31.1     11-25    142  |  108  |  48<H>  ----------------------------<  104<H>  3.7   |  25  |  1.58<H>    Ca    8.0<L>      25 Nov 2020 04:15  Phos  4.0     11-25  Mg     2.0     11-25    TPro  6.1  /  Alb  2.0<L>  /  TBili  0.9  /  DBili  x   /  AST  35  /  ALT  53  /  AlkPhos  56  11-25    PTT - ( 25 Nov 2020 11:56 )  PTT:123.5 sec    CAPILLARY BLOOD GLUCOSE      POCT Blood Glucose.: 100 mg/dL (25 Nov 2020 11:04)  POCT Blood Glucose.: 110 mg/dL (25 Nov 2020 04:11)  POCT Blood Glucose.: 122 mg/dL (24 Nov 2020 22:30)  POCT Blood Glucose.: 119 mg/dL (24 Nov 2020 16:30)    ABG - ( 25 Nov 2020 04:23 )  pH, Arterial: 7.46  pH, Blood: x     /  pCO2: 37    /  pO2: 58    / HCO3: 26    / Base Excess: 2.8   /  SaO2: 88        RADIOLOGY & ADDITIONAL TESTS:    Consultant(s) Notes Reviewed:  [x ] YES  [ ] NO    MEDICATIONS  (STANDING):  bisacodyl Suppository 10 milliGRAM(s) Rectal daily  chlorhexidine 0.12% Liquid 15 milliLiter(s) Oral Mucosa two times a day  chlorhexidine 2% Cloths 1 Application(s) Topical <User Schedule>  fentaNYL   Infusion. 3 MICROgram(s)/kG/Hr (25.6 mL/Hr) IV Continuous <Continuous>  insulin lispro (ADMELOG) corrective regimen sliding scale   SubCutaneous every 6 hours  midazolam Infusion 0.1 mG/kG/Hr (8.54 mL/Hr) IV Continuous <Continuous>  pantoprazole  Injectable 40 milliGRAM(s) IV Push daily  piperacillin/tazobactam IVPB.. 3.375 Gram(s) IV Intermittent every 8 hours  polyethylene glycol 3350 17 Gram(s) Oral daily  senna Syrup 10 milliLiter(s) Oral at bedtime  vancomycin  IVPB 1000 milliGRAM(s) IV Intermittent every 24 hours    MEDICATIONS  (PRN):  acetaminophen    Suspension .. 650 milliGRAM(s) Enteral Tube every 6 hours PRN Temp greater or equal to 38C (100.4F)  artificial  tears Solution 1 Drop(s) Both EYES every 4 hours PRN Dry Eyes  sodium chloride 0.9% lock flush 10 milliLiter(s) IV Push every 1 hour PRN Pre/post blood products, medications, blood draw, and to maintain line patency    PHYSICAL EXAM:  GENERAL: sedated, +ETT, +OG  HEAD:  Atraumatic, Normocephalic  EYES: b/l edema ongoing  ENT: moist mucous membranes  NECK: Supple  NERVOUS SYSTEM: sedated  CHEST/LUNG: B/L good air entry  HEART: S1S2 normal, RRR  ABDOMEN: Soft, Nontender, distended 2/2 body habitus, Bowel sounds present  EXTREMITIES:  2+ Peripheral Pulses, edema improved  SKIN: No rashes or lesions noted    Care Discussed with Consultants/Other Providers [ x] YES  [ ] NO

## 2020-11-25 NOTE — PROGRESS NOTE ADULT - SUBJECTIVE AND OBJECTIVE BOX
[facility] NEPHROLOGY- PROGRESS NOTE    Patient is a 54yo Male with no PMH recently diagnosed with COVID-19 (5 days PTA) p/w SOB s/p intubated in the ER. Pt admitted to ICU with acute hypoxic respiratory failure 2/2 COVID-19 PNA s/p intubated, BECKY with transaminitis. Nephrology consulted for Elevated serum creatinine.    Hospital Medications: Medications reviewed.  REVIEW OF SYSTEMS: Unable to obtain/ intubated & sedated    VITALS:  T(F): 98.8 (11-25-20 @ 13:00), Max: 101.1 (11-24-20 @ 14:00)  HR: 59 (11-25-20 @ 13:00)  BP: 107/57 (11-25-20 @ 13:00)  RR: 25 (11-25-20 @ 13:00)  SpO2: 91% (11-25-20 @ 13:00)  Wt(kg): --    11-24 @ 07:01  -  11-25 @ 07:00  --------------------------------------------------------  IN: 1708.4 mL / OUT: 2861 mL / NET: -1152.6 mL    11-25 @ 07:01  -  11-25 @ 13:37  --------------------------------------------------------  IN: 175.2 mL / OUT: 345 mL / NET: -169.8 mL        PHYSICAL EXAM:  Gen: Sedated  HEENT: +ETT  Cards: RRR, +S1/S2,   Resp: +mechanical BS  GI: soft, ND,   : +isaacs with yellow urine  Extremities: no LE edema, +UE edema  Neuro: sedated    LABS:  11-25    142  |  108  |  48<H>  ----------------------------<  104<H>  3.7   |  25  |  1.58<H>    Ca    8.0<L>      25 Nov 2020 04:15  Phos  4.0     11-25  Mg     2.0     11-25    TPro  6.1  /  Alb  2.0<L>  /  TBili  0.9  /  DBili      /  AST  35  /  ALT  53  /  AlkPhos  56  11-25    Creatinine Trend: 1.58 <--, 1.73 <--, 1.93 <--, 2.06 <--, 2.10 <--, 2.75 <--, 3.22 <--, 3.44 <--, 4.36 <--, 4.43 <--                        9.8    11.75 )-----------( 192      ( 25 Nov 2020 04:15 )             31.1     Urine Studies:

## 2020-11-26 LAB
ALBUMIN SERPL ELPH-MCNC: 2 G/DL — LOW (ref 3.5–5)
ALBUMIN SERPL ELPH-MCNC: 2.3 G/DL — LOW (ref 3.5–5)
ALP SERPL-CCNC: 68 U/L — SIGNIFICANT CHANGE UP (ref 40–120)
ALP SERPL-CCNC: 77 U/L — SIGNIFICANT CHANGE UP (ref 40–120)
ALT FLD-CCNC: 71 U/L DA — HIGH (ref 10–60)
ALT FLD-CCNC: 76 U/L DA — HIGH (ref 10–60)
ANION GAP SERPL CALC-SCNC: 6 MMOL/L — SIGNIFICANT CHANGE UP (ref 5–17)
ANION GAP SERPL CALC-SCNC: 9 MMOL/L — SIGNIFICANT CHANGE UP (ref 5–17)
APTT BLD: 67.7 SEC — HIGH (ref 27.5–35.5)
APTT BLD: 67.8 SEC — HIGH (ref 27.5–35.5)
AST SERPL-CCNC: 57 U/L — HIGH (ref 10–40)
AST SERPL-CCNC: 62 U/L — HIGH (ref 10–40)
BASE EXCESS BLDA CALC-SCNC: 0.9 MMOL/L — SIGNIFICANT CHANGE UP (ref -2–2)
BASE EXCESS BLDA CALC-SCNC: 1.5 MMOL/L — SIGNIFICANT CHANGE UP (ref -2–2)
BILIRUB SERPL-MCNC: 1.2 MG/DL — SIGNIFICANT CHANGE UP (ref 0.2–1.2)
BILIRUB SERPL-MCNC: 1.2 MG/DL — SIGNIFICANT CHANGE UP (ref 0.2–1.2)
BLOOD GAS COMMENTS ARTERIAL: SIGNIFICANT CHANGE UP
BLOOD GAS COMMENTS ARTERIAL: SIGNIFICANT CHANGE UP
BUN SERPL-MCNC: 34 MG/DL — HIGH (ref 7–18)
BUN SERPL-MCNC: 38 MG/DL — HIGH (ref 7–18)
CALCIUM SERPL-MCNC: 8.2 MG/DL — LOW (ref 8.4–10.5)
CALCIUM SERPL-MCNC: 8.4 MG/DL — SIGNIFICANT CHANGE UP (ref 8.4–10.5)
CHLORIDE SERPL-SCNC: 103 MMOL/L — SIGNIFICANT CHANGE UP (ref 96–108)
CHLORIDE SERPL-SCNC: 105 MMOL/L — SIGNIFICANT CHANGE UP (ref 96–108)
CO2 SERPL-SCNC: 27 MMOL/L — SIGNIFICANT CHANGE UP (ref 22–31)
CO2 SERPL-SCNC: 28 MMOL/L — SIGNIFICANT CHANGE UP (ref 22–31)
CREAT SERPL-MCNC: 1.47 MG/DL — HIGH (ref 0.5–1.3)
CREAT SERPL-MCNC: 1.62 MG/DL — HIGH (ref 0.5–1.3)
GLUCOSE SERPL-MCNC: 104 MG/DL — HIGH (ref 70–99)
GLUCOSE SERPL-MCNC: 89 MG/DL — SIGNIFICANT CHANGE UP (ref 70–99)
HCO3 BLDA-SCNC: 24 MMOL/L — SIGNIFICANT CHANGE UP (ref 23–27)
HCO3 BLDA-SCNC: 25 MMOL/L — SIGNIFICANT CHANGE UP (ref 23–27)
HCT VFR BLD CALC: 30.8 % — LOW (ref 39–50)
HCT VFR BLD CALC: 38.6 % — LOW (ref 39–50)
HGB BLD-MCNC: 12.1 G/DL — LOW (ref 13–17)
HGB BLD-MCNC: 9.7 G/DL — LOW (ref 13–17)
HOROWITZ INDEX BLDA+IHG-RTO: 100 — SIGNIFICANT CHANGE UP
HOROWITZ INDEX BLDA+IHG-RTO: 60 — SIGNIFICANT CHANGE UP
MAGNESIUM SERPL-MCNC: 2.1 MG/DL — SIGNIFICANT CHANGE UP (ref 1.6–2.6)
MCHC RBC-ENTMCNC: 28.8 PG — SIGNIFICANT CHANGE UP (ref 27–34)
MCHC RBC-ENTMCNC: 28.9 PG — SIGNIFICANT CHANGE UP (ref 27–34)
MCHC RBC-ENTMCNC: 31.3 GM/DL — LOW (ref 32–36)
MCHC RBC-ENTMCNC: 31.5 GM/DL — LOW (ref 32–36)
MCV RBC AUTO: 91.7 FL — SIGNIFICANT CHANGE UP (ref 80–100)
MCV RBC AUTO: 91.9 FL — SIGNIFICANT CHANGE UP (ref 80–100)
NRBC # BLD: 0 /100 WBCS — SIGNIFICANT CHANGE UP (ref 0–0)
NRBC # BLD: 0 /100 WBCS — SIGNIFICANT CHANGE UP (ref 0–0)
PCO2 BLDA: 35 MMHG — SIGNIFICANT CHANGE UP (ref 32–46)
PCO2 BLDA: 37 MMHG — SIGNIFICANT CHANGE UP (ref 32–46)
PH BLDA: 7.45 — SIGNIFICANT CHANGE UP (ref 7.35–7.45)
PH BLDA: 7.45 — SIGNIFICANT CHANGE UP (ref 7.35–7.45)
PHOSPHATE SERPL-MCNC: 5.1 MG/DL — HIGH (ref 2.5–4.5)
PLATELET # BLD AUTO: 179 K/UL — SIGNIFICANT CHANGE UP (ref 150–400)
PLATELET # BLD AUTO: 212 K/UL — SIGNIFICANT CHANGE UP (ref 150–400)
PO2 BLDA: 320 MMHG — HIGH (ref 74–108)
PO2 BLDA: 83 MMHG — SIGNIFICANT CHANGE UP (ref 74–108)
POTASSIUM SERPL-MCNC: 3.4 MMOL/L — LOW (ref 3.5–5.3)
POTASSIUM SERPL-MCNC: 3.9 MMOL/L — SIGNIFICANT CHANGE UP (ref 3.5–5.3)
POTASSIUM SERPL-SCNC: 3.4 MMOL/L — LOW (ref 3.5–5.3)
POTASSIUM SERPL-SCNC: 3.9 MMOL/L — SIGNIFICANT CHANGE UP (ref 3.5–5.3)
PROT SERPL-MCNC: 6.3 G/DL — SIGNIFICANT CHANGE UP (ref 6–8.3)
PROT SERPL-MCNC: 7.3 G/DL — SIGNIFICANT CHANGE UP (ref 6–8.3)
RBC # BLD: 3.36 M/UL — LOW (ref 4.2–5.8)
RBC # BLD: 4.2 M/UL — SIGNIFICANT CHANGE UP (ref 4.2–5.8)
RBC # FLD: 13.4 % — SIGNIFICANT CHANGE UP (ref 10.3–14.5)
RBC # FLD: 13.4 % — SIGNIFICANT CHANGE UP (ref 10.3–14.5)
SAO2 % BLDA: 96 % — SIGNIFICANT CHANGE UP (ref 92–96)
SAO2 % BLDA: 99 % — HIGH (ref 92–96)
SODIUM SERPL-SCNC: 139 MMOL/L — SIGNIFICANT CHANGE UP (ref 135–145)
SODIUM SERPL-SCNC: 139 MMOL/L — SIGNIFICANT CHANGE UP (ref 135–145)
VANCOMYCIN TROUGH SERPL-MCNC: 8.9 UG/ML — LOW (ref 10–20)
WBC # BLD: 12.18 K/UL — HIGH (ref 3.8–10.5)
WBC # BLD: 15.55 K/UL — HIGH (ref 3.8–10.5)
WBC # FLD AUTO: 12.18 K/UL — HIGH (ref 3.8–10.5)
WBC # FLD AUTO: 15.55 K/UL — HIGH (ref 3.8–10.5)

## 2020-11-26 PROCEDURE — 71045 X-RAY EXAM CHEST 1 VIEW: CPT | Mod: 26

## 2020-11-26 RX ORDER — MIDAZOLAM HYDROCHLORIDE 1 MG/ML
0.1 INJECTION, SOLUTION INTRAMUSCULAR; INTRAVENOUS
Qty: 100 | Refills: 0 | Status: DISCONTINUED | OUTPATIENT
Start: 2020-11-26 | End: 2020-11-27

## 2020-11-26 RX ORDER — NOREPINEPHRINE BITARTRATE/D5W 8 MG/250ML
0.2 PLASTIC BAG, INJECTION (ML) INTRAVENOUS
Qty: 16 | Refills: 0 | Status: DISCONTINUED | OUTPATIENT
Start: 2020-11-26 | End: 2020-11-29

## 2020-11-26 RX ORDER — POTASSIUM CHLORIDE 20 MEQ
40 PACKET (EA) ORAL ONCE
Refills: 0 | Status: COMPLETED | OUTPATIENT
Start: 2020-11-26 | End: 2020-11-26

## 2020-11-26 RX ORDER — FENTANYL CITRATE 50 UG/ML
1 INJECTION INTRAVENOUS
Qty: 2500 | Refills: 0 | Status: DISCONTINUED | OUTPATIENT
Start: 2020-11-26 | End: 2020-11-26

## 2020-11-26 RX ORDER — DEXMEDETOMIDINE HYDROCHLORIDE IN 0.9% SODIUM CHLORIDE 4 UG/ML
0.5 INJECTION INTRAVENOUS
Qty: 400 | Refills: 0 | Status: DISCONTINUED | OUTPATIENT
Start: 2020-11-26 | End: 2020-11-26

## 2020-11-26 RX ORDER — SODIUM CHLORIDE 9 MG/ML
1000 INJECTION, SOLUTION INTRAVENOUS ONCE
Refills: 0 | Status: COMPLETED | OUTPATIENT
Start: 2020-11-26 | End: 2020-11-26

## 2020-11-26 RX ORDER — FENTANYL CITRATE 50 UG/ML
3 INJECTION INTRAVENOUS
Qty: 2500 | Refills: 0 | Status: DISCONTINUED | OUTPATIENT
Start: 2020-11-26 | End: 2020-11-29

## 2020-11-26 RX ORDER — MIDAZOLAM HYDROCHLORIDE 1 MG/ML
0.3 INJECTION, SOLUTION INTRAMUSCULAR; INTRAVENOUS
Qty: 100 | Refills: 0 | Status: DISCONTINUED | OUTPATIENT
Start: 2020-11-26 | End: 2020-11-26

## 2020-11-26 RX ADMIN — PIPERACILLIN AND TAZOBACTAM 25 GRAM(S): 4; .5 INJECTION, POWDER, LYOPHILIZED, FOR SOLUTION INTRAVENOUS at 13:23

## 2020-11-26 RX ADMIN — Medication 10 MILLIGRAM(S): at 11:19

## 2020-11-26 RX ADMIN — MIDAZOLAM HYDROCHLORIDE 8.54 MG/KG/HR: 1 INJECTION, SOLUTION INTRAMUSCULAR; INTRAVENOUS at 13:34

## 2020-11-26 RX ADMIN — PIPERACILLIN AND TAZOBACTAM 25 GRAM(S): 4; .5 INJECTION, POWDER, LYOPHILIZED, FOR SOLUTION INTRAVENOUS at 21:46

## 2020-11-26 RX ADMIN — POLYETHYLENE GLYCOL 3350 17 GRAM(S): 17 POWDER, FOR SOLUTION ORAL at 11:18

## 2020-11-26 RX ADMIN — Medication 1 DROP(S): at 01:14

## 2020-11-26 RX ADMIN — Medication 1 DROP(S): at 11:20

## 2020-11-26 RX ADMIN — FENTANYL CITRATE 25.6 MICROGRAM(S)/KG/HR: 50 INJECTION INTRAVENOUS at 01:12

## 2020-11-26 RX ADMIN — Medication 100 MILLIGRAM(S): at 11:50

## 2020-11-26 RX ADMIN — PANTOPRAZOLE SODIUM 40 MILLIGRAM(S): 20 TABLET, DELAYED RELEASE ORAL at 11:17

## 2020-11-26 RX ADMIN — CHLORHEXIDINE GLUCONATE 15 MILLILITER(S): 213 SOLUTION TOPICAL at 05:09

## 2020-11-26 RX ADMIN — HEPARIN SODIUM 1300 UNIT(S)/HR: 5000 INJECTION INTRAVENOUS; SUBCUTANEOUS at 08:27

## 2020-11-26 RX ADMIN — SENNA PLUS 10 MILLILITER(S): 8.6 TABLET ORAL at 21:46

## 2020-11-26 RX ADMIN — Medication 80.1 MICROGRAM(S)/KG/MIN: at 18:28

## 2020-11-26 RX ADMIN — SODIUM CHLORIDE 1000 MILLILITER(S): 9 INJECTION, SOLUTION INTRAVENOUS at 10:44

## 2020-11-26 RX ADMIN — CHLORHEXIDINE GLUCONATE 1 APPLICATION(S): 213 SOLUTION TOPICAL at 05:09

## 2020-11-26 RX ADMIN — CHLORHEXIDINE GLUCONATE 15 MILLILITER(S): 213 SOLUTION TOPICAL at 17:15

## 2020-11-26 RX ADMIN — MIDAZOLAM HYDROCHLORIDE 8.54 MG/KG/HR: 1 INJECTION, SOLUTION INTRAMUSCULAR; INTRAVENOUS at 01:12

## 2020-11-26 RX ADMIN — DEXMEDETOMIDINE HYDROCHLORIDE IN 0.9% SODIUM CHLORIDE 10.7 MICROGRAM(S)/KG/HR: 4 INJECTION INTRAVENOUS at 09:00

## 2020-11-26 RX ADMIN — Medication 40 MILLIEQUIVALENT(S): at 10:45

## 2020-11-26 RX ADMIN — FENTANYL CITRATE 25.6 MICROGRAM(S)/KG/HR: 50 INJECTION INTRAVENOUS at 13:34

## 2020-11-26 RX ADMIN — HEPARIN SODIUM 1300 UNIT(S)/HR: 5000 INJECTION INTRAVENOUS; SUBCUTANEOUS at 01:13

## 2020-11-26 RX ADMIN — PIPERACILLIN AND TAZOBACTAM 25 GRAM(S): 4; .5 INJECTION, POWDER, LYOPHILIZED, FOR SOLUTION INTRAVENOUS at 05:09

## 2020-11-26 NOTE — PROGRESS NOTE ADULT - ASSESSMENT
55M from home without PMH with progressively worsening SOB and +COVID test x5 days prior to admission presented with SpO2 64% at home, intubated 2/2 deteriorating respiratory status and admitted to ICU.    Assessment:  1. Acute Hypoxic Respiratory failure   2. COVID Pneumonia   3. Acute Kidney Injury   4. elevated LFTs       Plan:    CNS: #intubated:   -sedated with fentanyl gtt, decreased to 1mcg, versed dc, started Precedex 0.5mcg  -will trial awakening today, wrist restraints in place to protect tube in case of agitation  -will hold Nimbex given 4/4 TOF and current supine position     CVS: #no active issues:   -SBPs increased to 160-180s, possibly 2/2 awakening/agitation  -follow up TTE (likely once off isolation)     RESP: #AHRF 2/2 COVID  -vent 25/450/60/5, ABG improved at 7.45/35/83/24, will decrease FiO2 to 50% this am and continue to titrate down as tolerated, SpO2 mid-high 90s  -CXR on admission with diffuse B/L opacities and small left pleural effusion, worsening b/l opacities 11/21, slight improvement past x2 days, fu am CXR                     ,  -WBC 24.4 on admission, improved to 10-11, worsened to 15.6 this am  -APRs elevated with procal 1.82->0.52, CRP 34.4->3.7->17->3.29, LDH 1095->706->427, ferritin 4814->1097->1355, d-dimer 559->1977->2763->1200, ESR 62->88, CK and PT/INR mildly elevated, trop 2.1->0.46->0.091 (initial and most recent results from 11/19-24 shown)  -completed 5 days cftx and azithromycin for CAP coverage (11/16-20)  -day 4 empiric vanc and zosyn given new fevers, Tmax 99.2 overnight  -MRSA negative, COVID positive  -s/p Decadron 6mg Qd x10 days (day 1 11/15)  -not candidate for remdesivir 2/2 CrCl <45 (29.7)   -BCx NGTD   -will follow daily CRP with coags, d-dimer, esr, LDH, ferritin, trop every three days to monitor disease progression, next results am 11/27    GI: #elevated LFTs:   -likely 2/2 COVID  -follow daily, mildly elevated AP 73, AST/ALT 62/76  -continue senna, miralax, and dulcolax [place] for bowel regimen    RENAL: #BECKY:   -BUN/Cr 49/2.2 on admission, unknown baseline  -Cr improving to 1.62, slightly worse than yesterday 1.5 (peak 4.43)  -continuing heparin gtt for elevated D-dimer, Lovenox held 2/2 elevated Cr  -nephro Dr. Camara following, no HD at this time, hold additional Lasix at this time, rec FWB 350cc q6  -u/o 25cc/hr, monitor    ID: #COVID PNA  -Tylenol prn fevers  -new fever 11/23 as above, entral line changed to right femoral yesterday and RIJ pulled today  -remainder of history and management as above     HEME/ONC: #hypercoagulability 2/2 COVID  -D-Dimer 559->1977->2763->1200 likely 2/2 COVID, will repeat 11/27  -continue heparin therapeutic (gtt), Lovenox held 2/2 worsening Cr (hold for line placement as above)  -monitor daily CBC, Hb stable    ENDO: #No issues:  -target CBG <180, 100s overnight  -HgA1c 6.1  -NPO, OG placed, on TF  -low HSS while on steroids    Skin/Catheters: #no issues  -RIJ 11/15-25  -right femoral line  -no rashes noted  -cw artificial tears  -FC in place     PPx:  #DVT: heparin gtt for therapeutic dosing as above SCD (BMI>30, ICU admission)  #GI: Protonix     Dispo:  -monitor in ICU     GOC: Full Code   -brother Yg updated 11/18-25     55M from home without PMH with progressively worsening SOB and +COVID test x5 days prior to admission presented with SpO2 64% at home, intubated 2/2 deteriorating respiratory status and admitted to ICU.    Assessment:  1. Acute Hypoxic Respiratory failure   2. COVID Pneumonia   3. Acute Kidney Injury   4. elevated LFTs       Plan:    CNS: #intubated:   -sedated with fentanyl gtt, decreased to 1mcg, versed dc, started Precedex 0.5mcg  -will trial awakening today, wrist restraints in place to protect tube in case of agitation  -will hold Nimbex given 4/4 TOF and current supine position     CVS: #no active issues:   -SBPs increased to 160-180s, possibly 2/2 awakening/agitation  -follow up TTE (likely once off isolation)     RESP: #AHRF 2/2 COVID  -vent 25/450/60/5, ABG improved at 7.45/35/83/24, will decrease FiO2 to 50% this am and continue to titrate down as tolerated, SpO2 mid-high 90s  -CXR on admission with diffuse B/L opacities and small left pleural effusion, worsening b/l opacities 11/21, slight improvement past x2 days, fu am CXR                     ,  -WBC 24.4 on admission, improved to 10-11, worsened to 15.6 this am  -APRs elevated with procal 1.82->0.52, CRP 34.4->3.7->17->3.29, LDH 1095->706->427, ferritin 4814->1097->1355, d-dimer 559->1977->2763->1200, ESR 62->88, CK and PT/INR mildly elevated, trop 2.1->0.46->0.091 (initial and most recent results from 11/19-24 shown)  -completed 5 days cftx and azithromycin for CAP coverage (11/16-20)  -day 4 empiric vanc and zosyn given new fevers, Tmax 99.2 overnight, VT before next dose today  -MRSA negative, COVID positive  -s/p Decadron 6mg Qd x10 days (day 1 11/15)  -not candidate for remdesivir 2/2 CrCl <45 (29.7)   -BCx NGTD   -will follow daily CRP with coags, d-dimer, esr, LDH, ferritin, trop every three days to monitor disease progression, next results am 11/27    GI: #elevated LFTs:   -likely 2/2 COVID  -follow daily, mildly elevated AP 73, AST/ALT 62/76  -continue senna, miralax, and dulcolax [place] for bowel regimen    RENAL: #BECKY:   -BUN/Cr 49/2.2 on admission, unknown baseline  -Cr improving to 1.62, slightly worse than yesterday 1.5 (peak 4.43)  -continuing heparin gtt for elevated D-dimer, Lovenox held 2/2 elevated Cr  -nephro Dr. Camara following, no HD at this time, hold additional Lasix at this time, rec FWB 350cc q6, will hold off at this time given Na  -trial x2 LR 1L boluses  -u/o 25cc/hr, monitor    ID: #COVID PNA  -Tylenol prn fevers  -new fever 11/23 as above, entral line changed to right femoral yesterday and RIJ pulled today  -remainder of history and management as above     HEME/ONC: #hypercoagulability 2/2 COVID  -D-Dimer 559->1977->2763->1200 likely 2/2 COVID, will repeat 11/27  -continue heparin therapeutic (gtt), Lovenox held 2/2 worsening Cr (hold for line placement as above)  -monitor daily CBC, Hb stable    ENDO: #No issues:  -target CBG <180, 100s overnight  -HgA1c 6.1  -NPO, OG placed, on TF  -low HSS while on steroids    Skin/Catheters: #no issues  -RIJ 11/15-25  -right femoral line  -no rashes noted  -cw artificial tears  -FC in place     PPx:  #DVT: heparin gtt for therapeutic dosing as above SCD (BMI>30, ICU admission)  #GI: Protonix     Dispo:  -monitor in ICU     GOC: Full Code   -brothbronson Ronquillo updated 11/18-25

## 2020-11-26 NOTE — PROGRESS NOTE ADULT - SUBJECTIVE AND OBJECTIVE BOX
[facility] NEPHROLOGY- PROGRESS NOTE    Patient is a 56yo Male with no PMH recently diagnosed with COVID-19 (5 days PTA) p/w SOB s/p intubated in the ER. Pt admitted to ICU with acute hypoxic respiratory failure 2/2 COVID-19 PNA s/p intubated, BECKY with transaminitis. Nephrology consulted for Elevated serum creatinine.    Hospital Medications: Medications reviewed.  REVIEW OF SYSTEMS: Unable to obtain/ intubated & sedated    VITALS:  T(F): 100.3 (11-26-20 @ 08:00), Max: 100.3 (11-25-20 @ 17:00)  HR: 72 (11-26-20 @ 10:00)  BP: 131/71 (11-26-20 @ 10:00)  RR: 25 (11-26-20 @ 10:00)  SpO2: 96% (11-26-20 @ 10:00)  Wt(kg): --    11-25 @ 07:01  -  11-26 @ 07:00  --------------------------------------------------------  IN: 1555.1 mL / OUT: 2565 mL / NET: -1009.9 mL    11-26 @ 07:01  -  11-26 @ 10:35  --------------------------------------------------------  IN: 122.2 mL / OUT: 175 mL / NET: -52.8 mL      PHYSICAL EXAM:  Gen: Sedated  HEENT: +ETT  Cards: RRR, +S1/S2,   Resp: +mechanical BS  GI: soft, ND,   : +isaacs with yellow urine  Extremities: no LE edema, +UE edema  Neuro: sedated    LABS:  11-26    139  |  103  |  38<H>  ----------------------------<  89  3.4<L>   |  27  |  1.62<H>    Ca    8.4      26 Nov 2020 06:50  Phos  5.1     11-26  Mg     2.1     11-26    TPro  7.3  /  Alb  2.3<L>  /  TBili  1.2  /  DBili      /  AST  62<H>  /  ALT  76<H>  /  AlkPhos  77  11-26    Creatinine Trend: 1.62 <--, 1.58 <--, 1.73 <--, 1.93 <--, 2.06 <--, 2.10 <--, 2.75 <--, 3.22 <--, 3.44 <--                        12.1   15.55 )-----------( 212      ( 26 Nov 2020 06:50 )             38.6

## 2020-11-26 NOTE — PROGRESS NOTE ADULT - SUBJECTIVE AND OBJECTIVE BOX
HPI: 55M from home without PMH with progressively worsening SOB and +COVID test x5 days prior to admission presented following SpO2 64% at home.  Started on 15L NRB in ED 2/2 SpO2s 85-90%, desaturated to low 80s and was placed on HFNC, continued to desaturate, was intubated and admitted to ICU.     INTERVAL HPI/OVERNIGHT EVENTS: NAEON. T max 99.2 overnight, BP elevated to 160-180s, SpO2 high 90s on vent 25/450/60/5, ABG improved 7.45/35/83/24, decreased FiO2 to 50%. Holding versed, started precedex 0.5, decreased fentanyl to 1mcg, will begin to wake patient up. Right femoral line placed yesterday, RIJ pulled this am, no bleeding noted. Heparin gtt continued. Cr slightly worsened to 1.62, u/o 25cc/hr.     ICU Vital Signs Last 24 Hrs  T(C): 37.3 (26 Nov 2020 03:00), Max: 37.9 (25 Nov 2020 17:00)  T(F): 99.2 (26 Nov 2020 03:00), Max: 100.3 (25 Nov 2020 17:00)  HR: 70 (26 Nov 2020 08:29) (48 - 80)  BP: 161/90 (26 Nov 2020 06:00) (104/56 - 189/118)  BP(mean): 108 (26 Nov 2020 06:00) (67 - 134)  ABP: --  ABP(mean): --  RR: 24 (26 Nov 2020 06:00) (13 - 25)  SpO2: 94% (26 Nov 2020 08:29) (91% - 99%)    I&O's Summary    25 Nov 2020 07:01  -  26 Nov 2020 07:00  --------------------------------------------------------  IN: 1498 mL / OUT: 2465 mL / NET: -967 mL      Mode: AC/ CMV (Assist Control/ Continuous Mandatory Ventilation)  RR (machine): 25  TV (machine): 450  FiO2: 60  PEEP: 5  ITime: 0.85  MAP: 13  PIP: 30      LABS:                        12.1   15.55 )-----------( 212      ( 26 Nov 2020 06:50 )             38.6     11-26    139  |  103  |  38<H>  ----------------------------<  89  3.4<L>   |  27  |  1.62<H>    Ca    8.4      26 Nov 2020 06:50  Phos  5.1     11-26  Mg     2.1     11-26    TPro  7.3  /  Alb  2.3<L>  /  TBili  1.2  /  DBili  x   /  AST  62<H>  /  ALT  76<H>  /  AlkPhos  77  11-26    PTT - ( 26 Nov 2020 06:50 )  PTT:67.7 sec    CAPILLARY BLOOD GLUCOSE      POCT Blood Glucose.: 114 mg/dL (26 Nov 2020 06:04)  POCT Blood Glucose.: 93 mg/dL (25 Nov 2020 22:20)  POCT Blood Glucose.: 89 mg/dL (25 Nov 2020 15:45)  POCT Blood Glucose.: 100 mg/dL (25 Nov 2020 11:04)    ABG - ( 26 Nov 2020 04:07 )  pH, Arterial: 7.45  pH, Blood: x     /  pCO2: 35    /  pO2: 83    / HCO3: 24    / Base Excess: 0.9   /  SaO2: 96                  RADIOLOGY & ADDITIONAL TESTS:    Consultant(s) Notes Reviewed:  [x ] YES  [ ] NO    MEDICATIONS  (STANDING):  bisacodyl Suppository 10 milliGRAM(s) Rectal daily  chlorhexidine 0.12% Liquid 15 milliLiter(s) Oral Mucosa two times a day  chlorhexidine 2% Cloths 1 Application(s) Topical <User Schedule>  dexMEDEtomidine Infusion 0.501 MICROgram(s)/kG/Hr (10.7 mL/Hr) IV Continuous <Continuous>  fentaNYL   Infusion. 1 MICROgram(s)/kG/Hr (8.54 mL/Hr) IV Continuous <Continuous>  heparin  Infusion. 1300 Unit(s)/Hr (13 mL/Hr) IV Continuous <Continuous>  insulin lispro (ADMELOG) corrective regimen sliding scale   SubCutaneous every 6 hours  pantoprazole  Injectable 40 milliGRAM(s) IV Push daily  piperacillin/tazobactam IVPB.. 3.375 Gram(s) IV Intermittent every 8 hours  polyethylene glycol 3350 17 Gram(s) Oral daily  potassium chloride   Powder 40 milliEquivalent(s) Oral once  senna Syrup 10 milliLiter(s) Oral at bedtime  vancomycin  IVPB 1000 milliGRAM(s) IV Intermittent every 24 hours    MEDICATIONS  (PRN):  acetaminophen    Suspension .. 650 milliGRAM(s) Enteral Tube every 6 hours PRN Temp greater or equal to 38C (100.4F)  artificial  tears Solution 1 Drop(s) Both EYES every 4 hours PRN Dry Eyes  sodium chloride 0.9% lock flush 10 milliLiter(s) IV Push every 1 hour PRN Pre/post blood products, medications, blood draw, and to maintain line patency    PHYSICAL EXAM:    GENERAL: sedated, some eye opening/grimacing, +ETT, +OG  HEAD:  Atraumatic, Normocephalic  EYES: b/l edema improved on left, worsened on right  ENT: moist mucous membranes  NECK: Supple  NERVOUS SYSTEM: sedated, some eye opening/grimacing as above  CHEST/LUNG: B/L good air entry  HEART: S1S2 normal, RRR  ABDOMEN: Soft, Nontender, distended 2/2 body habitus, Bowel sounds present  EXTREMITIES:  2+ Peripheral Pulses, edema improved  SKIN: No rashes or lesions noted    Care Discussed with Consultants/Other Providers [ x] YES  [ ] NO HPI: 55M from home without PMH with progressively worsening SOB and +COVID test x5 days prior to admission presented following SpO2 64% at home.  Started on 15L NRB in ED 2/2 SpO2s 85-90%, desaturated to low 80s and was placed on HFNC, continued to desaturate, was intubated and admitted to ICU.     INTERVAL HPI/OVERNIGHT EVENTS: NAEON. T max 99.2 overnight, BP elevated to 160-180s, SpO2 high 90s on vent 25/450/60/5, ABG improved 7.45/35/83/24, decreased FiO2 to 50%. Holding versed, started precedex 0.5, decreased fentanyl to 1mcg, will begin to wake patient up. Right femoral line placed yesterday, RIJ pulled this am, no bleeding noted. Heparin gtt continued. Cr slightly worsened to 1.62, u/o 25cc/hr. Will give x2 LR 1L boluses. Continuing on vanc and zosyn, VT today prior to 4th dose.     ICU Vital Signs Last 24 Hrs  T(C): 37.3 (26 Nov 2020 03:00), Max: 37.9 (25 Nov 2020 17:00)  T(F): 99.2 (26 Nov 2020 03:00), Max: 100.3 (25 Nov 2020 17:00)  HR: 70 (26 Nov 2020 08:29) (48 - 80)  BP: 161/90 (26 Nov 2020 06:00) (104/56 - 189/118)  BP(mean): 108 (26 Nov 2020 06:00) (67 - 134)  ABP: --  ABP(mean): --  RR: 24 (26 Nov 2020 06:00) (13 - 25)  SpO2: 94% (26 Nov 2020 08:29) (91% - 99%)    I&O's Summary    25 Nov 2020 07:01  -  26 Nov 2020 07:00  --------------------------------------------------------  IN: 1498 mL / OUT: 2465 mL / NET: -967 mL      Mode: AC/ CMV (Assist Control/ Continuous Mandatory Ventilation)  RR (machine): 25  TV (machine): 450  FiO2: 60  PEEP: 5  ITime: 0.85  MAP: 13  PIP: 30      LABS:                        12.1   15.55 )-----------( 212      ( 26 Nov 2020 06:50 )             38.6     11-26    139  |  103  |  38<H>  ----------------------------<  89  3.4<L>   |  27  |  1.62<H>    Ca    8.4      26 Nov 2020 06:50  Phos  5.1     11-26  Mg     2.1     11-26    TPro  7.3  /  Alb  2.3<L>  /  TBili  1.2  /  DBili  x   /  AST  62<H>  /  ALT  76<H>  /  AlkPhos  77  11-26    PTT - ( 26 Nov 2020 06:50 )  PTT:67.7 sec    CAPILLARY BLOOD GLUCOSE      POCT Blood Glucose.: 114 mg/dL (26 Nov 2020 06:04)  POCT Blood Glucose.: 93 mg/dL (25 Nov 2020 22:20)  POCT Blood Glucose.: 89 mg/dL (25 Nov 2020 15:45)  POCT Blood Glucose.: 100 mg/dL (25 Nov 2020 11:04)    ABG - ( 26 Nov 2020 04:07 )  pH, Arterial: 7.45  pH, Blood: x     /  pCO2: 35    /  pO2: 83    / HCO3: 24    / Base Excess: 0.9   /  SaO2: 96                  RADIOLOGY & ADDITIONAL TESTS:    Consultant(s) Notes Reviewed:  [x ] YES  [ ] NO    MEDICATIONS  (STANDING):  bisacodyl Suppository 10 milliGRAM(s) Rectal daily  chlorhexidine 0.12% Liquid 15 milliLiter(s) Oral Mucosa two times a day  chlorhexidine 2% Cloths 1 Application(s) Topical <User Schedule>  dexMEDEtomidine Infusion 0.501 MICROgram(s)/kG/Hr (10.7 mL/Hr) IV Continuous <Continuous>  fentaNYL   Infusion. 1 MICROgram(s)/kG/Hr (8.54 mL/Hr) IV Continuous <Continuous>  heparin  Infusion. 1300 Unit(s)/Hr (13 mL/Hr) IV Continuous <Continuous>  insulin lispro (ADMELOG) corrective regimen sliding scale   SubCutaneous every 6 hours  pantoprazole  Injectable 40 milliGRAM(s) IV Push daily  piperacillin/tazobactam IVPB.. 3.375 Gram(s) IV Intermittent every 8 hours  polyethylene glycol 3350 17 Gram(s) Oral daily  potassium chloride   Powder 40 milliEquivalent(s) Oral once  senna Syrup 10 milliLiter(s) Oral at bedtime  vancomycin  IVPB 1000 milliGRAM(s) IV Intermittent every 24 hours    MEDICATIONS  (PRN):  acetaminophen    Suspension .. 650 milliGRAM(s) Enteral Tube every 6 hours PRN Temp greater or equal to 38C (100.4F)  artificial  tears Solution 1 Drop(s) Both EYES every 4 hours PRN Dry Eyes  sodium chloride 0.9% lock flush 10 milliLiter(s) IV Push every 1 hour PRN Pre/post blood products, medications, blood draw, and to maintain line patency    PHYSICAL EXAM:    GENERAL: sedated, some eye opening/grimacing, +ETT, +OG  HEAD:  Atraumatic, Normocephalic  EYES: b/l edema improved on left, worsened on right  ENT: moist mucous membranes  NECK: Supple  NERVOUS SYSTEM: sedated, some eye opening/grimacing as above  CHEST/LUNG: B/L good air entry  HEART: S1S2 normal, RRR  ABDOMEN: Soft, Nontender, distended 2/2 body habitus, Bowel sounds present  EXTREMITIES:  2+ Peripheral Pulses, edema improved  SKIN: No rashes or lesions noted    Care Discussed with Consultants/Other Providers [ x] YES  [ ] NO

## 2020-11-26 NOTE — PROGRESS NOTE ADULT - ASSESSMENT
Patient is a 56yo Male with no PMH recently diagnosed with COVID-19 (5 days PTA) p/w SOB s/p intubated in the ER. Pt admitted to ICU with acute hypoxic respiratory failure 2/2 COVID-19 PNA s/p intubated, BECKY with transaminitis. Nephrology consulted for Elevated serum creatinine.    1. BECKY- unknown baseline SCr. BECKY likely hemodynamically mediated in the setting of septic shock / infection 2/2 COVID-19, hypotension on pressors (now off pressors);  likely ATN. UA with 100 protein and trace blood with hyaline cast. Renal function improving with good urine o/p; likely diuretic/ recovery phase. s/p IVF for hypernatremia, now resolved. Recc free water via NGT @ 350ml q6hrs and agree with IVF as well.  Will defer Renal US due to COVID status. Strict I/Os. Avoid nephrotoxins/ NSAIDs/ RCA. Monitor BMP.  2. Septic Shock due to Multifocal PNA 2/2 COVID-19- Plan per ICU  3. Hypotension- BP improved. Pt off Pressors as per ICU. Monitor BP  4. Acute hypoxic respiratory failure- vent support as per ICU.  5. Hypocalcemia- & Hyperphosphatemia resolved. Monitor ionized calcium and serum phos.

## 2020-11-27 LAB
ALBUMIN SERPL ELPH-MCNC: 1.8 G/DL — LOW (ref 3.5–5)
ALP SERPL-CCNC: 60 U/L — SIGNIFICANT CHANGE UP (ref 40–120)
ALT FLD-CCNC: 58 U/L DA — SIGNIFICANT CHANGE UP (ref 10–60)
ANION GAP SERPL CALC-SCNC: 8 MMOL/L — SIGNIFICANT CHANGE UP (ref 5–17)
ANION GAP SERPL CALC-SCNC: 9 MMOL/L — SIGNIFICANT CHANGE UP (ref 5–17)
APTT BLD: 71.4 SEC — HIGH (ref 27.5–35.5)
AST SERPL-CCNC: 36 U/L — SIGNIFICANT CHANGE UP (ref 10–40)
BASE EXCESS BLDA CALC-SCNC: 0.9 MMOL/L — SIGNIFICANT CHANGE UP (ref -2–2)
BILIRUB SERPL-MCNC: 0.9 MG/DL — SIGNIFICANT CHANGE UP (ref 0.2–1.2)
BLOOD GAS COMMENTS ARTERIAL: SIGNIFICANT CHANGE UP
BUN SERPL-MCNC: 28 MG/DL — HIGH (ref 7–18)
BUN SERPL-MCNC: 32 MG/DL — HIGH (ref 7–18)
CALCIUM SERPL-MCNC: 7.8 MG/DL — LOW (ref 8.4–10.5)
CALCIUM SERPL-MCNC: 7.9 MG/DL — LOW (ref 8.4–10.5)
CHLORIDE SERPL-SCNC: 104 MMOL/L — SIGNIFICANT CHANGE UP (ref 96–108)
CHLORIDE SERPL-SCNC: 104 MMOL/L — SIGNIFICANT CHANGE UP (ref 96–108)
CO2 SERPL-SCNC: 26 MMOL/L — SIGNIFICANT CHANGE UP (ref 22–31)
CO2 SERPL-SCNC: 27 MMOL/L — SIGNIFICANT CHANGE UP (ref 22–31)
CREAT SERPL-MCNC: 1.64 MG/DL — HIGH (ref 0.5–1.3)
CREAT SERPL-MCNC: 1.78 MG/DL — HIGH (ref 0.5–1.3)
CRP SERPL-MCNC: 11.76 MG/DL — HIGH (ref 0–0.4)
D DIMER BLD IA.RAPID-MCNC: 956 NG/ML DDU — HIGH
ERYTHROCYTE [SEDIMENTATION RATE] IN BLOOD: 16 MM/HR — SIGNIFICANT CHANGE UP (ref 0–20)
FERRITIN SERPL-MCNC: 1081 NG/ML — HIGH (ref 30–400)
GLUCOSE SERPL-MCNC: 121 MG/DL — HIGH (ref 70–99)
GLUCOSE SERPL-MCNC: 148 MG/DL — HIGH (ref 70–99)
HCO3 BLDA-SCNC: 25 MMOL/L — SIGNIFICANT CHANGE UP (ref 23–27)
HCT VFR BLD CALC: 30.6 % — LOW (ref 39–50)
HGB BLD-MCNC: 9.5 G/DL — LOW (ref 13–17)
HOROWITZ INDEX BLDA+IHG-RTO: 40 — SIGNIFICANT CHANGE UP
INR BLD: 1.36 RATIO — HIGH (ref 0.88–1.16)
LDH SERPL L TO P-CCNC: 286 U/L — HIGH (ref 120–225)
MAGNESIUM SERPL-MCNC: 2 MG/DL — SIGNIFICANT CHANGE UP (ref 1.6–2.6)
MCHC RBC-ENTMCNC: 28.8 PG — SIGNIFICANT CHANGE UP (ref 27–34)
MCHC RBC-ENTMCNC: 31 GM/DL — LOW (ref 32–36)
MCV RBC AUTO: 92.7 FL — SIGNIFICANT CHANGE UP (ref 80–100)
NRBC # BLD: 0 /100 WBCS — SIGNIFICANT CHANGE UP (ref 0–0)
PCO2 BLDA: 40 MMHG — SIGNIFICANT CHANGE UP (ref 32–46)
PH BLDA: 7.42 — SIGNIFICANT CHANGE UP (ref 7.35–7.45)
PHOSPHATE SERPL-MCNC: 4.3 MG/DL — SIGNIFICANT CHANGE UP (ref 2.5–4.5)
PLATELET # BLD AUTO: 214 K/UL — SIGNIFICANT CHANGE UP (ref 150–400)
PO2 BLDA: 81 MMHG — SIGNIFICANT CHANGE UP (ref 74–108)
POTASSIUM SERPL-MCNC: 3.3 MMOL/L — LOW (ref 3.5–5.3)
POTASSIUM SERPL-MCNC: 3.8 MMOL/L — SIGNIFICANT CHANGE UP (ref 3.5–5.3)
POTASSIUM SERPL-SCNC: 3.3 MMOL/L — LOW (ref 3.5–5.3)
POTASSIUM SERPL-SCNC: 3.8 MMOL/L — SIGNIFICANT CHANGE UP (ref 3.5–5.3)
PROCALCITONIN SERPL-MCNC: 0.57 NG/ML — HIGH (ref 0.02–0.1)
PROT SERPL-MCNC: 5.8 G/DL — LOW (ref 6–8.3)
PROTHROM AB SERPL-ACNC: 16 SEC — HIGH (ref 10.6–13.6)
RBC # BLD: 3.3 M/UL — LOW (ref 4.2–5.8)
RBC # FLD: 13.5 % — SIGNIFICANT CHANGE UP (ref 10.3–14.5)
SAO2 % BLDA: 95 % — SIGNIFICANT CHANGE UP (ref 92–96)
SODIUM SERPL-SCNC: 139 MMOL/L — SIGNIFICANT CHANGE UP (ref 135–145)
SODIUM SERPL-SCNC: 139 MMOL/L — SIGNIFICANT CHANGE UP (ref 135–145)
WBC # BLD: 12.06 K/UL — HIGH (ref 3.8–10.5)
WBC # FLD AUTO: 12.06 K/UL — HIGH (ref 3.8–10.5)

## 2020-11-27 PROCEDURE — 71045 X-RAY EXAM CHEST 1 VIEW: CPT | Mod: 26

## 2020-11-27 RX ORDER — POTASSIUM CHLORIDE 20 MEQ
10 PACKET (EA) ORAL
Refills: 0 | Status: COMPLETED | OUTPATIENT
Start: 2020-11-27 | End: 2020-11-27

## 2020-11-27 RX ORDER — DEXMEDETOMIDINE HYDROCHLORIDE IN 0.9% SODIUM CHLORIDE 4 UG/ML
1 INJECTION INTRAVENOUS
Qty: 200 | Refills: 0 | Status: DISCONTINUED | OUTPATIENT
Start: 2020-11-27 | End: 2020-11-27

## 2020-11-27 RX ORDER — DEXMEDETOMIDINE HYDROCHLORIDE IN 0.9% SODIUM CHLORIDE 4 UG/ML
0.2 INJECTION INTRAVENOUS
Qty: 400 | Refills: 0 | Status: DISCONTINUED | OUTPATIENT
Start: 2020-11-27 | End: 2020-12-01

## 2020-11-27 RX ADMIN — PIPERACILLIN AND TAZOBACTAM 25 GRAM(S): 4; .5 INJECTION, POWDER, LYOPHILIZED, FOR SOLUTION INTRAVENOUS at 23:55

## 2020-11-27 RX ADMIN — HEPARIN SODIUM 1300 UNIT(S)/HR: 5000 INJECTION INTRAVENOUS; SUBCUTANEOUS at 07:04

## 2020-11-27 RX ADMIN — DEXMEDETOMIDINE HYDROCHLORIDE IN 0.9% SODIUM CHLORIDE 21.4 MICROGRAM(S)/KG/HR: 4 INJECTION INTRAVENOUS at 11:17

## 2020-11-27 RX ADMIN — Medication 100 MILLIEQUIVALENT(S): at 19:15

## 2020-11-27 RX ADMIN — PIPERACILLIN AND TAZOBACTAM 25 GRAM(S): 4; .5 INJECTION, POWDER, LYOPHILIZED, FOR SOLUTION INTRAVENOUS at 14:00

## 2020-11-27 RX ADMIN — DEXMEDETOMIDINE HYDROCHLORIDE IN 0.9% SODIUM CHLORIDE 21.4 MICROGRAM(S)/KG/HR: 4 INJECTION INTRAVENOUS at 14:45

## 2020-11-27 RX ADMIN — FENTANYL CITRATE 25.6 MICROGRAM(S)/KG/HR: 50 INJECTION INTRAVENOUS at 23:55

## 2020-11-27 RX ADMIN — PANTOPRAZOLE SODIUM 40 MILLIGRAM(S): 20 TABLET, DELAYED RELEASE ORAL at 11:23

## 2020-11-27 RX ADMIN — FENTANYL CITRATE 25.6 MICROGRAM(S)/KG/HR: 50 INJECTION INTRAVENOUS at 11:20

## 2020-11-27 RX ADMIN — CHLORHEXIDINE GLUCONATE 15 MILLILITER(S): 213 SOLUTION TOPICAL at 17:10

## 2020-11-27 RX ADMIN — PIPERACILLIN AND TAZOBACTAM 25 GRAM(S): 4; .5 INJECTION, POWDER, LYOPHILIZED, FOR SOLUTION INTRAVENOUS at 05:13

## 2020-11-27 RX ADMIN — DEXMEDETOMIDINE HYDROCHLORIDE IN 0.9% SODIUM CHLORIDE 4.27 MICROGRAM(S)/KG/HR: 4 INJECTION INTRAVENOUS at 21:08

## 2020-11-27 RX ADMIN — POLYETHYLENE GLYCOL 3350 17 GRAM(S): 17 POWDER, FOR SOLUTION ORAL at 11:24

## 2020-11-27 RX ADMIN — FENTANYL CITRATE 25.6 MICROGRAM(S)/KG/HR: 50 INJECTION INTRAVENOUS at 01:06

## 2020-11-27 RX ADMIN — Medication 16 MICROGRAM(S)/KG/MIN: at 19:19

## 2020-11-27 RX ADMIN — DEXMEDETOMIDINE HYDROCHLORIDE IN 0.9% SODIUM CHLORIDE 21.4 MICROGRAM(S)/KG/HR: 4 INJECTION INTRAVENOUS at 18:19

## 2020-11-27 RX ADMIN — CHLORHEXIDINE GLUCONATE 1 APPLICATION(S): 213 SOLUTION TOPICAL at 05:13

## 2020-11-27 RX ADMIN — Medication 100 MILLIGRAM(S): at 11:22

## 2020-11-27 RX ADMIN — Medication 100 MILLIEQUIVALENT(S): at 20:30

## 2020-11-27 RX ADMIN — MIDAZOLAM HYDROCHLORIDE 8.54 MG/KG/HR: 1 INJECTION, SOLUTION INTRAMUSCULAR; INTRAVENOUS at 05:10

## 2020-11-27 RX ADMIN — CHLORHEXIDINE GLUCONATE 15 MILLILITER(S): 213 SOLUTION TOPICAL at 05:13

## 2020-11-27 NOTE — PROGRESS NOTE ADULT - SUBJECTIVE AND OBJECTIVE BOX
HPI: 55M from home without PMH with progressively worsening SOB and +COVID test x5 days prior to admission presented following SpO2 64% at home.  Started on 15L NRB in ED 2/2 SpO2s 85-90%, desaturated to low 80s and was placed on HFNC, continued to desaturate, was intubated and admitted to ICU.     INTERVAL HPI/OVERNIGHT EVENTS: NAEON. Febrile to 100.6, VS otherwise wnl. Continuing on vent 25/450/40/5 with SpO2 mid-high 90s. ABG this am 7.42/40/81/25. VT 8.9 yesterday, continuing with vancomycin and zosyn. APRs remain elevated. Failed awake trial yesterday 2/2 fighting vent, will trial again this am. Cr slightly worsened  ICU Vital Signs Last 24 Hrs  T(C): 37.2 (27 Nov 2020 07:45), Max: 38.1 (27 Nov 2020 04:00)  T(F): 99 (27 Nov 2020 07:45), Max: 100.6 (27 Nov 2020 04:00)  HR: 86 (27 Nov 2020 08:59) (47 - 107)  BP: 128/69 (27 Nov 2020 08:15) (77/45 - 158/52)  BP(mean): 83 (27 Nov 2020 08:15) (52 - 118)  ABP: --  ABP(mean): --  RR: 25 (27 Nov 2020 08:30) (17 - 29)  SpO2: 96% (27 Nov 2020 08:59) (92% - 100%)    I&O's Summary    26 Nov 2020 07:01  -  27 Nov 2020 07:00  --------------------------------------------------------  IN: 2854.3 mL / OUT: 2030 mL / NET: 824.3 mL      Mode: AC/ CMV (Assist Control/ Continuous Mandatory Ventilation)  RR (machine): 25  TV (machine): 450  FiO2: 40  PEEP: 5  ITime: 0.85  MAP: 15  PIP: 34      LABS:                        9.5    12.06 )-----------( 214      ( 27 Nov 2020 06:12 )             30.6     11-27    139  |  104  |  32<H>  ----------------------------<  148<H>  3.8   |  27  |  1.78<H>    Ca    7.8<L>      27 Nov 2020 06:12  Phos  4.3     11-27  Mg     2.0     11-27    TPro  5.8<L>  /  Alb  1.8<L>  /  TBili  0.9  /  DBili  x   /  AST  36  /  ALT  58  /  AlkPhos  60  11-27    PT/INR - ( 27 Nov 2020 06:12 )   PT: 16.0 sec;   INR: 1.36 ratio         PTT - ( 27 Nov 2020 06:12 )  PTT:71.4 sec    CAPILLARY BLOOD GLUCOSE      POCT Blood Glucose.: 134 mg/dL (27 Nov 2020 06:14)  POCT Blood Glucose.: 117 mg/dL (26 Nov 2020 22:40)  POCT Blood Glucose.: 113 mg/dL (26 Nov 2020 16:53)  POCT Blood Glucose.: 110 mg/dL (26 Nov 2020 10:56)    ABG - ( 27 Nov 2020 04:07 )  pH, Arterial: 7.42  pH, Blood: x     /  pCO2: 40    /  pO2: 81    / HCO3: 25    / Base Excess: 0.9   /  SaO2: 95                  RADIOLOGY & ADDITIONAL TESTS:    Consultant(s) Notes Reviewed:  [x ] YES  [ ] NO    MEDICATIONS  (STANDING):  chlorhexidine 0.12% Liquid 15 milliLiter(s) Oral Mucosa two times a day  chlorhexidine 2% Cloths 1 Application(s) Topical <User Schedule>  fentaNYL   Infusion. 3 MICROgram(s)/kG/Hr (25.6 mL/Hr) IV Continuous <Continuous>  heparin  Infusion. 1300 Unit(s)/Hr (13 mL/Hr) IV Continuous <Continuous>  insulin lispro (ADMELOG) corrective regimen sliding scale   SubCutaneous every 6 hours  midazolam Infusion 0.1 mG/kG/Hr (8.54 mL/Hr) IV Continuous <Continuous>  norepinephrine Infusion 0.2 MICROgram(s)/kG/Min (16 mL/Hr) IV Continuous <Continuous>  pantoprazole  Injectable 40 milliGRAM(s) IV Push daily  piperacillin/tazobactam IVPB.. 3.375 Gram(s) IV Intermittent every 8 hours  polyethylene glycol 3350 17 Gram(s) Oral daily  senna Syrup 10 milliLiter(s) Oral at bedtime  vancomycin  IVPB 1000 milliGRAM(s) IV Intermittent every 24 hours    MEDICATIONS  (PRN):  acetaminophen    Suspension .. 650 milliGRAM(s) Enteral Tube every 6 hours PRN Temp greater or equal to 38C (100.4F)  artificial  tears Solution 1 Drop(s) Both EYES every 4 hours PRN Dry Eyes  sodium chloride 0.9% lock flush 10 milliLiter(s) IV Push every 1 hour PRN Pre/post blood products, medications, blood draw, and to maintain line patency      PHYSICAL EXAM:  GENERAL: well built, well nourished  HEAD:  Atraumatic, Normocephalic  EYES: EOMI, PERRLA, conjunctiva and sclera clear  ENT: No tonsillar erythema, exudates, or enlargement; Moist mucous membranes, Good dentition, No lesions  NECK: Supple, No JVD, Normal thyroid, no enlarged nodes  NERVOUS SYSTEM:  Alert & Oriented X3, Good concentration; Motor Strength 5/5 B/L upper and lower extremities; DTRs 2+ intact and symmetric, sensory intact  CHEST/LUNG: B/L good air entry; No rales, rhonchi, or wheezing  HEART: S1S2 normal, no S3, Regular rate and rhythm; No murmurs, rubs, or gallops  ABDOMEN: Soft, Nontender, Nondistended; Bowel sounds present  EXTREMITIES:  2+ Peripheral Pulses, No clubbing, cyanosis, or edema  LYMPH: No lymphadenopathy noted  SKIN: No rashes or lesions    Care Discussed with Consultants/Other Providers [ x] YES  [ ] NO HPI: 55M from home without PMH with progressively worsening SOB and +COVID test x5 days prior to admission presented following SpO2 64% at home.  Started on 15L NRB in ED 2/2 SpO2s 85-90%, desaturated to low 80s and was placed on HFNC, continued to desaturate, was intubated and admitted to ICU.     INTERVAL HPI/OVERNIGHT EVENTS: NAEON. Febrile to 100.6, VS otherwise wnl. Continuing on vent 25/450/40/5 with SpO2 mid-high 90s. ABG this am 7.42/40/81/25. VT 8.9 yesterday, continuing with vancomycin and zosyn. APRs remain elevated. Failed awake trial yesterday 2/2 fighting vent, will trial again this am. Cr slightly worsened to 1.78  ICU Vital Signs Last 24 Hrs  T(C): 37.2 (27 Nov 2020 07:45), Max: 38.1 (27 Nov 2020 04:00)  T(F): 99 (27 Nov 2020 07:45), Max: 100.6 (27 Nov 2020 04:00)  HR: 86 (27 Nov 2020 08:59) (47 - 107)  BP: 128/69 (27 Nov 2020 08:15) (77/45 - 158/52)  BP(mean): 83 (27 Nov 2020 08:15) (52 - 118)  ABP: --  ABP(mean): --  RR: 25 (27 Nov 2020 08:30) (17 - 29)  SpO2: 96% (27 Nov 2020 08:59) (92% - 100%)    I&O's Summary    26 Nov 2020 07:01  -  27 Nov 2020 07:00  --------------------------------------------------------  IN: 2854.3 mL / OUT: 2030 mL / NET: 824.3 mL      Mode: AC/ CMV (Assist Control/ Continuous Mandatory Ventilation)  RR (machine): 25  TV (machine): 450  FiO2: 40  PEEP: 5  ITime: 0.85  MAP: 15  PIP: 34      LABS:                        9.5    12.06 )-----------( 214      ( 27 Nov 2020 06:12 )             30.6     11-27    139  |  104  |  32<H>  ----------------------------<  148<H>  3.8   |  27  |  1.78<H>    Ca    7.8<L>      27 Nov 2020 06:12  Phos  4.3     11-27  Mg     2.0     11-27    TPro  5.8<L>  /  Alb  1.8<L>  /  TBili  0.9  /  DBili  x   /  AST  36  /  ALT  58  /  AlkPhos  60  11-27    PT/INR - ( 27 Nov 2020 06:12 )   PT: 16.0 sec;   INR: 1.36 ratio         PTT - ( 27 Nov 2020 06:12 )  PTT:71.4 sec    CAPILLARY BLOOD GLUCOSE      POCT Blood Glucose.: 134 mg/dL (27 Nov 2020 06:14)  POCT Blood Glucose.: 117 mg/dL (26 Nov 2020 22:40)  POCT Blood Glucose.: 113 mg/dL (26 Nov 2020 16:53)  POCT Blood Glucose.: 110 mg/dL (26 Nov 2020 10:56)    ABG - ( 27 Nov 2020 04:07 )  pH, Arterial: 7.42  pH, Blood: x     /  pCO2: 40    /  pO2: 81    / HCO3: 25    / Base Excess: 0.9   /  SaO2: 95                  RADIOLOGY & ADDITIONAL TESTS:    Consultant(s) Notes Reviewed:  [x ] YES  [ ] NO    MEDICATIONS  (STANDING):  chlorhexidine 0.12% Liquid 15 milliLiter(s) Oral Mucosa two times a day  chlorhexidine 2% Cloths 1 Application(s) Topical <User Schedule>  fentaNYL   Infusion. 3 MICROgram(s)/kG/Hr (25.6 mL/Hr) IV Continuous <Continuous>  heparin  Infusion. 1300 Unit(s)/Hr (13 mL/Hr) IV Continuous <Continuous>  insulin lispro (ADMELOG) corrective regimen sliding scale   SubCutaneous every 6 hours  midazolam Infusion 0.1 mG/kG/Hr (8.54 mL/Hr) IV Continuous <Continuous>  norepinephrine Infusion 0.2 MICROgram(s)/kG/Min (16 mL/Hr) IV Continuous <Continuous>  pantoprazole  Injectable 40 milliGRAM(s) IV Push daily  piperacillin/tazobactam IVPB.. 3.375 Gram(s) IV Intermittent every 8 hours  polyethylene glycol 3350 17 Gram(s) Oral daily  senna Syrup 10 milliLiter(s) Oral at bedtime  vancomycin  IVPB 1000 milliGRAM(s) IV Intermittent every 24 hours    MEDICATIONS  (PRN):  acetaminophen    Suspension .. 650 milliGRAM(s) Enteral Tube every 6 hours PRN Temp greater or equal to 38C (100.4F)  artificial  tears Solution 1 Drop(s) Both EYES every 4 hours PRN Dry Eyes  sodium chloride 0.9% lock flush 10 milliLiter(s) IV Push every 1 hour PRN Pre/post blood products, medications, blood draw, and to maintain line patency      PHYSICAL EXAM:  GENERAL: well built, well nourished  HEAD:  Atraumatic, Normocephalic  EYES: EOMI, PERRLA, conjunctiva and sclera clear  ENT: No tonsillar erythema, exudates, or enlargement; Moist mucous membranes, Good dentition, No lesions  NECK: Supple, No JVD, Normal thyroid, no enlarged nodes  NERVOUS SYSTEM:  Alert & Oriented X3, Good concentration; Motor Strength 5/5 B/L upper and lower extremities; DTRs 2+ intact and symmetric, sensory intact  CHEST/LUNG: B/L good air entry; No rales, rhonchi, or wheezing  HEART: S1S2 normal, no S3, Regular rate and rhythm; No murmurs, rubs, or gallops  ABDOMEN: Soft, Nontender, Nondistended; Bowel sounds present  EXTREMITIES:  2+ Peripheral Pulses, No clubbing, cyanosis, or edema  LYMPH: No lymphadenopathy noted  SKIN: No rashes or lesions    Care Discussed with Consultants/Other Providers [ x] YES  [ ] NO HPI: 55M from home without PMH with progressively worsening SOB and +COVID test x5 days prior to admission presented following SpO2 64% at home.  Started on 15L NRB in ED 2/2 SpO2s 85-90%, desaturated to low 80s and was placed on HFNC, continued to desaturate, was intubated and admitted to ICU.     INTERVAL HPI/OVERNIGHT EVENTS: NAEON. Febrile to 100.6, VS otherwise wnl. Continuing on vent 25/450/40/5 with SpO2 mid-high 90s. ABG this am 7.42/40/81/25. VT 8.9 yesterday, continuing with vancomycin and zosyn. APRs remain elevated. Failed awake trial yesterday 2/2 fighting vent, will trial again this am. Cr slightly worsened to 1.78, u/o improved from yesterday to approx 85cc/hr, will continue to monitor.     ICU Vital Signs Last 24 Hrs  T(C): 37.2 (27 Nov 2020 07:45), Max: 38.1 (27 Nov 2020 04:00)  T(F): 99 (27 Nov 2020 07:45), Max: 100.6 (27 Nov 2020 04:00)  HR: 86 (27 Nov 2020 08:59) (47 - 107)  BP: 128/69 (27 Nov 2020 08:15) (77/45 - 158/52)  BP(mean): 83 (27 Nov 2020 08:15) (52 - 118)  ABP: --  ABP(mean): --  RR: 25 (27 Nov 2020 08:30) (17 - 29)  SpO2: 96% (27 Nov 2020 08:59) (92% - 100%)    I&O's Summary    26 Nov 2020 07:01  -  27 Nov 2020 07:00  --------------------------------------------------------  IN: 2854.3 mL / OUT: 2030 mL / NET: 824.3 mL      Mode: AC/ CMV (Assist Control/ Continuous Mandatory Ventilation)  RR (machine): 25  TV (machine): 450  FiO2: 40  PEEP: 5  ITime: 0.85  MAP: 15  PIP: 34      LABS:                        9.5    12.06 )-----------( 214      ( 27 Nov 2020 06:12 )             30.6     11-27    139  |  104  |  32<H>  ----------------------------<  148<H>  3.8   |  27  |  1.78<H>    Ca    7.8<L>      27 Nov 2020 06:12  Phos  4.3     11-27  Mg     2.0     11-27    TPro  5.8<L>  /  Alb  1.8<L>  /  TBili  0.9  /  DBili  x   /  AST  36  /  ALT  58  /  AlkPhos  60  11-27    PT/INR - ( 27 Nov 2020 06:12 )   PT: 16.0 sec;   INR: 1.36 ratio         PTT - ( 27 Nov 2020 06:12 )  PTT:71.4 sec    CAPILLARY BLOOD GLUCOSE      POCT Blood Glucose.: 134 mg/dL (27 Nov 2020 06:14)  POCT Blood Glucose.: 117 mg/dL (26 Nov 2020 22:40)  POCT Blood Glucose.: 113 mg/dL (26 Nov 2020 16:53)  POCT Blood Glucose.: 110 mg/dL (26 Nov 2020 10:56)    ABG - ( 27 Nov 2020 04:07 )  pH, Arterial: 7.42  pH, Blood: x     /  pCO2: 40    /  pO2: 81    / HCO3: 25    / Base Excess: 0.9   /  SaO2: 95                  RADIOLOGY & ADDITIONAL TESTS:    Consultant(s) Notes Reviewed:  [x ] YES  [ ] NO    MEDICATIONS  (STANDING):  chlorhexidine 0.12% Liquid 15 milliLiter(s) Oral Mucosa two times a day  chlorhexidine 2% Cloths 1 Application(s) Topical <User Schedule>  fentaNYL   Infusion. 3 MICROgram(s)/kG/Hr (25.6 mL/Hr) IV Continuous <Continuous>  heparin  Infusion. 1300 Unit(s)/Hr (13 mL/Hr) IV Continuous <Continuous>  insulin lispro (ADMELOG) corrective regimen sliding scale   SubCutaneous every 6 hours  midazolam Infusion 0.1 mG/kG/Hr (8.54 mL/Hr) IV Continuous <Continuous>  norepinephrine Infusion 0.2 MICROgram(s)/kG/Min (16 mL/Hr) IV Continuous <Continuous>  pantoprazole  Injectable 40 milliGRAM(s) IV Push daily  piperacillin/tazobactam IVPB.. 3.375 Gram(s) IV Intermittent every 8 hours  polyethylene glycol 3350 17 Gram(s) Oral daily  senna Syrup 10 milliLiter(s) Oral at bedtime  vancomycin  IVPB 1000 milliGRAM(s) IV Intermittent every 24 hours    MEDICATIONS  (PRN):  acetaminophen    Suspension .. 650 milliGRAM(s) Enteral Tube every 6 hours PRN Temp greater or equal to 38C (100.4F)  artificial  tears Solution 1 Drop(s) Both EYES every 4 hours PRN Dry Eyes  sodium chloride 0.9% lock flush 10 milliLiter(s) IV Push every 1 hour PRN Pre/post blood products, medications, blood draw, and to maintain line patency    PHYSICAL EXAM:    GENERAL: sedated, some eye opening/grimacing, +ETT, +OG  HEAD:  Atraumatic, Normocephalic  EYES: b/l edema improved on left, worsened on right  ENT: moist mucous membranes  NECK: Supple  NERVOUS SYSTEM: sedated, some eye opening/grimacing as above  CHEST/LUNG: B/L good air entry  HEART: S1S2 normal, RRR  ABDOMEN: Soft, Nontender, distended 2/2 body habitus, Bowel sounds present  EXTREMITIES:  2+ Peripheral Pulses, edema improved  SKIN: No rashes or lesions noted  Care Discussed with Consultants/Other Providers [ x] YES  [ ] NO HPI: 55M from home without PMH with progressively worsening SOB and +COVID test x5 days prior to admission presented following SpO2 64% at home.  Started on 15L NRB in ED 2/2 SpO2s 85-90%, desaturated to low 80s and was placed on HFNC, continued to desaturate, was intubated and admitted to ICU.     INTERVAL HPI/OVERNIGHT EVENTS: NAEON. Febrile to 100.6, VS otherwise wnl. Continuing on vent 25/450/40/5 with SpO2 mid-high 90s. ABG this am 7.42/40/81/25. VT 8.9 yesterday, continuing with vancomycin and zosyn. APRs remain elevated. Failed awake trial yesterday 2/2 fighting vent, will SBT again this am. Cr slightly worsened to 1.78, u/o improved from yesterday to approx 85cc/hr, will continue to monitor.     ICU Vital Signs Last 24 Hrs  T(C): 37.2 (27 Nov 2020 07:45), Max: 38.1 (27 Nov 2020 04:00)  T(F): 99 (27 Nov 2020 07:45), Max: 100.6 (27 Nov 2020 04:00)  HR: 86 (27 Nov 2020 08:59) (47 - 107)  BP: 128/69 (27 Nov 2020 08:15) (77/45 - 158/52)  BP(mean): 83 (27 Nov 2020 08:15) (52 - 118)  ABP: --  ABP(mean): --  RR: 25 (27 Nov 2020 08:30) (17 - 29)  SpO2: 96% (27 Nov 2020 08:59) (92% - 100%)    I&O's Summary    26 Nov 2020 07:01  -  27 Nov 2020 07:00  --------------------------------------------------------  IN: 2854.3 mL / OUT: 2030 mL / NET: 824.3 mL      Mode: AC/ CMV (Assist Control/ Continuous Mandatory Ventilation)  RR (machine): 25  TV (machine): 450  FiO2: 40  PEEP: 5  ITime: 0.85  MAP: 15  PIP: 34      LABS:                        9.5    12.06 )-----------( 214      ( 27 Nov 2020 06:12 )             30.6     11-27    139  |  104  |  32<H>  ----------------------------<  148<H>  3.8   |  27  |  1.78<H>    Ca    7.8<L>      27 Nov 2020 06:12  Phos  4.3     11-27  Mg     2.0     11-27    TPro  5.8<L>  /  Alb  1.8<L>  /  TBili  0.9  /  DBili  x   /  AST  36  /  ALT  58  /  AlkPhos  60  11-27    PT/INR - ( 27 Nov 2020 06:12 )   PT: 16.0 sec;   INR: 1.36 ratio         PTT - ( 27 Nov 2020 06:12 )  PTT:71.4 sec    CAPILLARY BLOOD GLUCOSE      POCT Blood Glucose.: 134 mg/dL (27 Nov 2020 06:14)  POCT Blood Glucose.: 117 mg/dL (26 Nov 2020 22:40)  POCT Blood Glucose.: 113 mg/dL (26 Nov 2020 16:53)  POCT Blood Glucose.: 110 mg/dL (26 Nov 2020 10:56)    ABG - ( 27 Nov 2020 04:07 )  pH, Arterial: 7.42  pH, Blood: x     /  pCO2: 40    /  pO2: 81    / HCO3: 25    / Base Excess: 0.9   /  SaO2: 95                  RADIOLOGY & ADDITIONAL TESTS:    Consultant(s) Notes Reviewed:  [x ] YES  [ ] NO    MEDICATIONS  (STANDING):  chlorhexidine 0.12% Liquid 15 milliLiter(s) Oral Mucosa two times a day  chlorhexidine 2% Cloths 1 Application(s) Topical <User Schedule>  fentaNYL   Infusion. 3 MICROgram(s)/kG/Hr (25.6 mL/Hr) IV Continuous <Continuous>  heparin  Infusion. 1300 Unit(s)/Hr (13 mL/Hr) IV Continuous <Continuous>  insulin lispro (ADMELOG) corrective regimen sliding scale   SubCutaneous every 6 hours  midazolam Infusion 0.1 mG/kG/Hr (8.54 mL/Hr) IV Continuous <Continuous>  norepinephrine Infusion 0.2 MICROgram(s)/kG/Min (16 mL/Hr) IV Continuous <Continuous>  pantoprazole  Injectable 40 milliGRAM(s) IV Push daily  piperacillin/tazobactam IVPB.. 3.375 Gram(s) IV Intermittent every 8 hours  polyethylene glycol 3350 17 Gram(s) Oral daily  senna Syrup 10 milliLiter(s) Oral at bedtime  vancomycin  IVPB 1000 milliGRAM(s) IV Intermittent every 24 hours    MEDICATIONS  (PRN):  acetaminophen    Suspension .. 650 milliGRAM(s) Enteral Tube every 6 hours PRN Temp greater or equal to 38C (100.4F)  artificial  tears Solution 1 Drop(s) Both EYES every 4 hours PRN Dry Eyes  sodium chloride 0.9% lock flush 10 milliLiter(s) IV Push every 1 hour PRN Pre/post blood products, medications, blood draw, and to maintain line patency    PHYSICAL EXAM:    GENERAL: sedated, some eye opening/grimacing, +ETT, +OG  HEAD:  Atraumatic, Normocephalic  EYES: b/l edema improved on left, worsened on right  ENT: moist mucous membranes  NECK: Supple  NERVOUS SYSTEM: sedated, some eye opening/grimacing as above  CHEST/LUNG: B/L good air entry  HEART: S1S2 normal, RRR  ABDOMEN: Soft, Nontender, distended 2/2 body habitus, Bowel sounds present  EXTREMITIES:  2+ Peripheral Pulses, edema improved  SKIN: No rashes or lesions noted  Care Discussed with Consultants/Other Providers [ x] YES  [ ] NO

## 2020-11-27 NOTE — PROGRESS NOTE ADULT - ASSESSMENT
Patient is a 56yo Male with no PMH recently diagnosed with COVID-19 (5 days PTA) p/w SOB s/p intubated in the ER. Pt admitted to ICU with acute hypoxic respiratory failure 2/2 COVID-19 PNA s/p intubated, BECKY with transaminitis. Nephrology consulted for Elevated serum creatinine.    1. BECKY- unknown baseline SCr. BECKY likely hemodynamically mediated in the setting of septic shock / infection 2/2 COVID-19, hypotension on pressors (now off pressors);  likely ATN. UA with 100 protein and trace blood with hyaline cast. Renal function improving with good urine o/p; likely diuretic/ recovery phase. s/p IVF for hypernatremia, now resolved. Recc free water via NGT @ 350ml q6hrs and IVF as well.  Will defer Renal US due to COVID status. Strict I/Os. Avoid nephrotoxins/ NSAIDs/ RCA. Monitor BMP.  2. Septic Shock due to Multifocal PNA 2/2 COVID-19- Plan per ICU  3. Hypotension- BP improved. Pt off Pressors as per ICU. Monitor BP  4. Acute hypoxic respiratory failure- vent support as per ICU.  5. Hypocalcemia- & Hyperphosphatemia resolved. Monitor ionized calcium and serum phos.

## 2020-11-27 NOTE — CHART NOTE - TREATMENT: THE FOLLOWING DIET HAS BEEN RECOMMENDED
Diet, NPO with Tube Feed:   Tube Feeding Modality: Orogastric  Nepro with Carb Steady  Total Volume for 24 Hours (mL): 240  Continuous  Starting Tube Feed Rate {mL per Hour}: 5  Increase Tube Feed Rate by (mL): 5     Every 10 hours  Until Goal Tube Feed Rate (mL per Hour): 10  Tube Feed Duration (in Hours): 24  Tube Feed Start Time: 10:00 (11-17-20 @ 09:49) [Active]

## 2020-11-27 NOTE — PROGRESS NOTE ADULT - ASSESSMENT
55M from home without PMH with progressively worsening SOB and +COVID test x5 days prior to admission presented with SpO2 64% at home, intubated 2/2 deteriorating respiratory status and admitted to ICU.    Assessment:  1. Acute Hypoxic Respiratory failure   2. COVID Pneumonia   3. Acute Kidney Injury   4. elevated LFTs       Plan:    CNS: #intubated:   -unable to tolerate awake trial yesterdaysedated with fentanyl gtt, decreased to 1mcg, versed dc, started Precedex 0.5mcg  -will trial awakening today, wrist restraints in place to protect tube in case of agitation  -will hold Nimbex given 4/4 TOF and current supine position     CVS: #no active issues:   -SBPs increased to 160-180s, possibly 2/2 awakening/agitation  -follow up TTE (likely once off isolation)     RESP: #AHRF 2/2 COVID  -vent 25/450/60/5, ABG improved at 7.45/35/83/24, will decrease FiO2 to 50% this am and continue to titrate down as tolerated, SpO2 mid-high 90s  -CXR on admission with diffuse B/L opacities and small left pleural effusion, worsening b/l opacities 11/21, slight improvement past x2 days, fu am CXR                     ,  -WBC 24.4 on admission, improved to 10-11, worsened to 15.6 this am  -APRs elevated with procal 1.82->0.52, CRP 34.4->3.7->17->3.29, LDH 1095->706->427, ferritin 4814->1097->1355, d-dimer 559->1977->2763->1200, ESR 62->88, CK and PT/INR mildly elevated, trop 2.1->0.46->0.091 (initial and most recent results from 11/19-24 shown)  -completed 5 days cftx and azithromycin for CAP coverage (11/16-20)  -day 4 empiric vanc and zosyn given new fevers, Tmax 99.2 overnight, VT before next dose today  -MRSA negative, COVID positive  -s/p Decadron 6mg Qd x10 days (day 1 11/15)  -not candidate for remdesivir 2/2 CrCl <45 (29.7)   -BCx NGTD   -will follow daily CRP with coags, d-dimer, esr, LDH, ferritin, trop every three days to monitor disease progression, next results am 11/27    GI: #elevated LFTs:   -likely 2/2 COVID  -follow daily, mildly elevated AP 73, AST/ALT 62/76  -continue senna, miralax, and dulcolax MI for bowel regimen    RENAL: #BECKY:   -BUN/Cr 49/2.2 on admission, unknown baseline  -Cr improving to 1.62, slightly worse than yesterday 1.5 (peak 4.43)  -continuing heparin gtt for elevated D-dimer, Lovenox held 2/2 elevated Cr  -nephro Dr. aCmara following, no HD at this time, hold additional Lasix at this time, rec FWB 350cc q6, will hold off at this time given Na  -trial x2 LR 1L boluses  -u/o 25cc/hr, monitor    ID: #COVID PNA  -Tylenol prn fevers  -new fever 11/23 as above, entral line changed to right femoral yesterday and RIJ pulled today  -remainder of history and management as above     HEME/ONC: #hypercoagulability 2/2 COVID  -D-Dimer 559->1977->2763->1200 likely 2/2 COVID, will repeat 11/27  -continue heparin therapeutic (gtt), Lovenox held 2/2 worsening Cr (hold for line placement as above)  -monitor daily CBC, Hb stable    ENDO: #No issues:  -target CBG <180, 100s overnight  -HgA1c 6.1  -NPO, OG placed, on TF  -low HSS while on steroids    Skin/Catheters: #no issues  -RIJ 11/15-25  -right femoral line  -no rashes noted  -cw artificial tears  -FC in place     PPx:  #DVT: heparin gtt for therapeutic dosing as above SCD (BMI>30, ICU admission)  #GI: Protonix     Dispo:  -monitor in ICU     GOC: Full Code   -brothbronson Ronquillo updated 11/18-25     55M from home without PMH with progressively worsening SOB and +COVID test x5 days prior to admission presented with SpO2 64% at home, intubated 2/2 deteriorating respiratory status and admitted to ICU.    Assessment:  1. Acute Hypoxic Respiratory failure   2. COVID Pneumonia   3. Acute Kidney Injury   4. elevated LFTs       Plan:    CNS: #intubated:   -unable to tolerate awake trial yesterday, started back on versed 0.1mcg, increased fentanyl to 3mcg sedated with fentanyl gtt, decreased to 1mcg, versed dc, started Precedex 0.5mcg  -will trial awakening today, wrist restraints in place to protect tube in case of agitation  -will hold Nimbex given 4/4 TOF and current supine position     CVS: #no active issues:   -SBPs increased to 160-180s, possibly 2/2 awakening/agitation  -follow up TTE (likely once off isolation)     RESP: #AHRF 2/2 COVID  -vent 25/450/60/5, ABG improved at 7.45/35/83/24, will decrease FiO2 to 50% this am and continue to titrate down as tolerated, SpO2 mid-high 90s  -CXR on admission with diffuse B/L opacities and small left pleural effusion, worsening b/l opacities 11/21, slight improvement past x2 days, fu am CXR                     ,  -WBC 24.4 on admission, improved to 10-11, worsened to 15.6 this am  -APRs elevated with procal 1.82->0.52, CRP 34.4->3.7->17->3.29, LDH 1095->706->427, ferritin 4814->1097->1355, d-dimer 559->1977->2763->1200, ESR 62->88, CK and PT/INR mildly elevated, trop 2.1->0.46->0.091 (initial and most recent results from 11/19-24 shown)  -completed 5 days cftx and azithromycin for CAP coverage (11/16-20)  -day 4 empiric vanc and zosyn given new fevers, Tmax 99.2 overnight, VT before next dose today  -MRSA negative, COVID positive  -s/p Decadron 6mg Qd x10 days (day 1 11/15)  -not candidate for remdesivir 2/2 CrCl <45 (29.7)   -BCx NGTD   -will follow daily CRP with coags, d-dimer, esr, LDH, ferritin, trop every three days to monitor disease progression, next results am 11/27    GI: #elevated LFTs:   -likely 2/2 COVID  -follow daily, mildly elevated AP 73, AST/ALT 62/76  -continue senna, miralax, and dulcolax ID for bowel regimen    RENAL: #BECKY:   -BUN/Cr 49/2.2 on admission, unknown baseline  -Cr improving to 1.62, slightly worse than yesterday 1.5 (peak 4.43)  -continuing heparin gtt for elevated D-dimer, Lovenox held 2/2 elevated Cr  -nephro Dr. Camara following, no HD at this time, hold additional Lasix at this time, rec FWB 350cc q6, will hold off at this time given Na  -trial x2 LR 1L boluses  -u/o 25cc/hr, monitor    ID: #COVID PNA  -Tylenol prn fevers  -new fever 11/23 as above, entral line changed to right femoral yesterday and RIJ pulled today  -remainder of history and management as above     HEME/ONC: #hypercoagulability 2/2 COVID  -D-Dimer 559->1977->2763->1200 likely 2/2 COVID, will repeat 11/27  -continue heparin therapeutic (gtt), Lovenox held 2/2 worsening Cr (hold for line placement as above)  -monitor daily CBC, Hb stable    ENDO: #No issues:  -target CBG <180, 100s overnight  -HgA1c 6.1  -NPO, OG placed, on TF  -low HSS while on steroids    Skin/Catheters: #no issues  -RIJ 11/15-25  -right femoral line 11/24  -no rashes noted  -cw artificial tears  -FC in place     PPx:  #DVT: heparin gtt for therapeutic dosing as above SCD (BMI>30, ICU admission)  #GI: Protonix     Dispo:  -monitor in ICU     GOC: Full Code   -brother Yg updated 11/18-25     55M from home without PMH with progressively worsening SOB and +COVID test x5 days prior to admission presented with SpO2 64% at home, intubated 2/2 deteriorating respiratory status and admitted to ICU.    Assessment:  1. Acute Hypoxic Respiratory failure   2. COVID Pneumonia   3. Acute Kidney Injury   4. elevated LFTs       Plan:    CNS: #intubated:   -unable to tolerate awake trial yesterday, started back on versed 0.1mcg, increased fentanyl to 3mcg  -will SBT today, will dc versed and start precedex  -will hold Nimbex given 4/4 TOF and current supine position     CVS: #no active issues:   -SBPs improved 120-140s  -follow up TTE (likely once off isolation)     RESP: #AHRF 2/2 COVID/  -vent 25/450/40/5, ABG remains improved to 7.42/40/81/25, will decrease FiO2 to 30% and monitor, SpO2 mid-high 90s  -CXR on admission with diffuse B/L opacities and small left pleural effusion, worsening b/l opacities 11/21, slight improvement past x2 days, am CXR slightly improved                     -WBC 24.4 on admission, improved to 10-12  -APRs elevated but continuing to decrease, will follow daily ferritin, esr, crp, LDH, ddimer, and procal  -completed 5 days cftx and azithromycin for CAP coverage (11/16-20)  -day 5 empiric vanc and zosyn given new fevers (day 1 11/23), Tmax 100.6 overnight, VT low to 8.9, continue  -MRSA negative, COVID positive  -s/p Decadron 6mg Qd x10 days (day 1 11/15)  -not candidate for remdesivir 2/2 CrCl <45 (29.7)   -BCx NGTD     GI: #elevated LFTs:   -likely 2/2 COVID  -follow daily, improved  -continue senna, miralax, and dulcolax OR for bowel regimen    RENAL: #BECKY:   -BUN/Cr 49/2.2 on admission, unknown baseline  -Cr improving to 1.78, slightly worse than 11/26 1.5 (peak 4.43)  -continuing heparin gtt for elevated D-dimer, Lovenox held 2/2 elevated Cr  -nephro Dr. Camara following  -u/o 85cc/hr, improved    ID: #COVID PNA  -Tylenol prn fevers  -new fever 11/23 as above, central line changed to right femoral 11/24 and RIJ pulled 11/25  -remainder of history and management as above     HEME/ONC: #hypercoagulability 2/2 COVID  -D-Dimer 559->1977->2763->1200 likely 2/2 COVID, will repeat 11/27  -continue heparin therapeutic (gtt), Lovenox held 2/2 worsening Cr (hold for line placement as above)  -monitor daily CBC, Hb stable    ENDO: #No issues:  -target CBG <180, 100s overnight  -HgA1c 6.1  -NPO, OG placed, on TF  -low HSS while on steroids    Skin/Catheters: #no issues  -RIJ 11/15-25  -right femoral line 11/24  -no rashes noted  -cw artificial tears  -FC in place     PPx:  #DVT: heparin gtt for therapeutic dosing as above SCD (BMI>30, ICU admission)  #GI: Protonix     Dispo:  -monitor in ICU     GOC: Full Code   -brother Yg updated 11/18-25

## 2020-11-27 NOTE — PROGRESS NOTE ADULT - SUBJECTIVE AND OBJECTIVE BOX
[facility] NEPHROLOGY- PROGRESS NOTE    Patient is a 56yo Male with no PMH recently diagnosed with COVID-19 (5 days PTA) p/w SOB s/p intubated in the ER. Pt admitted to ICU with acute hypoxic respiratory failure 2/2 COVID-19 PNA s/p intubated, BECKY with transaminitis. Nephrology consulted for Elevated serum creatinine.    Hospital Medications: Medications reviewed.  REVIEW OF SYSTEMS: Unable to obtain/ intubated & sedated    VITALS:  T(F): 100.6 (11-27-20 @ 04:00), Max: 100.6 (11-27-20 @ 04:00)  HR: 70 (11-27-20 @ 06:45)  BP: 107/56 (11-27-20 @ 06:45)  RR: 25 (11-27-20 @ 06:45)  SpO2: 95% (11-27-20 @ 06:45)  Wt(kg): --    11-26 @ 07:01  -  11-27 @ 07:00  --------------------------------------------------------  IN: 2854.3 mL / OUT: 2030 mL / NET: 824.3 mL      PHYSICAL EXAM:  Gen: Sedated  HEENT: +ETT  Cards: RRR, +S1/S2,   Resp: +mechanical BS  GI: soft, ND,   : +isaacs with yellow urine  Extremities: no LE edema, +UE edema  Neuro: sedated    LABS:  11-27    139  |  104  |  32<H>  ----------------------------<  148<H>  3.8   |  27  |  1.78<H>    Ca    7.8<L>      27 Nov 2020 06:12  Phos  4.3     11-27  Mg     2.0     11-27    TPro  5.8<L>  /  Alb  1.8<L>  /  TBili  0.9  /  DBili      /  AST  36  /  ALT  58  /  AlkPhos  60  11-27    Creatinine Trend: 1.78 <--, 1.47 <--, 1.62 <--, 1.58 <--, 1.73 <--, 1.93 <--, 2.06 <--, 2.10 <--, 2.75 <--, 3.22 <--                        9.5    12.06 )-----------( 214      ( 27 Nov 2020 06:12 )             30.6

## 2020-11-27 NOTE — CHART NOTE - NSCHARTNOTEFT_GEN_A_CORE
Reassessment:   55yMalePatient is a 55y old  Male who presents with a chief complaint of Acute Hypoxic Respiratory failure (2020 09:34)  pt remains intubated, sedated. per RN off nimbex. pt failed awakening trial yesterday, will try again today. pt seen through window. per flowrecords nepro running at 10ml/sus25iqy. per nephro, renal function improving with good urine output.    Factors impacting intake: [ ] none [ ] nausea  [ ] vomiting [ ] diarrhea [ ] constipation  [ ]chewing problems [ ] swallowing issues  [ X] other: intubated, critically ill    Diet Presciption: Diet, NPO with Tube Feed:   Tube Feeding Modality: Orogastric  Nepro with Carb Steady  Total Volume for 24 Hours (mL): 240  Continuous  Starting Tube Feed Rate {mL per Hour}: 5  Increase Tube Feed Rate by (mL): 5     Every 10 hours  Until Goal Tube Feed Rate (mL per Hour): 10  Tube Feed Duration (in Hours): 24  Tube Feed Start Time: 10:00 (20 @ 09:49)    Intake: nepro @10ml/hr x24hrs, providing 432kcal, 19g pro/d.     Daily Weight in k.7 (2020 07:45)  Weight in k.6 (2020 08:00)  Weight in k.2 (2020 07:00)  Weight in k.3 (2020 08:00)    % Weight Change +1 generalized edema, +2 right eye    Pertinent Medications: MEDICATIONS  (STANDING):  chlorhexidine 0.12% Liquid 15 milliLiter(s) Oral Mucosa two times a day  chlorhexidine 2% Cloths 1 Application(s) Topical <User Schedule>  dexMEDEtomidine Infusion 1 MICROgram(s)/kG/Hr (21.4 mL/Hr) IV Continuous <Continuous>  fentaNYL   Infusion. 3 MICROgram(s)/kG/Hr (25.6 mL/Hr) IV Continuous <Continuous>  heparin  Infusion. 1300 Unit(s)/Hr (13 mL/Hr) IV Continuous <Continuous>  insulin lispro (ADMELOG) corrective regimen sliding scale   SubCutaneous every 6 hours  norepinephrine Infusion 0.2 MICROgram(s)/kG/Min (16 mL/Hr) IV Continuous <Continuous>  pantoprazole  Injectable 40 milliGRAM(s) IV Push daily  piperacillin/tazobactam IVPB.. 3.375 Gram(s) IV Intermittent every 8 hours  polyethylene glycol 3350 17 Gram(s) Oral daily  senna Syrup 10 milliLiter(s) Oral at bedtime  vancomycin  IVPB 1000 milliGRAM(s) IV Intermittent every 24 hours    MEDICATIONS  (PRN):  acetaminophen    Suspension .. 650 milliGRAM(s) Enteral Tube every 6 hours PRN Temp greater or equal to 38C (100.4F)  artificial  tears Solution 1 Drop(s) Both EYES every 4 hours PRN Dry Eyes  sodium chloride 0.9% lock flush 10 milliLiter(s) IV Push every 1 hour PRN Pre/post blood products, medications, blood draw, and to maintain line patency    Pertinent Labs:  Na139 mmol/L Glu 148 mg/dL<H> K+ 3.8 mmol/L Cr  1.78 mg/dL<H> BUN 32 mg/dL<H>  Phos 4.3 mg/dL  Alb 1.8 g/dL<L>  Chol --    LDL --    HDL --    Trig 301 mg/dL<H>     CAPILLARY BLOOD GLUCOSE      POCT Blood Glucose.: 128 mg/dL (2020 16:08)  POCT Blood Glucose.: 116 mg/dL (2020 11:47)  POCT Blood Glucose.: 134 mg/dL (2020 06:14)  POCT Blood Glucose.: 117 mg/dL (2020 22:40)  POCT Blood Glucose.: 113 mg/dL (2020 16:53)    Skin: intact    Estimated Needs:   [X ] no change since previous assessment  [ ] recalculated:     Previous Nutrition Diagnosis:   [ ] Inadequate Energy Intake [ X]Inadequate Oral Intake [ ] Excessive Energy Intake   [ ] Underweight [ ] Increased Nutrient Needs [ ] Overweight/Obesity   [ ] Altered GI Function [ ] Unintended Weight Loss [ ] Food & Nutrition Related Knowledge Deficit [ ] Malnutrition     Nutrition Diagnosis is [X ] ongoing  [ ] resolved [ ] not applicable     New Nutrition Diagnosis: [X ] acute severe PCM related to critically ill, MOF as evidenced by <50% of nutrient needs >5d, +1 generalized edema, +2 right eye.       Interventions:   Recommend  [ ] Change Diet To:  [ ] Nutrition Supplement  [ X] Nutrition Support as medically feasible, consider increase nepro to 25ml/hr x24hs to provide 1080kcal, 48.6g pro, 436ml free water  [ ] Other:     Monitoring and Evaluation:   [ ] PO intake [ x ] Tolerance to diet prescription [ x ] weights [ x ] labs[ x ] follow up per protocol  [ ] other:

## 2020-11-28 LAB
ALBUMIN SERPL ELPH-MCNC: 1.9 G/DL — LOW (ref 3.5–5)
ALP SERPL-CCNC: 59 U/L — SIGNIFICANT CHANGE UP (ref 40–120)
ALT FLD-CCNC: 44 U/L DA — SIGNIFICANT CHANGE UP (ref 10–60)
ANION GAP SERPL CALC-SCNC: 8 MMOL/L — SIGNIFICANT CHANGE UP (ref 5–17)
AST SERPL-CCNC: 22 U/L — SIGNIFICANT CHANGE UP (ref 10–40)
BASE EXCESS BLDA CALC-SCNC: -0.1 MMOL/L — SIGNIFICANT CHANGE UP (ref -2–2)
BILIRUB SERPL-MCNC: 0.7 MG/DL — SIGNIFICANT CHANGE UP (ref 0.2–1.2)
BLOOD GAS COMMENTS ARTERIAL: SIGNIFICANT CHANGE UP
BUN SERPL-MCNC: 24 MG/DL — HIGH (ref 7–18)
CALCIUM SERPL-MCNC: 8.1 MG/DL — LOW (ref 8.4–10.5)
CHLORIDE SERPL-SCNC: 104 MMOL/L — SIGNIFICANT CHANGE UP (ref 96–108)
CO2 SERPL-SCNC: 27 MMOL/L — SIGNIFICANT CHANGE UP (ref 22–31)
CREAT SERPL-MCNC: 1.43 MG/DL — HIGH (ref 0.5–1.3)
CRP SERPL-MCNC: 10.64 MG/DL — HIGH (ref 0–0.4)
D DIMER BLD IA.RAPID-MCNC: 781 NG/ML DDU — HIGH
ERYTHROCYTE [SEDIMENTATION RATE] IN BLOOD: 106 MM/HR — HIGH (ref 0–20)
FERRITIN SERPL-MCNC: 1203 NG/ML — HIGH (ref 30–400)
GLUCOSE SERPL-MCNC: 126 MG/DL — HIGH (ref 70–99)
HCO3 BLDA-SCNC: 23 MMOL/L — SIGNIFICANT CHANGE UP (ref 23–27)
HCT VFR BLD CALC: 30.6 % — LOW (ref 39–50)
HGB BLD-MCNC: 9.7 G/DL — LOW (ref 13–17)
HOROWITZ INDEX BLDA+IHG-RTO: 40 — SIGNIFICANT CHANGE UP
LDH SERPL L TO P-CCNC: 233 U/L — HIGH (ref 120–225)
MAGNESIUM SERPL-MCNC: 1.9 MG/DL — SIGNIFICANT CHANGE UP (ref 1.6–2.6)
MCHC RBC-ENTMCNC: 29.1 PG — SIGNIFICANT CHANGE UP (ref 27–34)
MCHC RBC-ENTMCNC: 31.7 GM/DL — LOW (ref 32–36)
MCV RBC AUTO: 91.9 FL — SIGNIFICANT CHANGE UP (ref 80–100)
NRBC # BLD: 0 /100 WBCS — SIGNIFICANT CHANGE UP (ref 0–0)
PCO2 BLDA: 34 MMHG — SIGNIFICANT CHANGE UP (ref 32–46)
PH BLDA: 7.45 — SIGNIFICANT CHANGE UP (ref 7.35–7.45)
PHOSPHATE SERPL-MCNC: 3.5 MG/DL — SIGNIFICANT CHANGE UP (ref 2.5–4.5)
PLATELET # BLD AUTO: 247 K/UL — SIGNIFICANT CHANGE UP (ref 150–400)
PO2 BLDA: 97 MMHG — SIGNIFICANT CHANGE UP (ref 74–108)
POTASSIUM SERPL-MCNC: 3.4 MMOL/L — LOW (ref 3.5–5.3)
POTASSIUM SERPL-SCNC: 3.4 MMOL/L — LOW (ref 3.5–5.3)
PROCALCITONIN SERPL-MCNC: 0.41 NG/ML — HIGH (ref 0.02–0.1)
PROT SERPL-MCNC: 6.4 G/DL — SIGNIFICANT CHANGE UP (ref 6–8.3)
RBC # BLD: 3.33 M/UL — LOW (ref 4.2–5.8)
RBC # FLD: 13.5 % — SIGNIFICANT CHANGE UP (ref 10.3–14.5)
SAO2 % BLDA: 97 % — HIGH (ref 92–96)
SODIUM SERPL-SCNC: 139 MMOL/L — SIGNIFICANT CHANGE UP (ref 135–145)
WBC # BLD: 13 K/UL — HIGH (ref 3.8–10.5)
WBC # FLD AUTO: 13 K/UL — HIGH (ref 3.8–10.5)

## 2020-11-28 PROCEDURE — 71045 X-RAY EXAM CHEST 1 VIEW: CPT | Mod: 26

## 2020-11-28 RX ORDER — POTASSIUM CHLORIDE 20 MEQ
40 PACKET (EA) ORAL ONCE
Refills: 0 | Status: COMPLETED | OUTPATIENT
Start: 2020-11-28 | End: 2020-11-28

## 2020-11-28 RX ADMIN — HEPARIN SODIUM 1300 UNIT(S)/HR: 5000 INJECTION INTRAVENOUS; SUBCUTANEOUS at 09:07

## 2020-11-28 RX ADMIN — FENTANYL CITRATE 25.6 MICROGRAM(S)/KG/HR: 50 INJECTION INTRAVENOUS at 21:34

## 2020-11-28 RX ADMIN — SENNA PLUS 10 MILLILITER(S): 8.6 TABLET ORAL at 21:34

## 2020-11-28 RX ADMIN — PIPERACILLIN AND TAZOBACTAM 25 GRAM(S): 4; .5 INJECTION, POWDER, LYOPHILIZED, FOR SOLUTION INTRAVENOUS at 13:38

## 2020-11-28 RX ADMIN — CHLORHEXIDINE GLUCONATE 15 MILLILITER(S): 213 SOLUTION TOPICAL at 17:18

## 2020-11-28 RX ADMIN — PANTOPRAZOLE SODIUM 40 MILLIGRAM(S): 20 TABLET, DELAYED RELEASE ORAL at 13:39

## 2020-11-28 RX ADMIN — Medication 100 MILLIGRAM(S): at 13:39

## 2020-11-28 RX ADMIN — CHLORHEXIDINE GLUCONATE 15 MILLILITER(S): 213 SOLUTION TOPICAL at 05:27

## 2020-11-28 RX ADMIN — SENNA PLUS 10 MILLILITER(S): 8.6 TABLET ORAL at 00:01

## 2020-11-28 RX ADMIN — CHLORHEXIDINE GLUCONATE 1 APPLICATION(S): 213 SOLUTION TOPICAL at 05:27

## 2020-11-28 RX ADMIN — Medication 40 MILLIEQUIVALENT(S): at 17:18

## 2020-11-28 RX ADMIN — PIPERACILLIN AND TAZOBACTAM 25 GRAM(S): 4; .5 INJECTION, POWDER, LYOPHILIZED, FOR SOLUTION INTRAVENOUS at 21:33

## 2020-11-28 RX ADMIN — PIPERACILLIN AND TAZOBACTAM 25 GRAM(S): 4; .5 INJECTION, POWDER, LYOPHILIZED, FOR SOLUTION INTRAVENOUS at 05:27

## 2020-11-28 RX ADMIN — POLYETHYLENE GLYCOL 3350 17 GRAM(S): 17 POWDER, FOR SOLUTION ORAL at 13:39

## 2020-11-28 RX ADMIN — DEXMEDETOMIDINE HYDROCHLORIDE IN 0.9% SODIUM CHLORIDE 4.27 MICROGRAM(S)/KG/HR: 4 INJECTION INTRAVENOUS at 17:19

## 2020-11-28 NOTE — PROGRESS NOTE ADULT - SUBJECTIVE AND OBJECTIVE BOX
INTERVAL HPI/OVERNIGHT EVENTS: Patient had no significant overnight events.     PRESSORS: [ ] YES [x ] NO  WHICH:        Antimicrobial:  piperacillin/tazobactam IVPB.. 3.375 Gram(s) IV Intermittent every 8 hours  vancomycin  IVPB 1000 milliGRAM(s) IV Intermittent every 24 hours    Cardiovascular:  norepinephrine Infusion 0.2 MICROgram(s)/kG/Min IV Continuous <Continuous>    Pulmonary:    Hematalogic:  heparin  Infusion. 1300 Unit(s)/Hr IV Continuous <Continuous>    Other:  acetaminophen    Suspension .. 650 milliGRAM(s) Enteral Tube every 6 hours PRN  artificial  tears Solution 1 Drop(s) Both EYES every 4 hours PRN  chlorhexidine 0.12% Liquid 15 milliLiter(s) Oral Mucosa two times a day  chlorhexidine 2% Cloths 1 Application(s) Topical <User Schedule>  dexMEDEtomidine Infusion 0.2 MICROgram(s)/kG/Hr IV Continuous <Continuous>  fentaNYL   Infusion. 3 MICROgram(s)/kG/Hr IV Continuous <Continuous>  insulin lispro (ADMELOG) corrective regimen sliding scale   SubCutaneous every 6 hours  pantoprazole  Injectable 40 milliGRAM(s) IV Push daily  polyethylene glycol 3350 17 Gram(s) Oral daily  senna Syrup 10 milliLiter(s) Oral at bedtime  sodium chloride 0.9% lock flush 10 milliLiter(s) IV Push every 1 hour PRN    acetaminophen    Suspension .. 650 milliGRAM(s) Enteral Tube every 6 hours PRN  artificial  tears Solution 1 Drop(s) Both EYES every 4 hours PRN  chlorhexidine 0.12% Liquid 15 milliLiter(s) Oral Mucosa two times a day  chlorhexidine 2% Cloths 1 Application(s) Topical <User Schedule>  dexMEDEtomidine Infusion 0.2 MICROgram(s)/kG/Hr IV Continuous <Continuous>  fentaNYL   Infusion. 3 MICROgram(s)/kG/Hr IV Continuous <Continuous>  heparin  Infusion. 1300 Unit(s)/Hr IV Continuous <Continuous>  insulin lispro (ADMELOG) corrective regimen sliding scale   SubCutaneous every 6 hours  norepinephrine Infusion 0.2 MICROgram(s)/kG/Min IV Continuous <Continuous>  pantoprazole  Injectable 40 milliGRAM(s) IV Push daily  piperacillin/tazobactam IVPB.. 3.375 Gram(s) IV Intermittent every 8 hours  polyethylene glycol 3350 17 Gram(s) Oral daily  senna Syrup 10 milliLiter(s) Oral at bedtime  sodium chloride 0.9% lock flush 10 milliLiter(s) IV Push every 1 hour PRN  vancomycin  IVPB 1000 milliGRAM(s) IV Intermittent every 24 hours    Drug Dosing Weight  Height (cm): 157.5 (15 Nov 2020 23:00)  Weight (kg): 85.4 (15 Nov 2020 23:00)  BMI (kg/m2): 34.4 (15 Nov 2020 23:00)  BSA (m2): 1.86 (15 Nov 2020 23:00)        PMH -reviewed admission note, no change since admission      ABG - ( 27 Nov 2020 04:07 )  pH, Arterial: 7.42  pH, Blood: x     /  pCO2: 40    /  pO2: 81    / HCO3: 25    / Base Excess: 0.9   /  SaO2: 95                    11-26 @ 07:01  -  11-27 @ 07:00  --------------------------------------------------------  IN: 2977.5 mL / OUT: 2115 mL / NET: 862.5 mL        Mode: AC/ CMV (Assist Control/ Continuous Mandatory Ventilation)  RR (machine): 25  TV (machine): 450  FiO2: 40  PEEP: 5  ITime: 0.85  MAP: 14  PIP: 31      PHYSICAL EXAM:    GENERAL: sedated, some eye opening/grimacing, +ETT, +OG  HEAD:  Atraumatic, Normocephalic  EYES: b/l edema improved on left, worsened on right  ENT: moist mucous membranes  NECK: Supple  NERVOUS SYSTEM: sedated, some eye opening/grimacing as above  CHEST/LUNG: B/L good air entry  HEART: S1S2 normal, RRR  ABDOMEN: Soft, Nontender, distended 2/2 body habitus, Bowel sounds present  EXTREMITIES:  2+ Peripheral Pulses, edema improved  SKIN: No rashes or lesions noted        LABS:  CBC Full  -  ( 27 Nov 2020 06:12 )  WBC Count : 12.06 K/uL  RBC Count : 3.30 M/uL  Hemoglobin : 9.5 g/dL  Hematocrit : 30.6 %  Platelet Count - Automated : 214 K/uL  Mean Cell Volume : 92.7 fl  Mean Cell Hemoglobin : 28.8 pg  Mean Cell Hemoglobin Concentration : 31.0 gm/dL  Auto Neutrophil # : x  Auto Lymphocyte # : x  Auto Monocyte # : x  Auto Eosinophil # : x  Auto Basophil # : x  Auto Neutrophil % : x  Auto Lymphocyte % : x  Auto Monocyte % : x  Auto Eosinophil % : x  Auto Basophil % : x    11-27    139  |  104  |  28<H>  ----------------------------<  121<H>  3.3<L>   |  26  |  1.64<H>    Ca    7.9<L>      27 Nov 2020 16:20  Phos  4.3     11-27  Mg     2.0     11-27    TPro  5.8<L>  /  Alb  1.8<L>  /  TBili  0.9  /  DBili  x   /  AST  36  /  ALT  58  /  AlkPhos  60  11-27    PT/INR - ( 27 Nov 2020 06:12 )   PT: 16.0 sec;   INR: 1.36 ratio         PTT - ( 27 Nov 2020 06:12 )  PTT:71.4 sec        RADIOLOGY & ADDITIONAL STUDIES REVIEWED:  ***    [ ]GOALS OF CARE DISCUSSION WITH PATIENT/FAMILY/PROXY:    CRITICAL CARE TIME SPENT: 35 minutes INTERVAL HPI/OVERNIGHT EVENTS: Patient had no significant overnight events.     PRESSORS: [ ] YES [x ] NO  WHICH:        Antimicrobial:  piperacillin/tazobactam IVPB.. 3.375 Gram(s) IV Intermittent every 8 hours  vancomycin  IVPB 1000 milliGRAM(s) IV Intermittent every 24 hours    Cardiovascular:  norepinephrine Infusion 0.2 MICROgram(s)/kG/Min IV Continuous <Continuous>    Pulmonary:    Hematalogic:  heparin  Infusion. 1300 Unit(s)/Hr IV Continuous <Continuous>    Other:  acetaminophen    Suspension .. 650 milliGRAM(s) Enteral Tube every 6 hours PRN  artificial  tears Solution 1 Drop(s) Both EYES every 4 hours PRN  chlorhexidine 0.12% Liquid 15 milliLiter(s) Oral Mucosa two times a day  chlorhexidine 2% Cloths 1 Application(s) Topical <User Schedule>  dexMEDEtomidine Infusion 0.2 MICROgram(s)/kG/Hr IV Continuous <Continuous>  fentaNYL   Infusion. 3 MICROgram(s)/kG/Hr IV Continuous <Continuous>  insulin lispro (ADMELOG) corrective regimen sliding scale   SubCutaneous every 6 hours  pantoprazole  Injectable 40 milliGRAM(s) IV Push daily  polyethylene glycol 3350 17 Gram(s) Oral daily  senna Syrup 10 milliLiter(s) Oral at bedtime  sodium chloride 0.9% lock flush 10 milliLiter(s) IV Push every 1 hour PRN    acetaminophen    Suspension .. 650 milliGRAM(s) Enteral Tube every 6 hours PRN  artificial  tears Solution 1 Drop(s) Both EYES every 4 hours PRN  chlorhexidine 0.12% Liquid 15 milliLiter(s) Oral Mucosa two times a day  chlorhexidine 2% Cloths 1 Application(s) Topical <User Schedule>  dexMEDEtomidine Infusion 0.2 MICROgram(s)/kG/Hr IV Continuous <Continuous>  fentaNYL   Infusion. 3 MICROgram(s)/kG/Hr IV Continuous <Continuous>  heparin  Infusion. 1300 Unit(s)/Hr IV Continuous <Continuous>  insulin lispro (ADMELOG) corrective regimen sliding scale   SubCutaneous every 6 hours  norepinephrine Infusion 0.2 MICROgram(s)/kG/Min IV Continuous <Continuous>  pantoprazole  Injectable 40 milliGRAM(s) IV Push daily  piperacillin/tazobactam IVPB.. 3.375 Gram(s) IV Intermittent every 8 hours  polyethylene glycol 3350 17 Gram(s) Oral daily  senna Syrup 10 milliLiter(s) Oral at bedtime  sodium chloride 0.9% lock flush 10 milliLiter(s) IV Push every 1 hour PRN  vancomycin  IVPB 1000 milliGRAM(s) IV Intermittent every 24 hours    Drug Dosing Weight  Height (cm): 157.5 (15 Nov 2020 23:00)  Weight (kg): 85.4 (15 Nov 2020 23:00)  BMI (kg/m2): 34.4 (15 Nov 2020 23:00)  BSA (m2): 1.86 (15 Nov 2020 23:00)        PMH -reviewed admission note, no change since admission      ABG - ( 27 Nov 2020 04:07 )  pH, Arterial: 7.42  pH, Blood: x     /  pCO2: 40    /  pO2: 81    / HCO3: 25    / Base Excess: 0.9   /  SaO2: 95                    11-26 @ 07:01  -  11-27 @ 07:00  --------------------------------------------------------  IN: 2977.5 mL / OUT: 2115 mL / NET: 862.5 mL        Mode: AC/ CMV (Assist Control/ Continuous Mandatory Ventilation)  RR (machine): 25  TV (machine): 450  FiO2: 40  PEEP: 5  ITime: 0.85  MAP: 14  PIP: 31      PHYSICAL EXAM:    GENERAL: sedated, some eye opening/grimacing, +ETT, +OG  HEAD:  Atraumatic, Normocephalic  EYES: b/l edema improved on left, worsened on right  ENT: moist mucous membranes  NECK: Supple  NERVOUS SYSTEM: sedated, some eye opening/grimacing as above  CHEST/LUNG: B/L good air entry  HEART: S1S2 normal, RRR  ABDOMEN: Soft, Nontender, distended 2/2 body habitus, Bowel sounds present  EXTREMITIES:  2+ Peripheral Pulses, edema improved  SKIN: No rashes or lesions noted        LABS:  CBC Full  -  ( 27 Nov 2020 06:12 )  WBC Count : 12.06 K/uL  RBC Count : 3.30 M/uL  Hemoglobin : 9.5 g/dL  Hematocrit : 30.6 %  Platelet Count - Automated : 214 K/uL  Mean Cell Volume : 92.7 fl  Mean Cell Hemoglobin : 28.8 pg  Mean Cell Hemoglobin Concentration : 31.0 gm/dL  Auto Neutrophil # : x  Auto Lymphocyte # : x  Auto Monocyte # : x  Auto Eosinophil # : x  Auto Basophil # : x  Auto Neutrophil % : x  Auto Lymphocyte % : x  Auto Monocyte % : x  Auto Eosinophil % : x  Auto Basophil % : x    11-27    139  |  104  |  28<H>  ----------------------------<  121<H>  3.3<L>   |  26  |  1.64<H>    Ca    7.9<L>      27 Nov 2020 16:20  Phos  4.3     11-27  Mg     2.0     11-27    TPro  5.8<L>  /  Alb  1.8<L>  /  TBili  0.9  /  DBili  x   /  AST  36  /  ALT  58  /  AlkPhos  60  11-27    PT/INR - ( 27 Nov 2020 06:12 )   PT: 16.0 sec;   INR: 1.36 ratio         PTT - ( 27 Nov 2020 06:12 )  PTT:71.4 sec        RADIOLOGY & ADDITIONAL STUDIES REVIEWED:  ***  CXR;  < from: Xray Chest 1 View- PORTABLE-Routine (Xray Chest 1 View- PORTABLE-Routine in AM.) (11.27.20 @ 08:13) >    EXAM:  XR CHEST PORTABLE ROUTINE 1V                            PROCEDURE DATE:  11/27/2020          INTERPRETATION:  AP chest on the November 27, 2020 at 6:36 AM.    Heart is magnified by technique.    Endotracheal tube and nasogastric tube remain.    Presently the endotracheal tube is positioned somewhat more inferiorly and the tip is presently 13 mm above the gilbert.    Persistent advanced bilateral infiltrates again noted. There is suggestion of somewhat better aeration on the left.    IMPRESSION: Advanced bilateral infiltrates again noted with better aeration on the left.    Endotracheal tube has tip 13 mm above the gilbert.            SINA NASSAR M.D., ATTENDING RADIOLOGIST  This document has been electronically signed. Nov 27 2020  4:34PM    < end of copied text >    [ ]GOALS OF CARE DISCUSSION WITH PATIENT/FAMILY/PROXY:    CRITICAL CARE TIME SPENT: 35 minutes INTERVAL HPI/OVERNIGHT EVENTS: Continuing on vent 25|450|40|5|0.9, ABG this am 7.42/40/81/25 4am. Potassium low to 3.3, given 2 riders. Cr continuing to improve to 1.64, u/o worsening this am to approx 10-15cc/hr, monitor. Continuing to titrate down on fentanyl, sedated with Precedex, increasing responsiveness. Continuing with vanc and zosyn, next VT 11/30.    PRESSORS: [ ] YES [x ] NO  WHICH:        Antimicrobial:  piperacillin/tazobactam IVPB.. 3.375 Gram(s) IV Intermittent every 8 hours  vancomycin  IVPB 1000 milliGRAM(s) IV Intermittent every 24 hours    Cardiovascular:  norepinephrine Infusion 0.2 MICROgram(s)/kG/Min IV Continuous <Continuous>    Pulmonary:    Hematalogic:  heparin  Infusion. 1300 Unit(s)/Hr IV Continuous <Continuous>    Other:  acetaminophen    Suspension .. 650 milliGRAM(s) Enteral Tube every 6 hours PRN  artificial  tears Solution 1 Drop(s) Both EYES every 4 hours PRN  chlorhexidine 0.12% Liquid 15 milliLiter(s) Oral Mucosa two times a day  chlorhexidine 2% Cloths 1 Application(s) Topical <User Schedule>  dexMEDEtomidine Infusion 0.2 MICROgram(s)/kG/Hr IV Continuous <Continuous>  fentaNYL   Infusion. 3 MICROgram(s)/kG/Hr IV Continuous <Continuous>  insulin lispro (ADMELOG) corrective regimen sliding scale   SubCutaneous every 6 hours  pantoprazole  Injectable 40 milliGRAM(s) IV Push daily  polyethylene glycol 3350 17 Gram(s) Oral daily  senna Syrup 10 milliLiter(s) Oral at bedtime  sodium chloride 0.9% lock flush 10 milliLiter(s) IV Push every 1 hour PRN    acetaminophen    Suspension .. 650 milliGRAM(s) Enteral Tube every 6 hours PRN  artificial  tears Solution 1 Drop(s) Both EYES every 4 hours PRN  chlorhexidine 0.12% Liquid 15 milliLiter(s) Oral Mucosa two times a day  chlorhexidine 2% Cloths 1 Application(s) Topical <User Schedule>  dexMEDEtomidine Infusion 0.2 MICROgram(s)/kG/Hr IV Continuous <Continuous>  fentaNYL   Infusion. 3 MICROgram(s)/kG/Hr IV Continuous <Continuous>  heparin  Infusion. 1300 Unit(s)/Hr IV Continuous <Continuous>  insulin lispro (ADMELOG) corrective regimen sliding scale   SubCutaneous every 6 hours  norepinephrine Infusion 0.2 MICROgram(s)/kG/Min IV Continuous <Continuous>  pantoprazole  Injectable 40 milliGRAM(s) IV Push daily  piperacillin/tazobactam IVPB.. 3.375 Gram(s) IV Intermittent every 8 hours  polyethylene glycol 3350 17 Gram(s) Oral daily  senna Syrup 10 milliLiter(s) Oral at bedtime  sodium chloride 0.9% lock flush 10 milliLiter(s) IV Push every 1 hour PRN  vancomycin  IVPB 1000 milliGRAM(s) IV Intermittent every 24 hours    Drug Dosing Weight  Height (cm): 157.5 (15 Nov 2020 23:00)  Weight (kg): 85.4 (15 Nov 2020 23:00)  BMI (kg/m2): 34.4 (15 Nov 2020 23:00)  BSA (m2): 1.86 (15 Nov 2020 23:00)        PMH -reviewed admission note, no change since admission      ABG - ( 27 Nov 2020 04:07 )  pH, Arterial: 7.42  pH, Blood: x     /  pCO2: 40    /  pO2: 81    / HCO3: 25    / Base Excess: 0.9   /  SaO2: 95                    11-26 @ 07:01  -  11-27 @ 07:00  --------------------------------------------------------  IN: 2977.5 mL / OUT: 2115 mL / NET: 862.5 mL        Mode: AC/ CMV (Assist Control/ Continuous Mandatory Ventilation)  RR (machine): 25  TV (machine): 450  FiO2: 40  PEEP: 5  ITime: 0.85  MAP: 14  PIP: 31      PHYSICAL EXAM:    GENERAL: sedated, some eye opening/grimacing, +ETT, +OG  HEAD:  Atraumatic, Normocephalic  EYES: b/l edema improved on left, worsened on right  ENT: moist mucous membranes  NECK: Supple  NERVOUS SYSTEM: sedated, some eye opening/grimacing as above  CHEST/LUNG: B/L good air entry  HEART: S1S2 normal, RRR  ABDOMEN: Soft, Nontender, distended 2/2 body habitus, Bowel sounds present  EXTREMITIES:  2+ Peripheral Pulses, edema improved  SKIN: No rashes or lesions noted        LABS:  CBC Full  -  ( 27 Nov 2020 06:12 )  WBC Count : 12.06 K/uL  RBC Count : 3.30 M/uL  Hemoglobin : 9.5 g/dL  Hematocrit : 30.6 %  Platelet Count - Automated : 214 K/uL  Mean Cell Volume : 92.7 fl  Mean Cell Hemoglobin : 28.8 pg  Mean Cell Hemoglobin Concentration : 31.0 gm/dL  Auto Neutrophil # : x  Auto Lymphocyte # : x  Auto Monocyte # : x  Auto Eosinophil # : x  Auto Basophil # : x  Auto Neutrophil % : x  Auto Lymphocyte % : x  Auto Monocyte % : x  Auto Eosinophil % : x  Auto Basophil % : x    11-27    139  |  104  |  28<H>  ----------------------------<  121<H>  3.3<L>   |  26  |  1.64<H>    Ca    7.9<L>      27 Nov 2020 16:20  Phos  4.3     11-27  Mg     2.0     11-27    TPro  5.8<L>  /  Alb  1.8<L>  /  TBili  0.9  /  DBili  x   /  AST  36  /  ALT  58  /  AlkPhos  60  11-27    PT/INR - ( 27 Nov 2020 06:12 )   PT: 16.0 sec;   INR: 1.36 ratio         PTT - ( 27 Nov 2020 06:12 )  PTT:71.4 sec        RADIOLOGY & ADDITIONAL STUDIES REVIEWED:  ***  CXR;  < from: Xray Chest 1 View- PORTABLE-Routine (Xray Chest 1 View- PORTABLE-Routine in AM.) (11.27.20 @ 08:13) >    EXAM:  XR CHEST PORTABLE ROUTINE 1V                            PROCEDURE DATE:  11/27/2020          INTERPRETATION:  AP chest on the November 27, 2020 at 6:36 AM.    Heart is magnified by technique.    Endotracheal tube and nasogastric tube remain.    Presently the endotracheal tube is positioned somewhat more inferiorly and the tip is presently 13 mm above the gilbert.    Persistent advanced bilateral infiltrates again noted. There is suggestion of somewhat better aeration on the left.    IMPRESSION: Advanced bilateral infiltrates again noted with better aeration on the left.    Endotracheal tube has tip 13 mm above the gilbert.            SINA NASSAR M.D., ATTENDING RADIOLOGIST  This document has been electronically signed. Nov 27 2020  4:34PM    < end of copied text >    [ ]GOALS OF CARE DISCUSSION WITH PATIENT/FAMILY/PROXY:    CRITICAL CARE TIME SPENT: 35 minutes

## 2020-11-28 NOTE — PROGRESS NOTE ADULT - NUTRITIONAL ASSESSMENT
This patient has been assessed with a concern for Malnutrition and has been determined to have a diagnosis/diagnoses of Severe protein-calorie malnutrition.    This patient is being managed with:   Diet NPO with Tube Feed-  Tube Feeding Modality: Orogastric  Nepro with Carb Steady  Total Volume for 24 Hours (mL): 240  Continuous  Starting Tube Feed Rate {mL per Hour}: 5  Increase Tube Feed Rate by (mL): 5     Every 10 hours  Until Goal Tube Feed Rate (mL per Hour): 10  Tube Feed Duration (in Hours): 24  Tube Feed Start Time: 10:00  Entered: Nov 17 2020  9:48AM

## 2020-11-28 NOTE — PROGRESS NOTE ADULT - ASSESSMENT
55M from home without PMH with progressively worsening SOB and +COVID test x5 days prior to admission presented with SpO2 64% at home, intubated 2/2 deteriorating respiratory status and admitted to ICU.    Assessment:  1. Acute Hypoxic Respiratory failure   2. COVID Pneumonia   3. Acute Kidney Injury   4. elevated LFTs       Plan:    CNS: #intubated:   -unable to tolerate awake trial yesterday, started back on versed 0.1mcg, increased fentanyl to 3mcg  -will SBT today, will dc versed and start precedex  -will hold Nimbex given 4/4 TOF and current supine position     CVS: #no active issues:   -SBPs improved 120-140s  -follow up TTE (likely once off isolation)     RESP: #AHRF 2/2 COVID/  -vent 25/450/40/5, ABG remains improved to 7.42/40/81/25, will decrease FiO2 to 30% and monitor, SpO2 mid-high 90s  -CXR on admission with diffuse B/L opacities and small left pleural effusion, worsening b/l opacities 11/21, slight improvement past x2 days, am CXR slightly improved                     -WBC 24.4 on admission, improved to 10-12  -APRs elevated but continuing to decrease, will follow daily ferritin, esr, crp, LDH, ddimer, and procal  -completed 5 days cftx and azithromycin for CAP coverage (11/16-20)  -day 5 empiric vanc and zosyn given new fevers (day 1 11/23), Tmax 100.6 overnight, VT low to 8.9, continue  -MRSA negative, COVID positive  -s/p Decadron 6mg Qd x10 days (day 1 11/15)  -not candidate for remdesivir 2/2 CrCl <45 (29.7)   -BCx NGTD     GI: #elevated LFTs:   -likely 2/2 COVID  -follow daily, improved  -continue senna, miralax, and dulcolax NC for bowel regimen    RENAL: #BECKY:   -BUN/Cr 49/2.2 on admission, unknown baseline  -Cr improving to 1.78, slightly worse than 11/26 1.5 (peak 4.43)  -continuing heparin gtt for elevated D-dimer, Lovenox held 2/2 elevated Cr  -nephro Dr. Camara following  -u/o 85cc/hr, improved    ID: #COVID PNA  -Tylenol prn fevers  -new fever 11/23 as above, central line changed to right femoral 11/24 and RIJ pulled 11/25  -remainder of history and management as above     HEME/ONC: #hypercoagulability 2/2 COVID  -D-Dimer 559->1977->2763->1200 likely 2/2 COVID, will repeat 11/27  -continue heparin therapeutic (gtt), Lovenox held 2/2 worsening Cr (hold for line placement as above)  -monitor daily CBC, Hb stable    ENDO: #No issues:  -target CBG <180, 100s overnight  -HgA1c 6.1  -NPO, OG placed, on TF  -low HSS while on steroids    Skin/Catheters: #no issues  -RIJ 11/15-25  -right femoral line 11/24  -no rashes noted  -cw artificial tears  -FC in place     PPx:  #DVT: heparin gtt for therapeutic dosing as above SCD (BMI>30, ICU admission)  #GI: Protonix     Dispo:  -monitor in ICU     GOC: Full Code   -brother Yg updated 11/18-25     55M from home without PMH with progressively worsening SOB and +COVID test x5 days prior to admission presented with SpO2 64% at home, intubated 2/2 deteriorating respiratory status and admitted to ICU.    Assessment:  1. Acute Hypoxic Respiratory failure   2. COVID Pneumonia   3. Acute Kidney Injury   4. elevated LFTs       Plan:    CNS: #intubated:   -off versed, on precedex, decreased fentanyl to 1mcg  -continue to attempt SBT and awakening  -will hold Nimbex given 4/4 TOF and current supine position     CVS: #no active issues:   -SBPs increasing to 160-180s likely 2/2 awakening and decreasing fentanyl   -follow up TTE (likely once off isolation)     RESP: #AHRF 2/2 COVID/  -vent 25/450/40/5, ABG remains improved to 7.45/34/97/23, will decrease FiO2 to 30% and monitor, SpO2 mid-high 90s  -CXR on admission with diffuse B/L opacities and small left pleural effusion, worsening b/l opacities 11/21, slight improvement past x2 days, am CXR slightly improved                     -WBC 24.4 on admission, improved to 10-13  -APRs elevated but continuing to decrease, will follow daily ferritin, esr, crp, LDH, ddimer, and procal  -completed 5 days cftx and azithromycin for CAP coverage (11/16-20)  -day 5 empiric vanc and zosyn given new fevers (day 1 11/23), Tmax 100.6 overnight, VT low to 8.9, continue  -MRSA negative, COVID positive  -s/p Decadron 6mg Qd x10 days (day 1 11/15)  -not candidate for remdesivir 2/2 CrCl <45 (29.7)   -BCx NGTD     GI: #elevated LFTs:   -likely 2/2 COVID  -follow daily, improved  -continue senna, miralax, and dulcolax NH for bowel regimen    RENAL: #BECKY:   -BUN/Cr 49/2.2 on admission, unknown baseline  -Cr improving to 1.43, slightly worse than 11/26 1.5 (peak 4.43)  -continuing heparin gtt for elevated D-dimer, Lovenox held 2/2 elevated Cr  -nephro Dr. Camara following  -u/o 10cc/hr, monitor    ID: #COVID PNA  -Tylenol prn fevers  -new fever 11/23 as above, central line changed to right femoral 11/24 and RIJ pulled 11/25  -remainder of history and management as above     HEME/ONC: #hypercoagulability 2/2 COVID  -DD improving, will repeat 11/27  -continue heparin therapeutic (gtt), Lovenox held 2/2 worsening Cr   -monitor daily CBC, Hb stable    ENDO: #No issues:  -target CBG <180, 100s overnight  -HgA1c 6.1  -NPO, OG placed, on TF  -low HSS while on steroids    Skin/Catheters: #no issues  -RIJ 11/15-25  -right femoral line 11/24  -no rashes noted  -cw artificial tears  -FC in place     PPx:  #DVT: heparin gtt for therapeutic dosing as above SCD (BMI>30, ICU admission)  #GI: Protonix     Dispo:  -monitor in ICU     GOC: Full Code   -brother Yg updated 11/18-28

## 2020-11-28 NOTE — PROGRESS NOTE ADULT - SUBJECTIVE AND OBJECTIVE BOX
Full note to follow. [facility]- PROGRESS NOTE    Patient is a 56yo Male with no PMH recently diagnosed with COVID-19 (5 days PTA) p/w SOB s/p intubated in the ER. Pt admitted to ICU with acute hypoxic respiratory failure 2/2 COVID-19 PNA s/p intubated, BECKY with transaminitis. Nephrology consulted for Elevated serum creatinine.    Hospital Medications: Medications reviewed.  REVIEW OF SYSTEMS: Unable to obtain/ intubated & sedated    ICU Vital Signs Last 24 Hrs  T(C): 37.3 (28 Nov 2020 21:00), Max: 37.4 (28 Nov 2020 07:00)  T(F): 99.2 (28 Nov 2020 21:00), Max: 99.4 (28 Nov 2020 07:00)  HR: 58 (28 Nov 2020 22:30) (56 - 127)  BP: 136/67 (28 Nov 2020 22:30) (82/47 - 197/95)  BP(mean): 84 (28 Nov 2020 22:30) (56 - 129)  RR: 25 (28 Nov 2020 22:30) (18 - 30)  SpO2: 99% (28 Nov 2020 22:30) (89% - 100%)      PHYSICAL EXAM:  Gen: Sedated  HEENT: +ETT  Cards: RRR, +S1/S2,   Resp: +mechanical BS  GI: soft, ND,   : +isaacs with yellow urine  Extremities: no LE edema, +UE edema  Neuro: sedated      LABS:  11-28    139  |  104  |  24<H>  ----------------------------<  126<H>  3.4<L>   |  27  |  1.43<H>    Ca    8.1<L>      28 Nov 2020 05:28  Phos  3.5     11-28  Mg     1.9     11-28    TPro  6.4  /  Alb  1.9<L>  /  TBili  0.7  /  DBili      /  AST  22  /  ALT  44  /  AlkPhos  59  11-28    Creatinine Trend: 1.43 <--, 1.64 <--, 1.78 <--, 1.47 <--, 1.62 <--, 1.58 <--, 1.73 <--, 1.93 <--, 2.06 <--, 2.10 <--                        9.7    13.00 )-----------( 247      ( 28 Nov 2020 05:28 )             30.6

## 2020-11-28 NOTE — PROGRESS NOTE ADULT - ASSESSMENT
Patient is a 56yo Male with no PMH recently diagnosed with COVID-19 (5 days PTA) p/w SOB s/p intubated in the ER. Pt admitted to ICU with acute hypoxic respiratory failure 2/2 COVID-19 PNA s/p intubated, BECKY with transaminitis. Nephrology consulted for Elevated serum creatinine.    1. BECKY- unknown baseline SCr. BECKY likely hemodynamically mediated in the setting of septic shock / infection 2/2 COVID-19, hypotension on pressors (now off pressors);  likely ATN. UA with 100 protein and trace blood with hyaline cast. Renal function improving with good urine o/p; likely diuretic/ recovery phase. Monitor renal fxn.  s/p IVF for hypernatremia, now resolved.   Will defer Renal US due to COVID status. Strict I/Os. Avoid nephrotoxins/ NSAIDs/ RCA. Monitor BMP.  2. Septic Shock due to Multifocal PNA 2/2 COVID-19- Plan per ICU  3. Hypotension- BP improved. Pt off Pressors as per ICU. Monitor BP  4. Acute hypoxic respiratory failure- vent support as per ICU.  5. Hypocalcemia- & Hyperphosphatemia resolved. Monitor ionized calcium and serum phos.

## 2020-11-29 LAB
ALBUMIN SERPL ELPH-MCNC: 1.9 G/DL — LOW (ref 3.5–5)
ALP SERPL-CCNC: 56 U/L — SIGNIFICANT CHANGE UP (ref 40–120)
ALT FLD-CCNC: 37 U/L DA — SIGNIFICANT CHANGE UP (ref 10–60)
ANION GAP SERPL CALC-SCNC: 9 MMOL/L — SIGNIFICANT CHANGE UP (ref 5–17)
APTT BLD: 102.7 SEC — HIGH (ref 27.5–35.5)
APTT BLD: 71.1 SEC — HIGH (ref 27.5–35.5)
APTT BLD: 87.7 SEC — HIGH (ref 27.5–35.5)
AST SERPL-CCNC: 25 U/L — SIGNIFICANT CHANGE UP (ref 10–40)
BASE EXCESS BLDA CALC-SCNC: 0.2 MMOL/L — SIGNIFICANT CHANGE UP (ref -2–2)
BILIRUB SERPL-MCNC: 0.8 MG/DL — SIGNIFICANT CHANGE UP (ref 0.2–1.2)
BLOOD GAS COMMENTS ARTERIAL: SIGNIFICANT CHANGE UP
BUN SERPL-MCNC: 24 MG/DL — HIGH (ref 7–18)
CALCIUM SERPL-MCNC: 8.1 MG/DL — LOW (ref 8.4–10.5)
CHLORIDE SERPL-SCNC: 106 MMOL/L — SIGNIFICANT CHANGE UP (ref 96–108)
CO2 SERPL-SCNC: 25 MMOL/L — SIGNIFICANT CHANGE UP (ref 22–31)
CREAT SERPL-MCNC: 1.74 MG/DL — HIGH (ref 0.5–1.3)
CRP SERPL-MCNC: 7.39 MG/DL — HIGH (ref 0–0.4)
ERYTHROCYTE [SEDIMENTATION RATE] IN BLOOD: 110 MM/HR — HIGH (ref 0–20)
FERRITIN SERPL-MCNC: 1065 NG/ML — HIGH (ref 30–400)
GLUCOSE SERPL-MCNC: 132 MG/DL — HIGH (ref 70–99)
HCO3 BLDA-SCNC: 22 MMOL/L — LOW (ref 23–27)
HCT VFR BLD CALC: 25.8 % — LOW (ref 39–50)
HGB BLD-MCNC: 8.1 G/DL — LOW (ref 13–17)
HOROWITZ INDEX BLDA+IHG-RTO: 40 — SIGNIFICANT CHANGE UP
LDH SERPL L TO P-CCNC: 230 U/L — HIGH (ref 120–225)
MAGNESIUM SERPL-MCNC: 2 MG/DL — SIGNIFICANT CHANGE UP (ref 1.6–2.6)
MCHC RBC-ENTMCNC: 28.7 PG — SIGNIFICANT CHANGE UP (ref 27–34)
MCHC RBC-ENTMCNC: 31.4 GM/DL — LOW (ref 32–36)
MCV RBC AUTO: 91.5 FL — SIGNIFICANT CHANGE UP (ref 80–100)
NRBC # BLD: 0 /100 WBCS — SIGNIFICANT CHANGE UP (ref 0–0)
PCO2 BLDA: 28 MMHG — LOW (ref 32–46)
PH BLDA: 7.51 — HIGH (ref 7.35–7.45)
PHOSPHATE SERPL-MCNC: 3.9 MG/DL — SIGNIFICANT CHANGE UP (ref 2.5–4.5)
PLATELET # BLD AUTO: 222 K/UL — SIGNIFICANT CHANGE UP (ref 150–400)
PO2 BLDA: 103 MMHG — SIGNIFICANT CHANGE UP (ref 74–108)
POTASSIUM SERPL-MCNC: 3.7 MMOL/L — SIGNIFICANT CHANGE UP (ref 3.5–5.3)
POTASSIUM SERPL-SCNC: 3.7 MMOL/L — SIGNIFICANT CHANGE UP (ref 3.5–5.3)
PROCALCITONIN SERPL-MCNC: 0.39 NG/ML — HIGH (ref 0.02–0.1)
PROT SERPL-MCNC: 6.2 G/DL — SIGNIFICANT CHANGE UP (ref 6–8.3)
RBC # BLD: 2.82 M/UL — LOW (ref 4.2–5.8)
RBC # FLD: 13.4 % — SIGNIFICANT CHANGE UP (ref 10.3–14.5)
SAO2 % BLDA: 98 % — HIGH (ref 92–96)
SODIUM SERPL-SCNC: 140 MMOL/L — SIGNIFICANT CHANGE UP (ref 135–145)
WBC # BLD: 10.7 K/UL — HIGH (ref 3.8–10.5)
WBC # FLD AUTO: 10.7 K/UL — HIGH (ref 3.8–10.5)

## 2020-11-29 PROCEDURE — 71045 X-RAY EXAM CHEST 1 VIEW: CPT | Mod: 26

## 2020-11-29 RX ORDER — FENTANYL CITRATE 50 UG/ML
1 INJECTION INTRAVENOUS
Qty: 2500 | Refills: 0 | Status: DISCONTINUED | OUTPATIENT
Start: 2020-11-29 | End: 2020-12-01

## 2020-11-29 RX ORDER — DEXAMETHASONE 0.5 MG/5ML
6 ELIXIR ORAL DAILY
Refills: 0 | Status: DISCONTINUED | OUTPATIENT
Start: 2020-11-29 | End: 2020-12-01

## 2020-11-29 RX ORDER — FENTANYL CITRATE 50 UG/ML
25 INJECTION INTRAVENOUS EVERY 4 HOURS
Refills: 0 | Status: DISCONTINUED | OUTPATIENT
Start: 2020-11-29 | End: 2020-11-29

## 2020-11-29 RX ORDER — HALOPERIDOL DECANOATE 100 MG/ML
10 INJECTION INTRAMUSCULAR ONCE
Refills: 0 | Status: COMPLETED | OUTPATIENT
Start: 2020-11-29 | End: 2020-11-29

## 2020-11-29 RX ORDER — FENTANYL CITRATE 50 UG/ML
6 INJECTION INTRAVENOUS
Qty: 2500 | Refills: 0 | Status: DISCONTINUED | OUTPATIENT
Start: 2020-11-29 | End: 2020-11-29

## 2020-11-29 RX ORDER — HALOPERIDOL DECANOATE 100 MG/ML
5 INJECTION INTRAMUSCULAR ONCE
Refills: 0 | Status: COMPLETED | OUTPATIENT
Start: 2020-11-29 | End: 2020-11-29

## 2020-11-29 RX ORDER — DEXAMETHASONE 0.5 MG/5ML
6 ELIXIR ORAL EVERY 6 HOURS
Refills: 0 | Status: DISCONTINUED | OUTPATIENT
Start: 2020-11-29 | End: 2020-11-29

## 2020-11-29 RX ADMIN — CHLORHEXIDINE GLUCONATE 15 MILLILITER(S): 213 SOLUTION TOPICAL at 16:41

## 2020-11-29 RX ADMIN — DEXMEDETOMIDINE HYDROCHLORIDE IN 0.9% SODIUM CHLORIDE 4.27 MICROGRAM(S)/KG/HR: 4 INJECTION INTRAVENOUS at 17:02

## 2020-11-29 RX ADMIN — HALOPERIDOL DECANOATE 10 MILLIGRAM(S): 100 INJECTION INTRAMUSCULAR at 20:18

## 2020-11-29 RX ADMIN — HEPARIN SODIUM 1100 UNIT(S)/HR: 5000 INJECTION INTRAVENOUS; SUBCUTANEOUS at 05:20

## 2020-11-29 RX ADMIN — DEXMEDETOMIDINE HYDROCHLORIDE IN 0.9% SODIUM CHLORIDE 4.27 MICROGRAM(S)/KG/HR: 4 INJECTION INTRAVENOUS at 21:08

## 2020-11-29 RX ADMIN — PIPERACILLIN AND TAZOBACTAM 25 GRAM(S): 4; .5 INJECTION, POWDER, LYOPHILIZED, FOR SOLUTION INTRAVENOUS at 05:08

## 2020-11-29 RX ADMIN — DEXMEDETOMIDINE HYDROCHLORIDE IN 0.9% SODIUM CHLORIDE 4.27 MICROGRAM(S)/KG/HR: 4 INJECTION INTRAVENOUS at 12:46

## 2020-11-29 RX ADMIN — CHLORHEXIDINE GLUCONATE 1 APPLICATION(S): 213 SOLUTION TOPICAL at 05:10

## 2020-11-29 RX ADMIN — DEXMEDETOMIDINE HYDROCHLORIDE IN 0.9% SODIUM CHLORIDE 4.27 MICROGRAM(S)/KG/HR: 4 INJECTION INTRAVENOUS at 07:40

## 2020-11-29 RX ADMIN — Medication 6 MILLIGRAM(S): at 11:26

## 2020-11-29 RX ADMIN — Medication 100 MILLIGRAM(S): at 11:24

## 2020-11-29 RX ADMIN — HEPARIN SODIUM 1100 UNIT(S)/HR: 5000 INJECTION INTRAVENOUS; SUBCUTANEOUS at 12:47

## 2020-11-29 RX ADMIN — PIPERACILLIN AND TAZOBACTAM 25 GRAM(S): 4; .5 INJECTION, POWDER, LYOPHILIZED, FOR SOLUTION INTRAVENOUS at 14:08

## 2020-11-29 RX ADMIN — DEXMEDETOMIDINE HYDROCHLORIDE IN 0.9% SODIUM CHLORIDE 4.27 MICROGRAM(S)/KG/HR: 4 INJECTION INTRAVENOUS at 04:36

## 2020-11-29 RX ADMIN — CHLORHEXIDINE GLUCONATE 15 MILLILITER(S): 213 SOLUTION TOPICAL at 05:08

## 2020-11-29 RX ADMIN — HALOPERIDOL DECANOATE 5 MILLIGRAM(S): 100 INJECTION INTRAMUSCULAR at 20:11

## 2020-11-29 RX ADMIN — PANTOPRAZOLE SODIUM 40 MILLIGRAM(S): 20 TABLET, DELAYED RELEASE ORAL at 11:25

## 2020-11-29 RX ADMIN — HEPARIN SODIUM 1100 UNIT(S)/HR: 5000 INJECTION INTRAVENOUS; SUBCUTANEOUS at 18:58

## 2020-11-29 RX ADMIN — PIPERACILLIN AND TAZOBACTAM 25 GRAM(S): 4; .5 INJECTION, POWDER, LYOPHILIZED, FOR SOLUTION INTRAVENOUS at 21:30

## 2020-11-29 NOTE — PROGRESS NOTE ADULT - SUBJECTIVE AND OBJECTIVE BOX
[facility] [place] NEPHROLOGY- PROGRESS NOTE    Patient is a 56yo Male with no PMH recently diagnosed with COVID-19 (5 days PTA) p/w SOB s/p intubated in the ER. Pt admitted to ICU with acute hypoxic respiratory failure 2/2 COVID-19 PNA s/p intubated, BECKY with transaminitis. Nephrology consulted for Elevated serum creatinine.    Hospital Medications: Medications reviewed.  REVIEW OF SYSTEMS: Unable to obtain/ intubated & sedated      ICU Vital Signs Last 24 Hrs  T(C): 36.5 (29 Nov 2020 08:00), Max: 37.8 (28 Nov 2020 20:00)  T(F): 97.7 (29 Nov 2020 08:00), Max: 100.1 (28 Nov 2020 20:00)  HR: 88 (29 Nov 2020 12:15) (46 - 127)  BP: 155/110 (29 Nov 2020 12:00) (82/47 - 183/114)  BP(mean): 118 (29 Nov 2020 12:00) (55 - 129)  RR: 27 (29 Nov 2020 12:15) (18 - 30)  SpO2: 96% (29 Nov 2020 12:00) (89% - 100%)      PHYSICAL EXAM:  Gen: Sedated  HEENT: +ETT  Cards: RRR, +S1/S2,   Resp: +mechanical BS  GI: soft, ND,   : +isaacs with yellow urine  Extremities: no LE edema, +UE edema  Neuro: sedated      LABS:  11-29    140  |  106  |  24<H>  ----------------------------<  132<H>  3.7   |  25  |  1.74<H>    Ca    8.1<L>      29 Nov 2020 05:03  Phos  3.9     11-29  Mg     2.0     11-29    TPro  6.2  /  Alb  1.9<L>  /  TBili  0.8  /  DBili      /  AST  25  /  ALT  37  /  AlkPhos  56  11-29    Creatinine Trend: 1.74 <--, 1.43 <--, 1.64 <--, 1.78 <--, 1.47 <--, 1.62 <--, 1.58 <--, 1.73 <--, 1.93 <--, 2.06 <--                        8.1    10.70 )-----------( 222      ( 29 Nov 2020 05:03 )             25.8

## 2020-11-29 NOTE — PROGRESS NOTE ADULT - ASSESSMENT
55M from home without PMH with progressively worsening SOB and +COVID test x5 days prior to admission presented with SpO2 64% at home, intubated 2/2 deteriorating respiratory status and admitted to ICU.    Assessment:  1. Acute Hypoxic Respiratory failure   2. COVID Pneumonia   3. Acute Kidney Injury   4. elevated LFTs       Plan:    CNS: #intubated:   -off versed, on precedex, decreased fentanyl to 1mcg  -continue to attempt SBT and awakening  -will hold Nimbex given 4/4 TOF and current supine position     CVS: #no active issues:   -SBPs increasing to 160-180s likely 2/2 awakening and decreasing fentanyl   -follow up TTE (likely once off isolation)     RESP: #AHRF 2/2 COVID/  -vent 25/450/40/5, ABG remains improved to 7.45/34/97/23, will decrease FiO2 to 30% and monitor, SpO2 mid-high 90s  -CXR on admission with diffuse B/L opacities and small left pleural effusion, worsening b/l opacities 11/21, slight improvement past x2 days, am CXR slightly improved                     -WBC 24.4 on admission, improved to 10-13  -APRs elevated but continuing to decrease, will follow daily ferritin, esr, crp, LDH, ddimer, and procal  -completed 5 days cftx and azithromycin for CAP coverage (11/16-20)  -day 5 empiric vanc and zosyn given new fevers (day 1 11/23), Tmax 100.6 overnight, VT low to 8.9, continue  -MRSA negative, COVID positive  -s/p Decadron 6mg Qd x10 days (day 1 11/15)  -not candidate for remdesivir 2/2 CrCl <45 (29.7)   -BCx NGTD     GI: #elevated LFTs:   -likely 2/2 COVID  -follow daily, improved  -continue senna, miralax, and dulcolax NH for bowel regimen    RENAL: #BECKY:   -BUN/Cr 49/2.2 on admission, unknown baseline  -Cr improving to 1.43, slightly worse than 11/26 1.5 (peak 4.43)  -continuing heparin gtt for elevated D-dimer, Lovenox held 2/2 elevated Cr  -nephro Dr. Camara following  -u/o 10cc/hr, monitor    ID: #COVID PNA  -Tylenol prn fevers  -new fever 11/23 as above, central line changed to right femoral 11/24 and RIJ pulled 11/25  -remainder of history and management as above     HEME/ONC: #hypercoagulability 2/2 COVID  -DD improving, will repeat 11/27  -continue heparin therapeutic (gtt), Lovenox held 2/2 worsening Cr   -monitor daily CBC, Hb stable    ENDO: #No issues:  -target CBG <180, 100s overnight  -HgA1c 6.1  -NPO, OG placed, on TF  -low HSS while on steroids    Skin/Catheters: #no issues  -RIJ 11/15-25  -right femoral line 11/24  -no rashes noted  -cw artificial tears  -FC in place     PPx:  #DVT: heparin gtt for therapeutic dosing as above SCD (BMI>30, ICU admission)  #GI: Protonix     Dispo:  -monitor in ICU     GOC: Full Code   -brother Yg updated 11/18-28     55M from home without PMH with progressively worsening SOB and +COVID test x5 days prior to admission presented with SpO2 64% at home, intubated 2/2 deteriorating respiratory status and admitted to ICU.    Assessment:  1. Acute Hypoxic Respiratory failure   2. COVID Pneumonia   3. Acute Kidney Injury   4. elevated LFTs       Plan:    CNS: #intubated:   -off versed, on precedex, decreased fentanyl to 1mcg  -continue to attempt SBT and awakening  -will hold Nimbex given 4/4 TOF and current supine position     CVS: #no active issues:   -SBPs increasing to 160-180s likely 2/2 awakening and decreasing fentanyl   -follow up TTE (likely once off isolation)     RESP: #AHRF 2/2 COVID/  -vent 25/450/40/5, ABG remains improved to 7.51/28/103 respiratory alklosis from overbreathing the vent   -CXR improving                  -WBC count improving   -APRs elevated but continuing to decrease, will follow daily ferritin, esr, crp, LDH, ddimer, and procal  -completed 5 days cftx and azithromycin for CAP coverage (11/16-20)  -day 5 empiric vanc and zosyn given new fevers (day 1 11/23), Tmax 100.6 overnight, VT low to 8.9, continue  -MRSA negative, COVID positive  -C/w decadron   -not candidate for remdesivir 2/2 CrCl <45 (29.7)   -BCx NGTD     GI: #elevated LFTs:   -likely 2/2 COVID  -follow daily, improved  -continue senna, miralax, and dulcolax MO for bowel regimen    RENAL: #BECKY:   -BUN/Cr 49/2.2 on admission, unknown baseline  -Cr improving to 1.43, slightly worse than 11/26 1.5 (peak 4.43)  -continuing heparin gtt for elevated D-dimer, Lovenox held 2/2 elevated Cr  -nephro Dr. Camara following  -u/o 10cc/hr, monitor    ID: #COVID PNA  -Tylenol prn fevers  -new fever 11/23 as above, central line changed to right femoral 11/24 and RIJ pulled 11/25  -remainder of history and management as above     HEME/ONC: #hypercoagulability 2/2 COVID  -DD improving, will repeat 11/27  -continue heparin therapeutic (gtt), Lovenox held 2/2 worsening Cr   -monitor daily CBC, Hb stable    ENDO: #No issues:  -target CBG <180, 100s overnight  -HgA1c 6.1  -NPO, OG placed, on TF  -low HSS while on steroids    Skin/Catheters: #no issues  -RIJ 11/15-25 d/elvira   -right femoral line 11/24  -no rashes noted  -cw artificial tears  -FC in place     PPx:  #DVT: heparin gtt for therapeutic dosing as above SCD (BMI>30, ICU admission)  #GI: Protonix     Dispo:  -monitor in ICU     GOC: Full Code   -brother Yg updated 11/18-28

## 2020-11-29 NOTE — PROGRESS NOTE ADULT - ASSESSMENT
Patient is a 54yo Male with no PMH recently diagnosed with COVID-19 (5 days PTA) p/w SOB s/p intubated in the ER. Pt admitted to ICU with acute hypoxic respiratory failure 2/2 COVID-19 PNA s/p intubated, BECKY with transaminitis. Nephrology consulted for Elevated serum creatinine.    1. BECKY- unknown baseline SCr. BECKY likely hemodynamically mediated in the setting of septic shock / infection 2/2 COVID-19, hypotension on pressors (now off pressors);  likely ATN. UA with 100 protein and trace blood with hyaline cast. Renal function stabilized with good urine o/p; likely diuretic/ recovery phase. Monitor renal fxn.  s/p IVF for hypernatremia, now resolved.   Will defer Renal US due to COVID status. Strict I/Os. Avoid nephrotoxins/ NSAIDs/ RCA. Monitor BMP.  2. Septic Shock due to Multifocal PNA 2/2 COVID-19- Plan per ICU  3. Hypotension- BP improved. Pt off Pressors as per ICU. Monitor BP  4. Acute hypoxic respiratory failure- vent support as per ICU.  5. Hypocalcemia- & Hyperphosphatemia resolved. Monitor ionized calcium and serum phos.

## 2020-11-29 NOTE — PROGRESS NOTE ADULT - SUBJECTIVE AND OBJECTIVE BOX
INTERVAL HPI/OVERNIGHT EVENTS: ***    REVIEW OF SYSTEMS:    CONSTITUTIONAL: No weakness, fevers or chills  NECK: No pain or stiffness  RESPIRATORY: No cough, wheezing, hemoptysis; No shortness of breath  CARDIOVASCULAR: No chest pain or palpitations  GASTROINTESTINAL: No abdominal or epigastric pain. No nausea, vomiting, No diarrhea or constipation. No melena or hematochezia.  GENITOURINARY: No dysuria, frequency or hematuria  NEUROLOGICAL: No numbness or weakness  All other review of systems is negative unless indicated above      PRESSORS: [] YES [] NO  WHICH: N/A    Antimicrobial:  piperacillin/tazobactam IVPB.. 3.375 Gram(s) IV Intermittent every 8 hours  vancomycin  IVPB 1000 milliGRAM(s) IV Intermittent every 24 hours    Cardiovascular:  norepinephrine Infusion 0.2 MICROgram(s)/kG/Min IV Continuous <Continuous>    Pulmonary:    Hematalogic:  heparin  Infusion. 1300 Unit(s)/Hr IV Continuous <Continuous>    Other:  acetaminophen    Suspension .. 650 milliGRAM(s) Enteral Tube every 6 hours PRN  artificial  tears Solution 1 Drop(s) Both EYES every 4 hours PRN  chlorhexidine 0.12% Liquid 15 milliLiter(s) Oral Mucosa two times a day  chlorhexidine 2% Cloths 1 Application(s) Topical <User Schedule>  dexMEDEtomidine Infusion 0.2 MICROgram(s)/kG/Hr IV Continuous <Continuous>  fentaNYL   Infusion. 3 MICROgram(s)/kG/Hr IV Continuous <Continuous>  insulin lispro (ADMELOG) corrective regimen sliding scale   SubCutaneous every 6 hours  pantoprazole  Injectable 40 milliGRAM(s) IV Push daily  polyethylene glycol 3350 17 Gram(s) Oral daily  senna Syrup 10 milliLiter(s) Oral at bedtime  sodium chloride 0.9% lock flush 10 milliLiter(s) IV Push every 1 hour PRN    acetaminophen    Suspension .. 650 milliGRAM(s) Enteral Tube every 6 hours PRN  artificial  tears Solution 1 Drop(s) Both EYES every 4 hours PRN  chlorhexidine 0.12% Liquid 15 milliLiter(s) Oral Mucosa two times a day  chlorhexidine 2% Cloths 1 Application(s) Topical <User Schedule>  dexMEDEtomidine Infusion 0.2 MICROgram(s)/kG/Hr IV Continuous <Continuous>  fentaNYL   Infusion. 3 MICROgram(s)/kG/Hr IV Continuous <Continuous>  heparin  Infusion. 1300 Unit(s)/Hr IV Continuous <Continuous>  insulin lispro (ADMELOG) corrective regimen sliding scale   SubCutaneous every 6 hours  norepinephrine Infusion 0.2 MICROgram(s)/kG/Min IV Continuous <Continuous>  pantoprazole  Injectable 40 milliGRAM(s) IV Push daily  piperacillin/tazobactam IVPB.. 3.375 Gram(s) IV Intermittent every 8 hours  polyethylene glycol 3350 17 Gram(s) Oral daily  senna Syrup 10 milliLiter(s) Oral at bedtime  sodium chloride 0.9% lock flush 10 milliLiter(s) IV Push every 1 hour PRN  vancomycin  IVPB 1000 milliGRAM(s) IV Intermittent every 24 hours    Drug Dosing Weight  Height (cm): 157.5 (15 Nov 2020 23:00)  Weight (kg): 85.4 (15 Nov 2020 23:00)  BMI (kg/m2): 34.4 (15 Nov 2020 23:00)  BSA (m2): 1.86 (15 Nov 2020 23:00)      CENTRAL LINE: [] YES [] NO  LOCATION: N/A    ANN: [] YES [] NO        A-LINE:  [] YES [] NO  LOCATION: N/A    ICU Vital Signs Last 24 Hrs  T(C): 36.3 (28 Nov 2020 23:32), Max: 37.4 (28 Nov 2020 07:00)  T(F): 97.3 (28 Nov 2020 23:32), Max: 99.4 (28 Nov 2020 07:00)  HR: 58 (28 Nov 2020 22:30) (56 - 127)  BP: 136/67 (28 Nov 2020 22:30) (82/47 - 197/95)  BP(mean): 84 (28 Nov 2020 22:30) (56 - 129)  ABP: --  ABP(mean): --  RR: 25 (28 Nov 2020 22:30) (18 - 30)  SpO2: 99% (28 Nov 2020 22:30) (89% - 100%)      ABG - ( 28 Nov 2020 04:11 )  pH, Arterial: 7.45  pH, Blood: x     /  pCO2: 34    /  pO2: 97    / HCO3: 23    / Base Excess: -0.1  /  SaO2: 97                    11-27 @ 07:01  -  11-28 @ 07:00  --------------------------------------------------------  IN: 1729.1 mL / OUT: 1500 mL / NET: 229.1 mL        Mode: AC/ CMV (Assist Control/ Continuous Mandatory Ventilation)  RR (machine): 25  TV (machine): 450  FiO2: 40  PEEP: 5  ITime: 0.85  MAP: 10  PIP: 18        PHYSICAL EXAM:    GENERAL: NAD, well-groomed, well-developed, AAOx3  EYES: EOMI, PERRLA,   NECK: Supple, No JVD; Normal thyroid; Trachea midline  NERVOUS SYSTEM: Motor Strength 5/5 B/L upper and lower extremities; DTRs 2+ intact and symmetric  CHEST/LUNG: No rales, rhonchi, wheezing   HEART: Regular rate and rhythm; No murmurs,   ABDOMEN: Soft, Nontender, Nondistended; Bowel sounds present  EXTREMITIES:  2+ Peripheral Pulses, No clubbing, cyanosis, or edema    LABS:  CBC Full  -  ( 28 Nov 2020 05:28 )  WBC Count : 13.00 K/uL  RBC Count : 3.33 M/uL  Hemoglobin : 9.7 g/dL  Hematocrit : 30.6 %  Platelet Count - Automated : 247 K/uL  Mean Cell Volume : 91.9 fl  Mean Cell Hemoglobin : 29.1 pg  Mean Cell Hemoglobin Concentration : 31.7 gm/dL  Auto Neutrophil # : x  Auto Lymphocyte # : x  Auto Monocyte # : x  Auto Eosinophil # : x  Auto Basophil # : x  Auto Neutrophil % : x  Auto Lymphocyte % : x  Auto Monocyte % : x  Auto Eosinophil % : x  Auto Basophil % : x    11-28    139  |  104  |  24<H>  ----------------------------<  126<H>  3.4<L>   |  27  |  1.43<H>    Ca    8.1<L>      28 Nov 2020 05:28  Phos  3.5     11-28  Mg     1.9     11-28    TPro  6.4  /  Alb  1.9<L>  /  TBili  0.7  /  DBili  x   /  AST  22  /  ALT  44  /  AlkPhos  59  11-28    PT/INR - ( 27 Nov 2020 06:12 )   PT: 16.0 sec;   INR: 1.36 ratio         PTT - ( 28 Nov 2020 05:28 )  PTT:83.1 sec        RADIOLOGY & ADDITIONAL STUDIES REVIEWED:  ***    [ ]GOALS OF CARE DISCUSSION WITH PATIENT/FAMILY/PROXY:    CRITICAL CARE TIME SPENT: 35 minutes INTERVAL HPI/OVERNIGHT EVENTS: No acute events overnight. Patient remains sedated and intubated    REVIEW OF SYSTEMS:  - remains sedated and intubated    Antimicrobial:  piperacillin/tazobactam IVPB.. 3.375 Gram(s) IV Intermittent every 8 hours  vancomycin  IVPB 1000 milliGRAM(s) IV Intermittent every 24 hours    Cardiovascular:  norepinephrine Infusion 0.2 MICROgram(s)/kG/Min IV Continuous <Continuous>    Pulmonary:    Hematalogic:  heparin  Infusion. 1300 Unit(s)/Hr IV Continuous <Continuous>    Other:  acetaminophen    Suspension .. 650 milliGRAM(s) Enteral Tube every 6 hours PRN  artificial  tears Solution 1 Drop(s) Both EYES every 4 hours PRN  chlorhexidine 0.12% Liquid 15 milliLiter(s) Oral Mucosa two times a day  chlorhexidine 2% Cloths 1 Application(s) Topical <User Schedule>  dexMEDEtomidine Infusion 0.2 MICROgram(s)/kG/Hr IV Continuous <Continuous>  fentaNYL   Infusion. 3 MICROgram(s)/kG/Hr IV Continuous <Continuous>  insulin lispro (ADMELOG) corrective regimen sliding scale   SubCutaneous every 6 hours  pantoprazole  Injectable 40 milliGRAM(s) IV Push daily  polyethylene glycol 3350 17 Gram(s) Oral daily  senna Syrup 10 milliLiter(s) Oral at bedtime  sodium chloride 0.9% lock flush 10 milliLiter(s) IV Push every 1 hour PRN    acetaminophen    Suspension .. 650 milliGRAM(s) Enteral Tube every 6 hours PRN  artificial  tears Solution 1 Drop(s) Both EYES every 4 hours PRN  chlorhexidine 0.12% Liquid 15 milliLiter(s) Oral Mucosa two times a day  chlorhexidine 2% Cloths 1 Application(s) Topical <User Schedule>  dexMEDEtomidine Infusion 0.2 MICROgram(s)/kG/Hr IV Continuous <Continuous>  fentaNYL   Infusion. 3 MICROgram(s)/kG/Hr IV Continuous <Continuous>  heparin  Infusion. 1300 Unit(s)/Hr IV Continuous <Continuous>  insulin lispro (ADMELOG) corrective regimen sliding scale   SubCutaneous every 6 hours  norepinephrine Infusion 0.2 MICROgram(s)/kG/Min IV Continuous <Continuous>  pantoprazole  Injectable 40 milliGRAM(s) IV Push daily  piperacillin/tazobactam IVPB.. 3.375 Gram(s) IV Intermittent every 8 hours  polyethylene glycol 3350 17 Gram(s) Oral daily  senna Syrup 10 milliLiter(s) Oral at bedtime  sodium chloride 0.9% lock flush 10 milliLiter(s) IV Push every 1 hour PRN  vancomycin  IVPB 1000 milliGRAM(s) IV Intermittent every 24 hours    Drug Dosing Weight  Height (cm): 157.5 (15 Nov 2020 23:00)  Weight (kg): 85.4 (15 Nov 2020 23:00)  BMI (kg/m2): 34.4 (15 Nov 2020 23:00)  BSA (m2): 1.86 (15 Nov 2020 23:00)    ICU Vital Signs Last 24 Hrs  T(C): 36.3 (28 Nov 2020 23:32), Max: 37.4 (28 Nov 2020 07:00)  T(F): 97.3 (28 Nov 2020 23:32), Max: 99.4 (28 Nov 2020 07:00)  HR: 58 (28 Nov 2020 22:30) (56 - 127)  BP: 136/67 (28 Nov 2020 22:30) (82/47 - 197/95)  BP(mean): 84 (28 Nov 2020 22:30) (56 - 129)  ABP: --  ABP(mean): --  RR: 25 (28 Nov 2020 22:30) (18 - 30)  SpO2: 99% (28 Nov 2020 22:30) (89% - 100%)      ABG - ( 28 Nov 2020 04:11 )  pH, Arterial: 7.45  pH, Blood: x     /  pCO2: 34    /  pO2: 97    / HCO3: 23    / Base Excess: -0.1  /  SaO2: 97                    11-27 @ 07:01  -  11-28 @ 07:00  --------------------------------------------------------  IN: 1729.1 mL / OUT: 1500 mL / NET: 229.1 mL        Mode: AC/ CMV (Assist Control/ Continuous Mandatory Ventilation)  RR (machine): 25  TV (machine): 450  FiO2: 40  PEEP: 5  ITime: 0.85  MAP: 10  PIP: 18        PHYSICAL EXAM:    GENERAL: intubated and sedated  NECK: Supple, No JVD; Normal thyroid; Trachea midline  CHEST/LUNG: No rales, rhonchi, wheezing   HEART: Regular rate and rhythm; No murmurs,   ABDOMEN: Soft, Nontender, Nondistended; Bowel sounds present  EXTREMITIES:  2+ Peripheral Pulses, No clubbing, cyanosis, or edema    LABS:  CBC Full  -  ( 28 Nov 2020 05:28 )  WBC Count : 13.00 K/uL  RBC Count : 3.33 M/uL  Hemoglobin : 9.7 g/dL  Hematocrit : 30.6 %  Platelet Count - Automated : 247 K/uL  Mean Cell Volume : 91.9 fl  Mean Cell Hemoglobin : 29.1 pg  Mean Cell Hemoglobin Concentration : 31.7 gm/dL  Auto Neutrophil # : x  Auto Lymphocyte # : x  Auto Monocyte # : x  Auto Eosinophil # : x  Auto Basophil # : x  Auto Neutrophil % : x  Auto Lymphocyte % : x  Auto Monocyte % : x  Auto Eosinophil % : x  Auto Basophil % : x    11-28    139  |  104  |  24<H>  ----------------------------<  126<H>  3.4<L>   |  27  |  1.43<H>    Ca    8.1<L>      28 Nov 2020 05:28  Phos  3.5     11-28  Mg     1.9     11-28    TPro  6.4  /  Alb  1.9<L>  /  TBili  0.7  /  DBili  x   /  AST  22  /  ALT  44  /  AlkPhos  59  11-28    PT/INR - ( 27 Nov 2020 06:12 )   PT: 16.0 sec;   INR: 1.36 ratio         PTT - ( 28 Nov 2020 05:28 )  PTT:83.1 sec        RADIOLOGY & ADDITIONAL STUDIES REVIEWED:  ***    [ ]GOALS OF CARE DISCUSSION WITH PATIENT/FAMILY/PROXY:    CRITICAL CARE TIME SPENT: 35 minutes INTERVAL HPI/OVERNIGHT EVENTS: Pt pulled out Feeding tube today. Pt is waking up, moving his extremities.      REVIEW OF SYSTEMS:  - unable to obtain given mental status     Antimicrobial:  piperacillin/tazobactam IVPB.. 3.375 Gram(s) IV Intermittent every 8 hours  vancomycin  IVPB 1000 milliGRAM(s) IV Intermittent every 24 hours    Cardiovascular:  norepinephrine Infusion 0.2 MICROgram(s)/kG/Min IV Continuous <Continuous>    Pulmonary:    Hematalogic:  heparin  Infusion. 1300 Unit(s)/Hr IV Continuous <Continuous>    Other:  acetaminophen    Suspension .. 650 milliGRAM(s) Enteral Tube every 6 hours PRN  artificial  tears Solution 1 Drop(s) Both EYES every 4 hours PRN  chlorhexidine 0.12% Liquid 15 milliLiter(s) Oral Mucosa two times a day  chlorhexidine 2% Cloths 1 Application(s) Topical <User Schedule>  dexMEDEtomidine Infusion 0.2 MICROgram(s)/kG/Hr IV Continuous <Continuous>  fentaNYL   Infusion. 3 MICROgram(s)/kG/Hr IV Continuous <Continuous>  insulin lispro (ADMELOG) corrective regimen sliding scale   SubCutaneous every 6 hours  pantoprazole  Injectable 40 milliGRAM(s) IV Push daily  polyethylene glycol 3350 17 Gram(s) Oral daily  senna Syrup 10 milliLiter(s) Oral at bedtime  sodium chloride 0.9% lock flush 10 milliLiter(s) IV Push every 1 hour PRN    acetaminophen    Suspension .. 650 milliGRAM(s) Enteral Tube every 6 hours PRN  artificial  tears Solution 1 Drop(s) Both EYES every 4 hours PRN  chlorhexidine 0.12% Liquid 15 milliLiter(s) Oral Mucosa two times a day  chlorhexidine 2% Cloths 1 Application(s) Topical <User Schedule>  dexMEDEtomidine Infusion 0.2 MICROgram(s)/kG/Hr IV Continuous <Continuous>  fentaNYL   Infusion. 3 MICROgram(s)/kG/Hr IV Continuous <Continuous>  heparin  Infusion. 1300 Unit(s)/Hr IV Continuous <Continuous>  insulin lispro (ADMELOG) corrective regimen sliding scale   SubCutaneous every 6 hours  norepinephrine Infusion 0.2 MICROgram(s)/kG/Min IV Continuous <Continuous>  pantoprazole  Injectable 40 milliGRAM(s) IV Push daily  piperacillin/tazobactam IVPB.. 3.375 Gram(s) IV Intermittent every 8 hours  polyethylene glycol 3350 17 Gram(s) Oral daily  senna Syrup 10 milliLiter(s) Oral at bedtime  sodium chloride 0.9% lock flush 10 milliLiter(s) IV Push every 1 hour PRN  vancomycin  IVPB 1000 milliGRAM(s) IV Intermittent every 24 hours    Drug Dosing Weight  Height (cm): 157.5 (15 Nov 2020 23:00)  Weight (kg): 85.4 (15 Nov 2020 23:00)  BMI (kg/m2): 34.4 (15 Nov 2020 23:00)  BSA (m2): 1.86 (15 Nov 2020 23:00)    ICU Vital Signs Last 24 Hrs  T(C): 36.3 (28 Nov 2020 23:32), Max: 37.4 (28 Nov 2020 07:00)  T(F): 97.3 (28 Nov 2020 23:32), Max: 99.4 (28 Nov 2020 07:00)  HR: 58 (28 Nov 2020 22:30) (56 - 127)  BP: 136/67 (28 Nov 2020 22:30) (82/47 - 197/95)  BP(mean): 84 (28 Nov 2020 22:30) (56 - 129)  ABP: --  ABP(mean): --  RR: 25 (28 Nov 2020 22:30) (18 - 30)  SpO2: 99% (28 Nov 2020 22:30) (89% - 100%)      ABG - ( 28 Nov 2020 04:11 )  pH, Arterial: 7.45  pH, Blood: x     /  pCO2: 34    /  pO2: 97    / HCO3: 23    / Base Excess: -0.1  /  SaO2: 97                    11-27 @ 07:01  -  11-28 @ 07:00  --------------------------------------------------------  IN: 1729.1 mL / OUT: 1500 mL / NET: 229.1 mL        Mode: AC/ CMV (Assist Control/ Continuous Mandatory Ventilation)  RR (machine): 25  TV (machine): 450  FiO2: 40  PEEP: 5  ITime: 0.85  MAP: 10  PIP: 18        PHYSICAL EXAM:    GENERAL: intubated and sedated  NECK: Supple, No JVD; Normal thyroid; Trachea midline  CHEST/LUNG: No rales, rhonchi, wheezing   HEART: Regular rate and rhythm; No murmurs,   ABDOMEN: Soft, Nontender, Nondistended; Bowel sounds present  EXTREMITIES:  2+ Peripheral Pulses, No clubbing, cyanosis, or edema    LABS:  CBC Full  -  ( 28 Nov 2020 05:28 )  WBC Count : 13.00 K/uL  RBC Count : 3.33 M/uL  Hemoglobin : 9.7 g/dL  Hematocrit : 30.6 %  Platelet Count - Automated : 247 K/uL  Mean Cell Volume : 91.9 fl  Mean Cell Hemoglobin : 29.1 pg  Mean Cell Hemoglobin Concentration : 31.7 gm/dL  Auto Neutrophil # : x  Auto Lymphocyte # : x  Auto Monocyte # : x  Auto Eosinophil # : x  Auto Basophil # : x  Auto Neutrophil % : x  Auto Lymphocyte % : x  Auto Monocyte % : x  Auto Eosinophil % : x  Auto Basophil % : x    11-28    139  |  104  |  24<H>  ----------------------------<  126<H>  3.4<L>   |  27  |  1.43<H>    Ca    8.1<L>      28 Nov 2020 05:28  Phos  3.5     11-28  Mg     1.9     11-28    TPro  6.4  /  Alb  1.9<L>  /  TBili  0.7  /  DBili  x   /  AST  22  /  ALT  44  /  AlkPhos  59  11-28    PT/INR - ( 27 Nov 2020 06:12 )   PT: 16.0 sec;   INR: 1.36 ratio         PTT - ( 28 Nov 2020 05:28 )  PTT:83.1 sec        RADIOLOGY & ADDITIONAL STUDIES REVIEWED:  ***    [ ]GOALS OF CARE DISCUSSION WITH PATIENT/FAMILY/PROXY:    CRITICAL CARE TIME SPENT: 35 minutes

## 2020-11-30 LAB
ALBUMIN SERPL ELPH-MCNC: 2.5 G/DL — LOW (ref 3.5–5)
ALP SERPL-CCNC: 65 U/L — SIGNIFICANT CHANGE UP (ref 40–120)
ALT FLD-CCNC: 42 U/L DA — SIGNIFICANT CHANGE UP (ref 10–60)
ANION GAP SERPL CALC-SCNC: 8 MMOL/L — SIGNIFICANT CHANGE UP (ref 5–17)
APTT BLD: 121 SEC — CRITICAL HIGH (ref 27.5–35.5)
APTT BLD: 37.1 SEC — HIGH (ref 27.5–35.5)
APTT BLD: 60.2 SEC — HIGH (ref 27.5–35.5)
AST SERPL-CCNC: 28 U/L — SIGNIFICANT CHANGE UP (ref 10–40)
BASE EXCESS BLDA CALC-SCNC: -1.9 MMOL/L — SIGNIFICANT CHANGE UP (ref -2–2)
BILIRUB SERPL-MCNC: 0.9 MG/DL — SIGNIFICANT CHANGE UP (ref 0.2–1.2)
BLOOD GAS COMMENTS ARTERIAL: SIGNIFICANT CHANGE UP
BUN SERPL-MCNC: 25 MG/DL — HIGH (ref 7–18)
CALCIUM SERPL-MCNC: 9 MG/DL — SIGNIFICANT CHANGE UP (ref 8.4–10.5)
CHLORIDE SERPL-SCNC: 106 MMOL/L — SIGNIFICANT CHANGE UP (ref 96–108)
CO2 SERPL-SCNC: 24 MMOL/L — SIGNIFICANT CHANGE UP (ref 22–31)
CREAT SERPL-MCNC: 1.92 MG/DL — HIGH (ref 0.5–1.3)
CRP SERPL-MCNC: 5.57 MG/DL — HIGH (ref 0–0.4)
ERYTHROCYTE [SEDIMENTATION RATE] IN BLOOD: 116 MM/HR — HIGH (ref 0–20)
FERRITIN SERPL-MCNC: 1145 NG/ML — HIGH (ref 30–400)
GLUCOSE SERPL-MCNC: 131 MG/DL — HIGH (ref 70–99)
HCO3 BLDA-SCNC: 21 MMOL/L — LOW (ref 23–27)
HCT VFR BLD CALC: 27.2 % — LOW (ref 39–50)
HGB BLD-MCNC: 8.7 G/DL — LOW (ref 13–17)
HOROWITZ INDEX BLDA+IHG-RTO: 40 — SIGNIFICANT CHANGE UP
LDH SERPL L TO P-CCNC: 292 U/L — HIGH (ref 120–225)
MAGNESIUM SERPL-MCNC: 2.1 MG/DL — SIGNIFICANT CHANGE UP (ref 1.6–2.6)
MCHC RBC-ENTMCNC: 28.6 PG — SIGNIFICANT CHANGE UP (ref 27–34)
MCHC RBC-ENTMCNC: 32 GM/DL — SIGNIFICANT CHANGE UP (ref 32–36)
MCV RBC AUTO: 89.5 FL — SIGNIFICANT CHANGE UP (ref 80–100)
NRBC # BLD: 0 /100 WBCS — SIGNIFICANT CHANGE UP (ref 0–0)
PCO2 BLDA: 32 MMHG — SIGNIFICANT CHANGE UP (ref 32–46)
PH BLDA: 7.44 — SIGNIFICANT CHANGE UP (ref 7.35–7.45)
PHOSPHATE SERPL-MCNC: 4.7 MG/DL — HIGH (ref 2.5–4.5)
PLATELET # BLD AUTO: 275 K/UL — SIGNIFICANT CHANGE UP (ref 150–400)
PO2 BLDA: 69 MMHG — LOW (ref 74–108)
POTASSIUM SERPL-MCNC: 3.7 MMOL/L — SIGNIFICANT CHANGE UP (ref 3.5–5.3)
POTASSIUM SERPL-SCNC: 3.7 MMOL/L — SIGNIFICANT CHANGE UP (ref 3.5–5.3)
PROCALCITONIN SERPL-MCNC: 0.21 NG/ML — HIGH (ref 0.02–0.1)
PROT SERPL-MCNC: 7.4 G/DL — SIGNIFICANT CHANGE UP (ref 6–8.3)
RAPID RVP RESULT: DETECTED
RBC # BLD: 3.04 M/UL — LOW (ref 4.2–5.8)
RBC # FLD: 13.2 % — SIGNIFICANT CHANGE UP (ref 10.3–14.5)
SAO2 % BLDA: 94 % — SIGNIFICANT CHANGE UP (ref 92–96)
SARS-COV-2 RNA SPEC QL NAA+PROBE: DETECTED
SODIUM SERPL-SCNC: 138 MMOL/L — SIGNIFICANT CHANGE UP (ref 135–145)
VANCOMYCIN TROUGH SERPL-MCNC: 12.1 UG/ML — SIGNIFICANT CHANGE UP (ref 10–20)
WBC # BLD: 10.54 K/UL — HIGH (ref 3.8–10.5)
WBC # FLD AUTO: 10.54 K/UL — HIGH (ref 3.8–10.5)

## 2020-11-30 PROCEDURE — 71045 X-RAY EXAM CHEST 1 VIEW: CPT | Mod: 26

## 2020-11-30 RX ORDER — VANCOMYCIN HCL 1 G
1250 VIAL (EA) INTRAVENOUS EVERY 24 HOURS
Refills: 0 | Status: DISCONTINUED | OUTPATIENT
Start: 2020-12-01 | End: 2020-12-02

## 2020-11-30 RX ORDER — ACETAMINOPHEN 500 MG
1000 TABLET ORAL ONCE
Refills: 0 | Status: COMPLETED | OUTPATIENT
Start: 2020-11-30 | End: 2020-11-30

## 2020-11-30 RX ADMIN — Medication 400 MILLIGRAM(S): at 21:36

## 2020-11-30 RX ADMIN — DEXMEDETOMIDINE HYDROCHLORIDE IN 0.9% SODIUM CHLORIDE 4.27 MICROGRAM(S)/KG/HR: 4 INJECTION INTRAVENOUS at 11:18

## 2020-11-30 RX ADMIN — HEPARIN SODIUM 900 UNIT(S)/HR: 5000 INJECTION INTRAVENOUS; SUBCUTANEOUS at 06:41

## 2020-11-30 RX ADMIN — Medication 1000 MILLIGRAM(S): at 22:00

## 2020-11-30 RX ADMIN — HEPARIN SODIUM 1300 UNIT(S)/HR: 5000 INJECTION INTRAVENOUS; SUBCUTANEOUS at 20:47

## 2020-11-30 RX ADMIN — Medication 100 MILLIGRAM(S): at 11:20

## 2020-11-30 RX ADMIN — PIPERACILLIN AND TAZOBACTAM 25 GRAM(S): 4; .5 INJECTION, POWDER, LYOPHILIZED, FOR SOLUTION INTRAVENOUS at 05:47

## 2020-11-30 RX ADMIN — CHLORHEXIDINE GLUCONATE 15 MILLILITER(S): 213 SOLUTION TOPICAL at 05:48

## 2020-11-30 RX ADMIN — HEPARIN SODIUM 1300 UNIT(S)/HR: 5000 INJECTION INTRAVENOUS; SUBCUTANEOUS at 13:42

## 2020-11-30 RX ADMIN — PIPERACILLIN AND TAZOBACTAM 25 GRAM(S): 4; .5 INJECTION, POWDER, LYOPHILIZED, FOR SOLUTION INTRAVENOUS at 21:15

## 2020-11-30 RX ADMIN — CHLORHEXIDINE GLUCONATE 1 APPLICATION(S): 213 SOLUTION TOPICAL at 05:48

## 2020-11-30 RX ADMIN — PANTOPRAZOLE SODIUM 40 MILLIGRAM(S): 20 TABLET, DELAYED RELEASE ORAL at 11:20

## 2020-11-30 RX ADMIN — DEXMEDETOMIDINE HYDROCHLORIDE IN 0.9% SODIUM CHLORIDE 4.27 MICROGRAM(S)/KG/HR: 4 INJECTION INTRAVENOUS at 00:24

## 2020-11-30 RX ADMIN — PIPERACILLIN AND TAZOBACTAM 25 GRAM(S): 4; .5 INJECTION, POWDER, LYOPHILIZED, FOR SOLUTION INTRAVENOUS at 13:59

## 2020-11-30 RX ADMIN — Medication 6 MILLIGRAM(S): at 05:48

## 2020-11-30 NOTE — PROGRESS NOTE ADULT - SUBJECTIVE AND OBJECTIVE BOX
[facility] NEPHROLOGY- PROGRESS NOTE    Patient is a 56yo Male with no PMH recently diagnosed with COVID-19 (5 days PTA) p/w SOB s/p intubated in the ER. Pt admitted to ICU with acute hypoxic respiratory failure 2/2 COVID-19 PNA s/p intubated, BECKY with transaminitis. Nephrology consulted for Elevated serum creatinine.    Hospital Medications: Medications reviewed.  REVIEW OF SYSTEMS: Unable to obtain/ intubated & sedated    VITALS:  T(F): 98.9 (11-30-20 @ 03:00), Max: 99.1 (11-29-20 @ 23:00)  HR: 65 (11-30-20 @ 14:40)  BP: 151/80 (11-30-20 @ 14:00)  RR: 20 (11-30-20 @ 14:15)  SpO2: 95% (11-30-20 @ 14:40)  Wt(kg): --    11-29 @ 07:01  -  11-30 @ 07:00  --------------------------------------------------------  IN: 1280.7 mL / OUT: 1823 mL / NET: -542.3 mL    11-30 @ 07:01  -  11-30 @ 16:29  --------------------------------------------------------  IN: 492 mL / OUT: 225 mL / NET: 267 mL        PHYSICAL EXAM:  Gen: Sedated  HEENT: +ETT  Cards: RRR, +S1/S2,   Resp: +mechanical BS  GI: soft, ND,   : +isaacs with yellow urine  Extremities: no LE edema, +UE edema  Neuro: sedated      LABS:  11-30    138  |  106  |  25<H>  ----------------------------<  131<H>  3.7   |  24  |  1.92<H>    Ca    9.0      30 Nov 2020 05:40  Phos  4.7     11-30  Mg     2.1     11-30    TPro  7.4  /  Alb  2.5<L>  /  TBili  0.9  /  DBili      /  AST  28  /  ALT  42  /  AlkPhos  65  11-30    Creatinine Trend: 1.92 <--, 1.74 <--, 1.43 <--, 1.64 <--, 1.78 <--, 1.47 <--, 1.62 <--, 1.58 <--, 1.73 <--                        8.7    10.54 )-----------( 275      ( 30 Nov 2020 05:40 )             27.2     Urine Studies:

## 2020-11-30 NOTE — PROGRESS NOTE ADULT - ASSESSMENT
Patient is a 56yo Male with no PMH recently diagnosed with COVID-19 (5 days PTA) p/w SOB s/p intubated in the ER. Pt admitted to ICU with acute hypoxic respiratory failure 2/2 COVID-19 PNA s/p intubated, BECKY with transaminitis. Nephrology consulted for Elevated serum creatinine.    1. BECKY- unknown baseline SCr. BECKY likely hemodynamically mediated in the setting of septic shock / infection 2/2 COVID-19, hypotension on pressors (now off pressors);  likely ATN. UA with 100 protein and trace blood with hyaline cast. Renal function stabilized with good urine o/p but now worsening. Repeat urine lytes. Recc trial of IVF; NS @ 85 cc /hr x 24 hrs.   Will defer Renal US due to COVID status. Strict I/Os. Avoid nephrotoxins/ NSAIDs/ RCA. Monitor BMP.  2. Septic Shock due to Multifocal PNA 2/2 COVID-19- Plan per ICU  3. Hypotension- BP improved. Pt off Pressors as per ICU. Monitor BP  4. Acute hypoxic respiratory failure- vent support as per ICU.  5. Hypocalcemia- & Hyperphosphatemia resolved. Monitor ionized calcium and serum phos.

## 2020-11-30 NOTE — PROGRESS NOTE ADULT - ASSESSMENT
55M from home without PMH with progressively worsening SOB and +COVID test x5 days prior to admission presented with SpO2 64% at home, intubated 2/2 deteriorating respiratory status and admitted to ICU.    Assessment:  1. Acute Hypoxic Respiratory failure   2. COVID Pneumonia   3. Acute Kidney Injury   4. elevated LFTs     Plan:    CNS:   #intubated  - off versed and titrating fentanyl to 1mcg  - on precedex  - plan for SBT post-HD today  - hold Nimbex given 4/4 TOF and current supine position     CVS: #no active issues:   -SBPs increasing to 160-180s likely 2/2 awakening and decreasing fentanyl   -follow up TTE (likely once off isolation)     RESP: #AHRF 2/2 COVID/  -vent 25/450/40/5, ABG remains improved to 7.51/28/103 respiratory alklosis from overbreathing the vent   -CXR improving                  -WBC count improving   -APRs elevated but continuing to decrease, will follow daily ferritin, esr, crp, LDH, ddimer, and procal  -completed 5 days cftx and azithromycin for CAP coverage (11/16-20)  -day 5 empiric vanc and zosyn given new fevers (day 1 11/23), Tmax 100.6 overnight, VT low to 8.9, continue  -MRSA negative, COVID positive  -C/w decadron   -not candidate for remdesivir 2/2 CrCl <45 (29.7)   -BCx NGTD     GI: #elevated LFTs:   -likely 2/2 COVID  -follow daily, improved  -continue senna, miralax, and dulcolax PA for bowel regimen    RENAL: #BECKY:   -BUN/Cr 49/2.2 on admission, unknown baseline  -Cr improving to 1.43, slightly worse than 11/26 1.5 (peak 4.43)  -continuing heparin gtt for elevated D-dimer, Lovenox held 2/2 elevated Cr  -nephro Dr. Camara following  -u/o 10cc/hr, monitor    ID: #COVID PNA  -Tylenol prn fevers  -new fever 11/23 as above, central line changed to right femoral 11/24 and RIJ pulled 11/25  -remainder of history and management as above     HEME/ONC: #hypercoagulability 2/2 COVID  -DD improving, will repeat 11/27  -continue heparin therapeutic (gtt), Lovenox held 2/2 worsening Cr   -monitor daily CBC, Hb stable    ENDO: #No issues:  -target CBG <180, 100s overnight  -HgA1c 6.1  -NPO, OG placed, on TF  -low HSS while on steroids    Skin/Catheters: #no issues  -RIJ 11/15-25 d/elvira   -right femoral line 11/24  -no rashes noted  -cw artificial tears  -FC in place     PPx:  #DVT: heparin gtt for therapeutic dosing as above SCD (BMI>30, ICU admission)  #GI: Protonix     Dispo:  -monitor in ICU     GOC: Full Code   -brother Yg updated 11/18-28     55M from home without PMH with progressively worsening SOB and +COVID test x5 days prior to admission presented with SpO2 64% at home, intubated 2/2 deteriorating respiratory status and admitted to ICU.    Assessment:  1. Acute Hypoxic Respiratory failure   2. COVID Pneumonia   3. Acute Kidney Injury   4. elevated LFTs     Plan:    ====CNS====  #intubated  - off versed and titrating fentanyl to 1mcg  - on precedex  - plan for SBT  - hold Nimbex given 4/4 TOF and current supine position     ====CVS====   #no active issues   - SBPs increasing to 160-180s likely 2/2 awakening and decreasing fentanyl   - TTE when off isolation     ====RESP====  #AHRF 2/2 COVID  - vent 25/450/40/5, ABG remains improved to 7.51/28/103 respiratory alklosis from overbreathing the vent               - WBC improving   - Bcx neg  - MRSA neg  - COVID pcr pos  - CXR improving      - s/p 5 days cftx and azithromycin for CAP coverage (11/16-20)  - c/w decadron x10d (11/29- ), vanco & zosyn (11/23-)  - d/c remdesivir 2/2 CrCl <45 (29.7)   - f/u acute phase reactants, vanc trough on 12/3    ====GI====  #elevated LFTs   - likely 2/2 COVID  - monitor LFTs    #bowel regimen:  - continue senna, miralax, and dulcolax MN for bowel regimen    ====RENAL====   #BECKY   - BUN/Cr 49/2.2 on admission, unknown baseline  - Cr 1.92  - c/w IVF after SBT trial today  - hold Lovenox 2/2 elevated Cr  - nephro Dr. Camara following    ====ID====   #COVID PNA  - as above     ====HEME/ONC====  #hypercoagulability 2/2 COVID  - c/w heparin gtt  - Lovenox held 2/2 worsening Cr   - monitor daily CBC, Hb stable    ====ENDO====   #No active issues:  - target -180  - HgA1c 6.1  - NPO, OG placed, on TF  - c/w low HSS while on steroids    ====Skin/Catheters====  #no acute issues  - RIJ 11/15-25 d/elvira   - right femoral line 11/24  - no rashes noted  - cw artificial tears  - FC in place     ====PPx====  #DVT: heparin gtt and SCD (BMI>30, ICU admission)  #GI: Protonix     ====Dispo=====  - monitor in ICU     ====GOC====   - full Code

## 2020-11-30 NOTE — PROGRESS NOTE ADULT - SUBJECTIVE AND OBJECTIVE BOX
INTERVAL HPI/OVERNIGHT EVENTS: Patient received 15mg haldol and started on fentanyl ggt for agitation overnight. Patient is alert and follows command this am. Will try SBT post-HD today.    REVIEW OF SYSTEMS:  - unable to obtain 2/2 intubation and mild sedation    Antimicrobial:  piperacillin/tazobactam IVPB.. 3.375 Gram(s) IV Intermittent every 8 hours  vancomycin  IVPB 1000 milliGRAM(s) IV Intermittent every 24 hours    Cardiovascular:    Pulmonary:    Hematalogic:  heparin  Infusion. 1300 Unit(s)/Hr IV Continuous <Continuous>    Other:  acetaminophen    Suspension .. 650 milliGRAM(s) Enteral Tube every 6 hours PRN  artificial  tears Solution 1 Drop(s) Both EYES every 4 hours PRN  chlorhexidine 0.12% Liquid 15 milliLiter(s) Oral Mucosa two times a day  chlorhexidine 2% Cloths 1 Application(s) Topical <User Schedule>  dexAMETHasone  Injectable 6 milliGRAM(s) IV Push daily  dexMEDEtomidine Infusion 0.2 MICROgram(s)/kG/Hr IV Continuous <Continuous>  fentaNYL   Infusion. 1 MICROgram(s)/kG/Hr IV Continuous <Continuous>  insulin lispro (ADMELOG) corrective regimen sliding scale   SubCutaneous every 6 hours  pantoprazole  Injectable 40 milliGRAM(s) IV Push daily  polyethylene glycol 3350 17 Gram(s) Oral daily  senna Syrup 10 milliLiter(s) Oral at bedtime  sodium chloride 0.9% lock flush 10 milliLiter(s) IV Push every 1 hour PRN    acetaminophen    Suspension .. 650 milliGRAM(s) Enteral Tube every 6 hours PRN  artificial  tears Solution 1 Drop(s) Both EYES every 4 hours PRN  chlorhexidine 0.12% Liquid 15 milliLiter(s) Oral Mucosa two times a day  chlorhexidine 2% Cloths 1 Application(s) Topical <User Schedule>  dexAMETHasone  Injectable 6 milliGRAM(s) IV Push daily  dexMEDEtomidine Infusion 0.2 MICROgram(s)/kG/Hr IV Continuous <Continuous>  fentaNYL   Infusion. 1 MICROgram(s)/kG/Hr IV Continuous <Continuous>  heparin  Infusion. 1300 Unit(s)/Hr IV Continuous <Continuous>  insulin lispro (ADMELOG) corrective regimen sliding scale   SubCutaneous every 6 hours  pantoprazole  Injectable 40 milliGRAM(s) IV Push daily  piperacillin/tazobactam IVPB.. 3.375 Gram(s) IV Intermittent every 8 hours  polyethylene glycol 3350 17 Gram(s) Oral daily  senna Syrup 10 milliLiter(s) Oral at bedtime  sodium chloride 0.9% lock flush 10 milliLiter(s) IV Push every 1 hour PRN  vancomycin  IVPB 1000 milliGRAM(s) IV Intermittent every 24 hours    Drug Dosing Weight  Height (cm): 157.5 (15 Nov 2020 23:00)  Weight (kg): 85.4 (15 Nov 2020 23:00)  BMI (kg/m2): 34.4 (15 Nov 2020 23:00)  BSA (m2): 1.86 (15 Nov 2020 23:00)    ICU Vital Signs Last 24 Hrs  T(C): 37.2 (30 Nov 2020 03:00), Max: 37.3 (29 Nov 2020 23:00)  T(F): 98.9 (30 Nov 2020 03:00), Max: 99.1 (29 Nov 2020 23:00)  HR: 57 (30 Nov 2020 12:00) (49 - 111)  BP: 129/72 (30 Nov 2020 12:00) (125/69 - 221/109)  BP(mean): 86 (30 Nov 2020 12:00) (83 - 134)  ABP: --  ABP(mean): --  RR: 18 (30 Nov 2020 12:00) (13 - 29)  SpO2: 97% (30 Nov 2020 12:00) (93% - 100%)      ABG - ( 30 Nov 2020 04:21 )  pH, Arterial: 7.44  pH, Blood: x     /  pCO2: 32    /  pO2: 69    / HCO3: 21    / Base Excess: -1.9  /  SaO2: 94                    11-29 @ 07:01  -  11-30 @ 07:00  --------------------------------------------------------  IN: 1280.7 mL / OUT: 1823 mL / NET: -542.3 mL        Mode: CPAP with PS  FiO2: 40  PEEP: 5  PS: 8  ITime: 1  MAP: 9  PIP: 14        PHYSICAL EXAM:    GENERAL: intubated and mildly sedated; alert and follows command  EYES: EOMI, PERRLA,   NECK: Supple, No JVD; Normal thyroid; Trachea midline  NERVOUS SYSTEM: Motor Strength 5/5 B/L upper and lower extremities; DTRs 2+ intact and symmetric  CHEST/LUNG: No rales, rhonchi, wheezing   HEART: Regular rate and rhythm; No murmurs,   ABDOMEN: Soft, Nontender, Nondistended; Bowel sounds present  EXTREMITIES:  2+ Peripheral Pulses, No clubbing, cyanosis, or edema    LABS:  CBC Full  -  ( 30 Nov 2020 05:40 )  WBC Count : 10.54 K/uL  RBC Count : 3.04 M/uL  Hemoglobin : 8.7 g/dL  Hematocrit : 27.2 %  Platelet Count - Automated : 275 K/uL  Mean Cell Volume : 89.5 fl  Mean Cell Hemoglobin : 28.6 pg  Mean Cell Hemoglobin Concentration : 32.0 gm/dL  Auto Neutrophil # : x  Auto Lymphocyte # : x  Auto Monocyte # : x  Auto Eosinophil # : x  Auto Basophil # : x  Auto Neutrophil % : x  Auto Lymphocyte % : x  Auto Monocyte % : x  Auto Eosinophil % : x  Auto Basophil % : x    11-30    138  |  106  |  25<H>  ----------------------------<  131<H>  3.7   |  24  |  1.92<H>    Ca    9.0      30 Nov 2020 05:40  Phos  4.7     11-30  Mg     2.1     11-30    TPro  7.4  /  Alb  2.5<L>  /  TBili  0.9  /  DBili  x   /  AST  28  /  ALT  42  /  AlkPhos  65  11-30    PTT - ( 30 Nov 2020 05:40 )  PTT:121.0 sec        RADIOLOGY & ADDITIONAL STUDIES REVIEWED:  yes    [ ]GOALS OF CARE DISCUSSION WITH PATIENT/FAMILY/PROXY:    CRITICAL CARE TIME SPENT: 35 minutes INTERVAL HPI/OVERNIGHT EVENTS: Patient received 15mg haldol and started on fentanyl ggt for agitation overnight. Patient is alert and follows command this am. Will try SBT post-HD today.    REVIEW OF SYSTEMS:  - unable to obtain 2/2 intubation and mild sedation    Antimicrobial:  piperacillin/tazobactam IVPB.. 3.375 Gram(s) IV Intermittent every 8 hours  vancomycin  IVPB 1000 milliGRAM(s) IV Intermittent every 24 hours    Cardiovascular:    Pulmonary:    Hematalogic:  heparin  Infusion. 1300 Unit(s)/Hr IV Continuous <Continuous>    Other:  acetaminophen    Suspension .. 650 milliGRAM(s) Enteral Tube every 6 hours PRN  artificial  tears Solution 1 Drop(s) Both EYES every 4 hours PRN  chlorhexidine 0.12% Liquid 15 milliLiter(s) Oral Mucosa two times a day  chlorhexidine 2% Cloths 1 Application(s) Topical <User Schedule>  dexAMETHasone  Injectable 6 milliGRAM(s) IV Push daily  dexMEDEtomidine Infusion 0.2 MICROgram(s)/kG/Hr IV Continuous <Continuous>  fentaNYL   Infusion. 1 MICROgram(s)/kG/Hr IV Continuous <Continuous>  insulin lispro (ADMELOG) corrective regimen sliding scale   SubCutaneous every 6 hours  pantoprazole  Injectable 40 milliGRAM(s) IV Push daily  polyethylene glycol 3350 17 Gram(s) Oral daily  senna Syrup 10 milliLiter(s) Oral at bedtime  sodium chloride 0.9% lock flush 10 milliLiter(s) IV Push every 1 hour PRN    acetaminophen    Suspension .. 650 milliGRAM(s) Enteral Tube every 6 hours PRN  artificial  tears Solution 1 Drop(s) Both EYES every 4 hours PRN  chlorhexidine 0.12% Liquid 15 milliLiter(s) Oral Mucosa two times a day  chlorhexidine 2% Cloths 1 Application(s) Topical <User Schedule>  dexAMETHasone  Injectable 6 milliGRAM(s) IV Push daily  dexMEDEtomidine Infusion 0.2 MICROgram(s)/kG/Hr IV Continuous <Continuous>  fentaNYL   Infusion. 1 MICROgram(s)/kG/Hr IV Continuous <Continuous>  heparin  Infusion. 1300 Unit(s)/Hr IV Continuous <Continuous>  insulin lispro (ADMELOG) corrective regimen sliding scale   SubCutaneous every 6 hours  pantoprazole  Injectable 40 milliGRAM(s) IV Push daily  piperacillin/tazobactam IVPB.. 3.375 Gram(s) IV Intermittent every 8 hours  polyethylene glycol 3350 17 Gram(s) Oral daily  senna Syrup 10 milliLiter(s) Oral at bedtime  sodium chloride 0.9% lock flush 10 milliLiter(s) IV Push every 1 hour PRN  vancomycin  IVPB 1000 milliGRAM(s) IV Intermittent every 24 hours    Drug Dosing Weight  Height (cm): 157.5 (15 Nov 2020 23:00)  Weight (kg): 85.4 (15 Nov 2020 23:00)  BMI (kg/m2): 34.4 (15 Nov 2020 23:00)  BSA (m2): 1.86 (15 Nov 2020 23:00)    ICU Vital Signs Last 24 Hrs  T(C): 37.2 (30 Nov 2020 03:00), Max: 37.3 (29 Nov 2020 23:00)  T(F): 98.9 (30 Nov 2020 03:00), Max: 99.1 (29 Nov 2020 23:00)  HR: 57 (30 Nov 2020 12:00) (49 - 111)  BP: 129/72 (30 Nov 2020 12:00) (125/69 - 221/109)  BP(mean): 86 (30 Nov 2020 12:00) (83 - 134)  ABP: --  ABP(mean): --  RR: 18 (30 Nov 2020 12:00) (13 - 29)  SpO2: 97% (30 Nov 2020 12:00) (93% - 100%)      ABG - ( 30 Nov 2020 04:21 )  pH, Arterial: 7.44  pH, Blood: x     /  pCO2: 32    /  pO2: 69    / HCO3: 21    / Base Excess: -1.9  /  SaO2: 94                    11-29 @ 07:01  -  11-30 @ 07:00  --------------------------------------------------------  IN: 1280.7 mL / OUT: 1823 mL / NET: -542.3 mL        Mode: CPAP with PS  FiO2: 40  PEEP: 5  PS: 8  ITime: 1  MAP: 9  PIP: 14        PHYSICAL EXAM:    GENERAL: intubated and mildly sedated; alert and follows command  EYES: EOMI, PERRLA,   NECK: Supple, No JVD; Normal thyroid; Trachea midline  NERVOUS SYSTEM: Motor Strength 5/5 B/L upper and lower extremities; DTRs 2+ intact and symmetric  CHEST/LUNG: No rales, rhonchi, wheezing   HEART: Regular rate and rhythm; No murmurs,   ABDOMEN: Soft, Nontender, Nondistended; Bowel sounds present  EXTREMITIES:  2+ Peripheral Pulses, No clubbing, cyanosis, or edema    Lines/catheters/tubes  - right femoral line 11/25  - Chris catheter   - sump 11/17    LABS:  CBC Full  -  ( 30 Nov 2020 05:40 )  WBC Count : 10.54 K/uL  RBC Count : 3.04 M/uL  Hemoglobin : 8.7 g/dL  Hematocrit : 27.2 %  Platelet Count - Automated : 275 K/uL  Mean Cell Volume : 89.5 fl  Mean Cell Hemoglobin : 28.6 pg  Mean Cell Hemoglobin Concentration : 32.0 gm/dL  Auto Neutrophil # : x  Auto Lymphocyte # : x  Auto Monocyte # : x  Auto Eosinophil # : x  Auto Basophil # : x  Auto Neutrophil % : x  Auto Lymphocyte % : x  Auto Monocyte % : x  Auto Eosinophil % : x  Auto Basophil % : x    11-30    138  |  106  |  25<H>  ----------------------------<  131<H>  3.7   |  24  |  1.92<H>    Ca    9.0      30 Nov 2020 05:40  Phos  4.7     11-30  Mg     2.1     11-30    TPro  7.4  /  Alb  2.5<L>  /  TBili  0.9  /  DBili  x   /  AST  28  /  ALT  42  /  AlkPhos  65  11-30    PTT - ( 30 Nov 2020 05:40 )  PTT:121.0 sec        RADIOLOGY & ADDITIONAL STUDIES REVIEWED:  yes    [ ]GOALS OF CARE DISCUSSION WITH PATIENT/FAMILY/PROXY:    CRITICAL CARE TIME SPENT: 35 minutes INTERVAL HPI/OVERNIGHT EVENTS: Patient received 15mg haldol and started on fentanyl ggt for agitation overnight. Patient is alert and follows command this am. Will try SBT post-HD today.    REVIEW OF SYSTEMS:  - unable to obtain 2/2 intubation and mild sedation    Antimicrobial:  piperacillin/tazobactam IVPB.. 3.375 Gram(s) IV Intermittent every 8 hours  vancomycin  IVPB 1000 milliGRAM(s) IV Intermittent every 24 hours    Cardiovascular:    Pulmonary:    Hematalogic:  heparin  Infusion. 1300 Unit(s)/Hr IV Continuous <Continuous>    Other:  acetaminophen    Suspension .. 650 milliGRAM(s) Enteral Tube every 6 hours PRN  artificial  tears Solution 1 Drop(s) Both EYES every 4 hours PRN  chlorhexidine 0.12% Liquid 15 milliLiter(s) Oral Mucosa two times a day  chlorhexidine 2% Cloths 1 Application(s) Topical <User Schedule>  dexAMETHasone  Injectable 6 milliGRAM(s) IV Push daily  dexMEDEtomidine Infusion 0.2 MICROgram(s)/kG/Hr IV Continuous <Continuous>  fentaNYL   Infusion. 1 MICROgram(s)/kG/Hr IV Continuous <Continuous>  insulin lispro (ADMELOG) corrective regimen sliding scale   SubCutaneous every 6 hours  pantoprazole  Injectable 40 milliGRAM(s) IV Push daily  polyethylene glycol 3350 17 Gram(s) Oral daily  senna Syrup 10 milliLiter(s) Oral at bedtime  sodium chloride 0.9% lock flush 10 milliLiter(s) IV Push every 1 hour PRN    acetaminophen    Suspension .. 650 milliGRAM(s) Enteral Tube every 6 hours PRN  artificial  tears Solution 1 Drop(s) Both EYES every 4 hours PRN  chlorhexidine 0.12% Liquid 15 milliLiter(s) Oral Mucosa two times a day  chlorhexidine 2% Cloths 1 Application(s) Topical <User Schedule>  dexAMETHasone  Injectable 6 milliGRAM(s) IV Push daily  dexMEDEtomidine Infusion 0.2 MICROgram(s)/kG/Hr IV Continuous <Continuous>  fentaNYL   Infusion. 1 MICROgram(s)/kG/Hr IV Continuous <Continuous>  heparin  Infusion. 1300 Unit(s)/Hr IV Continuous <Continuous>  insulin lispro (ADMELOG) corrective regimen sliding scale   SubCutaneous every 6 hours  pantoprazole  Injectable 40 milliGRAM(s) IV Push daily  piperacillin/tazobactam IVPB.. 3.375 Gram(s) IV Intermittent every 8 hours  polyethylene glycol 3350 17 Gram(s) Oral daily  senna Syrup 10 milliLiter(s) Oral at bedtime  sodium chloride 0.9% lock flush 10 milliLiter(s) IV Push every 1 hour PRN  vancomycin  IVPB 1000 milliGRAM(s) IV Intermittent every 24 hours    Drug Dosing Weight  Height (cm): 157.5 (15 Nov 2020 23:00)  Weight (kg): 85.4 (15 Nov 2020 23:00)  BMI (kg/m2): 34.4 (15 Nov 2020 23:00)  BSA (m2): 1.86 (15 Nov 2020 23:00)    ICU Vital Signs Last 24 Hrs  T(C): 37.2 (30 Nov 2020 03:00), Max: 37.3 (29 Nov 2020 23:00)  T(F): 98.9 (30 Nov 2020 03:00), Max: 99.1 (29 Nov 2020 23:00)  HR: 57 (30 Nov 2020 12:00) (49 - 111)  BP: 129/72 (30 Nov 2020 12:00) (125/69 - 221/109)  BP(mean): 86 (30 Nov 2020 12:00) (83 - 134)  ABP: --  ABP(mean): --  RR: 18 (30 Nov 2020 12:00) (13 - 29)  SpO2: 97% (30 Nov 2020 12:00) (93% - 100%)      ABG - ( 30 Nov 2020 04:21 )  pH, Arterial: 7.44  pH, Blood: x     /  pCO2: 32    /  pO2: 69    / HCO3: 21    / Base Excess: -1.9  /  SaO2: 94                    11-29 @ 07:01  -  11-30 @ 07:00  --------------------------------------------------------  IN: 1280.7 mL / OUT: 1823 mL / NET: -542.3 mL        Mode: CPAP with PS  FiO2: 40  PEEP: 5  PS: 8  ITime: 1  MAP: 9  PIP: 14        PHYSICAL EXAM:    GENERAL: intubated and mildly sedated; alert and follows command  EYES: EOMI, PERRLA,   NECK: Supple, No JVD; Normal thyroid; Trachea midline  NERVOUS SYSTEM: Motor Strength 5/5 B/L upper and lower extremities; DTRs 2+ intact and symmetric  CHEST/LUNG: No rales, rhonchi, wheezing   HEART: Regular rate and rhythm; No murmurs,   ABDOMEN: Soft, Nontender, Nondistended; Bowel sounds present  EXTREMITIES:  2+ Peripheral Pulses, No clubbing, cyanosis, or edema    Lines/catheters/tubes  - right femoral line 11/25  - Chris catheter   - sump 11/17    LABS:  CBC Full  -  ( 30 Nov 2020 05:40 )  WBC Count : 10.54 K/uL  RBC Count : 3.04 M/uL  Hemoglobin : 8.7 g/dL  Hematocrit : 27.2 %  Platelet Count - Automated : 275 K/uL  Mean Cell Volume : 89.5 fl  Mean Cell Hemoglobin : 28.6 pg  Mean Cell Hemoglobin Concentration : 32.0 gm/dL  Auto Neutrophil # : x  Auto Lymphocyte # : x  Auto Monocyte # : x  Auto Eosinophil # : x  Auto Basophil # : x  Auto Neutrophil % : x  Auto Lymphocyte % : x  Auto Monocyte % : x  Auto Eosinophil % : x  Auto Basophil % : x    11-30    138  |  106  |  25<H>  ----------------------------<  131<H>  3.7   |  24  |  1.92<H>    Ca    9.0      30 Nov 2020 05:40  Phos  4.7     11-30  Mg     2.1     11-30    TPro  7.4  /  Alb  2.5<L>  /  TBili  0.9  /  DBili  x   /  AST  28  /  ALT  42  /  AlkPhos  65  11-30    PTT - ( 30 Nov 2020 05:40 )  PTT:121.0 sec        RADIOLOGY & ADDITIONAL STUDIES REVIEWED:  yes  CXR:  < from: Xray Chest 1 View- PORTABLE-Routine (Xray Chest 1 View- PORTABLE-Routine in AM.) (11.30.20 @ 08:05) >    EXAM:  XR CHEST PORTABLE ROUTINE 1V                            PROCEDURE DATE:  11/30/2020          INTERPRETATION:  CLINICAL STATEMENT: Follow-up chest pain.    TECHNIQUE: AP view of the chest.    COMPARISON: 11/29/2020    FINDINGS/  IMPRESSION:  ET tube again noted. Feeding tube removed.    Increased interstitial lung markings without significant change. Right lower lobe opacity slightly improving    No pleural effusion    Heart size cannot be accurately assessed in this projection.              VIVIENNE SETH MD; Attending Radiologist  This document has been electronically signed. Nov 30 2020  9:09AM    < end of copied text >  [ ]GOALS OF CARE DISCUSSION WITH PATIENT/FAMILY/PROXY:    CRITICAL CARE TIME SPENT: 35 minutes

## 2020-11-30 NOTE — AIRWAY REMOVAL NOTE  ADULT & PEDS - O2 DELIVERY METHOD
[FreeTextEntry1] : HTN check up\par  [de-identified] : Patient presented for HTN episode to ED in October 2020, followed up with me, was given Amlodipine 10, No side effects, tolerating well. \par BP Checked today 140/90. Patient asking for infertility workup for fiance and him are trying to have a child. Immunization visit as well.  BiPAP/CPAP

## 2020-12-01 LAB
ALBUMIN SERPL ELPH-MCNC: 2.6 G/DL — LOW (ref 3.5–5)
ALP SERPL-CCNC: 58 U/L — SIGNIFICANT CHANGE UP (ref 40–120)
ALT FLD-CCNC: 39 U/L DA — SIGNIFICANT CHANGE UP (ref 10–60)
ANION GAP SERPL CALC-SCNC: 7 MMOL/L — SIGNIFICANT CHANGE UP (ref 5–17)
ANION GAP SERPL CALC-SCNC: 9 MMOL/L — SIGNIFICANT CHANGE UP (ref 5–17)
APTT BLD: 76 SEC — HIGH (ref 27.5–35.5)
AST SERPL-CCNC: 27 U/L — SIGNIFICANT CHANGE UP (ref 10–40)
BASE EXCESS BLDA CALC-SCNC: 0.3 MMOL/L — SIGNIFICANT CHANGE UP (ref -2–2)
BASOPHILS # BLD AUTO: 0.01 K/UL — SIGNIFICANT CHANGE UP (ref 0–0.2)
BASOPHILS NFR BLD AUTO: 0.1 % — SIGNIFICANT CHANGE UP (ref 0–2)
BILIRUB SERPL-MCNC: 0.9 MG/DL — SIGNIFICANT CHANGE UP (ref 0.2–1.2)
BLOOD GAS COMMENTS ARTERIAL: SIGNIFICANT CHANGE UP
BUN SERPL-MCNC: 25 MG/DL — HIGH (ref 7–18)
BUN SERPL-MCNC: 26 MG/DL — HIGH (ref 7–18)
CALCIUM SERPL-MCNC: 8.6 MG/DL — SIGNIFICANT CHANGE UP (ref 8.4–10.5)
CALCIUM SERPL-MCNC: 9 MG/DL — SIGNIFICANT CHANGE UP (ref 8.4–10.5)
CHLORIDE SERPL-SCNC: 103 MMOL/L — SIGNIFICANT CHANGE UP (ref 96–108)
CHLORIDE SERPL-SCNC: 104 MMOL/L — SIGNIFICANT CHANGE UP (ref 96–108)
CO2 SERPL-SCNC: 24 MMOL/L — SIGNIFICANT CHANGE UP (ref 22–31)
CO2 SERPL-SCNC: 25 MMOL/L — SIGNIFICANT CHANGE UP (ref 22–31)
CREAT SERPL-MCNC: 1.59 MG/DL — HIGH (ref 0.5–1.3)
CREAT SERPL-MCNC: 1.81 MG/DL — HIGH (ref 0.5–1.3)
EOSINOPHIL # BLD AUTO: 0 K/UL — SIGNIFICANT CHANGE UP (ref 0–0.5)
EOSINOPHIL NFR BLD AUTO: 0 % — SIGNIFICANT CHANGE UP (ref 0–6)
GLUCOSE SERPL-MCNC: 103 MG/DL — HIGH (ref 70–99)
GLUCOSE SERPL-MCNC: 153 MG/DL — HIGH (ref 70–99)
HCO3 BLDA-SCNC: 23 MMOL/L — SIGNIFICANT CHANGE UP (ref 23–27)
HCT VFR BLD CALC: 25.4 % — LOW (ref 39–50)
HGB BLD-MCNC: 8.2 G/DL — LOW (ref 13–17)
HOROWITZ INDEX BLDA+IHG-RTO: 40 — SIGNIFICANT CHANGE UP
IMM GRANULOCYTES NFR BLD AUTO: 0.8 % — SIGNIFICANT CHANGE UP (ref 0–1.5)
LYMPHOCYTES # BLD AUTO: 0.98 K/UL — LOW (ref 1–3.3)
LYMPHOCYTES # BLD AUTO: 8.6 % — LOW (ref 13–44)
MAGNESIUM SERPL-MCNC: 1.8 MG/DL — SIGNIFICANT CHANGE UP (ref 1.6–2.6)
MCHC RBC-ENTMCNC: 28.9 PG — SIGNIFICANT CHANGE UP (ref 27–34)
MCHC RBC-ENTMCNC: 32.3 GM/DL — SIGNIFICANT CHANGE UP (ref 32–36)
MCV RBC AUTO: 89.4 FL — SIGNIFICANT CHANGE UP (ref 80–100)
MONOCYTES # BLD AUTO: 1.39 K/UL — HIGH (ref 0–0.9)
MONOCYTES NFR BLD AUTO: 12.1 % — SIGNIFICANT CHANGE UP (ref 2–14)
NEUTROPHILS # BLD AUTO: 8.98 K/UL — HIGH (ref 1.8–7.4)
NEUTROPHILS NFR BLD AUTO: 78.4 % — HIGH (ref 43–77)
NRBC # BLD: 0 /100 WBCS — SIGNIFICANT CHANGE UP (ref 0–0)
PCO2 BLDA: 32 MMHG — SIGNIFICANT CHANGE UP (ref 32–46)
PH BLDA: 7.48 — HIGH (ref 7.35–7.45)
PHOSPHATE SERPL-MCNC: 3.4 MG/DL — SIGNIFICANT CHANGE UP (ref 2.5–4.5)
PLATELET # BLD AUTO: 268 K/UL — SIGNIFICANT CHANGE UP (ref 150–400)
PO2 BLDA: 120 MMHG — HIGH (ref 74–108)
POTASSIUM SERPL-MCNC: 3.2 MMOL/L — LOW (ref 3.5–5.3)
POTASSIUM SERPL-MCNC: 3.9 MMOL/L — SIGNIFICANT CHANGE UP (ref 3.5–5.3)
POTASSIUM SERPL-SCNC: 3.2 MMOL/L — LOW (ref 3.5–5.3)
POTASSIUM SERPL-SCNC: 3.9 MMOL/L — SIGNIFICANT CHANGE UP (ref 3.5–5.3)
PROT SERPL-MCNC: 7 G/DL — SIGNIFICANT CHANGE UP (ref 6–8.3)
RBC # BLD: 2.84 M/UL — LOW (ref 4.2–5.8)
RBC # FLD: 13.4 % — SIGNIFICANT CHANGE UP (ref 10.3–14.5)
SAO2 % BLDA: 98 % — HIGH (ref 92–96)
SODIUM SERPL-SCNC: 135 MMOL/L — SIGNIFICANT CHANGE UP (ref 135–145)
SODIUM SERPL-SCNC: 137 MMOL/L — SIGNIFICANT CHANGE UP (ref 135–145)
WBC # BLD: 11.45 K/UL — HIGH (ref 3.8–10.5)
WBC # FLD AUTO: 11.45 K/UL — HIGH (ref 3.8–10.5)

## 2020-12-01 RX ORDER — POTASSIUM CHLORIDE 20 MEQ
10 PACKET (EA) ORAL
Refills: 0 | Status: DISCONTINUED | OUTPATIENT
Start: 2020-12-01 | End: 2020-12-01

## 2020-12-01 RX ORDER — POTASSIUM CHLORIDE 20 MEQ
20 PACKET (EA) ORAL
Refills: 0 | Status: DISCONTINUED | OUTPATIENT
Start: 2020-12-01 | End: 2020-12-01

## 2020-12-01 RX ORDER — DEXAMETHASONE 0.5 MG/5ML
4 ELIXIR ORAL DAILY
Refills: 0 | Status: COMPLETED | OUTPATIENT
Start: 2020-12-02 | End: 2020-12-03

## 2020-12-01 RX ORDER — LABETALOL HCL 100 MG
5 TABLET ORAL ONCE
Refills: 0 | Status: DISCONTINUED | OUTPATIENT
Start: 2020-12-01 | End: 2020-12-01

## 2020-12-01 RX ORDER — POTASSIUM CHLORIDE 20 MEQ
10 PACKET (EA) ORAL
Refills: 0 | Status: COMPLETED | OUTPATIENT
Start: 2020-12-01 | End: 2020-12-01

## 2020-12-01 RX ORDER — HYDRALAZINE HCL 50 MG
5 TABLET ORAL ONCE
Refills: 0 | Status: COMPLETED | OUTPATIENT
Start: 2020-12-01 | End: 2020-12-01

## 2020-12-01 RX ADMIN — Medication 100 MILLIEQUIVALENT(S): at 10:00

## 2020-12-01 RX ADMIN — SENNA PLUS 10 MILLILITER(S): 8.6 TABLET ORAL at 21:41

## 2020-12-01 RX ADMIN — Medication 5 MILLIGRAM(S): at 13:33

## 2020-12-01 RX ADMIN — Medication 100 MILLIEQUIVALENT(S): at 11:00

## 2020-12-01 RX ADMIN — PIPERACILLIN AND TAZOBACTAM 25 GRAM(S): 4; .5 INJECTION, POWDER, LYOPHILIZED, FOR SOLUTION INTRAVENOUS at 15:17

## 2020-12-01 RX ADMIN — PIPERACILLIN AND TAZOBACTAM 25 GRAM(S): 4; .5 INJECTION, POWDER, LYOPHILIZED, FOR SOLUTION INTRAVENOUS at 21:41

## 2020-12-01 RX ADMIN — Medication 6 MILLIGRAM(S): at 05:04

## 2020-12-01 RX ADMIN — Medication 166.67 MILLIGRAM(S): at 12:20

## 2020-12-01 RX ADMIN — CHLORHEXIDINE GLUCONATE 15 MILLILITER(S): 213 SOLUTION TOPICAL at 05:04

## 2020-12-01 RX ADMIN — PANTOPRAZOLE SODIUM 40 MILLIGRAM(S): 20 TABLET, DELAYED RELEASE ORAL at 12:06

## 2020-12-01 RX ADMIN — CHLORHEXIDINE GLUCONATE 1 APPLICATION(S): 213 SOLUTION TOPICAL at 05:04

## 2020-12-01 RX ADMIN — Medication 100 MILLIEQUIVALENT(S): at 05:05

## 2020-12-01 RX ADMIN — PIPERACILLIN AND TAZOBACTAM 25 GRAM(S): 4; .5 INJECTION, POWDER, LYOPHILIZED, FOR SOLUTION INTRAVENOUS at 05:04

## 2020-12-01 RX ADMIN — Medication 100 MILLIEQUIVALENT(S): at 09:00

## 2020-12-01 RX ADMIN — HEPARIN SODIUM 1300 UNIT(S)/HR: 5000 INJECTION INTRAVENOUS; SUBCUTANEOUS at 03:10

## 2020-12-01 NOTE — PROGRESS NOTE ADULT - ASSESSMENT
55M from home without PMH with progressively worsening SOB and +COVID test x5 days prior to admission presented with SpO2 64% at home, intubated 2/2 deteriorating respiratory status and admitted to ICU.    Assessment:  1. Acute Hypoxic Respiratory failure   2. COVID Pneumonia   3. Acute Kidney Injury   4. elevated LFTs     Plan:    ====CNS====  #intubated  - off versed and titrating fentanyl to 1mcg  - on precedex  - plan for SBT  - hold Nimbex given 4/4 TOF and current supine position     ====CVS====   #no active issues   - SBPs increasing to 160-180s likely 2/2 awakening and decreasing fentanyl   - TTE when off isolation     ====RESP====  #AHRF 2/2 COVID  - vent 25/450/40/5, ABG remains improved to 7.51/28/103 respiratory alklosis from overbreathing the vent               - WBC improving   - Bcx neg  - MRSA neg  - COVID pcr pos  - CXR improving      - s/p 5 days cftx and azithromycin for CAP coverage (11/16-20)  - c/w decadron x10d (11/29- ), vanco & zosyn (11/23-)  - d/c remdesivir 2/2 CrCl <45 (29.7)   - f/u acute phase reactants, vanc trough on 12/3    ====GI====  #elevated LFTs   - likely 2/2 COVID  - monitor LFTs    #bowel regimen:  - continue senna, miralax, and dulcolax UT for bowel regimen    ====RENAL====   #BECKY   - BUN/Cr 49/2.2 on admission, unknown baseline  - Cr 1.92  - c/w IVF after SBT trial today  - hold Lovenox 2/2 elevated Cr  - nephro Dr. Camara following    ====ID====   #COVID PNA  - as above     ====HEME/ONC====  #hypercoagulability 2/2 COVID  - c/w heparin gtt  - Lovenox held 2/2 worsening Cr   - monitor daily CBC, Hb stable    ====ENDO====   #No active issues:  - target -180  - HgA1c 6.1  - NPO, OG placed, on TF  - c/w low HSS while on steroids    ====Skin/Catheters====  #no acute issues  - RIJ 11/15-25 d/elvira   - right femoral line 11/24  - no rashes noted  - cw artificial tears  - FC in place     ====PPx====  #DVT: heparin gtt and SCD (BMI>30, ICU admission)  #GI: Protonix     ====Dispo=====  - monitor in ICU     ====GOC====   - full Code      55M from home without PMH with progressively worsening SOB and +COVID test x5 days prior to admission presented with SpO2 64% at home, intubated 2/2 deteriorating respiratory status and admitted to ICU.    Assessment:  1. Acute Hypoxic Respiratory failure   2. COVID Pneumonia   3. Acute Kidney Injury   4. elevated LFTs     Plan:    ====CNS====  #no acute issues  - off sedation!    ====CVS====   - TTE when off isolation     #hypertension  - no onset  - s/p lopressor 5mg IV  - plan for PO med when tolerating diet    ====RESP====  #AHRF 2/2 COVID            - WBC improving  - Bcx neg  - MRSA neg  - COVID pcr pos  - CXR improving      - s/p 5 days cftx and azithromycin for CAP coverage (11/16-20)  - c/w decadron, vanco & zosyn (11/23-)  - d/c remdesivir 2/2 CrCl <45 (29.7)   - plan for abx discontinuation 12/2 and decadron tapering q2d starting 12/2  - f/u acute phase reactants    ====GI====  #elevated LFTs   - likely 2/2 COVID  - monitor LFTs    #bowel regimen:  - continue senna, miralax, and dulcolax AR for bowel regimen    ====RENAL====   #BECKY   - BUN/Cr 49/2.2 on admission, unknown baseline  - Cr 1.8  - c/w IVF  - hold Lovenox 2/2 elevated Cr  - nephro Dr. Camara following    ====ID====   #COVID PNA  - as above     ====HEME/ONC====  #hypercoagulability 2/2 COVID  - c/w heparin gtt  - Lovenox held 2/2 worsening Cr   - monitor daily CBC, Hb stable    ====ENDO====   #No active issues:  - target -180  - HgA1c 6.1  - c/w low HSS while on steroids    ====Skin/Catheters====  #no acute issues  - RIJ 11/15-25 d/elvira   - right femoral line 11/24  - no rashes noted  - cw artificial tears  - FC in place     ====PPx====  #DVT: heparin gtt and SCD (BMI>30, ICU admission)  #GI: Protonix     ====Dispo=====  - monitor in ICU     ====GOC====   - full Code

## 2020-12-01 NOTE — PROGRESS NOTE ADULT - ASSESSMENT
Patient is a 56yo Male with no PMH recently diagnosed with COVID-19 (5 days PTA) p/w SOB s/p intubated in the ER. Pt admitted to ICU with acute hypoxic respiratory failure 2/2 COVID-19 PNA s/p intubated, BECKY with transaminitis. Nephrology consulted for Elevated serum creatinine.    1. BECKY- unknown baseline SCr. BECKY likely hemodynamically mediated in the setting of septic shock / infection 2/2 COVID-19, hypotension on pressors (now off pressors);  likely ATN. UA with 100 protein and trace blood with hyaline cast. Renal function was improving with mild increase 11/30 but currently improving on albumin gtt.  [facility] trial of IVF; NS @ 85 cc /hr x 24 hrs.   Will defer Renal US due to COVID status. Strict I/Os. Avoid nephrotoxins/ NSAIDs/ RCA. Monitor BMP.  2. Septic Shock due to Multifocal PNA 2/2 COVID-19- Plan per ICU  3. Hypotension- BP elevated; consider Hydralazine 5mg IV q6hrs prn BP >160/90. Pt off Pressors as per ICU. Monitor BP  4. Acute hypoxic respiratory failure- vent support as per ICU.  5. Hypocalcemia- & Hyperphosphatemia resolved. Monitor ionized calcium and serum phos.

## 2020-12-01 NOTE — PROGRESS NOTE ADULT - SUBJECTIVE AND OBJECTIVE BOX
INTERVAL HPI/OVERNIGHT EVENTS: ***    REVIEW OF SYSTEMS:    CONSTITUTIONAL: No weakness, fevers or chills  NECK: No pain or stiffness  RESPIRATORY: No cough, wheezing, hemoptysis; No shortness of breath  CARDIOVASCULAR: No chest pain or palpitations  GASTROINTESTINAL: No abdominal or epigastric pain. No nausea, vomiting, No diarrhea or constipation. No melena or hematochezia.  GENITOURINARY: No dysuria, frequency or hematuria  NEUROLOGICAL: No numbness or weakness  All other review of systems is negative unless indicated above      PRESSORS: [] YES [] NO  WHICH: N/A    Antimicrobial:  piperacillin/tazobactam IVPB.. 3.375 Gram(s) IV Intermittent every 8 hours  vancomycin  IVPB 1250 milliGRAM(s) IV Intermittent every 24 hours    Cardiovascular:    Pulmonary:    Hematalogic:  heparin  Infusion. 1300 Unit(s)/Hr IV Continuous <Continuous>    Other:  acetaminophen    Suspension .. 650 milliGRAM(s) Enteral Tube every 6 hours PRN  artificial  tears Solution 1 Drop(s) Both EYES every 4 hours PRN  chlorhexidine 0.12% Liquid 15 milliLiter(s) Oral Mucosa two times a day  chlorhexidine 2% Cloths 1 Application(s) Topical <User Schedule>  dexAMETHasone  Injectable 6 milliGRAM(s) IV Push daily  insulin lispro (ADMELOG) corrective regimen sliding scale   SubCutaneous every 6 hours  pantoprazole  Injectable 40 milliGRAM(s) IV Push daily  polyethylene glycol 3350 17 Gram(s) Oral daily  potassium chloride  10 mEq/100 mL IVPB 10 milliEquivalent(s) IV Intermittent every 2 hours  senna Syrup 10 milliLiter(s) Oral at bedtime  sodium chloride 0.9% lock flush 10 milliLiter(s) IV Push every 1 hour PRN    acetaminophen    Suspension .. 650 milliGRAM(s) Enteral Tube every 6 hours PRN  artificial  tears Solution 1 Drop(s) Both EYES every 4 hours PRN  chlorhexidine 0.12% Liquid 15 milliLiter(s) Oral Mucosa two times a day  chlorhexidine 2% Cloths 1 Application(s) Topical <User Schedule>  dexAMETHasone  Injectable 6 milliGRAM(s) IV Push daily  heparin  Infusion. 1300 Unit(s)/Hr IV Continuous <Continuous>  insulin lispro (ADMELOG) corrective regimen sliding scale   SubCutaneous every 6 hours  pantoprazole  Injectable 40 milliGRAM(s) IV Push daily  piperacillin/tazobactam IVPB.. 3.375 Gram(s) IV Intermittent every 8 hours  polyethylene glycol 3350 17 Gram(s) Oral daily  potassium chloride  10 mEq/100 mL IVPB 10 milliEquivalent(s) IV Intermittent every 2 hours  senna Syrup 10 milliLiter(s) Oral at bedtime  sodium chloride 0.9% lock flush 10 milliLiter(s) IV Push every 1 hour PRN  vancomycin  IVPB 1250 milliGRAM(s) IV Intermittent every 24 hours    Drug Dosing Weight  Height (cm): 157.5 (15 Nov 2020 23:00)  Weight (kg): 85.4 (15 Nov 2020 23:00)  BMI (kg/m2): 34.4 (15 Nov 2020 23:00)  BSA (m2): 1.86 (15 Nov 2020 23:00)      CENTRAL LINE: [] YES [] NO  LOCATION: N/A    ANN: [] YES [] NO        A-LINE:  [] YES [] NO  LOCATION: N/A    ICU Vital Signs Last 24 Hrs  T(C): 36.6 (01 Dec 2020 04:00), Max: 36.7 (01 Dec 2020 00:00)  T(F): 97.8 (01 Dec 2020 04:00), Max: 98 (01 Dec 2020 00:00)  HR: 87 (01 Dec 2020 06:00) (56 - 88)  BP: 172/81 (01 Dec 2020 06:00) (129/72 - 179/84)  BP(mean): 103 (01 Dec 2020 06:00) (82 - 131)  ABP: --  ABP(mean): --  RR: 22 (01 Dec 2020 06:00) (12 - 26)  SpO2: 98% (01 Dec 2020 06:00) (95% - 100%)      ABG - ( 01 Dec 2020 03:46 )  pH, Arterial: 7.48  pH, Blood: x     /  pCO2: 32    /  pO2: 120   / HCO3: 23    / Base Excess: 0.3   /  SaO2: 98                    11-30 @ 07:01  -  12-01 @ 07:00  --------------------------------------------------------  IN: 1450 mL / OUT: 2050 mL / NET: -600 mL        Mode: CPAP with PS  FiO2: 40  PEEP: 5  PS: 8  ITime: 1  MAP: 9  PIP: 14        PHYSICAL EXAM:    GENERAL: NAD, well-groomed, well-developed, AAOx3  EYES: EOMI, PERRLA,   NECK: Supple, No JVD; Normal thyroid; Trachea midline  NERVOUS SYSTEM: Motor Strength 5/5 B/L upper and lower extremities; DTRs 2+ intact and symmetric  CHEST/LUNG: No rales, rhonchi, wheezing   HEART: Regular rate and rhythm; No murmurs,   ABDOMEN: Soft, Nontender, Nondistended; Bowel sounds present  EXTREMITIES:  2+ Peripheral Pulses, No clubbing, cyanosis, or edema    LABS:  CBC Full  -  ( 01 Dec 2020 03:08 )  WBC Count : 11.45 K/uL  RBC Count : 2.84 M/uL  Hemoglobin : 8.2 g/dL  Hematocrit : 25.4 %  Platelet Count - Automated : 268 K/uL  Mean Cell Volume : 89.4 fl  Mean Cell Hemoglobin : 28.9 pg  Mean Cell Hemoglobin Concentration : 32.3 gm/dL  Auto Neutrophil # : 8.98 K/uL  Auto Lymphocyte # : 0.98 K/uL  Auto Monocyte # : 1.39 K/uL  Auto Eosinophil # : 0.00 K/uL  Auto Basophil # : 0.01 K/uL  Auto Neutrophil % : 78.4 %  Auto Lymphocyte % : 8.6 %  Auto Monocyte % : 12.1 %  Auto Eosinophil % : 0.0 %  Auto Basophil % : 0.1 %    12-01    137  |  103  |  26<H>  ----------------------------<  103<H>  3.2<L>   |  25  |  1.81<H>    Ca    8.6      01 Dec 2020 02:43  Phos  3.4     12-01  Mg     1.8     12-01    TPro  7.0  /  Alb  2.6<L>  /  TBili  0.9  /  DBili  x   /  AST  27  /  ALT  39  /  AlkPhos  58  12-01    PTT - ( 01 Dec 2020 02:43 )  PTT:76.0 sec        RADIOLOGY & ADDITIONAL STUDIES REVIEWED:  ***    [ ]GOALS OF CARE DISCUSSION WITH PATIENT/FAMILY/PROXY:    CRITICAL CARE TIME SPENT: 35 minutes INTERVAL HPI/OVERNIGHT EVENTS: No acute events overnight. Patient complained of generalized joint pain, which subsided with Tylenol. Patient denies..    REVIEW OF SYSTEMS:    CONSTITUTIONAL: No weakness, fevers or chills  NECK: No pain or stiffness  RESPIRATORY: No cough, wheezing, hemoptysis; No shortness of breath  CARDIOVASCULAR: No chest pain or palpitations  GASTROINTESTINAL: No abdominal or epigastric pain. No nausea, vomiting, No diarrhea or constipation. No melena or hematochezia.  GENITOURINARY: No dysuria, frequency or hematuria  NEUROLOGICAL: No numbness or weakness  All other review of systems is negative unless indicated above      PRESSORS: [] YES [] NO  WHICH: N/A    Antimicrobial:  piperacillin/tazobactam IVPB.. 3.375 Gram(s) IV Intermittent every 8 hours  vancomycin  IVPB 1250 milliGRAM(s) IV Intermittent every 24 hours    Cardiovascular:    Pulmonary:    Hematalogic:  heparin  Infusion. 1300 Unit(s)/Hr IV Continuous <Continuous>    Other:  acetaminophen    Suspension .. 650 milliGRAM(s) Enteral Tube every 6 hours PRN  artificial  tears Solution 1 Drop(s) Both EYES every 4 hours PRN  chlorhexidine 0.12% Liquid 15 milliLiter(s) Oral Mucosa two times a day  chlorhexidine 2% Cloths 1 Application(s) Topical <User Schedule>  dexAMETHasone  Injectable 6 milliGRAM(s) IV Push daily  insulin lispro (ADMELOG) corrective regimen sliding scale   SubCutaneous every 6 hours  pantoprazole  Injectable 40 milliGRAM(s) IV Push daily  polyethylene glycol 3350 17 Gram(s) Oral daily  potassium chloride  10 mEq/100 mL IVPB 10 milliEquivalent(s) IV Intermittent every 2 hours  senna Syrup 10 milliLiter(s) Oral at bedtime  sodium chloride 0.9% lock flush 10 milliLiter(s) IV Push every 1 hour PRN    acetaminophen    Suspension .. 650 milliGRAM(s) Enteral Tube every 6 hours PRN  artificial  tears Solution 1 Drop(s) Both EYES every 4 hours PRN  chlorhexidine 0.12% Liquid 15 milliLiter(s) Oral Mucosa two times a day  chlorhexidine 2% Cloths 1 Application(s) Topical <User Schedule>  dexAMETHasone  Injectable 6 milliGRAM(s) IV Push daily  heparin  Infusion. 1300 Unit(s)/Hr IV Continuous <Continuous>  insulin lispro (ADMELOG) corrective regimen sliding scale   SubCutaneous every 6 hours  pantoprazole  Injectable 40 milliGRAM(s) IV Push daily  piperacillin/tazobactam IVPB.. 3.375 Gram(s) IV Intermittent every 8 hours  polyethylene glycol 3350 17 Gram(s) Oral daily  potassium chloride  10 mEq/100 mL IVPB 10 milliEquivalent(s) IV Intermittent every 2 hours  senna Syrup 10 milliLiter(s) Oral at bedtime  sodium chloride 0.9% lock flush 10 milliLiter(s) IV Push every 1 hour PRN  vancomycin  IVPB 1250 milliGRAM(s) IV Intermittent every 24 hours    Drug Dosing Weight  Height (cm): 157.5 (15 Nov 2020 23:00)  Weight (kg): 85.4 (15 Nov 2020 23:00)  BMI (kg/m2): 34.4 (15 Nov 2020 23:00)  BSA (m2): 1.86 (15 Nov 2020 23:00)      CENTRAL LINE: [] YES [] NO  LOCATION: N/A    ANN: [] YES [] NO        A-LINE:  [] YES [] NO  LOCATION: N/A    ICU Vital Signs Last 24 Hrs  T(C): 36.6 (01 Dec 2020 04:00), Max: 36.7 (01 Dec 2020 00:00)  T(F): 97.8 (01 Dec 2020 04:00), Max: 98 (01 Dec 2020 00:00)  HR: 87 (01 Dec 2020 06:00) (56 - 88)  BP: 172/81 (01 Dec 2020 06:00) (129/72 - 179/84)  BP(mean): 103 (01 Dec 2020 06:00) (82 - 131)  ABP: --  ABP(mean): --  RR: 22 (01 Dec 2020 06:00) (12 - 26)  SpO2: 98% (01 Dec 2020 06:00) (95% - 100%)      ABG - ( 01 Dec 2020 03:46 )  pH, Arterial: 7.48  pH, Blood: x     /  pCO2: 32    /  pO2: 120   / HCO3: 23    / Base Excess: 0.3   /  SaO2: 98                    11-30 @ 07:01  -  12-01 @ 07:00  --------------------------------------------------------  IN: 1450 mL / OUT: 2050 mL / NET: -600 mL        Mode: CPAP with PS  FiO2: 40  PEEP: 5  PS: 8  ITime: 1  MAP: 9  PIP: 14        PHYSICAL EXAM:    GENERAL: NAD, well-groomed, well-developed, AAOx3  EYES: EOMI, PERRLA,   NECK: Supple, No JVD; Normal thyroid; Trachea midline  NERVOUS SYSTEM: Motor Strength 5/5 B/L upper and lower extremities; DTRs 2+ intact and symmetric  CHEST/LUNG: No rales, rhonchi, wheezing   HEART: Regular rate and rhythm; No murmurs,   ABDOMEN: Soft, Nontender, Nondistended; Bowel sounds present  EXTREMITIES:  2+ Peripheral Pulses, No clubbing, cyanosis, or edema    LABS:  CBC Full  -  ( 01 Dec 2020 03:08 )  WBC Count : 11.45 K/uL  RBC Count : 2.84 M/uL  Hemoglobin : 8.2 g/dL  Hematocrit : 25.4 %  Platelet Count - Automated : 268 K/uL  Mean Cell Volume : 89.4 fl  Mean Cell Hemoglobin : 28.9 pg  Mean Cell Hemoglobin Concentration : 32.3 gm/dL  Auto Neutrophil # : 8.98 K/uL  Auto Lymphocyte # : 0.98 K/uL  Auto Monocyte # : 1.39 K/uL  Auto Eosinophil # : 0.00 K/uL  Auto Basophil # : 0.01 K/uL  Auto Neutrophil % : 78.4 %  Auto Lymphocyte % : 8.6 %  Auto Monocyte % : 12.1 %  Auto Eosinophil % : 0.0 %  Auto Basophil % : 0.1 %    12-01    137  |  103  |  26<H>  ----------------------------<  103<H>  3.2<L>   |  25  |  1.81<H>    Ca    8.6      01 Dec 2020 02:43  Phos  3.4     12-01  Mg     1.8     12-01    TPro  7.0  /  Alb  2.6<L>  /  TBili  0.9  /  DBili  x   /  AST  27  /  ALT  39  /  AlkPhos  58  12-01    PTT - ( 01 Dec 2020 02:43 )  PTT:76.0 sec        RADIOLOGY & ADDITIONAL STUDIES REVIEWED:  ***    [ ]GOALS OF CARE DISCUSSION WITH PATIENT/FAMILY/PROXY:    CRITICAL CARE TIME SPENT: 35 minutes INTERVAL HPI/OVERNIGHT EVENTS: No acute events overnight. Patient complained of generalized joint pain, which subsided with Tylenol. He is saturating well on NC 4L    PRESSORS: [] YES [x] NO  WHICH: N/A    Antimicrobial:  piperacillin/tazobactam IVPB.. 3.375 Gram(s) IV Intermittent every 8 hours  vancomycin  IVPB 1250 milliGRAM(s) IV Intermittent every 24 hours    Cardiovascular:    Pulmonary:    Hematalogic:  heparin  Infusion. 1300 Unit(s)/Hr IV Continuous <Continuous>    Other:  acetaminophen    Suspension .. 650 milliGRAM(s) Enteral Tube every 6 hours PRN  artificial  tears Solution 1 Drop(s) Both EYES every 4 hours PRN  chlorhexidine 0.12% Liquid 15 milliLiter(s) Oral Mucosa two times a day  chlorhexidine 2% Cloths 1 Application(s) Topical <User Schedule>  dexAMETHasone  Injectable 6 milliGRAM(s) IV Push daily  insulin lispro (ADMELOG) corrective regimen sliding scale   SubCutaneous every 6 hours  pantoprazole  Injectable 40 milliGRAM(s) IV Push daily  polyethylene glycol 3350 17 Gram(s) Oral daily  potassium chloride  10 mEq/100 mL IVPB 10 milliEquivalent(s) IV Intermittent every 2 hours  senna Syrup 10 milliLiter(s) Oral at bedtime  sodium chloride 0.9% lock flush 10 milliLiter(s) IV Push every 1 hour PRN    acetaminophen    Suspension .. 650 milliGRAM(s) Enteral Tube every 6 hours PRN  artificial  tears Solution 1 Drop(s) Both EYES every 4 hours PRN  chlorhexidine 0.12% Liquid 15 milliLiter(s) Oral Mucosa two times a day  chlorhexidine 2% Cloths 1 Application(s) Topical <User Schedule>  dexAMETHasone  Injectable 6 milliGRAM(s) IV Push daily  heparin  Infusion. 1300 Unit(s)/Hr IV Continuous <Continuous>  insulin lispro (ADMELOG) corrective regimen sliding scale   SubCutaneous every 6 hours  pantoprazole  Injectable 40 milliGRAM(s) IV Push daily  piperacillin/tazobactam IVPB.. 3.375 Gram(s) IV Intermittent every 8 hours  polyethylene glycol 3350 17 Gram(s) Oral daily  potassium chloride  10 mEq/100 mL IVPB 10 milliEquivalent(s) IV Intermittent every 2 hours  senna Syrup 10 milliLiter(s) Oral at bedtime  sodium chloride 0.9% lock flush 10 milliLiter(s) IV Push every 1 hour PRN  vancomycin  IVPB 1250 milliGRAM(s) IV Intermittent every 24 hours    Drug Dosing Weight  Height (cm): 157.5 (15 Nov 2020 23:00)  Weight (kg): 85.4 (15 Nov 2020 23:00)  BMI (kg/m2): 34.4 (15 Nov 2020 23:00)  BSA (m2): 1.86 (15 Nov 2020 23:00)    ICU Vital Signs Last 24 Hrs  T(C): 36.6 (01 Dec 2020 04:00), Max: 36.7 (01 Dec 2020 00:00)  T(F): 97.8 (01 Dec 2020 04:00), Max: 98 (01 Dec 2020 00:00)  HR: 87 (01 Dec 2020 06:00) (56 - 88)  BP: 172/81 (01 Dec 2020 06:00) (129/72 - 179/84)  BP(mean): 103 (01 Dec 2020 06:00) (82 - 131)  ABP: --  ABP(mean): --  RR: 22 (01 Dec 2020 06:00) (12 - 26)  SpO2: 98% (01 Dec 2020 06:00) (95% - 100%)    ABG - ( 01 Dec 2020 03:46 )  pH, Arterial: 7.48  pH, Blood: x     /  pCO2: 32    /  pO2: 120   / HCO3: 23    / Base Excess: 0.3   /  SaO2: 98          11-30 @ 07:01  -  12-01 @ 07:00  --------------------------------------------------------  IN: 1450 mL / OUT: 2050 mL / NET: -600 mL      PHYSICAL EXAM:    GENERAL: NAD, mild lethargic, but AAOx3  HEAD: AT/NC  EYES: EOMI, PERRLA,   NECK: Supple, No JVD; Normal thyroid; Trachea midline  NERVOUS SYSTEM: strength 3-4/5 b/l LE & UE  CHEST/LUNG: No rales, rhonchi, wheezing   HEART: Regular rate and rhythm; No murmurs,   ABDOMEN: Soft, Nontender, Nondistended; Bowel sounds present  EXTREMITIES:  2+ Peripheral Pulses, No clubbing, cyanosis, or edema    lines/tubes/catheters: n/a    LABS:  CBC Full  -  ( 01 Dec 2020 03:08 )  WBC Count : 11.45 K/uL  RBC Count : 2.84 M/uL  Hemoglobin : 8.2 g/dL  Hematocrit : 25.4 %  Platelet Count - Automated : 268 K/uL  Mean Cell Volume : 89.4 fl  Mean Cell Hemoglobin : 28.9 pg  Mean Cell Hemoglobin Concentration : 32.3 gm/dL  Auto Neutrophil # : 8.98 K/uL  Auto Lymphocyte # : 0.98 K/uL  Auto Monocyte # : 1.39 K/uL  Auto Eosinophil # : 0.00 K/uL  Auto Basophil # : 0.01 K/uL  Auto Neutrophil % : 78.4 %  Auto Lymphocyte % : 8.6 %  Auto Monocyte % : 12.1 %  Auto Eosinophil % : 0.0 %  Auto Basophil % : 0.1 %    12-01    137  |  103  |  26<H>  ----------------------------<  103<H>  3.2<L>   |  25  |  1.81<H>    Ca    8.6      01 Dec 2020 02:43  Phos  3.4     12-01  Mg     1.8     12-01    TPro  7.0  /  Alb  2.6<L>  /  TBili  0.9  /  DBili  x   /  AST  27  /  ALT  39  /  AlkPhos  58  12-01    PTT - ( 01 Dec 2020 02:43 )  PTT:76.0 sec        RADIOLOGY & ADDITIONAL STUDIES REVIEWED:  ***    [ ]GOALS OF CARE DISCUSSION WITH PATIENT/FAMILY/PROXY:    CRITICAL CARE TIME SPENT: 35 minutes

## 2020-12-01 NOTE — PROGRESS NOTE ADULT - NUTRITIONAL ASSESSMENT
This patient has been assessed with a concern for Malnutrition and has been determined to have a diagnosis/diagnoses of Severe protein-calorie malnutrition.    This patient is being managed with:   Diet Dysphagia 1 Pureed-Nectar Consistency Fluid-  Entered: Dec  1 2020 12:16PM

## 2020-12-01 NOTE — PROGRESS NOTE ADULT - SUBJECTIVE AND OBJECTIVE BOX
[facility] NEPHROLOGY- PROGRESS NOTE    Patient is a 54yo Male with no PMH recently diagnosed with COVID-19 (5 days PTA) p/w SOB s/p intubated in the ER. Pt admitted to ICU with acute hypoxic respiratory failure 2/2 COVID-19 PNA s/p intubated, BECKY with transaminitis. Nephrology consulted for Elevated serum creatinine.  Extubated 11/30    Hospital Medications: Medications reviewed.  REVIEW OF SYSTEMS: Pt denies any current complaints    VITALS:  T(F): 97.9 (12-01-20 @ 20:22), Max: 98 (12-01-20 @ 00:00)  HR: 85 (12-01-20 @ 19:00)  BP: 150/76 (12-01-20 @ 19:00)  RR: 17 (12-01-20 @ 19:00)  SpO2: 95% (12-01-20 @ 19:00)  Wt(kg): --    11-30 @ 07:01  -  12-01 @ 07:00  --------------------------------------------------------  IN: 1463 mL / OUT: 2050 mL / NET: -587 mL    12-01 @ 07:01  -  12-01 @ 20:42  --------------------------------------------------------  IN: 980 mL / OUT: 1675 mL / NET: -695 mL      PHYSICAL EXAM:  Gen: NAD  Cards: RRR, +S1/S2,   Resp: CTA ant  GI: soft, ND,   : +isaacs with yellow urine  Extremities: no LE edema,     LABS:  12-01    135  |  104  |  25<H>  ----------------------------<  153<H>  3.9   |  24  |  1.59<H>    Ca    9.0      01 Dec 2020 14:45  Phos  3.4     12-01  Mg     1.8     12-01    TPro  7.0  /  Alb  2.6<L>  /  TBili  0.9  /  DBili      /  AST  27  /  ALT  39  /  AlkPhos  58  12-01    Creatinine Trend: 1.59 <--, 1.81 <--, 1.92 <--, 1.74 <--, 1.43 <--, 1.64 <--, 1.78 <--, 1.47 <--, 1.62 <--, 1.58 <--                        8.2    11.45 )-----------( 268      ( 01 Dec 2020 03:08 )             25.4     Urine Studies:

## 2020-12-01 NOTE — CHART NOTE - NSCHARTNOTEFT_GEN_A_CORE
Assessment:   Patient is a 55y old  Male who presents with a chief complaint of Acute Hypoxic Respiratory failure (01 Dec 2020 07:29). Pt s/p extubation from , now s/p TF order. Now s/p SLP this day with PO diet ordered. Brief discussion with RN. Unable to see pt due to Covid +/ isolation.      Factors impacting intake: [ ] none [ ] nausea  [ ] vomiting [ ] diarrhea [ ] constipation  [x ]chewing problems [x ] swallowing issues  [x ] other: critical illness ongoing (+ see above)    Diet Prescription: Diet, Dysphagia 1 Pureed-Nectar Consistency Fluid (20 @ 12:19)        Daily     Daily Weight in k.2 (2020 07:00)  Weight in k (2020 08:)  Weight in k.4 (2020 07:00)  Weight in k.7 (2020 07:45)  Weight in k.6 (2020 08:00)  Weight in k.2 (2020 07:00)  Weight in k.3 (2020 08:00)  Weight in k.8 (2020 07:00)  Weight in k (2020 07:00)        Pertinent Medications: MEDICATIONS  (STANDING):  albumin human 25% IVPB 100 milliLiter(s) IV Intermittent every 4 hours  chlorhexidine 0.12% Liquid 15 milliLiter(s) Oral Mucosa two times a day  chlorhexidine 2% Cloths 1 Application(s) Topical <User Schedule>  heparin  Infusion. 1300 Unit(s)/Hr (13 mL/Hr) IV Continuous <Continuous>  insulin lispro (ADMELOG) corrective regimen sliding scale   SubCutaneous every 6 hours  pantoprazole  Injectable 40 milliGRAM(s) IV Push daily  piperacillin/tazobactam IVPB.. 3.375 Gram(s) IV Intermittent every 8 hours  polyethylene glycol 3350 17 Gram(s) Oral daily  senna Syrup 10 milliLiter(s) Oral at bedtime  vancomycin  IVPB 1250 milliGRAM(s) IV Intermittent every 24 hours    MEDICATIONS  (PRN):  acetaminophen    Suspension .. 650 milliGRAM(s) Enteral Tube every 6 hours PRN Temp greater or equal to 38C (100.4F)  artificial  tears Solution 1 Drop(s) Both EYES every 4 hours PRN Dry Eyes  sodium chloride 0.9% lock flush 10 milliLiter(s) IV Push every 1 hour PRN Pre/post blood products, medications, blood draw, and to maintain line patency    Pertinent Labs:  Na137 mmol/L Glu 103 mg/dL<H> K+ 3.2 mmol/L<L> Cr  1.81 mg/dL<H> BUN 26 mg/dL<H>  Phos 3.4 mg/dL  Alb 2.6 g/dL<L>  Chol --    LDL --    HDL --    Trig 301 mg/dL<H>     CAPILLARY BLOOD GLUCOSE      POCT Blood Glucose.: 135 mg/dL (01 Dec 2020 11:43)  POCT Blood Glucose.: 103 mg/dL (01 Dec 2020 05:41)  POCT Blood Glucose.: 128 mg/dL (2020 23:07)  POCT Blood Glucose.: 144 mg/dL (2020 16:36)       Previous Nutrition Diagnosis:   [ ] Altered GI function  [ ]Inadequate Oral Intake [ ] Swallowing Difficulty   [ ] Altered nutrition related labs [ ] Increased Nutrient Needs [ ] Overweight/Obesity   [ ] Unintended Weight Loss [ ] Food & Nutrition Related Knowledge Deficit [x ] Malnutrition (severe PCM)  [ ] Other:     Nutrition Diagnosis is [x ] ongoing  [ ] resolved [ ] not applicable     New Nutrition Diagnosis: [ ] not applicable       Interventions:   Recommend  [ ] Change Diet To:  [x ] Nutrition Supplement: If PO generally not good, consider adding 2 doug HN (note: NTL) BID. MD to monitor. RD available.   [ ] Nutrition Support  [ ] Other:     Monitoring and Evaluation:   [x ] PO intake [ x ] Tolerance to diet prescription [ x ] weights [ x ] labs[ x ] follow up per protocol  [ ] other:

## 2020-12-02 LAB
ALBUMIN SERPL ELPH-MCNC: 3.5 G/DL — SIGNIFICANT CHANGE UP (ref 3.5–5)
ALP SERPL-CCNC: 60 U/L — SIGNIFICANT CHANGE UP (ref 40–120)
ALT FLD-CCNC: 39 U/L DA — SIGNIFICANT CHANGE UP (ref 10–60)
ANION GAP SERPL CALC-SCNC: 9 MMOL/L — SIGNIFICANT CHANGE UP (ref 5–17)
APTT BLD: 32.5 SEC — SIGNIFICANT CHANGE UP (ref 27.5–35.5)
AST SERPL-CCNC: 25 U/L — SIGNIFICANT CHANGE UP (ref 10–40)
BASE EXCESS BLDA CALC-SCNC: 0 MMOL/L — SIGNIFICANT CHANGE UP (ref -2–2)
BASOPHILS # BLD AUTO: 0.03 K/UL — SIGNIFICANT CHANGE UP (ref 0–0.2)
BASOPHILS NFR BLD AUTO: 0.3 % — SIGNIFICANT CHANGE UP (ref 0–2)
BILIRUB SERPL-MCNC: 1.3 MG/DL — HIGH (ref 0.2–1.2)
BLOOD GAS COMMENTS ARTERIAL: SIGNIFICANT CHANGE UP
BUN SERPL-MCNC: 24 MG/DL — HIGH (ref 7–18)
CALCIUM SERPL-MCNC: 9.2 MG/DL — SIGNIFICANT CHANGE UP (ref 8.4–10.5)
CHLORIDE SERPL-SCNC: 103 MMOL/L — SIGNIFICANT CHANGE UP (ref 96–108)
CO2 SERPL-SCNC: 26 MMOL/L — SIGNIFICANT CHANGE UP (ref 22–31)
CREAT SERPL-MCNC: 1.61 MG/DL — HIGH (ref 0.5–1.3)
EOSINOPHIL # BLD AUTO: 0.02 K/UL — SIGNIFICANT CHANGE UP (ref 0–0.5)
EOSINOPHIL NFR BLD AUTO: 0.2 % — SIGNIFICANT CHANGE UP (ref 0–6)
GLUCOSE SERPL-MCNC: 86 MG/DL — SIGNIFICANT CHANGE UP (ref 70–99)
HCO3 BLDA-SCNC: 22 MMOL/L — LOW (ref 23–27)
HCT VFR BLD CALC: 27.6 % — LOW (ref 39–50)
HGB BLD-MCNC: 8.8 G/DL — LOW (ref 13–17)
HOROWITZ INDEX BLDA+IHG-RTO: 40 — SIGNIFICANT CHANGE UP
IMM GRANULOCYTES NFR BLD AUTO: 0.4 % — SIGNIFICANT CHANGE UP (ref 0–1.5)
LYMPHOCYTES # BLD AUTO: 0.76 K/UL — LOW (ref 1–3.3)
LYMPHOCYTES # BLD AUTO: 6.4 % — LOW (ref 13–44)
MAGNESIUM SERPL-MCNC: 1.9 MG/DL — SIGNIFICANT CHANGE UP (ref 1.6–2.6)
MCHC RBC-ENTMCNC: 28.6 PG — SIGNIFICANT CHANGE UP (ref 27–34)
MCHC RBC-ENTMCNC: 31.9 GM/DL — LOW (ref 32–36)
MCV RBC AUTO: 89.6 FL — SIGNIFICANT CHANGE UP (ref 80–100)
MONOCYTES # BLD AUTO: 1.01 K/UL — HIGH (ref 0–0.9)
MONOCYTES NFR BLD AUTO: 8.5 % — SIGNIFICANT CHANGE UP (ref 2–14)
NEUTROPHILS # BLD AUTO: 10.08 K/UL — HIGH (ref 1.8–7.4)
NEUTROPHILS NFR BLD AUTO: 84.2 % — HIGH (ref 43–77)
NRBC # BLD: 0 /100 WBCS — SIGNIFICANT CHANGE UP (ref 0–0)
PCO2 BLDA: 29 MMHG — LOW (ref 32–46)
PH BLDA: 7.5 — HIGH (ref 7.35–7.45)
PLATELET # BLD AUTO: 258 K/UL — SIGNIFICANT CHANGE UP (ref 150–400)
PO2 BLDA: 118 MMHG — HIGH (ref 74–108)
POTASSIUM SERPL-MCNC: 3.3 MMOL/L — LOW (ref 3.5–5.3)
POTASSIUM SERPL-SCNC: 3.3 MMOL/L — LOW (ref 3.5–5.3)
PROT SERPL-MCNC: 7.9 G/DL — SIGNIFICANT CHANGE UP (ref 6–8.3)
RBC # BLD: 3.08 M/UL — LOW (ref 4.2–5.8)
RBC # FLD: 13.8 % — SIGNIFICANT CHANGE UP (ref 10.3–14.5)
SAO2 % BLDA: 98 % — HIGH (ref 92–96)
SODIUM SERPL-SCNC: 138 MMOL/L — SIGNIFICANT CHANGE UP (ref 135–145)
TRIGL SERPL-MCNC: 289 MG/DL — HIGH
WBC # BLD: 11.95 K/UL — HIGH (ref 3.8–10.5)
WBC # FLD AUTO: 11.95 K/UL — HIGH (ref 3.8–10.5)

## 2020-12-02 RX ORDER — POTASSIUM CHLORIDE 20 MEQ
10 PACKET (EA) ORAL
Refills: 0 | Status: COMPLETED | OUTPATIENT
Start: 2020-12-02 | End: 2020-12-02

## 2020-12-02 RX ORDER — ENOXAPARIN SODIUM 100 MG/ML
40 INJECTION SUBCUTANEOUS DAILY
Refills: 0 | Status: DISCONTINUED | OUTPATIENT
Start: 2020-12-02 | End: 2020-12-03

## 2020-12-02 RX ORDER — SODIUM CHLORIDE 9 MG/ML
250 INJECTION, SOLUTION INTRAVENOUS ONCE
Refills: 0 | Status: COMPLETED | OUTPATIENT
Start: 2020-12-02 | End: 2020-12-02

## 2020-12-02 RX ORDER — AMLODIPINE BESYLATE 2.5 MG/1
5 TABLET ORAL DAILY
Refills: 0 | Status: DISCONTINUED | OUTPATIENT
Start: 2020-12-02 | End: 2020-12-03

## 2020-12-02 RX ORDER — HYDRALAZINE HCL 50 MG
10 TABLET ORAL ONCE
Refills: 0 | Status: COMPLETED | OUTPATIENT
Start: 2020-12-02 | End: 2020-12-02

## 2020-12-02 RX ORDER — HYDRALAZINE HCL 50 MG
10 TABLET ORAL EVERY 8 HOURS
Refills: 0 | Status: DISCONTINUED | OUTPATIENT
Start: 2020-12-02 | End: 2020-12-02

## 2020-12-02 RX ORDER — HYDRALAZINE HCL 50 MG
10 TABLET ORAL ONCE
Refills: 0 | Status: DISCONTINUED | OUTPATIENT
Start: 2020-12-02 | End: 2020-12-02

## 2020-12-02 RX ORDER — LABETALOL HCL 100 MG
10 TABLET ORAL ONCE
Refills: 0 | Status: COMPLETED | OUTPATIENT
Start: 2020-12-02 | End: 2020-12-02

## 2020-12-02 RX ADMIN — POLYETHYLENE GLYCOL 3350 17 GRAM(S): 17 POWDER, FOR SOLUTION ORAL at 12:40

## 2020-12-02 RX ADMIN — ENOXAPARIN SODIUM 40 MILLIGRAM(S): 100 INJECTION SUBCUTANEOUS at 16:28

## 2020-12-02 RX ADMIN — PANTOPRAZOLE SODIUM 40 MILLIGRAM(S): 20 TABLET, DELAYED RELEASE ORAL at 12:40

## 2020-12-02 RX ADMIN — PIPERACILLIN AND TAZOBACTAM 25 GRAM(S): 4; .5 INJECTION, POWDER, LYOPHILIZED, FOR SOLUTION INTRAVENOUS at 05:21

## 2020-12-02 RX ADMIN — Medication 10 MILLIGRAM(S): at 21:54

## 2020-12-02 RX ADMIN — CHLORHEXIDINE GLUCONATE 15 MILLILITER(S): 213 SOLUTION TOPICAL at 05:17

## 2020-12-02 RX ADMIN — SODIUM CHLORIDE 500 MILLILITER(S): 9 INJECTION, SOLUTION INTRAVENOUS at 21:54

## 2020-12-02 RX ADMIN — Medication 4 MILLIGRAM(S): at 05:21

## 2020-12-02 RX ADMIN — AMLODIPINE BESYLATE 5 MILLIGRAM(S): 2.5 TABLET ORAL at 12:39

## 2020-12-02 RX ADMIN — Medication 100 MILLIEQUIVALENT(S): at 21:53

## 2020-12-02 RX ADMIN — Medication 100 MILLIEQUIVALENT(S): at 22:03

## 2020-12-02 RX ADMIN — Medication 100 MILLIEQUIVALENT(S): at 21:00

## 2020-12-02 RX ADMIN — SENNA PLUS 10 MILLILITER(S): 8.6 TABLET ORAL at 21:54

## 2020-12-02 RX ADMIN — CHLORHEXIDINE GLUCONATE 1 APPLICATION(S): 213 SOLUTION TOPICAL at 05:17

## 2020-12-02 RX ADMIN — Medication 10 MILLIGRAM(S): at 16:37

## 2020-12-02 NOTE — PHYSICAL THERAPY INITIAL EVALUATION ADULT - MODALITIES TREATMENT COMMENTS
+ crackles and on all lung fields with diminished to absent breath sounds on bilateral lung bases; decreased chest expansion, fair effort for inspiration; + weak, effective cough, may require assist with oral suctioning.

## 2020-12-02 NOTE — PROGRESS NOTE ADULT - ASSESSMENT
Patient is a 54yo Male with no PMH recently diagnosed with COVID-19 (5 days PTA) p/w SOB s/p intubated in the ER. Pt admitted to ICU with acute hypoxic respiratory failure 2/2 COVID-19 PNA s/p intubated, BECKY with transaminitis. Nephrology consulted for Elevated serum creatinine.    1. BECKY- unknown baseline SCr. BECKY likely hemodynamically mediated in the setting of septic shock / infection 2/2 COVID-19, hypotension on pressors (now off pressors);  likely ATN. UA with 100 protein and trace blood with hyaline cast. Renal function was improving with mild increase 11/30 now currently improving s/p albumin gtt.  [facility] trial of IVF; NS @ 80 cc /hr x 24 hrs.   Will defer Renal US due to COVID status. Strict I/Os. Avoid nephrotoxins/ NSAIDs/ RCA. Monitor BMP.  2. Septic Shock due to Multifocal PNA 2/2 COVID-19- Plan per ICU  3. Hypotension- BP elevated; consider Hydralazine 5mg IV q6hrs prn BP >160/90. Pt off Pressors as per ICU. Monitor BP  4. Acute hypoxic respiratory failure- NIV  as per ICU.  5. Hypocalcemia- & Hyperphosphatemia resolved. Monitor ionized calcium and serum phos.

## 2020-12-02 NOTE — PHYSICAL THERAPY INITIAL EVALUATION ADULT - PATIENT/FAMILY/SIGNIFICANT OTHER GOALS STATEMENT, PT EVAL
unable to state goals for self at this time; functional history documented in H&P states that patient was previously independent prior to hospital admission

## 2020-12-02 NOTE — PHYSICAL THERAPY INITIAL EVALUATION ADULT - IMPAIRMENTS FOUND, PT EVAL
cognitive impairment/muscle strength/gait, locomotion, and balance/posture/decreased midline orientation/aerobic capacity/endurance/arousal, attention, and cognition/ventilation and respiration/gas exchange

## 2020-12-02 NOTE — CHART NOTE - NSCHARTNOTEFT_GEN_A_CORE
Right femoral central catheter was removed under aseptic conditions . Appropriate pressure applied afterwards for at least 5 minutes to minimize blood loss. wound was covered with sterile gauze.

## 2020-12-02 NOTE — PHYSICAL THERAPY INITIAL EVALUATION ADULT - ACTIVE RANGE OF MOTION EXAMINATION, REHAB EVAL
all major joints limited towards end-range when moving against gravity due to weakness/no Active ROM deficits were identified/deficits as listed below

## 2020-12-02 NOTE — PHYSICAL THERAPY INITIAL EVALUATION ADULT - DIAGNOSIS, PT EVAL
severe generalized weakness and generalized deconditioning due to critical illness and prolonged immobility and intubation; ICU-acquired weakness; inability to perform basic mobility and functional activities

## 2020-12-02 NOTE — PROGRESS NOTE ADULT - SUBJECTIVE AND OBJECTIVE BOX
INTERVAL HPI/OVERNIGHT EVENTS: No     REVIEW OF SYSTEMS:    CONSTITUTIONAL: No weakness, fevers or chills  NECK: No pain or stiffness  RESPIRATORY: No cough, wheezing, hemoptysis; No shortness of breath  CARDIOVASCULAR: No chest pain or palpitations  GASTROINTESTINAL: No abdominal or epigastric pain. No nausea, vomiting, No diarrhea or constipation. No melena or hematochezia.  GENITOURINARY: No dysuria, frequency or hematuria  NEUROLOGICAL: No numbness or weakness  All other review of systems is negative unless indicated above    PRESSORS: [] YES [] NO  WHICH: N/A    Antimicrobial:    Cardiovascular:  amLODIPine   Tablet 5 milliGRAM(s) Oral daily    Pulmonary:    Hematalogic:    Other:  acetaminophen    Suspension .. 650 milliGRAM(s) Enteral Tube every 6 hours PRN  artificial  tears Solution 1 Drop(s) Both EYES every 4 hours PRN  chlorhexidine 0.12% Liquid 15 milliLiter(s) Oral Mucosa two times a day  chlorhexidine 2% Cloths 1 Application(s) Topical <User Schedule>  dexAMETHasone  Injectable 4 milliGRAM(s) IV Push daily  insulin lispro (ADMELOG) corrective regimen sliding scale   SubCutaneous every 6 hours  pantoprazole  Injectable 40 milliGRAM(s) IV Push daily  polyethylene glycol 3350 17 Gram(s) Oral daily  senna Syrup 10 milliLiter(s) Oral at bedtime  sodium chloride 0.9% lock flush 10 milliLiter(s) IV Push every 1 hour PRN    acetaminophen    Suspension .. 650 milliGRAM(s) Enteral Tube every 6 hours PRN  amLODIPine   Tablet 5 milliGRAM(s) Oral daily  artificial  tears Solution 1 Drop(s) Both EYES every 4 hours PRN  chlorhexidine 0.12% Liquid 15 milliLiter(s) Oral Mucosa two times a day  chlorhexidine 2% Cloths 1 Application(s) Topical <User Schedule>  dexAMETHasone  Injectable 4 milliGRAM(s) IV Push daily  insulin lispro (ADMELOG) corrective regimen sliding scale   SubCutaneous every 6 hours  pantoprazole  Injectable 40 milliGRAM(s) IV Push daily  polyethylene glycol 3350 17 Gram(s) Oral daily  senna Syrup 10 milliLiter(s) Oral at bedtime  sodium chloride 0.9% lock flush 10 milliLiter(s) IV Push every 1 hour PRN    Drug Dosing Weight  Height (cm): 157.5 (15 Nov 2020 23:00)  Weight (kg): 85.4 (15 Nov 2020 23:00)  BMI (kg/m2): 34.4 (15 Nov 2020 23:00)  BSA (m2): 1.86 (15 Nov 2020 23:00)    ICU Vital Signs Last 24 Hrs  T(C): 36.4 (02 Dec 2020 00:00), Max: 36.7 (01 Dec 2020 16:08)  T(F): 97.5 (02 Dec 2020 00:00), Max: 98 (01 Dec 2020 16:08)  HR: 81 (02 Dec 2020 15:00) (68 - 87)  BP: 169/97 (02 Dec 2020 15:00) (146/84 - 183/95)  BP(mean): 114 (02 Dec 2020 15:00) (92 - 120)  ABP: --  ABP(mean): --  RR: 23 (02 Dec 2020 15:00) (17 - 25)  SpO2: 97% (02 Dec 2020 15:00) (93% - 98%)      ABG - ( 01 Dec 2020 03:46 )  pH, Arterial: 7.48  pH, Blood: x     /  pCO2: 32    /  pO2: 120   / HCO3: 23    / Base Excess: 0.3   /  SaO2: 98                    12-01 @ 07:01  -  12-02 @ 07:00  --------------------------------------------------------  IN: 1245 mL / OUT: 2810 mL / NET: -1565 mL              PHYSICAL EXAM:    GENERAL: NAD, well-groomed, well-developed, AAOx3  EYES: EOMI, PERRLA,   NECK: Supple, No JVD; Normal thyroid; Trachea midline  NERVOUS SYSTEM: Motor Strength 5/5 B/L upper and lower extremities; DTRs 2+ intact and symmetric  CHEST/LUNG: No rales, rhonchi, wheezing   HEART: Regular rate and rhythm; No murmurs,   ABDOMEN: Soft, Nontender, Nondistended; Bowel sounds present  EXTREMITIES:  2+ Peripheral Pulses, No clubbing, cyanosis, or edema  LINES/TUBES/CATHETERS: n/a    LABS:  CBC Full  -  ( 02 Dec 2020 06:23 )  WBC Count : 11.95 K/uL  RBC Count : 3.08 M/uL  Hemoglobin : 8.8 g/dL  Hematocrit : 27.6 %  Platelet Count - Automated : 258 K/uL  Mean Cell Volume : 89.6 fl  Mean Cell Hemoglobin : 28.6 pg  Mean Cell Hemoglobin Concentration : 31.9 gm/dL  Auto Neutrophil # : 10.08 K/uL  Auto Lymphocyte # : 0.76 K/uL  Auto Monocyte # : 1.01 K/uL  Auto Eosinophil # : 0.02 K/uL  Auto Basophil # : 0.03 K/uL  Auto Neutrophil % : 84.2 %  Auto Lymphocyte % : 6.4 %  Auto Monocyte % : 8.5 %  Auto Eosinophil % : 0.2 %  Auto Basophil % : 0.3 %    12-02    138  |  103  |  24<H>  ----------------------------<  86  3.3<L>   |  26  |  1.61<H>    Ca    9.2      02 Dec 2020 06:23  Phos  3.4     12-01  Mg     1.9     12-02    TPro  7.9  /  Alb  3.5  /  TBili  1.3<H>  /  DBili  x   /  AST  25  /  ALT  39  /  AlkPhos  60  12-02    PTT - ( 02 Dec 2020 06:23 )  PTT:32.5 sec        RADIOLOGY & ADDITIONAL STUDIES REVIEWED:  Yes    CRITICAL CARE TIME SPENT: 35 minutes INTERVAL HPI/OVERNIGHT EVENTS: No acute events overnight. Patient noted to have tachypnea, but saturating very well on 4L NC. May consider Bipap. Patient reports no complaints.       PRESSORS: [] YES [x] NO  WHICH: N/A    Antimicrobial:    Cardiovascular:  amLODIPine   Tablet 5 milliGRAM(s) Oral daily    Pulmonary:    Hematalogic:    Other:  acetaminophen    Suspension .. 650 milliGRAM(s) Enteral Tube every 6 hours PRN  artificial  tears Solution 1 Drop(s) Both EYES every 4 hours PRN  chlorhexidine 0.12% Liquid 15 milliLiter(s) Oral Mucosa two times a day  chlorhexidine 2% Cloths 1 Application(s) Topical <User Schedule>  dexAMETHasone  Injectable 4 milliGRAM(s) IV Push daily  insulin lispro (ADMELOG) corrective regimen sliding scale   SubCutaneous every 6 hours  pantoprazole  Injectable 40 milliGRAM(s) IV Push daily  polyethylene glycol 3350 17 Gram(s) Oral daily  senna Syrup 10 milliLiter(s) Oral at bedtime  sodium chloride 0.9% lock flush 10 milliLiter(s) IV Push every 1 hour PRN    acetaminophen    Suspension .. 650 milliGRAM(s) Enteral Tube every 6 hours PRN  amLODIPine   Tablet 5 milliGRAM(s) Oral daily  artificial  tears Solution 1 Drop(s) Both EYES every 4 hours PRN  chlorhexidine 0.12% Liquid 15 milliLiter(s) Oral Mucosa two times a day  chlorhexidine 2% Cloths 1 Application(s) Topical <User Schedule>  dexAMETHasone  Injectable 4 milliGRAM(s) IV Push daily  insulin lispro (ADMELOG) corrective regimen sliding scale   SubCutaneous every 6 hours  pantoprazole  Injectable 40 milliGRAM(s) IV Push daily  polyethylene glycol 3350 17 Gram(s) Oral daily  senna Syrup 10 milliLiter(s) Oral at bedtime  sodium chloride 0.9% lock flush 10 milliLiter(s) IV Push every 1 hour PRN    Drug Dosing Weight  Height (cm): 157.5 (15 Nov 2020 23:00)  Weight (kg): 85.4 (15 Nov 2020 23:00)  BMI (kg/m2): 34.4 (15 Nov 2020 23:00)  BSA (m2): 1.86 (15 Nov 2020 23:00)    ICU Vital Signs Last 24 Hrs  T(C): 36.4 (02 Dec 2020 00:00), Max: 36.7 (01 Dec 2020 16:08)  T(F): 97.5 (02 Dec 2020 00:00), Max: 98 (01 Dec 2020 16:08)  HR: 81 (02 Dec 2020 15:00) (68 - 87)  BP: 169/97 (02 Dec 2020 15:00) (146/84 - 183/95)  BP(mean): 114 (02 Dec 2020 15:00) (92 - 120)  ABP: --  ABP(mean): --  RR: 23 (02 Dec 2020 15:00) (17 - 25)  SpO2: 97% (02 Dec 2020 15:00) (93% - 98%)      ABG - ( 01 Dec 2020 03:46 )  pH, Arterial: 7.48  pH, Blood: x     /  pCO2: 32    /  pO2: 120   / HCO3: 23    / Base Excess: 0.3   /  SaO2: 98                    12-01 @ 07:01  -  12-02 @ 07:00  --------------------------------------------------------  IN: 1245 mL / OUT: 2810 mL / NET: -1565 mL              PHYSICAL EXAM:    GENERAL: NAD, AAOx2, and mildly sedated  EYES: EOMI, PERRLA   NECK: Supple, No JVD; Normal thyroid; Trachea midline  NERVOUS SYSTEM: strength 2-3/5 UE & LE b/l  CHEST/LUNG: mildly decreased lung sound at base b/l; No rales, rhonchi, wheezing   HEART: Regular rate and rhythm; No murmurs,   ABDOMEN: Soft, Nontender, Nondistended; Bowel sounds present  EXTREMITIES:  2+ Peripheral Pulses, No clubbing, cyanosis, or edema  LINES/TUBES/CATHETERS: n/a    LABS:  CBC Full  -  ( 02 Dec 2020 06:23 )  WBC Count : 11.95 K/uL  RBC Count : 3.08 M/uL  Hemoglobin : 8.8 g/dL  Hematocrit : 27.6 %  Platelet Count - Automated : 258 K/uL  Mean Cell Volume : 89.6 fl  Mean Cell Hemoglobin : 28.6 pg  Mean Cell Hemoglobin Concentration : 31.9 gm/dL  Auto Neutrophil # : 10.08 K/uL  Auto Lymphocyte # : 0.76 K/uL  Auto Monocyte # : 1.01 K/uL  Auto Eosinophil # : 0.02 K/uL  Auto Basophil # : 0.03 K/uL  Auto Neutrophil % : 84.2 %  Auto Lymphocyte % : 6.4 %  Auto Monocyte % : 8.5 %  Auto Eosinophil % : 0.2 %  Auto Basophil % : 0.3 %    12-02    138  |  103  |  24<H>  ----------------------------<  86  3.3<L>   |  26  |  1.61<H>    Ca    9.2      02 Dec 2020 06:23  Phos  3.4     12-01  Mg     1.9     12-02    TPro  7.9  /  Alb  3.5  /  TBili  1.3<H>  /  DBili  x   /  AST  25  /  ALT  39  /  AlkPhos  60  12-02    PTT - ( 02 Dec 2020 06:23 )  PTT:32.5 sec        RADIOLOGY & ADDITIONAL STUDIES REVIEWED:  Yes    CRITICAL CARE TIME SPENT: 35 minutes

## 2020-12-02 NOTE — PROGRESS NOTE ADULT - ASSESSMENT
55M from home without PMH with progressively worsening SOB and +COVID test x5 days prior to admission presented with SpO2 64% at home, intubated 2/2 deteriorating respiratory status and admitted to ICU.    Assessment:  1. Acute Hypoxic Respiratory failure   2. COVID Pneumonia   3. Acute Kidney Injury   4. elevated LFTs     Plan:    ====CNS====  #no acute issues  - off sedation!    ====CVS====   - TTE when off isolation     #hypertension  - no onset  - s/p lopressor 5mg IV  - c/w amlodipine 5mg PO qd  - monitor BP    ====RESP====  #AHRF 2/2 COVID            - WBC improving  - Bcx neg  - MRSA neg  - COVID pcr pos  - CXR improving      - s/p 5 days cftx and azithromycin for CAP coverage (11/16-20)  - s/p vanco & zosyn (11/23-12/01)  - s/p remdesivir d/c'd 2/2 CrCl <45 (29.7)   - c/w decadron   - decadron tapering q2d starting 12/2  - f/u acute phase reactants    ====GI====  #elevated LFTs   - resolved  - likely 2/2 COVID  - monitor LFTs    #bowel regimen:  - continue senna, miralax, and dulcolax DE for bowel regimen    ====RENAL====   #BECKY   - BUN/Cr 49/2.2 on admission, unknown baseline  - Cr 1.8  - c/w IVF  - hold Lovenox 2/2 elevated Cr  - nephro Dr. Camara following    ====ID====   #COVID PNA  - as above     ====HEME/ONC====  #hypercoagulability 2/2 COVID  - s/p heparin gtt  - c/w lovenox  - monitor daily CBC, Hb stable    ====ENDO====   #No active issues:  - target -180  - HgA1c 6.1  - c/w low HSS while on steroids    ====Skin/Catheters====  #no acute issues  - RIJ 11/15-25 d/elvira   - right femoral line 11/24-12/02 dc'd   - no rashes noted  - cw artificial tears    ====PPx====  #DVT: lovenox and SCD (BMI>30, ICU admission)  #GI: Protonix     ====Dispo=====  - monitor in ICU     ====GOC====   - full Code

## 2020-12-02 NOTE — PHYSICAL THERAPY INITIAL EVALUATION ADULT - CRITERIA FOR SKILLED THERAPEUTIC INTERVENTIONS
predicted duration of therapy intervention/impairments found/risk reduction/prevention/functional limitations in following categories/rehab potential/therapy frequency

## 2020-12-02 NOTE — PROGRESS NOTE ADULT - SUBJECTIVE AND OBJECTIVE BOX
[facility] NEPHROLOGY- PROGRESS NOTE    Patient is a 56yo Male with no PMH recently diagnosed with COVID-19 (5 days PTA) p/w SOB s/p intubated in the ER. Pt admitted to ICU with acute hypoxic respiratory failure 2/2 COVID-19 PNA s/p intubated, BECKY with transaminitis. Nephrology consulted for Elevated serum creatinine.  Extubated 11/30    Hospital Medications: Medications reviewed.  REVIEW OF SYSTEMS: Unable to obtain; on biPAP    VITALS:  T(F): 97.5 (12-02-20 @ 00:00), Max: 97.9 (12-01-20 @ 20:22)  HR: 84 (12-02-20 @ 16:00)  BP: 181/94 (12-02-20 @ 16:00)  RR: 22 (12-02-20 @ 16:00)  SpO2: 96% (12-02-20 @ 16:00)  Wt(kg): --    12-01 @ 07:01  -  12-02 @ 07:00  --------------------------------------------------------  IN: 1245 mL / OUT: 2810 mL / NET: -1565 mL    12-02 @ 07:01  -  12-02 @ 16:59  --------------------------------------------------------  IN: 0 mL / OUT: 460 mL / NET: -460 mL      PHYSICAL EXAM:  Gen: NAD  Cards: RRR, +S1/S2,   Resp: CTA ant on BIPAP  GI: soft, ND,   : +isaacs with yellow urine  Extremities: no LE edema,     LABS:  12-02    138  |  103  |  24<H>  ----------------------------<  86  3.3<L>   |  26  |  1.61<H>    Ca    9.2      02 Dec 2020 06:23  Phos  3.4     12-01  Mg     1.9     12-02    TPro  7.9  /  Alb  3.5  /  TBili  1.3<H>  /  DBili      /  AST  25  /  ALT  39  /  AlkPhos  60  12-02    Creatinine Trend: 1.61 <--, 1.59 <--, 1.81 <--, 1.92 <--, 1.74 <--, 1.43 <--, 1.64 <--, 1.78 <--, 1.47 <--, 1.62 <--                        8.8    11.95 )-----------( 258      ( 02 Dec 2020 06:23 )             27.6     Urine Studies:

## 2020-12-02 NOTE — PHYSICAL THERAPY INITIAL EVALUATION ADULT - GENERAL OBSERVATIONS, REHAB EVAL
Awake, not on sedation but appears to be RASS -1, CAM-ICU positive; currently on O2@4L/min via NC; +peripheral IV access on left antecubital area;

## 2020-12-02 NOTE — PHYSICAL THERAPY INITIAL EVALUATION ADULT - MANUAL MUSCLE TESTING RESULTS, REHAB EVAL
MRC SUM SCORE: 30/60 ; Shoulder Abductors: L=2, R=2; Elbow Flexors: L=2. R=2; Wrist Extensors: L=2, R=2; Hip Flexors: L=3, R=3; Knee Extensors: L=3, R=3; Ankle Dorsiflexors: L=3, R=3 ; weak bilateral  strength

## 2020-12-03 LAB
ALBUMIN SERPL ELPH-MCNC: 3.4 G/DL — LOW (ref 3.5–5)
ALP SERPL-CCNC: 64 U/L — SIGNIFICANT CHANGE UP (ref 40–120)
ALT FLD-CCNC: 57 U/L DA — SIGNIFICANT CHANGE UP (ref 10–60)
ANION GAP SERPL CALC-SCNC: 10 MMOL/L — SIGNIFICANT CHANGE UP (ref 5–17)
APTT BLD: 37.2 SEC — HIGH (ref 27.5–35.5)
AST SERPL-CCNC: 35 U/L — SIGNIFICANT CHANGE UP (ref 10–40)
BASOPHILS # BLD AUTO: 0.01 K/UL — SIGNIFICANT CHANGE UP (ref 0–0.2)
BASOPHILS NFR BLD AUTO: 0.1 % — SIGNIFICANT CHANGE UP (ref 0–2)
BILIRUB SERPL-MCNC: 1 MG/DL — SIGNIFICANT CHANGE UP (ref 0.2–1.2)
BUN SERPL-MCNC: 28 MG/DL — HIGH (ref 7–18)
CALCIUM SERPL-MCNC: 9.3 MG/DL — SIGNIFICANT CHANGE UP (ref 8.4–10.5)
CHLORIDE SERPL-SCNC: 105 MMOL/L — SIGNIFICANT CHANGE UP (ref 96–108)
CO2 SERPL-SCNC: 23 MMOL/L — SIGNIFICANT CHANGE UP (ref 22–31)
CREAT SERPL-MCNC: 1.31 MG/DL — HIGH (ref 0.5–1.3)
D DIMER BLD IA.RAPID-MCNC: 1287 NG/ML DDU — HIGH
EOSINOPHIL # BLD AUTO: 0.11 K/UL — SIGNIFICANT CHANGE UP (ref 0–0.5)
EOSINOPHIL NFR BLD AUTO: 1 % — SIGNIFICANT CHANGE UP (ref 0–6)
ERYTHROCYTE [SEDIMENTATION RATE] IN BLOOD: 91 MM/HR — HIGH (ref 0–20)
FERRITIN SERPL-MCNC: 880 NG/ML — HIGH (ref 30–400)
GLUCOSE SERPL-MCNC: 84 MG/DL — SIGNIFICANT CHANGE UP (ref 70–99)
HCT VFR BLD CALC: 30.5 % — LOW (ref 39–50)
HGB BLD-MCNC: 9.9 G/DL — LOW (ref 13–17)
IMM GRANULOCYTES NFR BLD AUTO: 0.6 % — SIGNIFICANT CHANGE UP (ref 0–1.5)
INR BLD: 1.27 RATIO — HIGH (ref 0.88–1.16)
LDH SERPL L TO P-CCNC: 317 U/L — HIGH (ref 120–225)
LYMPHOCYTES # BLD AUTO: 0.75 K/UL — LOW (ref 1–3.3)
LYMPHOCYTES # BLD AUTO: 6.9 % — LOW (ref 13–44)
MAGNESIUM SERPL-MCNC: 2.1 MG/DL — SIGNIFICANT CHANGE UP (ref 1.6–2.6)
MCHC RBC-ENTMCNC: 29.1 PG — SIGNIFICANT CHANGE UP (ref 27–34)
MCHC RBC-ENTMCNC: 32.5 GM/DL — SIGNIFICANT CHANGE UP (ref 32–36)
MCV RBC AUTO: 89.7 FL — SIGNIFICANT CHANGE UP (ref 80–100)
MONOCYTES # BLD AUTO: 0.86 K/UL — SIGNIFICANT CHANGE UP (ref 0–0.9)
MONOCYTES NFR BLD AUTO: 7.9 % — SIGNIFICANT CHANGE UP (ref 2–14)
NEUTROPHILS # BLD AUTO: 9.03 K/UL — HIGH (ref 1.8–7.4)
NEUTROPHILS NFR BLD AUTO: 83.5 % — HIGH (ref 43–77)
NRBC # BLD: 0 /100 WBCS — SIGNIFICANT CHANGE UP (ref 0–0)
PHOSPHATE SERPL-MCNC: 3.2 MG/DL — SIGNIFICANT CHANGE UP (ref 2.5–4.5)
PLATELET # BLD AUTO: 252 K/UL — SIGNIFICANT CHANGE UP (ref 150–400)
POTASSIUM SERPL-MCNC: 3.7 MMOL/L — SIGNIFICANT CHANGE UP (ref 3.5–5.3)
POTASSIUM SERPL-SCNC: 3.7 MMOL/L — SIGNIFICANT CHANGE UP (ref 3.5–5.3)
PROT SERPL-MCNC: 8.3 G/DL — SIGNIFICANT CHANGE UP (ref 6–8.3)
PROTHROM AB SERPL-ACNC: 14.9 SEC — HIGH (ref 10.6–13.6)
RBC # BLD: 3.4 M/UL — LOW (ref 4.2–5.8)
RBC # FLD: 14 % — SIGNIFICANT CHANGE UP (ref 10.3–14.5)
SODIUM SERPL-SCNC: 138 MMOL/L — SIGNIFICANT CHANGE UP (ref 135–145)
WBC # BLD: 10.82 K/UL — HIGH (ref 3.8–10.5)
WBC # FLD AUTO: 10.82 K/UL — HIGH (ref 3.8–10.5)

## 2020-12-03 RX ORDER — AMLODIPINE BESYLATE 2.5 MG/1
10 TABLET ORAL DAILY
Refills: 0 | Status: DISCONTINUED | OUTPATIENT
Start: 2020-12-04 | End: 2020-12-18

## 2020-12-03 RX ORDER — ENOXAPARIN SODIUM 100 MG/ML
80 INJECTION SUBCUTANEOUS
Refills: 0 | Status: DISCONTINUED | OUTPATIENT
Start: 2020-12-03 | End: 2020-12-04

## 2020-12-03 RX ORDER — ENOXAPARIN SODIUM 100 MG/ML
40 INJECTION SUBCUTANEOUS EVERY 12 HOURS
Refills: 0 | Status: DISCONTINUED | OUTPATIENT
Start: 2020-12-03 | End: 2020-12-03

## 2020-12-03 RX ORDER — AMLODIPINE BESYLATE 2.5 MG/1
5 TABLET ORAL ONCE
Refills: 0 | Status: COMPLETED | OUTPATIENT
Start: 2020-12-03 | End: 2020-12-03

## 2020-12-03 RX ADMIN — AMLODIPINE BESYLATE 5 MILLIGRAM(S): 2.5 TABLET ORAL at 05:13

## 2020-12-03 RX ADMIN — CHLORHEXIDINE GLUCONATE 1 APPLICATION(S): 213 SOLUTION TOPICAL at 05:13

## 2020-12-03 RX ADMIN — AMLODIPINE BESYLATE 5 MILLIGRAM(S): 2.5 TABLET ORAL at 14:28

## 2020-12-03 RX ADMIN — PANTOPRAZOLE SODIUM 40 MILLIGRAM(S): 20 TABLET, DELAYED RELEASE ORAL at 11:46

## 2020-12-03 RX ADMIN — Medication 4 MILLIGRAM(S): at 05:13

## 2020-12-03 RX ADMIN — ENOXAPARIN SODIUM 80 MILLIGRAM(S): 100 INJECTION SUBCUTANEOUS at 18:11

## 2020-12-03 NOTE — PROGRESS NOTE ADULT - ASSESSMENT
55M from home without PMH with progressively worsening SOB and +COVID test x5 days prior to admission presented with SpO2 64% at home, intubated 2/2 deteriorating respiratory status and admitted to ICU.    Assessment:  1. Acute Hypoxic Respiratory failure   2. COVID Pneumonia   3. Acute Kidney Injury   4. elevated LFTs     Plan:    ====CNS====  #no acute issues  - off sedation    ====CVS====   - TTE when off isolation     #hypertension  - no onset  - s/p lopressor 5mg IV  - c/w amlodipine 10mg PO qd  - monitor BP    ====RESP====  #AHRF 2/2 COVID            - WBC improving  - Bcx neg  - MRSA neg  - COVID pcr pos  - CXR improving      - s/p 5 days cftx and azithromycin for CAP coverage (11/16-20)  - s/p vanco & zosyn (11/23-12/01)  - s/p remdesivir d/c'd 2/2 CrCl <45 (29.7)   - s/p decadron   - f/u acute phase reactants    ====GI====  #elevated LFTs   - resolved  - likely 2/2 COVID  - monitor LFTs    #bowel regimen:  - continue senna, miralax, and dulcolax NM for bowel regimen    ====RENAL====   #BECKY   - improving  - BUN/Cr 49/2.2 on admission, unknown baseline  - Cr 1.31  - c/w IVF  - nephro Dr. Camara following    ====ID====   #COVID PNA  - as above     ====HEME/ONC====  #hypercoagulability 2/2 COVID  - s/p heparin gtt  - c/w full-dose lovenox  - monitor daily CBC, Hb stable    ====ENDO====   #No active issues:  - target -180  - HgA1c 6.1  - c/w low HSS while on steroids    ====Skin/Catheters====  #no acute issues  - RIJ 11/15-25 d/c'd   - right femoral line 11/24-12/02 d/c'd   - no rashes noted  - cw artificial tears    ====PPx====  #DVT: full-lovenox and SCD (BMI>30, ICU admission)  #GI: Protonix     ====Dispo=====  - monitor in ICU     ====GOC====   - full Code

## 2020-12-03 NOTE — CHART NOTE - NSCHARTNOTEFT_GEN_A_CORE
HPI  - Patient is a 55 year old male from home ambulates independently with no significant PMHx or PSHx presents to the ED with his son with complaints of progressively worsening shortness of breath and difficulty breathing since testing positive for COVID-19 five days ago as outpatient . Patient's son states that he brought patient into the ED as he had oxygen saturation of 64% earlier today. Patient otherwise denies any fevers, chills, and all other acute complaints. Patient reports that currently multiple family members are also sick with COVID-19. Denies smoking, alcohol, illicit drug use. NKDA. Oxygen saturation came up to 85-90% on 15L nonrebreather, and patient appears less pale and is less short of breath. Later Pt was desaturating on NRB 81% on NRB so was started on HFNC was still desaturated then got intubated in ED    ICU course  - Patient was admitted to ICU for acute hypoxic respiratory failure requiring intubation and mechanical ventilation. Patient was treated with Decadron and Remdesvir, but Remdesivir was subsequently discontinued due to worsening kidney function. Patient was also treated with antibiotic for possible pneumonia. Patient was eventually extubated and is on 3L oxygen via NC. Patient also noted to have elevated blood pressure. Patient is treated with amlodipine 10mg PO qd.    Assessment:  1. Acute Hypoxic Respiratory failure   2. COVID Pneumonia   3. Acute Kidney Injury     Plan:    ====CNS====  #no acute issues  - off sedation    ====CVS====   - TTE when off isolation     #hypertension  - no onset  - s/p lopressor 5mg IV  - c/w amlodipine 10mg PO qd  - monitor BP    ====RESP====  #AHRF 2/2 COVID            - WBC improving  - Bcx neg  - MRSA neg  - COVID pcr pos  - CXR improving      - s/p 5 days cftx and azithromycin for CAP coverage (11/16-20)  - s/p vanco & zosyn (11/23-12/01)  - s/p remdesivir d/c'd 2/2 CrCl <45 (29.7)   - s/p decadron   - f/u acute phase reactants    ====GI====  #elevated LFTs   - resolved  - likely 2/2 COVID  - monitor LFTs    #bowel regimen:  - continue senna, miralax, and dulcolax MI for bowel regimen    ====RENAL====   #BECKY   - improving  - BUN/Cr 49/2.2 on admission, unknown baseline  - Cr 1.31  - c/w IVF  - nephro Dr. Camara following    ====ID====   #COVID PNA  - as above     ====HEME/ONC====  #hypercoagulability 2/2 COVID  - s/p heparin gtt  - c/w full-dose lovenox  - monitor daily CBC, Hb stable    ====ENDO====   #No active issues:  - target -180  - HgA1c 6.1  - c/w low HSS while on steroids    ====Skin/Catheters====  #no acute issues  - RIJ 11/15-25 d/c'd   - right femoral line 11/24-12/02 d/c'd   - no rashes noted  - cw artificial tears    ====PPx====  #DVT: full-lovenox and SCD (BMI>30, ICU admission)  #GI: Protonix     Things to follow  - monitor O2 saturation, and continue to titrate oxygen off  - monitor BP, and increase amlodipine dosage as needed  - follow PT recommendations   - continue full-dose lovenox for up to 1 month

## 2020-12-03 NOTE — PROGRESS NOTE ADULT - SUBJECTIVE AND OBJECTIVE BOX
[facility] NEPHROLOGY- PROGRESS NOTE    Patient is a 54yo Male with no PMH recently diagnosed with COVID-19 (5 days PTA) p/w SOB s/p intubated in the ER. Pt admitted to ICU with acute hypoxic respiratory failure 2/2 COVID-19 PNA s/p intubated, BECKY with transaminitis. Nephrology consulted for Elevated serum creatinine.  Extubated 11/30    Hospital Medications: Medications reviewed.  REVIEW OF SYSTEMS: Pt denies any SOB or pain    VITALS:  T(F): 97.9 (12-03-20 @ 09:00), Max: 98.3 (12-03-20 @ 00:10)  HR: 118 (12-03-20 @ 11:00)  BP: 158/99 (12-03-20 @ 10:00)  RR: 30 (12-03-20 @ 11:00)  SpO2: 94% (12-03-20 @ 10:00)  Wt(kg): --    12-02 @ 07:01  -  12-03 @ 07:00  --------------------------------------------------------  IN: 625 mL / OUT: 1280 mL / NET: -655 mL    12-03 @ 07:01  -  12-03 @ 13:29  --------------------------------------------------------  IN: 50 mL / OUT: 205 mL / NET: -155 mL      PHYSICAL EXAM:  Gen: NAD  Cards: RRR, +S1/S2,   Resp: Course BS  GI: soft, ND,   : +isaacs with yellow urine  Extremities: no LE edema,     LABS:  12-03    138  |  105  |  28<H>  ----------------------------<  84  3.7   |  23  |  1.31<H>    Ca    9.3      03 Dec 2020 06:30  Phos  3.2     12-03  Mg     2.1     12-03    TPro  8.3  /  Alb  3.4<L>  /  TBili  1.0  /  DBili      /  AST  35  /  ALT  57  /  AlkPhos  64  12-03    Creatinine Trend: 1.31 <--, 1.61 <--, 1.59 <--, 1.81 <--, 1.92 <--, 1.74 <--, 1.43 <--, 1.64 <--, 1.78 <--, 1.47 <--                        9.9    10.82 )-----------( 252      ( 03 Dec 2020 06:30 )             30.5     Urine Studies:

## 2020-12-03 NOTE — PROGRESS NOTE ADULT - NUTRITIONAL ASSESSMENT
This patient has been assessed with a concern for Malnutrition and has been determined to have a diagnosis/diagnoses of Severe protein-calorie malnutrition.    This patient is being managed with:   Diet Dysphagia 1 Pureed-Nectar Consistency Fluid-  DASH/TLC {Sodium & Cholesterol Restricted}  Entered: Dec  3 2020 11:45AM

## 2020-12-03 NOTE — PROGRESS NOTE ADULT - SUBJECTIVE AND OBJECTIVE BOX
INTERVAL HPI/OVERNIGHT EVENTS: Patient complains of fatigue and thick sputum.     REVIEW OF SYSTEMS:    CONSTITUTIONAL: No weakness, fevers or chills  NECK: No pain or stiffness  RESPIRATORY: No cough, wheezing, hemoptysis; No shortness of breath  CARDIOVASCULAR: No chest pain or palpitations  GASTROINTESTINAL: No abdominal or epigastric pain. No nausea, vomiting, No diarrhea or constipation. No melena or hematochezia.  GENITOURINARY: No dysuria, frequency or hematuria  NEUROLOGICAL: No numbness or weakness  All other review of systems is negative unless indicated above    PRESSORS: [] YES [] NO  WHICH: N/A    Antimicrobial:    Cardiovascular:    Pulmonary:    Hematalogic:  enoxaparin Injectable 80 milliGRAM(s) SubCutaneous two times a day    Other:  acetaminophen    Suspension .. 650 milliGRAM(s) Enteral Tube every 6 hours PRN  artificial  tears Solution 1 Drop(s) Both EYES every 4 hours PRN  chlorhexidine 2% Cloths 1 Application(s) Topical <User Schedule>  pantoprazole  Injectable 40 milliGRAM(s) IV Push daily  polyethylene glycol 3350 17 Gram(s) Oral daily  senna Syrup 10 milliLiter(s) Oral at bedtime  sodium chloride 0.9% lock flush 10 milliLiter(s) IV Push every 1 hour PRN    acetaminophen    Suspension .. 650 milliGRAM(s) Enteral Tube every 6 hours PRN  artificial  tears Solution 1 Drop(s) Both EYES every 4 hours PRN  chlorhexidine 2% Cloths 1 Application(s) Topical <User Schedule>  enoxaparin Injectable 80 milliGRAM(s) SubCutaneous two times a day  pantoprazole  Injectable 40 milliGRAM(s) IV Push daily  polyethylene glycol 3350 17 Gram(s) Oral daily  senna Syrup 10 milliLiter(s) Oral at bedtime  sodium chloride 0.9% lock flush 10 milliLiter(s) IV Push every 1 hour PRN    Drug Dosing Weight  Height (cm): 157.5 (15 Nov 2020 23:00)  Weight (kg): 85.4 (15 Nov 2020 23:00)  BMI (kg/m2): 34.4 (15 Nov 2020 23:00)  BSA (m2): 1.86 (15 Nov 2020 23:00)    ICU Vital Signs Last 24 Hrs  T(C): 36.3 (03 Dec 2020 12:00), Max: 36.8 (02 Dec 2020 17:11)  T(F): 97.4 (03 Dec 2020 12:00), Max: 98.3 (03 Dec 2020 00:10)  HR: 119 (03 Dec 2020 14:00) (71 - 119)  BP: 158/103 (03 Dec 2020 14:00) (116/95 - 181/94)  BP(mean): 115 (03 Dec 2020 14:00) (101 - 116)  ABP: --  ABP(mean): --  RR: 24 (03 Dec 2020 14:00) (18 - 30)  SpO2: 92% (03 Dec 2020 14:00) (86% - 98%)      ABG - ( 02 Dec 2020 16:50 )  pH, Arterial: 7.50  pH, Blood: x     /  pCO2: 29    /  pO2: 118   / HCO3: 22    / Base Excess: 0.0   /  SaO2: 98                    12-02 @ 07:01  -  12-03 @ 07:00  --------------------------------------------------------  IN: 625 mL / OUT: 1280 mL / NET: -655 mL              PHYSICAL EXAM:    GENERAL: NAD, well-groomed, well-developed, AAOx3  EYES: EOMI, PERRLA,   NECK: Supple, No JVD; Normal thyroid; Trachea midline  NERVOUS SYSTEM: Motor Strength 5/5 B/L upper and lower extremities; DTRs 2+ intact and symmetric  CHEST/LUNG: No rales, rhonchi, wheezing   HEART: Regular rate and rhythm; No murmurs,   ABDOMEN: Soft, Nontender, Nondistended; Bowel sounds present  EXTREMITIES:  2+ Peripheral Pulses, No clubbing, cyanosis, or edema  LINES/TUBES/CATHETERS: n/a    LABS:  CBC Full  -  ( 03 Dec 2020 06:30 )  WBC Count : 10.82 K/uL  RBC Count : 3.40 M/uL  Hemoglobin : 9.9 g/dL  Hematocrit : 30.5 %  Platelet Count - Automated : 252 K/uL  Mean Cell Volume : 89.7 fl  Mean Cell Hemoglobin : 29.1 pg  Mean Cell Hemoglobin Concentration : 32.5 gm/dL  Auto Neutrophil # : 9.03 K/uL  Auto Lymphocyte # : 0.75 K/uL  Auto Monocyte # : 0.86 K/uL  Auto Eosinophil # : 0.11 K/uL  Auto Basophil # : 0.01 K/uL  Auto Neutrophil % : 83.5 %  Auto Lymphocyte % : 6.9 %  Auto Monocyte % : 7.9 %  Auto Eosinophil % : 1.0 %  Auto Basophil % : 0.1 %    12-03    138  |  105  |  28<H>  ----------------------------<  84  3.7   |  23  |  1.31<H>    Ca    9.3      03 Dec 2020 06:30  Phos  3.2     12-03  Mg     2.1     12-03    TPro  8.3  /  Alb  3.4<L>  /  TBili  1.0  /  DBili  x   /  AST  35  /  ALT  57  /  AlkPhos  64  12-03    PT/INR - ( 03 Dec 2020 06:31 )   PT: 14.9 sec;   INR: 1.27 ratio         PTT - ( 03 Dec 2020 06:31 )  PTT:37.2 sec        RADIOLOGY & ADDITIONAL STUDIES REVIEWED:  Yes    CRITICAL CARE TIME SPENT: 35 minutes INTERVAL HPI/OVERNIGHT EVENTS: Patient complains of fatigue and thick sputum. He denies fever, chills, n/v, sob, chest pain, or abdominal pain.    Antimicrobial:    Cardiovascular:    Pulmonary:    Hematalogic:  enoxaparin Injectable 80 milliGRAM(s) SubCutaneous two times a day    Other:  acetaminophen    Suspension .. 650 milliGRAM(s) Enteral Tube every 6 hours PRN  artificial  tears Solution 1 Drop(s) Both EYES every 4 hours PRN  chlorhexidine 2% Cloths 1 Application(s) Topical <User Schedule>  pantoprazole  Injectable 40 milliGRAM(s) IV Push daily  polyethylene glycol 3350 17 Gram(s) Oral daily  senna Syrup 10 milliLiter(s) Oral at bedtime  sodium chloride 0.9% lock flush 10 milliLiter(s) IV Push every 1 hour PRN    acetaminophen    Suspension .. 650 milliGRAM(s) Enteral Tube every 6 hours PRN  artificial  tears Solution 1 Drop(s) Both EYES every 4 hours PRN  chlorhexidine 2% Cloths 1 Application(s) Topical <User Schedule>  enoxaparin Injectable 80 milliGRAM(s) SubCutaneous two times a day  pantoprazole  Injectable 40 milliGRAM(s) IV Push daily  polyethylene glycol 3350 17 Gram(s) Oral daily  senna Syrup 10 milliLiter(s) Oral at bedtime  sodium chloride 0.9% lock flush 10 milliLiter(s) IV Push every 1 hour PRN    Drug Dosing Weight  Height (cm): 157.5 (15 Nov 2020 23:00)  Weight (kg): 85.4 (15 Nov 2020 23:00)  BMI (kg/m2): 34.4 (15 Nov 2020 23:00)  BSA (m2): 1.86 (15 Nov 2020 23:00)    ICU Vital Signs Last 24 Hrs  T(C): 36.3 (03 Dec 2020 12:00), Max: 36.8 (02 Dec 2020 17:11)  T(F): 97.4 (03 Dec 2020 12:00), Max: 98.3 (03 Dec 2020 00:10)  HR: 119 (03 Dec 2020 14:00) (71 - 119)  BP: 158/103 (03 Dec 2020 14:00) (116/95 - 181/94)  BP(mean): 115 (03 Dec 2020 14:00) (101 - 116)  ABP: --  ABP(mean): --  RR: 24 (03 Dec 2020 14:00) (18 - 30)  SpO2: 92% (03 Dec 2020 14:00) (86% - 98%)      ABG - ( 02 Dec 2020 16:50 )  pH, Arterial: 7.50  pH, Blood: x     /  pCO2: 29    /  pO2: 118   / HCO3: 22    / Base Excess: 0.0   /  SaO2: 98                    12-02 @ 07:01  -  12-03 @ 07:00  --------------------------------------------------------  IN: 625 mL / OUT: 1280 mL / NET: -655 mL              PHYSICAL EXAM:    GENERAL: unable to assess orientation 2/2 poor speech; but patient is alert and follows command  EYES: EOMI, PERRLA,   NECK: Supple, No JVD; Normal thyroid; Trachea midline  NERVOUS SYSTEM: Motor Strength 3-4/5 B/L upper and lower extremities; DTRs 2+ intact and symmetric  CHEST/LUNG: No rales, rhonchi, wheezing   HEART: Regular rate and rhythm; No murmurs,   ABDOMEN: Soft, Nontender, Nondistended; Bowel sounds present  EXTREMITIES:  2+ Peripheral Pulses, No clubbing, cyanosis, or edema  LINES/TUBES/CATHETERS: n/a    LABS:  CBC Full  -  ( 03 Dec 2020 06:30 )  WBC Count : 10.82 K/uL  RBC Count : 3.40 M/uL  Hemoglobin : 9.9 g/dL  Hematocrit : 30.5 %  Platelet Count - Automated : 252 K/uL  Mean Cell Volume : 89.7 fl  Mean Cell Hemoglobin : 29.1 pg  Mean Cell Hemoglobin Concentration : 32.5 gm/dL  Auto Neutrophil # : 9.03 K/uL  Auto Lymphocyte # : 0.75 K/uL  Auto Monocyte # : 0.86 K/uL  Auto Eosinophil # : 0.11 K/uL  Auto Basophil # : 0.01 K/uL  Auto Neutrophil % : 83.5 %  Auto Lymphocyte % : 6.9 %  Auto Monocyte % : 7.9 %  Auto Eosinophil % : 1.0 %  Auto Basophil % : 0.1 %    12-03    138  |  105  |  28<H>  ----------------------------<  84  3.7   |  23  |  1.31<H>    Ca    9.3      03 Dec 2020 06:30  Phos  3.2     12-03  Mg     2.1     12-03    TPro  8.3  /  Alb  3.4<L>  /  TBili  1.0  /  DBili  x   /  AST  35  /  ALT  57  /  AlkPhos  64  12-03    PT/INR - ( 03 Dec 2020 06:31 )   PT: 14.9 sec;   INR: 1.27 ratio         PTT - ( 03 Dec 2020 06:31 )  PTT:37.2 sec        RADIOLOGY & ADDITIONAL STUDIES REVIEWED:  Yes    CRITICAL CARE TIME SPENT: 35 minutes

## 2020-12-03 NOTE — PROGRESS NOTE ADULT - ASSESSMENT
Patient is a 56yo Male with no PMH recently diagnosed with COVID-19 (5 days PTA) p/w SOB s/p intubated in the ER. Pt admitted to ICU with acute hypoxic respiratory failure 2/2 COVID-19 PNA s/p intubated, BECKY with transaminitis. Nephrology consulted for Elevated serum creatinine.    1. BECKY- unknown baseline SCr. BECKY likely hemodynamically mediated in the setting of septic shock / infection 2/2 COVID-19, hypotension on pressors (now off pressors);  likely ATN. UA with 100 protein and trace blood with hyaline cast. Renal function improving s/p LR bolus.   If worsening SCr consider trial of IVF; NS @ 80 cc /hr x 24 hrs.   Will defer Renal US due to COVID status. Strict I/Os. Avoid nephrotoxins/ NSAIDs/ RCA. Monitor BMP.  2. Septic Shock due to Multifocal PNA 2/2 COVID-19- Plan per ICU  3. Hypotension- BP elevated; consider Metoprolol 25mg PO qd, titrate as needed. Monitor BP  4. Acute hypoxic respiratory failure- as per ICU.  5. Hypocalcemia- & Hyperphosphatemia resolved. Monitor ionized calcium and serum phos.

## 2020-12-04 LAB
ALBUMIN SERPL ELPH-MCNC: 3.3 G/DL — LOW (ref 3.5–5)
ALP SERPL-CCNC: 66 U/L — SIGNIFICANT CHANGE UP (ref 40–120)
ALT FLD-CCNC: 61 U/L DA — HIGH (ref 10–60)
ANION GAP SERPL CALC-SCNC: 10 MMOL/L — SIGNIFICANT CHANGE UP (ref 5–17)
AST SERPL-CCNC: 31 U/L — SIGNIFICANT CHANGE UP (ref 10–40)
BASE EXCESS BLDA CALC-SCNC: 1.4 MMOL/L — SIGNIFICANT CHANGE UP (ref -2–2)
BASOPHILS # BLD AUTO: 0.04 K/UL — SIGNIFICANT CHANGE UP (ref 0–0.2)
BASOPHILS NFR BLD AUTO: 0.3 % — SIGNIFICANT CHANGE UP (ref 0–2)
BILIRUB SERPL-MCNC: 0.9 MG/DL — SIGNIFICANT CHANGE UP (ref 0.2–1.2)
BUN SERPL-MCNC: 44 MG/DL — HIGH (ref 7–18)
CALCIUM SERPL-MCNC: 9.5 MG/DL — SIGNIFICANT CHANGE UP (ref 8.4–10.5)
CHLORIDE SERPL-SCNC: 105 MMOL/L — SIGNIFICANT CHANGE UP (ref 96–108)
CO2 SERPL-SCNC: 24 MMOL/L — SIGNIFICANT CHANGE UP (ref 22–31)
CREAT SERPL-MCNC: 1.91 MG/DL — HIGH (ref 0.5–1.3)
D DIMER BLD IA.RAPID-MCNC: 1352 NG/ML DDU — HIGH
EOSINOPHIL # BLD AUTO: 0.14 K/UL — SIGNIFICANT CHANGE UP (ref 0–0.5)
EOSINOPHIL NFR BLD AUTO: 1 % — SIGNIFICANT CHANGE UP (ref 0–6)
ERYTHROCYTE [SEDIMENTATION RATE] IN BLOOD: 104 MM/HR — HIGH (ref 0–20)
FERRITIN SERPL-MCNC: 851 NG/ML — HIGH (ref 30–400)
GLUCOSE SERPL-MCNC: 118 MG/DL — HIGH (ref 70–99)
HCO3 BLDA-SCNC: 25 MMOL/L — SIGNIFICANT CHANGE UP (ref 23–27)
HCT VFR BLD CALC: 31.9 % — LOW (ref 39–50)
HGB BLD-MCNC: 10 G/DL — LOW (ref 13–17)
HOROWITZ INDEX BLDA+IHG-RTO: 28 — SIGNIFICANT CHANGE UP
IMM GRANULOCYTES NFR BLD AUTO: 0.4 % — SIGNIFICANT CHANGE UP (ref 0–1.5)
INR BLD: 1.39 RATIO — HIGH (ref 0.88–1.16)
LACTATE SERPL-SCNC: 0.8 MMOL/L — SIGNIFICANT CHANGE UP (ref 0.7–2)
LDH SERPL L TO P-CCNC: 304 U/L — HIGH (ref 120–225)
LYMPHOCYTES # BLD AUTO: 0.88 K/UL — LOW (ref 1–3.3)
LYMPHOCYTES # BLD AUTO: 6.6 % — LOW (ref 13–44)
MAGNESIUM SERPL-MCNC: 2.5 MG/DL — SIGNIFICANT CHANGE UP (ref 1.6–2.6)
MCHC RBC-ENTMCNC: 28.7 PG — SIGNIFICANT CHANGE UP (ref 27–34)
MCHC RBC-ENTMCNC: 31.3 GM/DL — LOW (ref 32–36)
MCV RBC AUTO: 91.4 FL — SIGNIFICANT CHANGE UP (ref 80–100)
MONOCYTES # BLD AUTO: 0.64 K/UL — SIGNIFICANT CHANGE UP (ref 0–0.9)
MONOCYTES NFR BLD AUTO: 4.8 % — SIGNIFICANT CHANGE UP (ref 2–14)
NEUTROPHILS # BLD AUTO: 11.65 K/UL — HIGH (ref 1.8–7.4)
NEUTROPHILS NFR BLD AUTO: 86.9 % — HIGH (ref 43–77)
NRBC # BLD: 0 /100 WBCS — SIGNIFICANT CHANGE UP (ref 0–0)
PCO2 BLDA: 36 MMHG — SIGNIFICANT CHANGE UP (ref 32–46)
PH BLDA: 7.45 — SIGNIFICANT CHANGE UP (ref 7.35–7.45)
PHOSPHATE SERPL-MCNC: 4.5 MG/DL — SIGNIFICANT CHANGE UP (ref 2.5–4.5)
PLATELET # BLD AUTO: 253 K/UL — SIGNIFICANT CHANGE UP (ref 150–400)
PO2 BLDA: 97 MMHG — SIGNIFICANT CHANGE UP (ref 74–108)
POTASSIUM SERPL-MCNC: 4 MMOL/L — SIGNIFICANT CHANGE UP (ref 3.5–5.3)
POTASSIUM SERPL-SCNC: 4 MMOL/L — SIGNIFICANT CHANGE UP (ref 3.5–5.3)
PROCALCITONIN SERPL-MCNC: 0.15 NG/ML — HIGH (ref 0.02–0.1)
PROCALCITONIN SERPL-MCNC: 0.29 NG/ML — HIGH (ref 0.02–0.1)
PROT SERPL-MCNC: 8.4 G/DL — HIGH (ref 6–8.3)
PROTHROM AB SERPL-ACNC: 16.3 SEC — HIGH (ref 10.6–13.6)
RBC # BLD: 3.49 M/UL — LOW (ref 4.2–5.8)
RBC # FLD: 13.9 % — SIGNIFICANT CHANGE UP (ref 10.3–14.5)
SAO2 % BLDA: 97 % — HIGH (ref 92–96)
SODIUM SERPL-SCNC: 139 MMOL/L — SIGNIFICANT CHANGE UP (ref 135–145)
WBC # BLD: 13.41 K/UL — HIGH (ref 3.8–10.5)
WBC # FLD AUTO: 13.41 K/UL — HIGH (ref 3.8–10.5)

## 2020-12-04 RX ORDER — ACETAMINOPHEN 500 MG
1000 TABLET ORAL ONCE
Refills: 0 | Status: COMPLETED | OUTPATIENT
Start: 2020-12-04 | End: 2020-12-04

## 2020-12-04 RX ORDER — METOPROLOL TARTRATE 50 MG
25 TABLET ORAL DAILY
Refills: 0 | Status: DISCONTINUED | OUTPATIENT
Start: 2020-12-04 | End: 2020-12-04

## 2020-12-04 RX ORDER — ENOXAPARIN SODIUM 100 MG/ML
70 INJECTION SUBCUTANEOUS
Refills: 0 | Status: DISCONTINUED | OUTPATIENT
Start: 2020-12-04 | End: 2020-12-18

## 2020-12-04 RX ORDER — SODIUM CHLORIDE 9 MG/ML
1000 INJECTION, SOLUTION INTRAVENOUS
Refills: 0 | Status: DISCONTINUED | OUTPATIENT
Start: 2020-12-04 | End: 2020-12-05

## 2020-12-04 RX ORDER — LABETALOL HCL 100 MG
5 TABLET ORAL ONCE
Refills: 0 | Status: COMPLETED | OUTPATIENT
Start: 2020-12-04 | End: 2020-12-04

## 2020-12-04 RX ORDER — LABETALOL HCL 100 MG
10 TABLET ORAL ONCE
Refills: 0 | Status: COMPLETED | OUTPATIENT
Start: 2020-12-04 | End: 2020-12-04

## 2020-12-04 RX ADMIN — PANTOPRAZOLE SODIUM 40 MILLIGRAM(S): 20 TABLET, DELAYED RELEASE ORAL at 11:18

## 2020-12-04 RX ADMIN — Medication 5 MILLIGRAM(S): at 22:02

## 2020-12-04 RX ADMIN — CHLORHEXIDINE GLUCONATE 1 APPLICATION(S): 213 SOLUTION TOPICAL at 05:24

## 2020-12-04 RX ADMIN — Medication 650 MILLIGRAM(S): at 21:00

## 2020-12-04 RX ADMIN — Medication 1000 MILLIGRAM(S): at 22:13

## 2020-12-04 RX ADMIN — Medication 650 MILLIGRAM(S): at 20:32

## 2020-12-04 RX ADMIN — Medication 10 MILLIGRAM(S): at 06:38

## 2020-12-04 RX ADMIN — AMLODIPINE BESYLATE 10 MILLIGRAM(S): 2.5 TABLET ORAL at 05:24

## 2020-12-04 RX ADMIN — ENOXAPARIN SODIUM 80 MILLIGRAM(S): 100 INJECTION SUBCUTANEOUS at 05:24

## 2020-12-04 RX ADMIN — ENOXAPARIN SODIUM 70 MILLIGRAM(S): 100 INJECTION SUBCUTANEOUS at 17:23

## 2020-12-04 RX ADMIN — SODIUM CHLORIDE 75 MILLILITER(S): 9 INJECTION, SOLUTION INTRAVENOUS at 11:00

## 2020-12-04 RX ADMIN — Medication 400 MILLIGRAM(S): at 21:39

## 2020-12-04 NOTE — PROGRESS NOTE ADULT - ASSESSMENT
Patient is a 56yo Male with no PMH recently diagnosed with COVID-19 (5 days PTA) p/w SOB s/p intubated in the ER. Pt admitted to ICU with acute hypoxic respiratory failure 2/2 COVID-19 PNA s/p intubated, BECKY with transaminitis. Nephrology consulted for Elevated serum creatinine.    1. BECKY- unknown baseline SCr. BECKY likely hemodynamically mediated in the setting of septic shock / infection 2/2 COVID-19, hypotension on pressors (now off pressors);  likely ATN. UA with 100 protein and trace blood with hyaline cast. Renal function was improving, now worsening today. [facility] trial of IVF.    Will defer Renal US due to COVID status. Strict I/Os. Avoid nephrotoxins/ NSAIDs/ RCA. Monitor BMP.  2. Septic Shock due to Multifocal PNA 2/2 COVID-19- Plan per ICU  3. Hypotension- BP elevated for which amlodipine was increased to 10mg O qd; consider Metoprolol 25mg PO qd, titrate as needed. Monitor BP  4. Acute hypoxic respiratory failure- as per ICU.  5. Hypocalcemia- & Hyperphosphatemia resolved. Monitor ionized calcium and serum phos.

## 2020-12-04 NOTE — PROGRESS NOTE ADULT - ASSESSMENT
55M from home without PMH with progressively worsening SOB and +COVID test x5 days prior to admission presented with SpO2 64% at home, intubated 2/2 deteriorating respiratory status and admitted to ICU.    Assessment:  1. Acute Hypoxic Respiratory failure   2. COVID Pneumonia   3. Acute Kidney Injury   4. elevated LFTs     Plan:    ====CNS====  #no acute issues  - off sedation    ====CVS====   - TTE when off isolation     #hypertension  - no onset  - s/p lopressor 5mg IV  - c/w amlodipine 10mg PO qd  - monitor BP  - cardio rec'd metoprolol 25mg; may change if not controlled    ====RESP====  #AHRF 2/2 COVID            - WBC improving  - Bcx neg  - MRSA neg  - COVID pcr pos  - CXR improving      - s/p 5 days cftx and azithromycin for CAP coverage (11/16-20)  - s/p vanco & zosyn (11/23-12/01)  - s/p remdesivir d/c'd 2/2 CrCl <45 (29.7)   - s/p decadron   - f/u acute phase reactants    ====GI====  #elevated LFTs   - resolved  - likely 2/2 COVID  - monitor LFTs    #bowel regimen:  - continue senna, miralax, and dulcolax SC for bowel regimen    ====RENAL====   #BECKY   - improving  - BUN/Cr 49/2.2 on admission, unknown baseline  - Cr 1.31>1.91  - c/w D5/LR 75cc/h  - nephro Dr. Camara following    ====ID====   #COVID PNA  - as above     ====HEME/ONC====  #hypercoagulability 2/2 COVID  - s/p heparin gtt  - c/w full-dose lovenox  - monitor daily CBC, Hb stable    ====ENDO====   #No active issues:  - target -180  - HgA1c 6.1  - c/w low HSS while on steroids    ====Skin/Catheters====  #no acute issues  - RIJ 11/15-25 d/c'd   - right femoral line 11/24-12/02 d/c'd   - no rashes noted  - cw artificial tears    ====PPx====  #DVT: full-lovenox and SCD (BMI>30, ICU admission)  #GI: Protonix     ====Dispo=====  - monitor in ICU     ====GOC====   - full Code

## 2020-12-04 NOTE — PROGRESS NOTE ADULT - SUBJECTIVE AND OBJECTIVE BOX
[facility] NEPHROLOGY- PROGRESS NOTE    Patient is a 54yo Male with no PMH recently diagnosed with COVID-19 (5 days PTA) p/w SOB s/p intubated in the ER. Pt admitted to ICU with acute hypoxic respiratory failure 2/2 COVID-19 PNA s/p intubated, BECKY with transaminitis. Nephrology consulted for Elevated serum creatinine.  Extubated 11/30    Hospital Medications: Medications reviewed.  REVIEW OF SYSTEMS: Deferred due to COVID    VITALS:  T(F): 99 (12-04-20 @ 15:37), Max: 99.5 (12-04-20 @ 05:00)  HR: 115 (12-04-20 @ 18:00)  BP: 149/87 (12-04-20 @ 18:00)  RR: 24 (12-04-20 @ 18:00)  SpO2: 94% (12-04-20 @ 18:00)  Wt(kg): --    12-03 @ 07:01  -  12-04 @ 07:00  --------------------------------------------------------  IN: 200 mL / OUT: 1120 mL / NET: -920 mL    12-04 @ 07:01  -  12-04 @ 18:21  --------------------------------------------------------  IN: 843 mL / OUT: 450 mL / NET: 393 mL        PHYSICAL EXAM: Will refer to ICU team physical exam      LABS:  12-04    139  |  105  |  44<H>  ----------------------------<  118<H>  4.0   |  24  |  1.91<H>    Ca    9.5      04 Dec 2020 06:37  Phos  4.5     12-04  Mg     2.5     12-04    TPro  8.4<H>  /  Alb  3.3<L>  /  TBili  0.9  /  DBili      /  AST  31  /  ALT  61<H>  /  AlkPhos  66  12-04    Creatinine Trend: 1.91 <--, 1.31 <--, 1.61 <--, 1.59 <--, 1.81 <--, 1.92 <--, 1.74 <--, 1.43 <--                        10.0   13.41 )-----------( 253      ( 04 Dec 2020 06:37 )             31.9     Urine Studies:

## 2020-12-04 NOTE — PROGRESS NOTE ADULT - SUBJECTIVE AND OBJECTIVE BOX
INTERVAL HPI/OVERNIGHT EVENTS: ***    REVIEW OF SYSTEMS:    CONSTITUTIONAL: No weakness, fevers or chills  NECK: No pain or stiffness  RESPIRATORY: No cough, wheezing, hemoptysis; No shortness of breath  CARDIOVASCULAR: No chest pain or palpitations  GASTROINTESTINAL: No abdominal or epigastric pain. No nausea, vomiting, No diarrhea or constipation. No melena or hematochezia.  GENITOURINARY: No dysuria, frequency or hematuria  NEUROLOGICAL: No numbness or weakness  All other review of systems is negative unless indicated above    PRESSORS: [] YES [] NO  WHICH: N/A    Antimicrobial:    Cardiovascular:  amLODIPine   Tablet 10 milliGRAM(s) Oral daily    Pulmonary:    Hematalogic:  enoxaparin Injectable 70 milliGRAM(s) SubCutaneous two times a day    Other:  acetaminophen    Suspension .. 650 milliGRAM(s) Enteral Tube every 6 hours PRN  artificial  tears Solution 1 Drop(s) Both EYES every 4 hours PRN  chlorhexidine 2% Cloths 1 Application(s) Topical <User Schedule>  dextrose 5% + lactated ringers. 1000 milliLiter(s) IV Continuous <Continuous>  pantoprazole  Injectable 40 milliGRAM(s) IV Push daily  polyethylene glycol 3350 17 Gram(s) Oral daily  senna Syrup 10 milliLiter(s) Oral at bedtime  sodium chloride 0.9% lock flush 10 milliLiter(s) IV Push every 1 hour PRN    acetaminophen    Suspension .. 650 milliGRAM(s) Enteral Tube every 6 hours PRN  amLODIPine   Tablet 10 milliGRAM(s) Oral daily  artificial  tears Solution 1 Drop(s) Both EYES every 4 hours PRN  chlorhexidine 2% Cloths 1 Application(s) Topical <User Schedule>  dextrose 5% + lactated ringers. 1000 milliLiter(s) IV Continuous <Continuous>  enoxaparin Injectable 70 milliGRAM(s) SubCutaneous two times a day  pantoprazole  Injectable 40 milliGRAM(s) IV Push daily  polyethylene glycol 3350 17 Gram(s) Oral daily  senna Syrup 10 milliLiter(s) Oral at bedtime  sodium chloride 0.9% lock flush 10 milliLiter(s) IV Push every 1 hour PRN    Drug Dosing Weight  Height (cm): 157.5 (15 Nov 2020 23:00)  Weight (kg): 85.4 (15 Nov 2020 23:00)  BMI (kg/m2): 34.4 (15 Nov 2020 23:00)  BSA (m2): 1.86 (15 Nov 2020 23:00)    ICU Vital Signs Last 24 Hrs  T(C): 37.3 (04 Dec 2020 11:00), Max: 37.5 (04 Dec 2020 05:00)  T(F): 99.1 (04 Dec 2020 11:00), Max: 99.5 (04 Dec 2020 05:00)  HR: 96 (04 Dec 2020 11:00) (89 - 119)  BP: 138/68 (04 Dec 2020 11:00) (128/77 - 164/91)  BP(mean): 83 (04 Dec 2020 11:00) (83 - 115)  ABP: --  ABP(mean): --  RR: 21 (04 Dec 2020 11:00) (19 - 28)  SpO2: 96% (04 Dec 2020 11:00) (79% - 96%)      ABG - ( 04 Dec 2020 08:17 )  pH, Arterial: 7.45  pH, Blood: x     /  pCO2: 36    /  pO2: 97    / HCO3: 25    / Base Excess: 1.4   /  SaO2: 97                    12-03 @ 07:01  -  12-04 @ 07:00  --------------------------------------------------------  IN: 200 mL / OUT: 1120 mL / NET: -920 mL              PHYSICAL EXAM:    GENERAL: NAD, well-groomed, well-developed, AAOx3  EYES: EOMI, PERRLA,   NECK: Supple, No JVD; Normal thyroid; Trachea midline  NERVOUS SYSTEM: Motor Strength 5/5 B/L upper and lower extremities; DTRs 2+ intact and symmetric  CHEST/LUNG: No rales, rhonchi, wheezing   HEART: Regular rate and rhythm; No murmurs,   ABDOMEN: Soft, Nontender, Nondistended; Bowel sounds present  EXTREMITIES:  2+ Peripheral Pulses, No clubbing, cyanosis, or edema  LINES/TUBES/CATHETERS: n/a    LABS:  CBC Full  -  ( 04 Dec 2020 06:37 )  WBC Count : 13.41 K/uL  RBC Count : 3.49 M/uL  Hemoglobin : 10.0 g/dL  Hematocrit : 31.9 %  Platelet Count - Automated : 253 K/uL  Mean Cell Volume : 91.4 fl  Mean Cell Hemoglobin : 28.7 pg  Mean Cell Hemoglobin Concentration : 31.3 gm/dL  Auto Neutrophil # : 11.65 K/uL  Auto Lymphocyte # : 0.88 K/uL  Auto Monocyte # : 0.64 K/uL  Auto Eosinophil # : 0.14 K/uL  Auto Basophil # : 0.04 K/uL  Auto Neutrophil % : 86.9 %  Auto Lymphocyte % : 6.6 %  Auto Monocyte % : 4.8 %  Auto Eosinophil % : 1.0 %  Auto Basophil % : 0.3 %    12-04    139  |  105  |  44<H>  ----------------------------<  118<H>  4.0   |  24  |  1.91<H>    Ca    9.5      04 Dec 2020 06:37  Phos  4.5     12-04  Mg     2.5     12-04    TPro  8.4<H>  /  Alb  3.3<L>  /  TBili  0.9  /  DBili  x   /  AST  31  /  ALT  61<H>  /  AlkPhos  66  12-04    PT/INR - ( 04 Dec 2020 06:37 )   PT: 16.3 sec;   INR: 1.39 ratio         PTT - ( 03 Dec 2020 06:31 )  PTT:37.2 sec        RADIOLOGY & ADDITIONAL STUDIES REVIEWED:  Yes    CRITICAL CARE TIME SPENT: 35 minutes INTERVAL HPI/OVERNIGHT EVENTS: Downgrade to floor canceled 2/2 retaining thick secretion. Patient continue to produce and retain secretion. He complains of thirst, but denies fever, chills, n/v, sob, chest pain, or abdominal pain.    Antimicrobial:    Cardiovascular:  amLODIPine   Tablet 10 milliGRAM(s) Oral daily    Pulmonary:    Hematalogic:  enoxaparin Injectable 70 milliGRAM(s) SubCutaneous two times a day    Other:  acetaminophen    Suspension .. 650 milliGRAM(s) Enteral Tube every 6 hours PRN  artificial  tears Solution 1 Drop(s) Both EYES every 4 hours PRN  chlorhexidine 2% Cloths 1 Application(s) Topical <User Schedule>  dextrose 5% + lactated ringers. 1000 milliLiter(s) IV Continuous <Continuous>  pantoprazole  Injectable 40 milliGRAM(s) IV Push daily  polyethylene glycol 3350 17 Gram(s) Oral daily  senna Syrup 10 milliLiter(s) Oral at bedtime  sodium chloride 0.9% lock flush 10 milliLiter(s) IV Push every 1 hour PRN    acetaminophen    Suspension .. 650 milliGRAM(s) Enteral Tube every 6 hours PRN  amLODIPine   Tablet 10 milliGRAM(s) Oral daily  artificial  tears Solution 1 Drop(s) Both EYES every 4 hours PRN  chlorhexidine 2% Cloths 1 Application(s) Topical <User Schedule>  dextrose 5% + lactated ringers. 1000 milliLiter(s) IV Continuous <Continuous>  enoxaparin Injectable 70 milliGRAM(s) SubCutaneous two times a day  pantoprazole  Injectable 40 milliGRAM(s) IV Push daily  polyethylene glycol 3350 17 Gram(s) Oral daily  senna Syrup 10 milliLiter(s) Oral at bedtime  sodium chloride 0.9% lock flush 10 milliLiter(s) IV Push every 1 hour PRN    Drug Dosing Weight  Height (cm): 157.5 (15 Nov 2020 23:00)  Weight (kg): 85.4 (15 Nov 2020 23:00)  BMI (kg/m2): 34.4 (15 Nov 2020 23:00)  BSA (m2): 1.86 (15 Nov 2020 23:00)    ICU Vital Signs Last 24 Hrs  T(C): 37.3 (04 Dec 2020 11:00), Max: 37.5 (04 Dec 2020 05:00)  T(F): 99.1 (04 Dec 2020 11:00), Max: 99.5 (04 Dec 2020 05:00)  HR: 96 (04 Dec 2020 11:00) (89 - 119)  BP: 138/68 (04 Dec 2020 11:00) (128/77 - 164/91)  BP(mean): 83 (04 Dec 2020 11:00) (83 - 115)  ABP: --  ABP(mean): --  RR: 21 (04 Dec 2020 11:00) (19 - 28)  SpO2: 96% (04 Dec 2020 11:00) (79% - 96%)      ABG - ( 04 Dec 2020 08:17 )  pH, Arterial: 7.45  pH, Blood: x     /  pCO2: 36    /  pO2: 97    / HCO3: 25    / Base Excess: 1.4   /  SaO2: 97                    12-03 @ 07:01  -  12-04 @ 07:00  --------------------------------------------------------  IN: 200 mL / OUT: 1120 mL / NET: -920 mL              PHYSICAL EXAM:    GENERAL: lethargic, but AAOx3  EYES: PERRLA   NECK: Supple, No JVD; Normal thyroid; Trachea midline  NERVOUS SYSTEM: Motor Strength 2-3/5 B/L upper and lower extremities  CHEST/LUNG: No rales, rhonchi, wheezing   HEART: Regular rate and rhythm; No murmurs,   ABDOMEN: Soft, Nontender, Nondistended; Bowel sounds present  EXTREMITIES:  2+ Peripheral Pulses, No clubbing, cyanosis, or edema  LINES/TUBES/CATHETERS: n/a    LABS:  CBC Full  -  ( 04 Dec 2020 06:37 )  WBC Count : 13.41 K/uL  RBC Count : 3.49 M/uL  Hemoglobin : 10.0 g/dL  Hematocrit : 31.9 %  Platelet Count - Automated : 253 K/uL  Mean Cell Volume : 91.4 fl  Mean Cell Hemoglobin : 28.7 pg  Mean Cell Hemoglobin Concentration : 31.3 gm/dL  Auto Neutrophil # : 11.65 K/uL  Auto Lymphocyte # : 0.88 K/uL  Auto Monocyte # : 0.64 K/uL  Auto Eosinophil # : 0.14 K/uL  Auto Basophil # : 0.04 K/uL  Auto Neutrophil % : 86.9 %  Auto Lymphocyte % : 6.6 %  Auto Monocyte % : 4.8 %  Auto Eosinophil % : 1.0 %  Auto Basophil % : 0.3 %    12-04    139  |  105  |  44<H>  ----------------------------<  118<H>  4.0   |  24  |  1.91<H>    Ca    9.5      04 Dec 2020 06:37  Phos  4.5     12-04  Mg     2.5     12-04    TPro  8.4<H>  /  Alb  3.3<L>  /  TBili  0.9  /  DBili  x   /  AST  31  /  ALT  61<H>  /  AlkPhos  66  12-04    PT/INR - ( 04 Dec 2020 06:37 )   PT: 16.3 sec;   INR: 1.39 ratio         PTT - ( 03 Dec 2020 06:31 )  PTT:37.2 sec        RADIOLOGY & ADDITIONAL STUDIES REVIEWED:  Yes    CRITICAL CARE TIME SPENT: 35 minutes

## 2020-12-05 LAB
ALBUMIN SERPL ELPH-MCNC: 3.3 G/DL — LOW (ref 3.5–5)
ALP SERPL-CCNC: 68 U/L — SIGNIFICANT CHANGE UP (ref 40–120)
ALT FLD-CCNC: 53 U/L DA — SIGNIFICANT CHANGE UP (ref 10–60)
ANION GAP SERPL CALC-SCNC: 10 MMOL/L — SIGNIFICANT CHANGE UP (ref 5–17)
AST SERPL-CCNC: 26 U/L — SIGNIFICANT CHANGE UP (ref 10–40)
BASOPHILS # BLD AUTO: 0.04 K/UL — SIGNIFICANT CHANGE UP (ref 0–0.2)
BASOPHILS NFR BLD AUTO: 0.3 % — SIGNIFICANT CHANGE UP (ref 0–2)
BILIRUB SERPL-MCNC: 0.8 MG/DL — SIGNIFICANT CHANGE UP (ref 0.2–1.2)
BUN SERPL-MCNC: 53 MG/DL — HIGH (ref 7–18)
CALCIUM SERPL-MCNC: 9.2 MG/DL — SIGNIFICANT CHANGE UP (ref 8.4–10.5)
CHLORIDE SERPL-SCNC: 109 MMOL/L — HIGH (ref 96–108)
CHLORIDE UR-SCNC: 90 MMOL/L — SIGNIFICANT CHANGE UP
CO2 SERPL-SCNC: 25 MMOL/L — SIGNIFICANT CHANGE UP (ref 22–31)
CREAT ?TM UR-MCNC: 74 MG/DL — SIGNIFICANT CHANGE UP
CREAT SERPL-MCNC: 1.97 MG/DL — HIGH (ref 0.5–1.3)
D DIMER BLD IA.RAPID-MCNC: 1540 NG/ML DDU — HIGH
EOSINOPHIL # BLD AUTO: 0.63 K/UL — HIGH (ref 0–0.5)
EOSINOPHIL NFR BLD AUTO: 5.4 % — SIGNIFICANT CHANGE UP (ref 0–6)
ERYTHROCYTE [SEDIMENTATION RATE] IN BLOOD: 102 MM/HR — HIGH (ref 0–20)
FERRITIN SERPL-MCNC: 905 NG/ML — HIGH (ref 30–400)
GLUCOSE SERPL-MCNC: 112 MG/DL — HIGH (ref 70–99)
HCT VFR BLD CALC: 30.9 % — LOW (ref 39–50)
HGB BLD-MCNC: 9.6 G/DL — LOW (ref 13–17)
IMM GRANULOCYTES NFR BLD AUTO: 0.3 % — SIGNIFICANT CHANGE UP (ref 0–1.5)
INR BLD: 1.3 RATIO — HIGH (ref 0.88–1.16)
LDH SERPL L TO P-CCNC: 226 U/L — HIGH (ref 120–225)
LYMPHOCYTES # BLD AUTO: 0.97 K/UL — LOW (ref 1–3.3)
LYMPHOCYTES # BLD AUTO: 8.3 % — LOW (ref 13–44)
MAGNESIUM SERPL-MCNC: 2.4 MG/DL — SIGNIFICANT CHANGE UP (ref 1.6–2.6)
MCHC RBC-ENTMCNC: 28.6 PG — SIGNIFICANT CHANGE UP (ref 27–34)
MCHC RBC-ENTMCNC: 31.1 GM/DL — LOW (ref 32–36)
MCV RBC AUTO: 92 FL — SIGNIFICANT CHANGE UP (ref 80–100)
MONOCYTES # BLD AUTO: 0.64 K/UL — SIGNIFICANT CHANGE UP (ref 0–0.9)
MONOCYTES NFR BLD AUTO: 5.5 % — SIGNIFICANT CHANGE UP (ref 2–14)
NEUTROPHILS # BLD AUTO: 9.3 K/UL — HIGH (ref 1.8–7.4)
NEUTROPHILS NFR BLD AUTO: 80.2 % — HIGH (ref 43–77)
NRBC # BLD: 0 /100 WBCS — SIGNIFICANT CHANGE UP (ref 0–0)
PHOSPHATE SERPL-MCNC: 3 MG/DL — SIGNIFICANT CHANGE UP (ref 2.5–4.5)
PLATELET # BLD AUTO: 218 K/UL — SIGNIFICANT CHANGE UP (ref 150–400)
POTASSIUM SERPL-MCNC: 3.2 MMOL/L — LOW (ref 3.5–5.3)
POTASSIUM SERPL-SCNC: 3.2 MMOL/L — LOW (ref 3.5–5.3)
PROCALCITONIN SERPL-MCNC: 0.23 NG/ML — HIGH (ref 0.02–0.1)
PROT SERPL-MCNC: 8.2 G/DL — SIGNIFICANT CHANGE UP (ref 6–8.3)
PROTHROM AB SERPL-ACNC: 15.3 SEC — HIGH (ref 10.6–13.6)
RBC # BLD: 3.36 M/UL — LOW (ref 4.2–5.8)
RBC # FLD: 13.9 % — SIGNIFICANT CHANGE UP (ref 10.3–14.5)
SODIUM SERPL-SCNC: 144 MMOL/L — SIGNIFICANT CHANGE UP (ref 135–145)
SODIUM UR-SCNC: 84 MMOL/L — SIGNIFICANT CHANGE UP
WBC # BLD: 11.62 K/UL — HIGH (ref 3.8–10.5)
WBC # FLD AUTO: 11.62 K/UL — HIGH (ref 3.8–10.5)

## 2020-12-05 PROCEDURE — 71045 X-RAY EXAM CHEST 1 VIEW: CPT | Mod: 26

## 2020-12-05 RX ORDER — PIPERACILLIN AND TAZOBACTAM 4; .5 G/20ML; G/20ML
3.38 INJECTION, POWDER, LYOPHILIZED, FOR SOLUTION INTRAVENOUS ONCE
Refills: 0 | Status: COMPLETED | OUTPATIENT
Start: 2020-12-05 | End: 2020-12-05

## 2020-12-05 RX ORDER — ROBINUL 0.2 MG/ML
1 INJECTION INTRAMUSCULAR; INTRAVENOUS ONCE
Refills: 0 | Status: DISCONTINUED | OUTPATIENT
Start: 2020-12-05 | End: 2020-12-05

## 2020-12-05 RX ORDER — IBUPROFEN 200 MG
400 TABLET ORAL ONCE
Refills: 0 | Status: COMPLETED | OUTPATIENT
Start: 2020-12-05 | End: 2020-12-05

## 2020-12-05 RX ORDER — SENNA PLUS 8.6 MG/1
2 TABLET ORAL AT BEDTIME
Refills: 0 | Status: DISCONTINUED | OUTPATIENT
Start: 2020-12-05 | End: 2020-12-18

## 2020-12-05 RX ORDER — MORPHINE SULFATE 50 MG/1
1 CAPSULE, EXTENDED RELEASE ORAL EVERY 8 HOURS
Refills: 0 | Status: DISCONTINUED | OUTPATIENT
Start: 2020-12-05 | End: 2020-12-05

## 2020-12-05 RX ORDER — ACETAMINOPHEN 500 MG
1000 TABLET ORAL ONCE
Refills: 0 | Status: COMPLETED | OUTPATIENT
Start: 2020-12-05 | End: 2020-12-05

## 2020-12-05 RX ORDER — POTASSIUM CHLORIDE 20 MEQ
10 PACKET (EA) ORAL
Refills: 0 | Status: COMPLETED | OUTPATIENT
Start: 2020-12-05 | End: 2020-12-05

## 2020-12-05 RX ORDER — ROBINUL 0.2 MG/ML
0.2 INJECTION INTRAMUSCULAR; INTRAVENOUS ONCE
Refills: 0 | Status: DISCONTINUED | OUTPATIENT
Start: 2020-12-05 | End: 2020-12-06

## 2020-12-05 RX ORDER — PIPERACILLIN AND TAZOBACTAM 4; .5 G/20ML; G/20ML
3.38 INJECTION, POWDER, LYOPHILIZED, FOR SOLUTION INTRAVENOUS EVERY 12 HOURS
Refills: 0 | Status: DISCONTINUED | OUTPATIENT
Start: 2020-12-05 | End: 2020-12-12

## 2020-12-05 RX ADMIN — Medication 100 MILLIEQUIVALENT(S): at 12:11

## 2020-12-05 RX ADMIN — PIPERACILLIN AND TAZOBACTAM 25 GRAM(S): 4; .5 INJECTION, POWDER, LYOPHILIZED, FOR SOLUTION INTRAVENOUS at 17:13

## 2020-12-05 RX ADMIN — ENOXAPARIN SODIUM 70 MILLIGRAM(S): 100 INJECTION SUBCUTANEOUS at 05:42

## 2020-12-05 RX ADMIN — Medication 400 MILLIGRAM(S): at 19:10

## 2020-12-05 RX ADMIN — Medication 400 MILLIGRAM(S): at 20:47

## 2020-12-05 RX ADMIN — Medication 100 MILLIEQUIVALENT(S): at 11:17

## 2020-12-05 RX ADMIN — CHLORHEXIDINE GLUCONATE 1 APPLICATION(S): 213 SOLUTION TOPICAL at 05:42

## 2020-12-05 RX ADMIN — Medication 400 MILLIGRAM(S): at 18:20

## 2020-12-05 RX ADMIN — Medication 1000 MILLIGRAM(S): at 13:20

## 2020-12-05 RX ADMIN — Medication 1 DROP(S): at 21:56

## 2020-12-05 RX ADMIN — ENOXAPARIN SODIUM 70 MILLIGRAM(S): 100 INJECTION SUBCUTANEOUS at 17:14

## 2020-12-05 RX ADMIN — Medication 1000 MILLIGRAM(S): at 21:54

## 2020-12-05 RX ADMIN — Medication 1 DROP(S): at 16:34

## 2020-12-05 RX ADMIN — PIPERACILLIN AND TAZOBACTAM 200 GRAM(S): 4; .5 INJECTION, POWDER, LYOPHILIZED, FOR SOLUTION INTRAVENOUS at 16:35

## 2020-12-05 RX ADMIN — Medication 400 MILLIGRAM(S): at 13:11

## 2020-12-05 RX ADMIN — AMLODIPINE BESYLATE 10 MILLIGRAM(S): 2.5 TABLET ORAL at 05:41

## 2020-12-05 RX ADMIN — Medication 1 DROP(S): at 23:37

## 2020-12-05 RX ADMIN — PANTOPRAZOLE SODIUM 40 MILLIGRAM(S): 20 TABLET, DELAYED RELEASE ORAL at 12:34

## 2020-12-05 RX ADMIN — SENNA PLUS 2 TABLET(S): 8.6 TABLET ORAL at 21:56

## 2020-12-05 NOTE — PROGRESS NOTE ADULT - ASSESSMENT
Patient is a 56yo Male with no PMH recently diagnosed with COVID-19 (5 days PTA) p/w SOB s/p intubated in the ER. Pt admitted to ICU with acute hypoxic respiratory failure 2/2 COVID-19 PNA s/p intubated, BECKY with transaminitis. Nephrology consulted for Elevated serum creatinine.    1. BECKY- unknown baseline SCr. BECKY likely hemodynamically mediated in the setting of septic shock / infection 2/2 COVID-19, hypotension on pressors (now off pressors);  likely ATN; initially improving but now worsening.   Renal function remains elevated but with good urine o/p.  Check urine lytes. UA with 100 protein and trace blood with hyaline cast. Will defer Renal US due to COVID status. Strict I/Os. Avoid nephrotoxins/ NSAIDs/ RCA. Monitor BMP.  2. Septic Shock due to Multifocal PNA 2/2 COVID-19- Plan per ICU  3. Hypotension- BP acceptable on amlodipine  10mg O qd; Monitor BP  4. Acute hypoxic respiratory failure- as per ICU.  5. Hypocalcemia- & Hyperphosphatemia resolved. Monitor ionized calcium and serum phos.

## 2020-12-05 NOTE — PROGRESS NOTE ADULT - SUBJECTIVE AND OBJECTIVE BOX
[facility] NEPHROLOGY- PROGRESS NOTE    Patient is a 54yo Male with no PMH recently diagnosed with COVID-19 (5 days PTA) p/w SOB s/p intubated in the ER. Pt admitted to ICU with acute hypoxic respiratory failure 2/2 COVID-19 PNA s/p intubated, BECKY with transaminitis. Nephrology consulted for Elevated serum creatinine.  Extubated 11/30    Hospital Medications: Medications reviewed.  REVIEW OF SYSTEMS: Pt nods yes to SOB, chest pain, n/v; unclear if pt is comprehending    VITALS:  T(F): 100.9 (12-05-20 @ 16:00), Max: 101.7 (12-05-20 @ 12:00)  HR: 87 (12-05-20 @ 16:00)  BP: 157/82 (12-05-20 @ 16:00)  RR: 17 (12-05-20 @ 16:00)  SpO2: 99% (12-05-20 @ 16:00)  Wt(kg): --    12-04 @ 07:01  -  12-05 @ 07:00  --------------------------------------------------------  IN: 1793 mL / OUT: 930 mL / NET: 863 mL    12-05 @ 07:01  -  12-05 @ 17:40  --------------------------------------------------------  IN: 375 mL / OUT: 150 mL / NET: 225 mL            PHYSICAL EXAM:   Gen: NAD  HEENT: b/l conjuctival erythema  CV: S1S2 RRR  Pulm: CTA ant  Abd: NT/ ND  : No isaacs  Vascular: No LE edema      LABS:  12-05    144  |  109<H>  |  53<H>  ----------------------------<  112<H>  3.2<L>   |  25  |  1.97<H>    Ca    9.2      05 Dec 2020 06:25  Phos  3.0     12-05  Mg     2.4     12-05    TPro  8.2  /  Alb  3.3<L>  /  TBili  0.8  /  DBili      /  AST  26  /  ALT  53  /  AlkPhos  68  12-05    Creatinine Trend: 1.97 <--, 1.91 <--, 1.31 <--, 1.61 <--, 1.59 <--, 1.81 <--, 1.92 <--, 1.74 <--                        9.6    11.62 )-----------( 218      ( 05 Dec 2020 06:25 )             30.9     Urine Studies:

## 2020-12-05 NOTE — PROGRESS NOTE ADULT - SUBJECTIVE AND OBJECTIVE BOX
INTERVAL HPI/OVERNIGHT EVENTS: Pulled out IV line overnight. HFNC for worsening mentation during day, tolerating NC overnight. Amlodipine increased to 10mg Qd, given x1 dose labetalol 5mg. Patient deconditioned with poor appetite, D5LR started and PT working to improve strength. Tmax 100.4 overnight, gave x1 dose Ofirmev.       ICU Vital Signs Last 24 Hrs  T(C): 36.9 (04 Dec 2020 22:13), Max: 38 (04 Dec 2020 20:29)  T(F): 98.4 (04 Dec 2020 22:13), Max: 100.4 (04 Dec 2020 20:29)  HR: 92 (05 Dec 2020 01:00) (89 - 134)  BP: 163/85 (05 Dec 2020 01:00) (128/77 - 170/96)  BP(mean): 100 (05 Dec 2020 01:00) (83 - 115)  ABP: --  ABP(mean): --  RR: 19 (05 Dec 2020 01:00) (18 - 28)  SpO2: 98% (05 Dec 2020 01:00) (79% - 100%)    I&O's Summary    03 Dec 2020 07:01  -  04 Dec 2020 07:00  --------------------------------------------------------  IN: 200 mL / OUT: 1120 mL / NET: -920 mL    04 Dec 2020 07:01  -  05 Dec 2020 02:33  --------------------------------------------------------  IN: 1343 mL / OUT: 700 mL / NET: 643 mL          LABS:                        10.0   13.41 )-----------( 253      ( 04 Dec 2020 06:37 )             31.9     12-04    139  |  105  |  44<H>  ----------------------------<  118<H>  4.0   |  24  |  1.91<H>    Ca    9.5      04 Dec 2020 06:37  Phos  4.5     12-04  Mg     2.5     12-04    TPro  8.4<H>  /  Alb  3.3<L>  /  TBili  0.9  /  DBili  x   /  AST  31  /  ALT  61<H>  /  AlkPhos  66  12-04    PT/INR - ( 04 Dec 2020 06:37 )   PT: 16.3 sec;   INR: 1.39 ratio         PTT - ( 03 Dec 2020 06:31 )  PTT:37.2 sec    CAPILLARY BLOOD GLUCOSE      POCT Blood Glucose.: 123 mg/dL (04 Dec 2020 23:39)    ABG - ( 04 Dec 2020 08:17 )  pH, Arterial: 7.45  pH, Blood: x     /  pCO2: 36    /  pO2: 97    / HCO3: 25    / Base Excess: 1.4   /  SaO2: 97          RADIOLOGY & ADDITIONAL TESTS:    Consultant(s) Notes Reviewed:  [x ] YES  [ ] NO    MEDICATIONS  (STANDING):  amLODIPine   Tablet 10 milliGRAM(s) Oral daily  chlorhexidine 2% Cloths 1 Application(s) Topical <User Schedule>  dextrose 5% + lactated ringers. 1000 milliLiter(s) (75 mL/Hr) IV Continuous <Continuous>  enoxaparin Injectable 70 milliGRAM(s) SubCutaneous two times a day  pantoprazole  Injectable 40 milliGRAM(s) IV Push daily  senna Syrup 10 milliLiter(s) Oral at bedtime    MEDICATIONS  (PRN):  acetaminophen    Suspension .. 650 milliGRAM(s) Enteral Tube every 6 hours PRN Temp greater or equal to 38C (100.4F)  artificial  tears Solution 1 Drop(s) Both EYES every 4 hours PRN Dry Eyes  sodium chloride 0.9% lock flush 10 milliLiter(s) IV Push every 1 hour PRN Pre/post blood products, medications, blood draw, and to maintain line patency      PHYSICAL EXAM:    GENERAL: lethargic, but AAOx3  EYES: PERRLA   NECK: Supple, No JVD; Normal thyroid; Trachea midline  NERVOUS SYSTEM: Motor Strength 2-3/5 B/L upper and lower extremities  CHEST/LUNG: No rales, rhonchi, wheezing   HEART: Regular rate and rhythm; No murmurs,   ABDOMEN: Soft, Nontender, Nondistended; Bowel sounds present  EXTREMITIES:  2+ Peripheral Pulses, No clubbing, cyanosis, or edema  LINES/TUBES/CATHETERS: n/a    Care Discussed with Consultants/Other Providers [ x] YES  [ ] NO INTERVAL HPI/OVERNIGHT EVENTS: Pulled out IV line overnight. HFNC for worsening mentation during day, tolerating NC overnight. Amlodipine increased to 10mg Qd, given x1 dose labetalol 5mg. Patient deconditioned with poor appetite, D5LR started and PT working to improve strength. Tmax 100.4 overnight, gave x1 dose Ofirmev.   - another episode of T100.8, IV tylenol given. Food pocketing noted on oral examination      ICU Vital Signs Last 24 Hrs  T(C): 36.9 (04 Dec 2020 22:13), Max: 38 (04 Dec 2020 20:29)  T(F): 98.4 (04 Dec 2020 22:13), Max: 100.4 (04 Dec 2020 20:29)  HR: 92 (05 Dec 2020 01:00) (89 - 134)  BP: 163/85 (05 Dec 2020 01:00) (128/77 - 170/96)  BP(mean): 100 (05 Dec 2020 01:00) (83 - 115)  ABP: --  ABP(mean): --  RR: 19 (05 Dec 2020 01:00) (18 - 28)  SpO2: 98% (05 Dec 2020 01:00) (79% - 100%)    I&O's Summary    03 Dec 2020 07:01  -  04 Dec 2020 07:00  --------------------------------------------------------  IN: 200 mL / OUT: 1120 mL / NET: -920 mL    04 Dec 2020 07:01  -  05 Dec 2020 02:33  --------------------------------------------------------  IN: 1343 mL / OUT: 700 mL / NET: 643 mL          LABS:                        10.0   13.41 )-----------( 253      ( 04 Dec 2020 06:37 )             31.9     12-04    139  |  105  |  44<H>  ----------------------------<  118<H>  4.0   |  24  |  1.91<H>    Ca    9.5      04 Dec 2020 06:37  Phos  4.5     12-04  Mg     2.5     12-04    TPro  8.4<H>  /  Alb  3.3<L>  /  TBili  0.9  /  DBili  x   /  AST  31  /  ALT  61<H>  /  AlkPhos  66  12-04    PT/INR - ( 04 Dec 2020 06:37 )   PT: 16.3 sec;   INR: 1.39 ratio         PTT - ( 03 Dec 2020 06:31 )  PTT:37.2 sec    CAPILLARY BLOOD GLUCOSE      POCT Blood Glucose.: 123 mg/dL (04 Dec 2020 23:39)    ABG - ( 04 Dec 2020 08:17 )  pH, Arterial: 7.45  pH, Blood: x     /  pCO2: 36    /  pO2: 97    / HCO3: 25    / Base Excess: 1.4   /  SaO2: 97          RADIOLOGY & ADDITIONAL TESTS:    Consultant(s) Notes Reviewed:  [x ] YES  [ ] NO    MEDICATIONS  (STANDING):  amLODIPine   Tablet 10 milliGRAM(s) Oral daily  chlorhexidine 2% Cloths 1 Application(s) Topical <User Schedule>  dextrose 5% + lactated ringers. 1000 milliLiter(s) (75 mL/Hr) IV Continuous <Continuous>  enoxaparin Injectable 70 milliGRAM(s) SubCutaneous two times a day  pantoprazole  Injectable 40 milliGRAM(s) IV Push daily  senna Syrup 10 milliLiter(s) Oral at bedtime    MEDICATIONS  (PRN):  acetaminophen    Suspension .. 650 milliGRAM(s) Enteral Tube every 6 hours PRN Temp greater or equal to 38C (100.4F)  artificial  tears Solution 1 Drop(s) Both EYES every 4 hours PRN Dry Eyes  sodium chloride 0.9% lock flush 10 milliLiter(s) IV Push every 1 hour PRN Pre/post blood products, medications, blood draw, and to maintain line patency      PHYSICAL EXAM:    GENERAL: lethargic, but AAOx3  EYES: PERRLA   MOUTH: small piece of food noted  NECK: Supple, No JVD; Normal thyroid; Trachea midline  NERVOUS SYSTEM: Motor Strength 2-3/5 B/L upper and lower extremities  CHEST/LUNG: No rales, rhonchi, wheezing   HEART: Regular rate and rhythm; No murmurs,   ABDOMEN: Soft, Nontender, Nondistended; Bowel sounds present  EXTREMITIES:  2+ Peripheral Pulses, No clubbing, cyanosis, or edema  LINES/TUBES/CATHETERS: n/a    Care Discussed with Consultants/Other Providers [ x] YES  [ ] NO INTERVAL HPI/OVERNIGHT EVENTS: Pulled out IV line overnight. HFNC for worsening mentation during day, tolerating NC overnight. Amlodipine increased to 10mg Qd, given x1 dose labetalol 5mg. Patient deconditioned with poor appetite, D5LR started and PT working to improve strength. Tmax 100.4 overnight, gave x1 dose Ofirmev.   - another IV tylenol given for T100.8 this am. Food pocketing noted on oral examination.      ICU Vital Signs Last 24 Hrs  T(C): 36.9 (04 Dec 2020 22:13), Max: 38 (04 Dec 2020 20:29)  T(F): 98.4 (04 Dec 2020 22:13), Max: 100.4 (04 Dec 2020 20:29)  HR: 92 (05 Dec 2020 01:00) (89 - 134)  BP: 163/85 (05 Dec 2020 01:00) (128/77 - 170/96)  BP(mean): 100 (05 Dec 2020 01:00) (83 - 115)  ABP: --  ABP(mean): --  RR: 19 (05 Dec 2020 01:00) (18 - 28)  SpO2: 98% (05 Dec 2020 01:00) (79% - 100%)    I&O's Summary    03 Dec 2020 07:01  -  04 Dec 2020 07:00  --------------------------------------------------------  IN: 200 mL / OUT: 1120 mL / NET: -920 mL    04 Dec 2020 07:01  -  05 Dec 2020 02:33  --------------------------------------------------------  IN: 1343 mL / OUT: 700 mL / NET: 643 mL          LABS:                        10.0   13.41 )-----------( 253      ( 04 Dec 2020 06:37 )             31.9     12-04    139  |  105  |  44<H>  ----------------------------<  118<H>  4.0   |  24  |  1.91<H>    Ca    9.5      04 Dec 2020 06:37  Phos  4.5     12-04  Mg     2.5     12-04    TPro  8.4<H>  /  Alb  3.3<L>  /  TBili  0.9  /  DBili  x   /  AST  31  /  ALT  61<H>  /  AlkPhos  66  12-04    PT/INR - ( 04 Dec 2020 06:37 )   PT: 16.3 sec;   INR: 1.39 ratio         PTT - ( 03 Dec 2020 06:31 )  PTT:37.2 sec    CAPILLARY BLOOD GLUCOSE      POCT Blood Glucose.: 123 mg/dL (04 Dec 2020 23:39)    ABG - ( 04 Dec 2020 08:17 )  pH, Arterial: 7.45  pH, Blood: x     /  pCO2: 36    /  pO2: 97    / HCO3: 25    / Base Excess: 1.4   /  SaO2: 97          RADIOLOGY & ADDITIONAL TESTS:    Consultant(s) Notes Reviewed:  [x ] YES  [ ] NO    MEDICATIONS  (STANDING):  amLODIPine   Tablet 10 milliGRAM(s) Oral daily  chlorhexidine 2% Cloths 1 Application(s) Topical <User Schedule>  dextrose 5% + lactated ringers. 1000 milliLiter(s) (75 mL/Hr) IV Continuous <Continuous>  enoxaparin Injectable 70 milliGRAM(s) SubCutaneous two times a day  pantoprazole  Injectable 40 milliGRAM(s) IV Push daily  senna Syrup 10 milliLiter(s) Oral at bedtime    MEDICATIONS  (PRN):  acetaminophen    Suspension .. 650 milliGRAM(s) Enteral Tube every 6 hours PRN Temp greater or equal to 38C (100.4F)  artificial  tears Solution 1 Drop(s) Both EYES every 4 hours PRN Dry Eyes  sodium chloride 0.9% lock flush 10 milliLiter(s) IV Push every 1 hour PRN Pre/post blood products, medications, blood draw, and to maintain line patency      PHYSICAL EXAM:    GENERAL: lethargic, but AAOx3  EYES: PERRLA   MOUTH: small piece of food noted  NECK: Supple, No JVD; Normal thyroid; Trachea midline  NERVOUS SYSTEM: Motor Strength 2-3/5 B/L upper and lower extremities  CHEST/LUNG: No rales, rhonchi, wheezing   HEART: Regular rate and rhythm; No murmurs,   ABDOMEN: Soft, Nontender, Nondistended; Bowel sounds present  EXTREMITIES:  2+ Peripheral Pulses, No clubbing, cyanosis, or edema  LINES/TUBES/CATHETERS: n/a    Care Discussed with Consultants/Other Providers [ x] YES  [ ] NO

## 2020-12-05 NOTE — PROGRESS NOTE ADULT - NUTRITIONAL ASSESSMENT
This patient has been assessed with a concern for Malnutrition and has been determined to have a diagnosis/diagnoses of Severe protein-calorie malnutrition.    This patient is being managed with:   Diet NPO-  Except Medications  Entered: Dec  5 2020  3:17PM

## 2020-12-05 NOTE — PROGRESS NOTE ADULT - ASSESSMENT
55M from home without PMH with progressively worsening SOB and +COVID test x5 days prior to admission presented with SpO2 64% at home, intubated 2/2 deteriorating respiratory status and admitted to ICU.    Assessment:  1. Acute Hypoxic Respiratory failure   2. COVID Pneumonia   3. Acute Kidney Injury   4. elevated LFTs     Plan:    ====CNS====  #no acute issues  - off sedation    ====CVS====   - TTE when off isolation     #hypertension  - no onset  - s/p lopressor 5mg IV  - c/w amlodipine 10mg PO qd  - monitor BP  - cardio rec'd metoprolol 25mg; may change if not controlled    ====RESP====  #AHRF 2/2 COVID            - WBC improving  - Bcx neg  - MRSA neg  - COVID pcr pos  - CXR improving      - s/p 5 days cftx and azithromycin for CAP coverage (11/16-20)  - s/p vanco & zosyn (11/23-12/01)  - s/p remdesivir d/c'd 2/2 CrCl <45 (29.7)   - s/p decadron   - f/u acute phase reactants    ====GI====  #elevated LFTs   - resolved  - likely 2/2 COVID  - monitor LFTs    #bowel regimen:  - continue senna, miralax, and dulcolax TN for bowel regimen    ====RENAL====   #BECKY   - improving  - BUN/Cr 49/2.2 on admission, unknown baseline  - Cr 1.31>1.91  - c/w D5/LR 75cc/h  - nephro Dr. Camara following    ====ID====   #COVID PNA  - as above     ====HEME/ONC====  #hypercoagulability 2/2 COVID  - s/p heparin gtt  - c/w full-dose lovenox  - monitor daily CBC, Hb stable    ====ENDO====   #No active issues:  - target -180  - HgA1c 6.1  - c/w low HSS while on steroids    ====Skin/Catheters====  #no acute issues  - RIJ 11/15-25 d/c'd   - right femoral line 11/24-12/02 d/c'd   - no rashes noted  - cw artificial tears    ====PPx====  #DVT: full-lovenox and SCD (BMI>30, ICU admission)  #GI: Protonix     ====Dispo=====  - monitor in ICU     ====GOC====   - full Code

## 2020-12-06 LAB
ALBUMIN SERPL ELPH-MCNC: 3 G/DL — LOW (ref 3.5–5)
ALP SERPL-CCNC: 74 U/L — SIGNIFICANT CHANGE UP (ref 40–120)
ALT FLD-CCNC: 47 U/L DA — SIGNIFICANT CHANGE UP (ref 10–60)
ANION GAP SERPL CALC-SCNC: 8 MMOL/L — SIGNIFICANT CHANGE UP (ref 5–17)
ANISOCYTOSIS BLD QL: SLIGHT — SIGNIFICANT CHANGE UP
AST SERPL-CCNC: 25 U/L — SIGNIFICANT CHANGE UP (ref 10–40)
BASOPHILS # BLD AUTO: 0.05 K/UL — SIGNIFICANT CHANGE UP (ref 0–0.2)
BASOPHILS NFR BLD AUTO: 0.5 % — SIGNIFICANT CHANGE UP (ref 0–2)
BILIRUB SERPL-MCNC: 0.8 MG/DL — SIGNIFICANT CHANGE UP (ref 0.2–1.2)
BUN SERPL-MCNC: 44 MG/DL — HIGH (ref 7–18)
CALCIUM SERPL-MCNC: 8.8 MG/DL — SIGNIFICANT CHANGE UP (ref 8.4–10.5)
CHLORIDE SERPL-SCNC: 112 MMOL/L — HIGH (ref 96–108)
CO2 SERPL-SCNC: 25 MMOL/L — SIGNIFICANT CHANGE UP (ref 22–31)
CREAT SERPL-MCNC: 1.73 MG/DL — HIGH (ref 0.5–1.3)
EOSINOPHIL # BLD AUTO: 1.48 K/UL — HIGH (ref 0–0.5)
EOSINOPHIL NFR BLD AUTO: 14 % — HIGH (ref 0–6)
GLUCOSE SERPL-MCNC: 106 MG/DL — HIGH (ref 70–99)
HCT VFR BLD CALC: 30.3 % — LOW (ref 39–50)
HGB BLD-MCNC: 9.3 G/DL — LOW (ref 13–17)
IMM GRANULOCYTES NFR BLD AUTO: 0.5 % — SIGNIFICANT CHANGE UP (ref 0–1.5)
LYMPHOCYTES # BLD AUTO: 0.48 K/UL — LOW (ref 1–3.3)
LYMPHOCYTES # BLD AUTO: 4.5 % — LOW (ref 13–44)
MACROCYTES BLD QL: SLIGHT — SIGNIFICANT CHANGE UP
MAGNESIUM SERPL-MCNC: 2.5 MG/DL — SIGNIFICANT CHANGE UP (ref 1.6–2.6)
MANUAL SMEAR VERIFICATION: SIGNIFICANT CHANGE UP
MCHC RBC-ENTMCNC: 28.8 PG — SIGNIFICANT CHANGE UP (ref 27–34)
MCHC RBC-ENTMCNC: 30.7 GM/DL — LOW (ref 32–36)
MCV RBC AUTO: 93.8 FL — SIGNIFICANT CHANGE UP (ref 80–100)
MONOCYTES # BLD AUTO: 0.44 K/UL — SIGNIFICANT CHANGE UP (ref 0–0.9)
MONOCYTES NFR BLD AUTO: 4.2 % — SIGNIFICANT CHANGE UP (ref 2–14)
NEUTROPHILS # BLD AUTO: 8.06 K/UL — HIGH (ref 1.8–7.4)
NEUTROPHILS NFR BLD AUTO: 76.3 % — SIGNIFICANT CHANGE UP (ref 43–77)
NRBC # BLD: 0 /100 WBCS — SIGNIFICANT CHANGE UP (ref 0–0)
PHOSPHATE SERPL-MCNC: 4 MG/DL — SIGNIFICANT CHANGE UP (ref 2.5–4.5)
PLAT MORPH BLD: NORMAL — SIGNIFICANT CHANGE UP
PLATELET # BLD AUTO: 152 K/UL — SIGNIFICANT CHANGE UP (ref 150–400)
POIKILOCYTOSIS BLD QL AUTO: SLIGHT — SIGNIFICANT CHANGE UP
POLYCHROMASIA BLD QL SMEAR: SLIGHT — SIGNIFICANT CHANGE UP
POTASSIUM SERPL-MCNC: 3.9 MMOL/L — SIGNIFICANT CHANGE UP (ref 3.5–5.3)
POTASSIUM SERPL-SCNC: 3.9 MMOL/L — SIGNIFICANT CHANGE UP (ref 3.5–5.3)
PROT SERPL-MCNC: 8.2 G/DL — SIGNIFICANT CHANGE UP (ref 6–8.3)
RBC # BLD: 3.23 M/UL — LOW (ref 4.2–5.8)
RBC # FLD: 14.2 % — SIGNIFICANT CHANGE UP (ref 10.3–14.5)
RBC BLD AUTO: ABNORMAL
SODIUM SERPL-SCNC: 145 MMOL/L — SIGNIFICANT CHANGE UP (ref 135–145)
UUN UR-MCNC: 940 MG/DL — SIGNIFICANT CHANGE UP
WBC # BLD: 10.56 K/UL — HIGH (ref 3.8–10.5)
WBC # FLD AUTO: 10.56 K/UL — HIGH (ref 3.8–10.5)

## 2020-12-06 RX ORDER — NYSTATIN 500MM UNIT
500000 POWDER (EA) MISCELLANEOUS EVERY 6 HOURS
Refills: 0 | Status: DISCONTINUED | OUTPATIENT
Start: 2020-12-06 | End: 2020-12-07

## 2020-12-06 RX ORDER — SODIUM CHLORIDE 9 MG/ML
1000 INJECTION, SOLUTION INTRAVENOUS
Refills: 0 | Status: DISCONTINUED | OUTPATIENT
Start: 2020-12-06 | End: 2020-12-07

## 2020-12-06 RX ORDER — ACETAMINOPHEN 500 MG
1000 TABLET ORAL ONCE
Refills: 0 | Status: COMPLETED | OUTPATIENT
Start: 2020-12-06 | End: 2020-12-06

## 2020-12-06 RX ADMIN — PIPERACILLIN AND TAZOBACTAM 25 GRAM(S): 4; .5 INJECTION, POWDER, LYOPHILIZED, FOR SOLUTION INTRAVENOUS at 17:41

## 2020-12-06 RX ADMIN — Medication 500000 UNIT(S): at 17:54

## 2020-12-06 RX ADMIN — SENNA PLUS 2 TABLET(S): 8.6 TABLET ORAL at 21:56

## 2020-12-06 RX ADMIN — ENOXAPARIN SODIUM 70 MILLIGRAM(S): 100 INJECTION SUBCUTANEOUS at 05:03

## 2020-12-06 RX ADMIN — Medication 1 DROP(S): at 12:57

## 2020-12-06 RX ADMIN — Medication 400 MILLIGRAM(S): at 17:40

## 2020-12-06 RX ADMIN — PANTOPRAZOLE SODIUM 40 MILLIGRAM(S): 20 TABLET, DELAYED RELEASE ORAL at 11:41

## 2020-12-06 RX ADMIN — PIPERACILLIN AND TAZOBACTAM 25 GRAM(S): 4; .5 INJECTION, POWDER, LYOPHILIZED, FOR SOLUTION INTRAVENOUS at 05:04

## 2020-12-06 RX ADMIN — CHLORHEXIDINE GLUCONATE 1 APPLICATION(S): 213 SOLUTION TOPICAL at 05:03

## 2020-12-06 RX ADMIN — Medication 1000 MILLIGRAM(S): at 18:57

## 2020-12-06 RX ADMIN — Medication 1 DROP(S): at 17:40

## 2020-12-06 RX ADMIN — SODIUM CHLORIDE 40 MILLILITER(S): 9 INJECTION, SOLUTION INTRAVENOUS at 13:33

## 2020-12-06 RX ADMIN — Medication 500000 UNIT(S): at 11:41

## 2020-12-06 RX ADMIN — AMLODIPINE BESYLATE 10 MILLIGRAM(S): 2.5 TABLET ORAL at 05:03

## 2020-12-06 NOTE — PROGRESS NOTE ADULT - ASSESSMENT
Patient is a 54yo Male with no PMH recently diagnosed with COVID-19 (5 days PTA) p/w SOB s/p intubated in the ER. Pt admitted to ICU with acute hypoxic respiratory failure 2/2 COVID-19 PNA s/p intubated, BECKY with transaminitis. Nephrology consulted for Elevated serum creatinine.    1. BECKY- unknown baseline SCr. BECKY likely hemodynamically mediated in the setting of septic shock / infection 2/2 COVID-19, hypotension on pressors (now off pressors);  likely ATN; Renal function improving with good urine o/p.  FeNa 1.55% and FeUrea 47%; inconclusive. Pt on trial of IVF. UA with 100 protein and trace blood with hyaline cast. Will defer Renal US due to COVID status. Strict I/Os. Avoid nephrotoxins/ NSAIDs/ RCA. Monitor BMP.  2. Septic Shock due to Multifocal PNA 2/2 COVID-19- Plan per ICU  3. Hypotension- BP acceptable on amlodipine  10mg O qd; Monitor BP  4. Acute hypoxic respiratory failure- as per ICU.  5. Hypocalcemia- & Hyperphosphatemia resolved. Monitor ionized calcium and serum phos.

## 2020-12-06 NOTE — PROGRESS NOTE ADULT - SUBJECTIVE AND OBJECTIVE BOX
INTERVAL HPI/OVERNIGHT EVENTS: Patient spiked fever of 38, S/p IV Tylenol    PRESSORS: [ ] YES [x ] NO  WHICH:    ANTIBIOTICS:                  DATE STARTED:  ANTIBIOTICS:                  DATE STARTED:  ANTIBIOTICS:                  DATE STARTED:    Antimicrobial:  piperacillin/tazobactam IVPB.. 3.375 Gram(s) IV Intermittent every 12 hours    Cardiovascular:  amLODIPine   Tablet 10 milliGRAM(s) Oral daily    Pulmonary:    Hematalogic:  enoxaparin Injectable 70 milliGRAM(s) SubCutaneous two times a day    Other:  acetaminophen    Suspension .. 650 milliGRAM(s) Enteral Tube every 6 hours PRN  artificial  tears Solution 1 Drop(s) Both EYES every 4 hours PRN  carboxymethylcellulose 0.5% Ophthalmic Solution 1 Drop(s) Both EYES daily  chlorhexidine 2% Cloths 1 Application(s) Topical <User Schedule>  glycopyrrolate Injectable 0.2 milliGRAM(s) IV Push once  pantoprazole  Injectable 40 milliGRAM(s) IV Push daily  senna 2 Tablet(s) Oral at bedtime  sodium chloride 0.9% lock flush 10 milliLiter(s) IV Push every 1 hour PRN    acetaminophen    Suspension .. 650 milliGRAM(s) Enteral Tube every 6 hours PRN  amLODIPine   Tablet 10 milliGRAM(s) Oral daily  artificial  tears Solution 1 Drop(s) Both EYES every 4 hours PRN  carboxymethylcellulose 0.5% Ophthalmic Solution 1 Drop(s) Both EYES daily  chlorhexidine 2% Cloths 1 Application(s) Topical <User Schedule>  enoxaparin Injectable 70 milliGRAM(s) SubCutaneous two times a day  glycopyrrolate Injectable 0.2 milliGRAM(s) IV Push once  pantoprazole  Injectable 40 milliGRAM(s) IV Push daily  piperacillin/tazobactam IVPB.. 3.375 Gram(s) IV Intermittent every 12 hours  senna 2 Tablet(s) Oral at bedtime  sodium chloride 0.9% lock flush 10 milliLiter(s) IV Push every 1 hour PRN    Drug Dosing Weight  Height (cm): 157.5 (15 Nov 2020 23:00)  Weight (kg): 85.4 (15 Nov 2020 23:00)  BMI (kg/m2): 34.4 (15 Nov 2020 23:00)  BSA (m2): 1.86 (15 Nov 2020 23:00)    CENTRAL LINE: [ ] YES [x ] NO  LOCATION:   DATE INSERTED:  REMOVE: [ ] YES [ ] NO  EXPLAIN:    ANN: [ ] YES [x ] NO    DATE INSERTED:  REMOVE:  [ ] YES [ ] NO  EXPLAIN:    A-LINE:  [ ] YES [x ] NO  LOCATION:   DATE INSERTED:  REMOVE:  [ ] YES [ ] NO  EXPLAIN:    PMH -reviewed admission note, no change since admission  PAST MEDICAL & SURGICAL HISTORY:  COVID-19    No significant past surgical history        ICU Vital Signs Last 24 Hrs  T(C): 38 (05 Dec 2020 22:50), Max: 38.7 (05 Dec 2020 12:00)  T(F): 100.4 (05 Dec 2020 22:50), Max: 101.7 (05 Dec 2020 12:00)  HR: 72 (05 Dec 2020 23:00) (72 - 111)  BP: 140/73 (05 Dec 2020 23:00) (124/86 - 174/90)  BP(mean): 88 (05 Dec 2020 23:00) (88 - 149)  RR: 15 (05 Dec 2020 23:00) (15 - 22)  SpO2: 99% (05 Dec 2020 23:00) (88% - 100%)      ABG - ( 04 Dec 2020 08:17 )  pH, Arterial: 7.45  pH, Blood: x     /  pCO2: 36    /  pO2: 97    / HCO3: 25    / Base Excess: 1.4   /  SaO2: 97                    12-04 @ 07:01  -  12-05 @ 07:00  --------------------------------------------------------  IN: 1793 mL / OUT: 930 mL / NET: 863 mL            PHYSICAL EXAM:    GENERAL: [ ]NAD, [ ]well-groomed, [ ]well-developed , Generalized weakness  HEAD:  [ ]Atraumatic, [ ]Normocephalic  EYES: [ ]EOMI, [ ]PERRLA, [ ]conjunctiva and sclera clear  ENMT: [ ]No tonsillar erythema, exudates, or enlargement; [ ]Moist mucous membranes, [ ]Good dentition, [ ]No lesions  NECK: [ ]Supple, normal appearance, [ ]No JVD; [ ]Normal thyroid; [ ]Trachea midline  NERVOUS SYSTEM:  [ ]Alert & Oriented X3, [ ]Good concentration; [ ]Motor Strength 5/5 B/L upper and lower extremities; [ ]DTRs 2+ intact and symmetric  CHEST/LUNG: [ ]No chest deformity; [ ]Normal percussion bilaterally; [ ]No rales, rhonchi, wheezing   HEART: [ ]Regular rate and rhythm; [ ]No murmurs, rubs, or gallops  ABDOMEN: [ ]Soft, Nontender, Nondistended; [ ]Bowel sounds present  EXTREMITIES:  [ ]2+ Peripheral Pulses, [ ]No clubbing, cyanosis, or edema  LYMPH: [ ]No lymphadenopathy noted  SKIN: [ ]No rashes or lesions; [ ]Good capillary refill      LABS:  CBC Full  -  ( 05 Dec 2020 06:25 )  WBC Count : 11.62 K/uL  RBC Count : 3.36 M/uL  Hemoglobin : 9.6 g/dL  Hematocrit : 30.9 %  Platelet Count - Automated : 218 K/uL  Mean Cell Volume : 92.0 fl  Mean Cell Hemoglobin : 28.6 pg  Mean Cell Hemoglobin Concentration : 31.1 gm/dL  Auto Neutrophil # : 9.30 K/uL  Auto Lymphocyte # : 0.97 K/uL  Auto Monocyte # : 0.64 K/uL  Auto Eosinophil # : 0.63 K/uL  Auto Basophil # : 0.04 K/uL  Auto Neutrophil % : 80.2 %  Auto Lymphocyte % : 8.3 %  Auto Monocyte % : 5.5 %  Auto Eosinophil % : 5.4 %  Auto Basophil % : 0.3 %    12-05    144  |  109<H>  |  53<H>  ----------------------------<  112<H>  3.2<L>   |  25  |  1.97<H>    Ca    9.2      05 Dec 2020 06:25  Phos  3.0     12-05  Mg     2.4     12-05    TPro  8.2  /  Alb  3.3<L>  /  TBili  0.8  /  DBili  x   /  AST  26  /  ALT  53  /  AlkPhos  68  12-05    PT/INR - ( 05 Dec 2020 06:25 )   PT: 15.3 sec;   INR: 1.30 ratio                 RADIOLOGY & ADDITIONAL STUDIES REVIEWED:  ***    [ ]GOALS OF CARE DISCUSSION WITH PATIENT/FAMILY/PROXY:    CRITICAL CARE TIME SPENT: 35 minutes INTERVAL HPI/OVERNIGHT EVENTS: Patient spiked fever of 38, S/p IV Tylenol, on NC 4L    PRESSORS: [ ] YES [x ] NO  WHICH:    Antimicrobial:  piperacillin/tazobactam IVPB.. 3.375 Gram(s) IV Intermittent every 12 hours    Cardiovascular:  amLODIPine   Tablet 10 milliGRAM(s) Oral daily    Pulmonary:    Hematalogic:  enoxaparin Injectable 70 milliGRAM(s) SubCutaneous two times a day    Other:  acetaminophen    Suspension .. 650 milliGRAM(s) Enteral Tube every 6 hours PRN  artificial  tears Solution 1 Drop(s) Both EYES every 4 hours PRN  carboxymethylcellulose 0.5% Ophthalmic Solution 1 Drop(s) Both EYES daily  chlorhexidine 2% Cloths 1 Application(s) Topical <User Schedule>  glycopyrrolate Injectable 0.2 milliGRAM(s) IV Push once  pantoprazole  Injectable 40 milliGRAM(s) IV Push daily  senna 2 Tablet(s) Oral at bedtime  sodium chloride 0.9% lock flush 10 milliLiter(s) IV Push every 1 hour PRN    acetaminophen    Suspension .. 650 milliGRAM(s) Enteral Tube every 6 hours PRN  amLODIPine   Tablet 10 milliGRAM(s) Oral daily  artificial  tears Solution 1 Drop(s) Both EYES every 4 hours PRN  carboxymethylcellulose 0.5% Ophthalmic Solution 1 Drop(s) Both EYES daily  chlorhexidine 2% Cloths 1 Application(s) Topical <User Schedule>  enoxaparin Injectable 70 milliGRAM(s) SubCutaneous two times a day  glycopyrrolate Injectable 0.2 milliGRAM(s) IV Push once  pantoprazole  Injectable 40 milliGRAM(s) IV Push daily  piperacillin/tazobactam IVPB.. 3.375 Gram(s) IV Intermittent every 12 hours  senna 2 Tablet(s) Oral at bedtime  sodium chloride 0.9% lock flush 10 milliLiter(s) IV Push every 1 hour PRN    Drug Dosing Weight  Height (cm): 157.5 (15 Nov 2020 23:00)  Weight (kg): 85.4 (15 Nov 2020 23:00)  BMI (kg/m2): 34.4 (15 Nov 2020 23:00)  BSA (m2): 1.86 (15 Nov 2020 23:00)    CENTRAL LINE: [ ] YES [x ] NO  LOCATION:   DATE INSERTED:  REMOVE: [ ] YES [ ] NO  EXPLAIN:    ANN: [ ] YES [x ] NO    DATE INSERTED:  REMOVE:  [ ] YES [ ] NO  EXPLAIN:    A-LINE:  [ ] YES [x ] NO  LOCATION:   DATE INSERTED:  REMOVE:  [ ] YES [ ] NO  EXPLAIN:    PMH -reviewed admission note, no change since admission  PAST MEDICAL & SURGICAL HISTORY:  COVID-19    No significant past surgical history        ICU Vital Signs Last 24 Hrs  T(C): 38 (05 Dec 2020 22:50), Max: 38.7 (05 Dec 2020 12:00)  T(F): 100.4 (05 Dec 2020 22:50), Max: 101.7 (05 Dec 2020 12:00)  HR: 72 (05 Dec 2020 23:00) (72 - 111)  BP: 140/73 (05 Dec 2020 23:00) (124/86 - 174/90)  BP(mean): 88 (05 Dec 2020 23:00) (88 - 149)  RR: 15 (05 Dec 2020 23:00) (15 - 22)  SpO2: 99% (05 Dec 2020 23:00) (88% - 100%)      ABG - ( 04 Dec 2020 08:17 )  pH, Arterial: 7.45  pH, Blood: x     /  pCO2: 36    /  pO2: 97    / HCO3: 25    / Base Excess: 1.4   /  SaO2: 97                    12-04 @ 07:01  -  12-05 @ 07:00  --------------------------------------------------------  IN: 1793 mL / OUT: 930 mL / NET: 863 mL            PHYSICAL EXAM:    GENERAL: weak, but AAOx3  EYES: PERRLA   MOUTH: small piece of food noted  NECK: Supple, No JVD; Normal thyroid; Trachea midline  NERVOUS SYSTEM: Motor Strength 2-3/5 B/L upper and lower extremities  CHEST/LUNG: No rales, rhonchi, wheezing   HEART: Regular rate and rhythm; No murmurs,   ABDOMEN: Soft, Nontender, Nondistended; Bowel sounds present  EXTREMITIES:  2+ Peripheral Pulses, No clubbing, cyanosis, or edema  LINES/TUBES/CATHETERS: n/a    LABS:  CBC Full  -  ( 05 Dec 2020 06:25 )  WBC Count : 11.62 K/uL  RBC Count : 3.36 M/uL  Hemoglobin : 9.6 g/dL  Hematocrit : 30.9 %  Platelet Count - Automated : 218 K/uL  Mean Cell Volume : 92.0 fl  Mean Cell Hemoglobin : 28.6 pg  Mean Cell Hemoglobin Concentration : 31.1 gm/dL  Auto Neutrophil # : 9.30 K/uL  Auto Lymphocyte # : 0.97 K/uL  Auto Monocyte # : 0.64 K/uL  Auto Eosinophil # : 0.63 K/uL  Auto Basophil # : 0.04 K/uL  Auto Neutrophil % : 80.2 %  Auto Lymphocyte % : 8.3 %  Auto Monocyte % : 5.5 %  Auto Eosinophil % : 5.4 %  Auto Basophil % : 0.3 %    12-05    144  |  109<H>  |  53<H>  ----------------------------<  112<H>  3.2<L>   |  25  |  1.97<H>    Ca    9.2      05 Dec 2020 06:25  Phos  3.0     12-05  Mg     2.4     12-05    TPro  8.2  /  Alb  3.3<L>  /  TBili  0.8  /  DBili  x   /  AST  26  /  ALT  53  /  AlkPhos  68  12-05    PT/INR - ( 05 Dec 2020 06:25 )   PT: 15.3 sec;   INR: 1.30 ratio                 RADIOLOGY & ADDITIONAL STUDIES REVIEWED:      < from: Xray Chest 1 View- PORTABLE-Urgent (Xray Chest 1 View- PORTABLE-Urgent .) (12.05.20 @ 15:29) >  Frontal expiratory view of the chest shows the heart to be normal in size. The lungs show slight decrease in bilateral pulmonary infiltrates and there is no evidence of pneumothorax nor pleural effusion.    IMPRESSION:  Slight clearing of the lungs.    Thank you for the courtesy of this referral.      < end of copied text >      [ ]GOALS OF CARE DISCUSSION WITH PATIENT/FAMILY/PROXY:    CRITICAL CARE TIME SPENT: 35 minutes

## 2020-12-06 NOTE — PROGRESS NOTE ADULT - SUBJECTIVE AND OBJECTIVE BOX
[facility] NEPHROLOGY- PROGRESS NOTE    Patient is a 56yo Male with no PMH recently diagnosed with COVID-19 (5 days PTA) p/w SOB s/p intubated in the ER. Pt admitted to ICU with acute hypoxic respiratory failure 2/2 COVID-19 PNA s/p intubated, BECKY with transaminitis. Nephrology consulted for Elevated serum creatinine.  Extubated 11/30    Hospital Medications: Medications reviewed.  REVIEW OF SYSTEMS: Deferred due to COVID status    VITALS:  T(F): 100 (12-06-20 @ 13:00), Max: 101.3 (12-05-20 @ 20:00)  HR: 70 (12-06-20 @ 16:00)  BP: 143/77 (12-06-20 @ 16:00)  RR: 14 (12-06-20 @ 16:00)  SpO2: 99% (12-06-20 @ 16:00)  Wt(kg): --    12-05 @ 07:01  -  12-06 @ 07:00  --------------------------------------------------------  IN: 650 mL / OUT: 1355 mL / NET: -705 mL    12-06 @ 07:01  -  12-06 @ 16:32  --------------------------------------------------------  IN: 40 mL / OUT: 260 mL / NET: -220 mL      PHYSICAL EXAM: Defer physical exam due to COVID-19 status. Please refer to primary team's note for physical exam.      LABS:  12-06    145  |  112<H>  |  44<H>  ----------------------------<  106<H>  3.9   |  25  |  1.73<H>    Ca    8.8      06 Dec 2020 06:04  Phos  4.0     12-06  Mg     2.5     12-06    TPro  8.2  /  Alb  3.0<L>  /  TBili  0.8  /  DBili      /  AST  25  /  ALT  47  /  AlkPhos  74  12-06    Creatinine Trend: 1.73 <--, 1.97 <--, 1.91 <--, 1.31 <--, 1.61 <--, 1.59 <--, 1.81 <--, 1.92 <--                        9.3    10.56 )-----------( 152      ( 06 Dec 2020 06:04 )             30.3     Urine Studies:    Sodium, Random Urine: 84 mmol/L (12-05 @ 18:19)  Creatinine, Random Urine: 74 mg/dL (12-05 @ 18:19)  Chloride, Random Urine: 90 mmol/L (12-05 @ 18:19)

## 2020-12-06 NOTE — PROGRESS NOTE ADULT - ASSESSMENT
55M from home without PMH with progressively worsening SOB and +COVID test x5 days prior to admission presented with SpO2 64% at home, intubated 2/2 deteriorating respiratory status and admitted to ICU.    Assessment:  1. Acute Hypoxic Respiratory failure   2. COVID Pneumonia   3. Acute Kidney Injury   4. elevated LFTs     Plan:    ====CNS====  #no acute issues  - off sedation    ====CVS====   - TTE when off isolation     #hypertension  - no onset  - s/p lopressor 5mg IV  - c/w amlodipine 10mg PO qd  - monitor BP  - Patient is on Amlodipine    ====RESP====  #AHRF 2/2 COVID            - WBC improving  - Bcx neg  - MRSA neg  - COVID pcr pos  - CXR improving      - s/p 5 days cftx and azithromycin for CAP coverage (11/16-20)  - s/p vanco & zosyn (11/23-12/01)  - s/p remdesivir d/c'd 2/2 CrCl <45 (29.7)   - s/p decadron   - f/u acute phase reactants    ====GI====  #elevated LFTs   - resolved  - likely 2/2 COVID  - monitor LFTs    #bowel regimen:  - continue senna, miralax, and dulcolax [place] for bowel regimen    ====RENAL====   #BECKY   - improving  - BUN/Cr 49/2.2 on admission, unknown baseline  - Cr 1.31>1.91  - patient is off fluids for now  - nephro Dr. Camara following    ====ID====   #COVID PNA  - Patient is spiking fevers, On Zosyn for Aspiration pneumonia    ====HEME/ONC====  #hypercoagulability 2/2 COVID  - s/p heparin gtt  - c/w full-dose lovenox  - monitor daily CBC, Hb stable    ====ENDO====   #No active issues:  - target -180  - HgA1c 6.1  - c/w low HSS while on steroids    ====Skin/Catheters====  #no acute issues  - RIJ 11/15-25 d/c'd   - right femoral line 11/24-12/02 d/c'd   - no rashes noted  - cw artificial tears    ====PPx====  #DVT: full-lovenox and SCD (BMI>30, ICU admission)  #GI: Protonix     ====Dispo=====  - monitor in ICU     ====GOC====   - full Code      55M from home without PMH with progressively worsening SOB and +COVID test x5 days prior to admission presented with SpO2 64% at home, intubated 2/2 deteriorating respiratory status and admitted to ICU.    Assessment:  1. Acute Hypoxic Respiratory failure   2. COVID Pneumonia   3. Acute Kidney Injury   4. elevated LFTs     Plan:    ====CNS====  #no acute issues  - off sedation  - Weak and concern for aspiration    ====CVS====   - TTE when off isolation     #hypertension  - new onset  - s/p lopressor 5mg IV  - c/w amlodipine 10mg PO qd  - monitor BP    ====RESP====  #Aspiration PNA  - concern for aspiration   - c/w zosyn 12/5-    #AHRF 2/2 COVID            - WBC improving  - Bcx neg  - MRSA neg  - COVID pcr pos  - CXR improving      - s/p 5 days cftx and azithromycin for CAP coverage (11/16-20)  - s/p vanco & zosyn (11/23-12/01)  - s/p remdesivir d/c'd 2/2 CrCl <45 (29.7)   - s/p decadron   - f/u acute phase reactants    ====GI====  #elevated LFTs   - resolved  - likely 2/2 COVID  - monitor LFTs    #bowel regimen:  - continue senna, miralax, and dulcolax [place] for bowel regimen    - NPO for now for concern for aspiration    ====RENAL====   #BECKY   - improving  - BUN/Cr 49/2.2 on admission, unknown baseline  - Cr 1.31>1.91  - patient is off fluids for now  - nephro Dr. Camara following    ====ID====   #COVID PNA  #Aspiration PNA  - Patient is spiking fevers, On Zosyn for Aspiration pneumonia      ====HEME/ONC====  #hypercoagulability 2/2 COVID  - s/p heparin gtt  - c/w full-dose lovenox  - monitor daily CBC, Hb stable    ====ENDO====   #No active issues:  - target -180  - HgA1c 6.1  - c/w low HSS while on steroids    ====Skin/Catheters====  #no acute issues  - RIJ 11/15-25 d/c'd   - right femoral line 11/24-12/02 d/c'd   - no rashes noted  - cw artificial tears    ====PPx====  #DVT: full-lovenox and SCD (BMI>30, ICU admission)  #GI: Protonix     ====Dispo=====  - monitor in ICU     ====GOC====   - full Code

## 2020-12-07 LAB
ALBUMIN SERPL ELPH-MCNC: 3 G/DL — LOW (ref 3.5–5)
ALP SERPL-CCNC: 69 U/L — SIGNIFICANT CHANGE UP (ref 40–120)
ALT FLD-CCNC: 38 U/L DA — SIGNIFICANT CHANGE UP (ref 10–60)
ANION GAP SERPL CALC-SCNC: 10 MMOL/L — SIGNIFICANT CHANGE UP (ref 5–17)
AST SERPL-CCNC: 20 U/L — SIGNIFICANT CHANGE UP (ref 10–40)
BASOPHILS # BLD AUTO: 0.03 K/UL — SIGNIFICANT CHANGE UP (ref 0–0.2)
BASOPHILS NFR BLD AUTO: 0.3 % — SIGNIFICANT CHANGE UP (ref 0–2)
BILIRUB SERPL-MCNC: 0.6 MG/DL — SIGNIFICANT CHANGE UP (ref 0.2–1.2)
BUN SERPL-MCNC: 40 MG/DL — HIGH (ref 7–18)
CALCIUM SERPL-MCNC: 9 MG/DL — SIGNIFICANT CHANGE UP (ref 8.4–10.5)
CHLORIDE SERPL-SCNC: 108 MMOL/L — SIGNIFICANT CHANGE UP (ref 96–108)
CO2 SERPL-SCNC: 25 MMOL/L — SIGNIFICANT CHANGE UP (ref 22–31)
CREAT SERPL-MCNC: 1.51 MG/DL — HIGH (ref 0.5–1.3)
EOSINOPHIL # BLD AUTO: 1.94 K/UL — HIGH (ref 0–0.5)
EOSINOPHIL NFR BLD AUTO: 21.2 % — HIGH (ref 0–6)
GLUCOSE SERPL-MCNC: 103 MG/DL — HIGH (ref 70–99)
HCT VFR BLD CALC: 30.2 % — LOW (ref 39–50)
HGB BLD-MCNC: 9.2 G/DL — LOW (ref 13–17)
IMM GRANULOCYTES NFR BLD AUTO: 0.5 % — SIGNIFICANT CHANGE UP (ref 0–1.5)
LYMPHOCYTES # BLD AUTO: 0.72 K/UL — LOW (ref 1–3.3)
LYMPHOCYTES # BLD AUTO: 7.9 % — LOW (ref 13–44)
MAGNESIUM SERPL-MCNC: 2.5 MG/DL — SIGNIFICANT CHANGE UP (ref 1.6–2.6)
MCHC RBC-ENTMCNC: 28.6 PG — SIGNIFICANT CHANGE UP (ref 27–34)
MCHC RBC-ENTMCNC: 30.5 GM/DL — LOW (ref 32–36)
MCV RBC AUTO: 93.8 FL — SIGNIFICANT CHANGE UP (ref 80–100)
MONOCYTES # BLD AUTO: 0.43 K/UL — SIGNIFICANT CHANGE UP (ref 0–0.9)
MONOCYTES NFR BLD AUTO: 4.7 % — SIGNIFICANT CHANGE UP (ref 2–14)
NEUTROPHILS # BLD AUTO: 5.97 K/UL — SIGNIFICANT CHANGE UP (ref 1.8–7.4)
NEUTROPHILS NFR BLD AUTO: 65.4 % — SIGNIFICANT CHANGE UP (ref 43–77)
NRBC # BLD: 0 /100 WBCS — SIGNIFICANT CHANGE UP (ref 0–0)
PHOSPHATE SERPL-MCNC: 3.4 MG/DL — SIGNIFICANT CHANGE UP (ref 2.5–4.5)
PLATELET # BLD AUTO: 125 K/UL — LOW (ref 150–400)
POTASSIUM SERPL-MCNC: 3.7 MMOL/L — SIGNIFICANT CHANGE UP (ref 3.5–5.3)
POTASSIUM SERPL-SCNC: 3.7 MMOL/L — SIGNIFICANT CHANGE UP (ref 3.5–5.3)
PROT SERPL-MCNC: 8.2 G/DL — SIGNIFICANT CHANGE UP (ref 6–8.3)
RBC # BLD: 3.22 M/UL — LOW (ref 4.2–5.8)
RBC # FLD: 13.8 % — SIGNIFICANT CHANGE UP (ref 10.3–14.5)
SODIUM SERPL-SCNC: 143 MMOL/L — SIGNIFICANT CHANGE UP (ref 135–145)
WBC # BLD: 9.14 K/UL — SIGNIFICANT CHANGE UP (ref 3.8–10.5)
WBC # FLD AUTO: 9.14 K/UL — SIGNIFICANT CHANGE UP (ref 3.8–10.5)

## 2020-12-07 RX ORDER — FLUCONAZOLE 150 MG/1
800 TABLET ORAL ONCE
Refills: 0 | Status: COMPLETED | OUTPATIENT
Start: 2020-12-07 | End: 2020-12-07

## 2020-12-07 RX ORDER — FLUCONAZOLE 150 MG/1
400 TABLET ORAL DAILY
Refills: 0 | Status: DISCONTINUED | OUTPATIENT
Start: 2020-12-08 | End: 2020-12-14

## 2020-12-07 RX ORDER — FLUCONAZOLE 150 MG/1
400 TABLET ORAL EVERY 24 HOURS
Refills: 0 | Status: DISCONTINUED | OUTPATIENT
Start: 2020-12-07 | End: 2020-12-07

## 2020-12-07 RX ADMIN — Medication 500000 UNIT(S): at 00:15

## 2020-12-07 RX ADMIN — CHLORHEXIDINE GLUCONATE 1 APPLICATION(S): 213 SOLUTION TOPICAL at 05:34

## 2020-12-07 RX ADMIN — PIPERACILLIN AND TAZOBACTAM 25 GRAM(S): 4; .5 INJECTION, POWDER, LYOPHILIZED, FOR SOLUTION INTRAVENOUS at 17:34

## 2020-12-07 RX ADMIN — SENNA PLUS 2 TABLET(S): 8.6 TABLET ORAL at 23:19

## 2020-12-07 RX ADMIN — Medication 500000 UNIT(S): at 05:35

## 2020-12-07 RX ADMIN — PIPERACILLIN AND TAZOBACTAM 25 GRAM(S): 4; .5 INJECTION, POWDER, LYOPHILIZED, FOR SOLUTION INTRAVENOUS at 05:34

## 2020-12-07 RX ADMIN — AMLODIPINE BESYLATE 10 MILLIGRAM(S): 2.5 TABLET ORAL at 05:34

## 2020-12-07 RX ADMIN — ENOXAPARIN SODIUM 70 MILLIGRAM(S): 100 INJECTION SUBCUTANEOUS at 05:33

## 2020-12-07 RX ADMIN — ENOXAPARIN SODIUM 70 MILLIGRAM(S): 100 INJECTION SUBCUTANEOUS at 17:34

## 2020-12-07 RX ADMIN — Medication 500000 UNIT(S): at 13:36

## 2020-12-07 RX ADMIN — PANTOPRAZOLE SODIUM 40 MILLIGRAM(S): 20 TABLET, DELAYED RELEASE ORAL at 12:01

## 2020-12-07 RX ADMIN — FLUCONAZOLE 100 MILLIGRAM(S): 150 TABLET ORAL at 14:08

## 2020-12-07 NOTE — PROGRESS NOTE ADULT - NUTRITIONAL ASSESSMENT
This patient has been assessed with a concern for Malnutrition and has been determined to have a diagnosis/diagnoses of Severe protein-calorie malnutrition.    This patient is being managed with:   Diet Dysphagia 1 Pureed-Nectar Consistency Fluid-  Entered: Dec  7 2020 12:47PM

## 2020-12-07 NOTE — SWALLOW BEDSIDE ASSESSMENT ADULT - ASR SWALLOW ASPIRATION MONITOR
position upright (90Y)/cough/gurgly voice/oral hygiene
gurgly voice/fever/throat clearing/pneumonia/upper respiratory infection/oral hygiene/change of breathing pattern/position upright (90Y)/cough

## 2020-12-07 NOTE — CHART NOTE - NSCHARTNOTEFT_GEN_A_CORE
Assessment:   Patient is a 55y old  Male who presents with a chief complaint of Acute Hypoxic Respiratory failure (07 Dec 2020 15:30). Covid. Pt s/p extubation from vent . While intubated, pt with low level TF. Pt now s/p SLP and PO diet ordered (s/p NPO day#3)      Factors impacting intake: [ ] none [ ] nausea  [ ] vomiting [ ] diarrhea [ ] constipation  [x ]chewing problems [x ] swallowing issues  [x ] other: Covid, with prolonged hospitalization/ acutely ill    Diet Prescription: Diet, Dysphagia 1 Pureed-Nectar Consistency Fluid (20 @ 12:47)      Daily     Daily Weight in k.9 (06 Dec 2020 08:00)  Weight in k.4 (05 Dec 2020 07:00)  Weight in k.2 (04 Dec 2020 08:00)  Weight in k.5 (03 Dec 2020 07:00)  Weight in k (01 Dec 2020 07:00)  Weight in k.2 (2020 07:00)  Weight in k (2020 08:00)  Weight in k.4 (2020 07:00)  Weight in k.7 (2020 07:45)  Weight in k.6 (2020 08:00)  Weight in k.2 (2020 07:00)    % Weight Change: overall loss (see above) O>I    Pertinent Medications: MEDICATIONS  (STANDING):  amLODIPine   Tablet 10 milliGRAM(s) Oral daily  chlorhexidine 2% Cloths 1 Application(s) Topical <User Schedule>  enoxaparin Injectable 70 milliGRAM(s) SubCutaneous two times a day  nystatin    Suspension [id number] Unit(s) Oral every 6 hours  pantoprazole  Injectable 40 milliGRAM(s) IV Push daily  piperacillin/tazobactam IVPB.. 3.375 Gram(s) IV Intermittent every 12 hours  senna 2 Tablet(s) Oral at bedtime    MEDICATIONS  (PRN):  artificial  tears Solution 1 Drop(s) Both EYES every 4 hours PRN Dry Eyes  sodium chloride 0.9% lock flush 10 milliLiter(s) IV Push every 1 hour PRN Pre/post blood products, medications, blood draw, and to maintain line patency    Pertinent Labs:  Na143 mmol/L Glu 103 mg/dL<H> K+ 3.7 mmol/L Cr  1.51 mg/dL<H> BUN 40 mg/dL<H>  Phos 3.4 mg/dL  Alb 3.0 g/dL<L>  Chol --    LDL --    HDL --    Trig 289 mg/dL<H>     CAPILLARY BLOOD GLUCOSE      POCT Blood Glucose.: 106 mg/dL (07 Dec 2020 00:26)  POCT Blood Glucose.: 116 mg/dL (06 Dec 2020 18:08)      Previous Nutrition Diagnosis:   [ ] Altered GI function  [ ]Inadequate Oral Intake [ ] Swallowing Difficulty   [ ] Altered nutrition related labs [ ] Increased Nutrient Needs [ ] Overweight/Obesity   [ ] Unintended Weight Loss [ ] Food & Nutrition Related Knowledge Deficit [x ] Malnutrition (severe)  [ ] Other:     Nutrition Diagnosis is [x ] ongoing  [ ] resolved [ ] not applicable     Interventions:   Recommend  [ ] Change Diet To:  [x ] Nutrition Supplement: If PO intake generally <65 % tray, consider adding 2 doug HN (NTL)  [ ] Nutrition Support  [x ] Other: MD to monitor. RD available.     Monitoring and Evaluation:   [x ] PO intake [ x ] Tolerance to diet prescription [ x ] weights [ x ] labs[ x ] follow up per protocol  [ ] other:

## 2020-12-07 NOTE — PROGRESS NOTE ADULT - SUBJECTIVE AND OBJECTIVE BOX
Patient is a 55y old  Male who presents with a chief complaint of Acute Hypoxic Respiratory failure (06 Dec 2020 16:32)      INTERVAL HPI/OVERNIGHT EVENTS:  ICU Vital Signs Last 24 Hrs  T(C): 37.2 (07 Dec 2020 05:00), Max: 38.1 (06 Dec 2020 17:00)  T(F): 99 (07 Dec 2020 05:00), Max: 100.6 (06 Dec 2020 17:00)  HR: 63 (07 Dec 2020 07:00) (58 - 169)  BP: 128/71 (07 Dec 2020 07:00) (120/68 - 163/94)  BP(mean): 84 (07 Dec 2020 07:00) (78 - 111)  ABP: --  ABP(mean): --  RR: 14 (07 Dec 2020 07:00) (14 - 20)  SpO2: 98% (07 Dec 2020 07:00) (94% - 100%)    I&O's Summary    06 Dec 2020 07:01  -  07 Dec 2020 07:00  --------------------------------------------------------  IN: 995 mL / OUT: 1000 mL / NET: -5 mL          LABS:                        9.2    9.14  )-----------( 125      ( 07 Dec 2020 06:38 )             30.2     12-06    145  |  112<H>  |  44<H>  ----------------------------<  106<H>  3.9   |  25  |  1.73<H>    Ca    8.8      06 Dec 2020 06:04  Phos  4.0     12-06  Mg     2.5     12-06    TPro  8.2  /  Alb  3.0<L>  /  TBili  0.8  /  DBili  x   /  AST  25  /  ALT  47  /  AlkPhos  74  12-06        CAPILLARY BLOOD GLUCOSE      POCT Blood Glucose.: 106 mg/dL (07 Dec 2020 00:26)  POCT Blood Glucose.: 116 mg/dL (06 Dec 2020 18:08)  POCT Blood Glucose.: 109 mg/dL (06 Dec 2020 12:07)        RADIOLOGY & ADDITIONAL TESTS:    Consultant(s) Notes Reviewed:  [x ] YES  [ ] NO    MEDICATIONS  (STANDING):  amLODIPine   Tablet 10 milliGRAM(s) Oral daily  chlorhexidine 2% Cloths 1 Application(s) Topical <User Schedule>  dextrose 5% + sodium chloride 0.45%. 1000 milliLiter(s) (40 mL/Hr) IV Continuous <Continuous>  enoxaparin Injectable 70 milliGRAM(s) SubCutaneous two times a day  nystatin    Suspension 110718 Unit(s) Oral every 6 hours  pantoprazole  Injectable 40 milliGRAM(s) IV Push daily  piperacillin/tazobactam IVPB.. 3.375 Gram(s) IV Intermittent every 12 hours  senna 2 Tablet(s) Oral at bedtime    MEDICATIONS  (PRN):  artificial  tears Solution 1 Drop(s) Both EYES every 4 hours PRN Dry Eyes  sodium chloride 0.9% lock flush 10 milliLiter(s) IV Push every 1 hour PRN Pre/post blood products, medications, blood draw, and to maintain line patency      PHYSICAL EXAM:  GENERAL: well built, well nourished  HEAD:  Atraumatic, Normocephalic  EYES: EOMI, PERRLA, conjunctiva and sclera clear  ENT: No tonsillar erythema, exudates, or enlargement; Moist mucous membranes, Good dentition, No lesions  NECK: Supple, No JVD, Normal thyroid, no enlarged nodes  NERVOUS SYSTEM:  Alert & Oriented X3, Good concentration; Motor Strength 5/5 B/L upper and lower extremities; DTRs 2+ intact and symmetric, sensory intact  CHEST/LUNG: B/L good air entry; No rales, rhonchi, or wheezing  HEART: S1S2 normal, no S3, Regular rate and rhythm; No murmurs, rubs, or gallops  ABDOMEN: Soft, Nontender, Nondistended; Bowel sounds present  EXTREMITIES:  2+ Peripheral Pulses, No clubbing, cyanosis, or edema  LYMPH: No lymphadenopathy noted  SKIN: No rashes or lesions    Care Discussed with Consultants/Other Providers [ x] YES  [ ] NO Patient is a 55y old  Male who presents with a chief complaint of Acute Hypoxic Respiratory failure (06 Dec 2020 16:32).    INTERVAL HPI/OVERNIGHT EVENTS: NAEON. Febrile to 100.6 overnight. CBC significant for 21% eosinophils, possible 2/2 fungemia. Fungicell and procal sent, started fluconazole, continue zosyn, dc Nystatin. SS eval, started nectar thick diet.      ICU Vital Signs Last 24 Hrs  T(C): 37.2 (07 Dec 2020 05:00), Max: 38.1 (06 Dec 2020 17:00)  T(F): 99 (07 Dec 2020 05:00), Max: 100.6 (06 Dec 2020 17:00)  HR: 63 (07 Dec 2020 07:00) (58 - 169)  BP: 128/71 (07 Dec 2020 07:00) (120/68 - 163/94)  BP(mean): 84 (07 Dec 2020 07:00) (78 - 111)  ABP: --  ABP(mean): --  RR: 14 (07 Dec 2020 07:00) (14 - 20)  SpO2: 98% (07 Dec 2020 07:00) (94% - 100%)    I&O's Summary    06 Dec 2020 07:01  -  07 Dec 2020 07:00  --------------------------------------------------------  IN: 995 mL / OUT: 1000 mL / NET: -5 mL          LABS:                        9.2    9.14  )-----------( 125      ( 07 Dec 2020 06:38 )             30.2     12-06    145  |  112<H>  |  44<H>  ----------------------------<  106<H>  3.9   |  25  |  1.73<H>    Ca    8.8      06 Dec 2020 06:04  Phos  4.0     12-06  Mg     2.5     12-06    TPro  8.2  /  Alb  3.0<L>  /  TBili  0.8  /  DBili  x   /  AST  25  /  ALT  47  /  AlkPhos  74  12-06        CAPILLARY BLOOD GLUCOSE      POCT Blood Glucose.: 106 mg/dL (07 Dec 2020 00:26)  POCT Blood Glucose.: 116 mg/dL (06 Dec 2020 18:08)  POCT Blood Glucose.: 109 mg/dL (06 Dec 2020 12:07)        RADIOLOGY & ADDITIONAL TESTS:    Consultant(s) Notes Reviewed:  [x ] YES  [ ] NO    MEDICATIONS  (STANDING):  amLODIPine   Tablet 10 milliGRAM(s) Oral daily  chlorhexidine 2% Cloths 1 Application(s) Topical <User Schedule>  dextrose 5% + sodium chloride 0.45%. 1000 milliLiter(s) (40 mL/Hr) IV Continuous <Continuous>  enoxaparin Injectable 70 milliGRAM(s) SubCutaneous two times a day  nystatin    Suspension 059173 Unit(s) Oral every 6 hours  pantoprazole  Injectable 40 milliGRAM(s) IV Push daily  piperacillin/tazobactam IVPB.. 3.375 Gram(s) IV Intermittent every 12 hours  senna 2 Tablet(s) Oral at bedtime    MEDICATIONS  (PRN):  artificial  tears Solution 1 Drop(s) Both EYES every 4 hours PRN Dry Eyes  sodium chloride 0.9% lock flush 10 milliLiter(s) IV Push every 1 hour PRN Pre/post blood products, medications, blood draw, and to maintain line patency    PHYSICAL EXAM:    GENERAL: weak, but AAOx3  EYES: PERRLA, EOMI  MOUTH: moist mucous [place]  NECK: Supple, No JVD; Normal thyroid; Trachea midline  NERVOUS SYSTEM: Motor Strength 2-3/5 B/L upper and lower extremities  CHEST/LUNG: No rales, rhonchi, wheezing   HEART: Regular rate and rhythm; No murmurs,   ABDOMEN: Soft, Nontender, Nondistended; Bowel sounds present  EXTREMITIES:  2+ Peripheral Pulses, No clubbing, cyanosis, or edema  LINES/TUBES/CATHETERS: FC    Care Discussed with Consultants/Other Providers [ x] YES  [ ] NO

## 2020-12-07 NOTE — SWALLOW BEDSIDE ASSESSMENT ADULT - H & P REVIEW
Consult received, EMR, labs, radiology reviewed./yes
Consult received, EMR, labs, radiology reviewed./yes

## 2020-12-07 NOTE — PROGRESS NOTE ADULT - ASSESSMENT
55M from home without PMH with progressively worsening SOB and +COVID test x5 days prior to admission presented with SpO2 64% at home, intubated 2/2 deteriorating respiratory status and admitted to ICU.    Assessment:  1. Acute Hypoxic Respiratory failure   2. COVID Pneumonia   3. Acute Kidney Injury   4. elevated LFTs     Plan:    ====CNS====  #no acute issues  - off sedation  - Weak and concern for aspiration    ====CVS====   - TTE when off isolation     #hypertension  - new onset  - s/p lopressor 5mg IV  - c/w amlodipine 10mg PO qd  - monitor BP    ====RESP====  #Aspiration PNA  - concern for aspiration   - c/w zosyn 12/5-    #AHRF 2/2 COVID            - WBC improving  - Bcx neg  - MRSA neg  - COVID pcr pos  - CXR improving      - s/p 5 days cftx and azithromycin for CAP coverage (11/16-20)  - s/p vanco & zosyn (11/23-12/01)  - s/p remdesivir d/c'd 2/2 CrCl <45 (29.7)   - s/p decadron   - f/u acute phase reactants    ====GI====  #elevated LFTs   - resolved  - likely 2/2 COVID  - monitor LFTs    #bowel regimen:  - continue senna, miralax, and dulcolax [place] for bowel regimen    - NPO for now for concern for aspiration    ====RENAL====   #BECKY   - improving  - BUN/Cr 49/2.2 on admission, unknown baseline  - Cr 1.31>1.91  - patient is off fluids for now  - nephro Dr. Camara following    ====ID====   #COVID PNA  #Aspiration PNA  - Patient is spiking fevers, On Zosyn for Aspiration pneumonia      ====HEME/ONC====  #hypercoagulability 2/2 COVID  - s/p heparin gtt  - c/w full-dose lovenox  - monitor daily CBC, Hb stable    ====ENDO====   #No active issues:  - target -180  - HgA1c 6.1  - c/w low HSS while on steroids    ====Skin/Catheters====  #no acute issues  - RIJ 11/15-25 d/c'd   - right femoral line 11/24-12/02 d/c'd   - no rashes noted  - cw artificial tears    ====PPx====  #DVT: full-lovenox and SCD (BMI>30, ICU admission)  #GI: Protonix     ====Dispo=====  - monitor in ICU     ====GOC====   - full Code      55M from home without PMH with progressively worsening SOB and +COVID test x5 days prior to admission presented with SpO2 64% at home, intubated 2/2 deteriorating respiratory status and admitted to ICU.    Assessment:  1. Acute Hypoxic Respiratory failure   2. COVID Pneumonia   3. Acute Kidney Injury   4. elevated LFTs     Plan:    ====CNS====  #no acute issues  - off sedation  - Weak and concern for aspiration    ====CVS====   - TTE when off isolation     #hypertension  - new onset  - s/p lopressor 5mg IV  - c/w amlodipine 10mg PO qd  - monitor BP    ====RESP====  #Aspiration PNA  - concern for aspiration   - c/w zosyn 12/5-    #AHRF 2/2 COVID            - WBC improving  - Bcx neg  - MRSA neg  - COVID pcr pos  - CXR improving      - s/p 5 days cftx and azithromycin for CAP coverage (11/16-20)  - s/p vanco & zosyn (11/23-12/01)  - s/p remdesivir d/c'd 2/2 CrCl <45 (29.7)   - s/p decadron   - f/u acute phase reactants    ====GI====  #elevated LFTs   - resolved  - likely 2/2 COVID  - monitor LFTs    #bowel regimen:  - continue senna, miralax, and dulcolax [place] for bowel regimen    - started nectar thick diet per SS    ====RENAL====   #BECKY   - improving  - BUN/Cr 49/2.2 on admission, unknown baseline  - Cr 1.31>1.91>1.73  - patient is off fluids for now  - nephro Dr. Camara following    ====ID====   #COVID PNA  #Aspiration PNA  - Patient is spiking fevers, On Zosyn for Aspiration pneumonia    -fu procal in am  -WBC significant for eosinophilia 21%, concern for fungemia  -fu fungicell started fluconazole, dc nystatin    ====HEME/ONC====  #hypercoagulability 2/2 COVID  - s/p heparin gtt  - c/w full-dose lovenox  - monitor daily CBC, Hb stable    ====ENDO====   #No active issues:  - target -180  - HgA1c 6.1  - c/w low HSS while on steroids    ====Skin/Catheters====  #no acute issues  - RIJ 11/15-25 d/c'd   - right femoral line 11/24-12/02 d/c'd   - no rashes noted  - cw artificial tears    ====PPx====  #DVT: full-lovenox and SCD (BMI>30, ICU admission)  #GI: Protonix     ====Dispo=====  - monitor in ICU     ====GOC====   - full Code

## 2020-12-07 NOTE — SWALLOW BEDSIDE ASSESSMENT ADULT - MODE OF PRESENTATION
fed by clinician/hand over hand or handing spoon to pt's right hand/spoon/self fed hand over hand or handing spoon to pt's right hand/self fed/fed by clinician/spoon hand over hand or handing spoon to pt's right hand/fed by clinician/spoon/self fed self fed/cup/hand over hand or handing spoon to pt's right hand

## 2020-12-07 NOTE — PROGRESS NOTE ADULT - ASSESSMENT
Patient is a 54yo Male with no PMH recently diagnosed with COVID-19 (5 days PTA) p/w SOB s/p intubated in the ER. Pt admitted to ICU with acute hypoxic respiratory failure 2/2 COVID-19 PNA s/p intubated, BECKY with transaminitis. Nephrology consulted for Elevated serum creatinine.    1. BECKY- unknown baseline SCr. BECKY likely hemodynamically mediated in the setting of septic shock / infection 2/2 COVID-19, hypotension on pressors (now off pressors);  likely ATN; Renal function improving with good urine o/p.  s/p trial of IVF. FeNa 1.55% and FeUrea 47%; inconclusive.  UA with 100 protein and trace blood with hyaline cast. Will defer Renal US due to COVID status. Strict I/Os. Avoid nephrotoxins/ NSAIDs/ RCA. Monitor BMP.  2. Septic Shock due to Multifocal PNA 2/2 COVID-19- Plan per ICU  3. Hypotension- BP acceptable on amlodipine  10mg O qd; Monitor BP  4. Acute hypoxic respiratory failure- as per ICU.  5. Hypocalcemia- & Hyperphosphatemia resolved. Monitor ionized calcium and serum phos.

## 2020-12-07 NOTE — PROGRESS NOTE ADULT - SUBJECTIVE AND OBJECTIVE BOX
[facility] NEPHROLOGY- PROGRESS NOTE    Patient is a 54yo Male with no PMH recently diagnosed with COVID-19 (5 days PTA) p/w SOB s/p intubated in the ER. Pt admitted to ICU with acute hypoxic respiratory failure 2/2 COVID-19 PNA s/p intubated, BECKY with transaminitis. Nephrology consulted for Elevated serum creatinine.  Extubated 11/30    Hospital Medications: Medications reviewed.  REVIEW OF SYSTEMS: Deferred due to COVID status    VITALS:  T(F): 99 (12-07-20 @ 05:00), Max: 100.6 (12-06-20 @ 17:00)  HR: 64 (12-07-20 @ 14:00)  BP: 95/57 (12-07-20 @ 14:00)  RR: 21 (12-07-20 @ 14:00)  SpO2: 97% (12-07-20 @ 14:00)  Wt(kg): --    12-06 @ 07:01  -  12-07 @ 07:00  --------------------------------------------------------  IN: 995 mL / OUT: 1000 mL / NET: -5 mL    12-07 @ 07:01  -  12-07 @ 15:30  --------------------------------------------------------  IN: 1265 mL / OUT: 350 mL / NET: 915 mL        PHYSICAL EXAM: Defer physical exam due to COVID-19 status. Please refer to primary team's note for physical exam.      LABS:  12-07    143  |  108  |  40<H>  ----------------------------<  103<H>  3.7   |  25  |  1.51<H>    Ca    9.0      07 Dec 2020 06:38  Phos  3.4     12-07  Mg     2.5     12-07    TPro  8.2  /  Alb  3.0<L>  /  TBili  0.6  /  DBili      /  AST  20  /  ALT  38  /  AlkPhos  69  12-07    Creatinine Trend: 1.51 <--, 1.73 <--, 1.97 <--, 1.91 <--, 1.31 <--, 1.61 <--, 1.59 <--, 1.81 <--                        9.2    9.14  )-----------( 125      ( 07 Dec 2020 06:38 )             30.2     Urine Studies:    Sodium, Random Urine: 84 mmol/L (12-05 @ 18:19)  Creatinine, Random Urine: 74 mg/dL (12-05 @ 18:19)  Chloride, Random Urine: 90 mmol/L (12-05 @ 18:19)

## 2020-12-07 NOTE — SWALLOW BEDSIDE ASSESSMENT ADULT - SWALLOW EVAL: RECOMMENDED FEEDING/EATING TECHNIQUES
maintain upright posture during/after eating for 30 mins/oral hygiene/small sips/bites/90 degree angle vital for feeding time/check mouth frequently for oral residue/pocketing/crush medication (when feasible)/position upright (90 degrees)
position upright (90 degrees)/crush medication (when feasible)/maintain upright posture during/after eating for 30 mins/oral hygiene/check mouth frequently for oral residue/pocketing/allow for swallow between intakes/alternate food with liquid/small sips/bites

## 2020-12-07 NOTE — SWALLOW BEDSIDE ASSESSMENT ADULT - SWALLOW EVAL: FEEDING ASSISTANCE
minimal assistance required/Pt only utilizing right hand
Unable to feed self 2/2 B/L UE weakness/dependent

## 2020-12-07 NOTE — SWALLOW BEDSIDE ASSESSMENT ADULT - SWALLOW EVAL: DIAGNOSIS
Pt p/w oropharyngeal dysphaiga c/b delayed A-P transport, multiple swallows, and reflexive mastication (likely due to oral ulcers causing discomfort) w/ no s/s of penetration/aspiration.
Pt p/w s&s oropharyngeal dysphagia c/b weak oral aperture, poor bolus formation, prolonged mastication on solids, slow A-P transport w/ base-of-tongue pumping, delayed swallow trigger, & decreased hyolaryngeal elevation, & s&s of penetration/aspiration on ice chips & thin liquid trials.

## 2020-12-07 NOTE — SWALLOW BEDSIDE ASSESSMENT ADULT - SLP GENERAL OBSERVATIONS
Pt AA+Ox2-3 (aphonic, unable to understand at times but utilized gestures); OOB on chair. Exam offered in Estonian by SLP.

## 2020-12-07 NOTE — SWALLOW BEDSIDE ASSESSMENT ADULT - COMMENTS
Pt AA+Ox2, notably fatigued, HOB elevated to 90°. +B/L UE weakness. Exam offered in Ukrainian by SLP.
Due to recent suspicion of aspiration PNA and no overt s/s of penetration/aspiration, pt to remain on nectar thick liquids for time being.

## 2020-12-07 NOTE — SWALLOW BEDSIDE ASSESSMENT ADULT - SLP PERTINENT HISTORY OF CURRENT PROBLEM
55M from home without PMH with progressively worsening SOB and +COVID test x5 days prior to admission presented with SpO2 64% at home, intubated 2/2 deteriorating respiratory status and admitted to ICU. Pt has suspected PNA, warranting speech/swallow re-eval.
55M from home without PMH with progressively worsening SOB and +COVID test x5 days prior to admission presented with SpO2 64% at home, intubated 2/2 deteriorating respiratory status and admitted to ICU.

## 2020-12-08 LAB
ALBUMIN SERPL ELPH-MCNC: 2.7 G/DL — LOW (ref 3.5–5)
ALP SERPL-CCNC: 68 U/L — SIGNIFICANT CHANGE UP (ref 40–120)
ALT FLD-CCNC: 29 U/L DA — SIGNIFICANT CHANGE UP (ref 10–60)
ANION GAP SERPL CALC-SCNC: 11 MMOL/L — SIGNIFICANT CHANGE UP (ref 5–17)
AST SERPL-CCNC: 30 U/L — SIGNIFICANT CHANGE UP (ref 10–40)
BASOPHILS # BLD AUTO: 0.04 K/UL — SIGNIFICANT CHANGE UP (ref 0–0.2)
BASOPHILS NFR BLD AUTO: 0.4 % — SIGNIFICANT CHANGE UP (ref 0–2)
BILIRUB SERPL-MCNC: 0.6 MG/DL — SIGNIFICANT CHANGE UP (ref 0.2–1.2)
BUN SERPL-MCNC: 33 MG/DL — HIGH (ref 7–18)
CALCIUM SERPL-MCNC: 8.2 MG/DL — LOW (ref 8.4–10.5)
CHLORIDE SERPL-SCNC: 106 MMOL/L — SIGNIFICANT CHANGE UP (ref 96–108)
CO2 SERPL-SCNC: 22 MMOL/L — SIGNIFICANT CHANGE UP (ref 22–31)
CREAT SERPL-MCNC: 1.47 MG/DL — HIGH (ref 0.5–1.3)
EOSINOPHIL # BLD AUTO: 2.1 K/UL — HIGH (ref 0–0.5)
EOSINOPHIL NFR BLD AUTO: 20.6 % — HIGH (ref 0–6)
GLUCOSE SERPL-MCNC: 94 MG/DL — SIGNIFICANT CHANGE UP (ref 70–99)
HCT VFR BLD CALC: 27.1 % — LOW (ref 39–50)
HGB BLD-MCNC: 8.5 G/DL — LOW (ref 13–17)
IMM GRANULOCYTES NFR BLD AUTO: 1 % — SIGNIFICANT CHANGE UP (ref 0–1.5)
LYMPHOCYTES # BLD AUTO: 1.07 K/UL — SIGNIFICANT CHANGE UP (ref 1–3.3)
LYMPHOCYTES # BLD AUTO: 10.5 % — LOW (ref 13–44)
MAGNESIUM SERPL-MCNC: 2.1 MG/DL — SIGNIFICANT CHANGE UP (ref 1.6–2.6)
MCHC RBC-ENTMCNC: 29 PG — SIGNIFICANT CHANGE UP (ref 27–34)
MCHC RBC-ENTMCNC: 31.4 GM/DL — LOW (ref 32–36)
MCV RBC AUTO: 92.5 FL — SIGNIFICANT CHANGE UP (ref 80–100)
MONOCYTES # BLD AUTO: 0.61 K/UL — SIGNIFICANT CHANGE UP (ref 0–0.9)
MONOCYTES NFR BLD AUTO: 6 % — SIGNIFICANT CHANGE UP (ref 2–14)
NEUTROPHILS # BLD AUTO: 6.27 K/UL — SIGNIFICANT CHANGE UP (ref 1.8–7.4)
NEUTROPHILS NFR BLD AUTO: 61.5 % — SIGNIFICANT CHANGE UP (ref 43–77)
NRBC # BLD: 0 /100 WBCS — SIGNIFICANT CHANGE UP (ref 0–0)
PHOSPHATE SERPL-MCNC: 2.7 MG/DL — SIGNIFICANT CHANGE UP (ref 2.5–4.5)
PLATELET # BLD AUTO: 129 K/UL — LOW (ref 150–400)
POTASSIUM SERPL-MCNC: 3.6 MMOL/L — SIGNIFICANT CHANGE UP (ref 3.5–5.3)
POTASSIUM SERPL-SCNC: 3.6 MMOL/L — SIGNIFICANT CHANGE UP (ref 3.5–5.3)
PROCALCITONIN SERPL-MCNC: 0.18 NG/ML — HIGH (ref 0.02–0.1)
PROT SERPL-MCNC: 7.7 G/DL — SIGNIFICANT CHANGE UP (ref 6–8.3)
RBC # BLD: 2.93 M/UL — LOW (ref 4.2–5.8)
RBC # FLD: 13.6 % — SIGNIFICANT CHANGE UP (ref 10.3–14.5)
SODIUM SERPL-SCNC: 139 MMOL/L — SIGNIFICANT CHANGE UP (ref 135–145)
WBC # BLD: 10.19 K/UL — SIGNIFICANT CHANGE UP (ref 3.8–10.5)
WBC # FLD AUTO: 10.19 K/UL — SIGNIFICANT CHANGE UP (ref 3.8–10.5)

## 2020-12-08 RX ADMIN — ENOXAPARIN SODIUM 70 MILLIGRAM(S): 100 INJECTION SUBCUTANEOUS at 17:37

## 2020-12-08 RX ADMIN — SENNA PLUS 2 TABLET(S): 8.6 TABLET ORAL at 21:11

## 2020-12-08 RX ADMIN — PIPERACILLIN AND TAZOBACTAM 25 GRAM(S): 4; .5 INJECTION, POWDER, LYOPHILIZED, FOR SOLUTION INTRAVENOUS at 17:36

## 2020-12-08 RX ADMIN — ENOXAPARIN SODIUM 70 MILLIGRAM(S): 100 INJECTION SUBCUTANEOUS at 05:09

## 2020-12-08 RX ADMIN — PIPERACILLIN AND TAZOBACTAM 25 GRAM(S): 4; .5 INJECTION, POWDER, LYOPHILIZED, FOR SOLUTION INTRAVENOUS at 05:09

## 2020-12-08 RX ADMIN — FLUCONAZOLE 400 MILLIGRAM(S): 150 TABLET ORAL at 13:45

## 2020-12-08 RX ADMIN — AMLODIPINE BESYLATE 10 MILLIGRAM(S): 2.5 TABLET ORAL at 05:09

## 2020-12-08 RX ADMIN — CHLORHEXIDINE GLUCONATE 1 APPLICATION(S): 213 SOLUTION TOPICAL at 05:10

## 2020-12-08 NOTE — PROGRESS NOTE ADULT - ASSESSMENT
55M from home without PMH with progressively worsening SOB and +COVID test x5 days prior to admission presented with SpO2 64% at home, intubated 2/2 deteriorating respiratory status and admitted to ICU.    Assessment:  1. Acute Hypoxic Respiratory failure   2. COVID Pneumonia   3. Acute Kidney Injury   4. elevated LFTs     Plan:    ====CNS====  #no acute issues  - off sedation  - Weak and concern for aspiration    ====CVS====   - TTE when off isolation     #hypertension  - new onset  - s/p lopressor 5mg IV  - c/w amlodipine 10mg PO qd  - monitor BP    ====RESP====  #Aspiration PNA  - concern for aspiration   - c/w zosyn 12/5-    #AHRF 2/2 COVID            - WBC improving  - Bcx neg  - MRSA neg  - COVID pcr pos  - CXR improving      - s/p 5 days cftx and azithromycin for CAP coverage (11/16-20)  - s/p vanco & zosyn (11/23-12/01)  - s/p remdesivir d/c'd 2/2 CrCl <45 (29.7)   - s/p decadron   - f/u acute phase reactants    ====GI====  #elevated LFTs   - resolved  - likely 2/2 COVID  - monitor LFTs    #bowel regimen:  - continue senna, miralax, and dulcolax [place] for bowel regimen    - started nectar thick diet per SS    ====RENAL====   #BECKY   - improving  - BUN/Cr 49/2.2 on admission, unknown baseline  - Cr 1.31>1.91>1.73  - patient is off fluids for now  - nephro Dr. Camara following    ====ID====   #COVID PNA  #Aspiration PNA  - Patient is spiking fevers, On Zosyn for Aspiration pneumonia    -fu procal in am  -WBC significant for eosinophilia 21%, concern for fungemia  -fu fungicell started fluconazole, dc nystatin    ====HEME/ONC====  #hypercoagulability 2/2 COVID  - s/p heparin gtt  - c/w full-dose lovenox  - monitor daily CBC, Hb stable    ====ENDO====   #No active issues:  - target -180  - HgA1c 6.1  - c/w low HSS while on steroids    ====Skin/Catheters====  #no acute issues  - RIJ 11/15-25 d/c'd   - right femoral line 11/24-12/02 d/c'd   - no rashes noted  - cw artificial tears    ====PPx====  #DVT: full-lovenox and SCD (BMI>30, ICU admission)  #GI: Protonix     ====Dispo=====  - monitor in ICU     ====GOC====   - full Code      55M from home without PMH with progressively worsening SOB and +COVID test x5 days prior to admission presented with SpO2 64% at home, intubated 2/2 deteriorating respiratory status and admitted to ICU.    Assessment:  1. Acute Hypoxic Respiratory failure   2. COVID Pneumonia   3. Acute Kidney Injury   4. elevated LFTs     Plan:    ====CNS====  #no acute issues  - off sedation  - strength improving    ====CVS====   - TTE when off isolation     #hypertension  - new onset  - c/w amlodipine 10mg PO qd  - monitor BP    ====RESP====  #Aspiration PNA  - concern for aspiration   - c/w zosyn 12/5-12/9    #AHRF 2/2 COVID            - WBC wnl  - Bcx neg  - MRSA neg  - COVID pcr pos  - CXR improving      - s/p 5 days cftx and azithromycin for CAP coverage (11/16-20)  - s/p vanco & zosyn (11/23-12/01)  - s/p remdesivir d/c'd 2/2 CrCl <45 (29.7)   - s/p decadron   - f/u acute phase reactants    ====GI====  #elevated LFTs   - resolved  - likely 2/2 COVID  - monitor LFTs    #bowel regimen:  - continue senna, miralax, and dulcolax [place] for bowel regimen    - advanced to soft diet    ====RENAL====   #BECKY   - improving  - BUN/Cr 49/2.2 on admission, unknown baseline  - Cr 1.31>1.91>1.73>1.47  - patient is off fluids for now  - nephro Dr. Camara following    ====ID====   #COVID PNA  #Aspiration PNA  - Patient febrile last several days, afebrile overnight, On Zosyn through 12/9 for Aspiration pneumonia    -procal 0.18  -WBC significant for eosinophilia 21%, concern for fungemia  -fu fungicell, cw fluconazole    ====HEME/ONC====  #hypercoagulability 2/2 COVID  - s/p heparin gtt  - c/w full-dose lovenox  - monitor daily CBC, Hb stable    ====ENDO====   #No active issues:  - target -180  - HgA1c 6.1  - c/w low HSS while on steroids    ====Skin/Catheters====  #no acute issues  - RIJ 11/15-25 d/c'd   - right femoral line 11/24-12/02 d/c'd   - no rashes noted  - cw artificial tears  -dc FC  -bruise noted in left neck, monitor    ====PPx====  #DVT: full-lovenox and SCD (BMI>30, ICU admission)  #GI: Protonix     ====Dispo=====  - monitor in ICU     ====GOC====   - full Code

## 2020-12-08 NOTE — PHARMACOTHERAPY INTERVENTION NOTE - COMMENTS
Recommended ordering Vancomycin trough 30 minutes before the 4th dose of Vancomycin is due on 11/26/2020.
During Critcal Care rounds, azithromycin stop date was discussed and it was decided that the therapy would be for 5 days,
During Critical Care rounds, recommended to update Stop Date for the antibiotics.

## 2020-12-08 NOTE — PROGRESS NOTE ADULT - SUBJECTIVE AND OBJECTIVE BOX
Patient is a 55y old  Male who presents with a chief complaint of Acute Hypoxic Respiratory failure (07 Dec 2020 15:30)      INTERVAL HPI/OVERNIGHT EVENTS:  ICU Vital Signs Last 24 Hrs  T(C): 36.9 (08 Dec 2020 06:00), Max: 36.9 (08 Dec 2020 06:00)  T(F): 98.4 (08 Dec 2020 06:00), Max: 98.4 (08 Dec 2020 06:00)  HR: 79 (08 Dec 2020 06:00) (63 - 90)  BP: 121/65 (08 Dec 2020 06:00) (95/57 - 147/78)  BP(mean): 80 (08 Dec 2020 06:00) (64 - 93)  ABP: --  ABP(mean): --  RR: 22 (08 Dec 2020 06:00) (15 - 23)  SpO2: 93% (08 Dec 2020 06:00) (93% - 98%)    I&O's Summary    07 Dec 2020 07:01  -  08 Dec 2020 07:00  --------------------------------------------------------  IN: 2225 mL / OUT: 1215 mL / NET: 1010 mL          LABS:                        8.5    10.19 )-----------( 129      ( 08 Dec 2020 06:06 )             27.1     12-08    139  |  106  |  33<H>  ----------------------------<  94  3.6   |  22  |  1.47<H>    Ca    8.2<L>      08 Dec 2020 06:06  Phos  2.7     12-08  Mg     2.1     12-08    TPro  7.7  /  Alb  2.7<L>  /  TBili  0.6  /  DBili  x   /  AST  30  /  ALT  29  /  AlkPhos  68  12-08        CAPILLARY BLOOD GLUCOSE            RADIOLOGY & ADDITIONAL TESTS:    Consultant(s) Notes Reviewed:  [x ] YES  [ ] NO    MEDICATIONS  (STANDING):  amLODIPine   Tablet 10 milliGRAM(s) Oral daily  chlorhexidine 2% Cloths 1 Application(s) Topical <User Schedule>  enoxaparin Injectable 70 milliGRAM(s) SubCutaneous two times a day  fluconAZOLE   Tablet 400 milliGRAM(s) Oral daily  pantoprazole  Injectable 40 milliGRAM(s) IV Push daily  piperacillin/tazobactam IVPB.. 3.375 Gram(s) IV Intermittent every 12 hours  senna 2 Tablet(s) Oral at bedtime    MEDICATIONS  (PRN):  artificial  tears Solution 1 Drop(s) Both EYES every 4 hours PRN Dry Eyes  sodium chloride 0.9% lock flush 10 milliLiter(s) IV Push every 1 hour PRN Pre/post blood products, medications, blood draw, and to maintain line patency      PHYSICAL EXAM:  GENERAL: well built, well nourished  HEAD:  Atraumatic, Normocephalic  EYES: EOMI, PERRLA, conjunctiva and sclera clear  ENT: No tonsillar erythema, exudates, or enlargement; Moist mucous membranes, Good dentition, No lesions  NECK: Supple, No JVD, Normal thyroid, no enlarged nodes  NERVOUS SYSTEM:  Alert & Oriented X3, Good concentration; Motor Strength 5/5 B/L upper and lower extremities; DTRs 2+ intact and symmetric, sensory intact  CHEST/LUNG: B/L good air entry; No rales, rhonchi, or wheezing  HEART: S1S2 normal, no S3, Regular rate and rhythm; No murmurs, rubs, or gallops  ABDOMEN: Soft, Nontender, Nondistended; Bowel sounds present  EXTREMITIES:  2+ Peripheral Pulses, No clubbing, cyanosis, or edema  LYMPH: No lymphadenopathy noted  SKIN: No rashes or lesions    Care Discussed with Consultants/Other Providers [ x] YES  [ ] NO Patient is a 55y old  Male who presents with a chief complaint of Acute Hypoxic Respiratory failure (07 Dec 2020 15:30)    INTERVAL HPI/OVERNIGHT EVENTS: NAEON. VS wnl. Improving strength and mentation today, able to sit up in chair and feed self. Advanced to soft diet. Cr improved to 1.47. BCx NGTD, procal 0.18. Continuing zosyn (through tomorrow) and fluconazole, awaiting fungitell. Medically stable for downgrade to AI today.      ICU Vital Signs Last 24 Hrs  T(C): 36.9 (08 Dec 2020 06:00), Max: 36.9 (08 Dec 2020 06:00)  T(F): 98.4 (08 Dec 2020 06:00), Max: 98.4 (08 Dec 2020 06:00)  HR: 79 (08 Dec 2020 06:00) (63 - 90)  BP: 121/65 (08 Dec 2020 06:00) (95/57 - 147/78)  BP(mean): 80 (08 Dec 2020 06:00) (64 - 93)  ABP: --  ABP(mean): --  RR: 22 (08 Dec 2020 06:00) (15 - 23)  SpO2: 93% (08 Dec 2020 06:00) (93% - 98%)    I&O's Summary    07 Dec 2020 07:01  -  08 Dec 2020 07:00  --------------------------------------------------------  IN: 2225 mL / OUT: 1215 mL / NET: 1010 mL          LABS:                        8.5    10.19 )-----------( 129      ( 08 Dec 2020 06:06 )             27.1     12-08    139  |  106  |  33<H>  ----------------------------<  94  3.6   |  22  |  1.47<H>    Ca    8.2<L>      08 Dec 2020 06:06  Phos  2.7     12-08  Mg     2.1     12-08    TPro  7.7  /  Alb  2.7<L>  /  TBili  0.6  /  DBili  x   /  AST  30  /  ALT  29  /  AlkPhos  68  12-08        CAPILLARY BLOOD GLUCOSE            RADIOLOGY & ADDITIONAL TESTS:    Consultant(s) Notes Reviewed:  [x ] YES  [ ] NO    MEDICATIONS  (STANDING):  amLODIPine   Tablet 10 milliGRAM(s) Oral daily  chlorhexidine 2% Cloths 1 Application(s) Topical <User Schedule>  enoxaparin Injectable 70 milliGRAM(s) SubCutaneous two times a day  fluconAZOLE   Tablet 400 milliGRAM(s) Oral daily  pantoprazole  Injectable 40 milliGRAM(s) IV Push daily  piperacillin/tazobactam IVPB.. 3.375 Gram(s) IV Intermittent every 12 hours  senna 2 Tablet(s) Oral at bedtime    MEDICATIONS  (PRN):  artificial  tears Solution 1 Drop(s) Both EYES every 4 hours PRN Dry Eyes  sodium chloride 0.9% lock flush 10 milliLiter(s) IV Push every 1 hour PRN Pre/post blood products, medications, blood draw, and to maintain line patency    PHYSICAL EXAM:    GENERAL: weak, improving, but AAOx3  EYES: PERRLA, EOMI  MOUTH: moist mucous membraines  NECK: Supple, No JVD; Normal thyroid; Trachea midline  NERVOUS SYSTEM: Motor Strength 3-4/5 B/L upper and lower extremities  CHEST/LUNG: No rales, rhonchi, wheezing   HEART: Regular rate and rhythm; No murmurs,   ABDOMEN: Soft, Nontender, Nondistended; Bowel sounds present  EXTREMITIES:  2+ Peripheral Pulses, No clubbing, cyanosis, or edema  LINES/TUBES/CATHETERS: FC dc    Care Discussed with Consultants/Other Providers [ x] YES  [ ] NO

## 2020-12-08 NOTE — CHART NOTE - NSCHARTNOTEFT_GEN_A_CORE
HC: 55M from home without PMH with progressively worsening SOB and +COVID test x5 days prior to admission presented following SpO2 64% at home.  Started on 15L NRB in ED 2/2 SpO2s 85-90%, desaturated to low 80s and was placed on HFNC, continued to desaturate, was intubated and admitted to ICU 11/15.     S/p 10-day course decadron 11/15-24. Not a candidate for remdesivir initially 2/2 elevated LFTs and subsequently 2/2 CrCl <45. Kidney function poor with elevated Cr to 2.22 on admission, baseline unknown, peaked at 4.43 and gradually improved. U/O supported with Lasix boluses, gradually improved, nephro Dr. Camara following.  CXR with b/l opacities and small pleural effusions, APRs elevated with d-dimer 559->2763, started on heparin gtt for therapeutic AC. Completed 5 day course azithromycin and cftx for CAP. Extuabted 11/30, O2 delivery titrated down, now with NC and standby BiPAP.      11/23 spiked new fever, last fever 12/6. Started on 5-day course of Zosyn for possible aspiration pna, last day 12/9. BCx NGTD, MRSA negative. WBC with 21% eosinophils, possibly 2/2 fungemia, started fungemia and awaiting fungicell. Strength improving gradually. Medically stable for downgrade to SCU today.     To follow:  -fu fungicell, monitor WBC, evaluate need to continue fluconazole  -fu BCx from 12/7  -dc Zosyn after last dose tomorrow  -monitor u/o and Cr  -monitor bruise in left neck for worsening  -TOV 7pm   -BiPAP if needed    Patient to be downgraded to 404 and will remain in the care of Dr. Chau. Brother Yg informed of downgrade. NP Pat given signout 2:45pm.

## 2020-12-08 NOTE — PROGRESS NOTE ADULT - ASSESSMENT
Patient is a 54yo Male with no PMH recently diagnosed with COVID-19 (5 days PTA) p/w SOB s/p intubated in the ER. Pt admitted to ICU with acute hypoxic respiratory failure 2/2 COVID-19 PNA s/p intubated, BECKY with transaminitis. Nephrology consulted for Elevated serum creatinine.    1. BECKY- unknown baseline SCr. BECKY likely hemodynamically mediated in the setting of septic shock / infection 2/2 COVID-19, hypotension on pressors (now off pressors);  likely ATN; Renal function improving with good urine o/p, now off IVF. FeNa 1.55% and FeUrea 47%; inconclusive.  UA with 100 protein and trace blood with hyaline cast. Will defer Renal US due to COVID status. Strict I/Os. Avoid nephrotoxins/ NSAIDs/ RCA. Monitor BMP.  2. Septic Shock due to Multifocal PNA 2/2 COVID-19- Plan per ICU  3. Hypotension- BP acceptable on amlodipine  10mg O qd; Monitor BP  4. Acute hypoxic respiratory failure- as per ICU.  5. Hypocalcemia- & Hyperphosphatemia resolved. Monitor ionized calcium and serum phos.

## 2020-12-08 NOTE — PROGRESS NOTE ADULT - NUTRITIONAL ASSESSMENT
This patient has been assessed with a concern for Malnutrition and has been determined to have a diagnosis/diagnoses of Severe protein-calorie malnutrition.    This patient is being managed with:   Diet Dysphagia 2 Mechanical Soft-Thin Liquids-  Entered: Dec  8 2020 11:09AM

## 2020-12-08 NOTE — PROGRESS NOTE ADULT - SUBJECTIVE AND OBJECTIVE BOX
[facility] NEPHROLOGY- PROGRESS NOTE    Patient is a 56yo Male with no PMH recently diagnosed with COVID-19 (5 days PTA) p/w SOB s/p intubated in the ER. Pt admitted to ICU with acute hypoxic respiratory failure 2/2 COVID-19 PNA s/p intubated, BECKY with transaminitis. Nephrology consulted for Elevated serum creatinine.  Extubated 11/30    Hospital Medications: Medications reviewed.  REVIEW OF SYSTEMS: Deferred due to COVID status    VITALS:  T(F): 98.4 (12-08-20 @ 06:00), Max: 98.4 (12-08-20 @ 06:00)  HR: 75 (12-08-20 @ 11:00)  BP: 126/66 (12-08-20 @ 11:00)  RR: 21 (12-08-20 @ 11:00)  SpO2: 92% (12-08-20 @ 11:00)  Wt(kg): --    12-07 @ 07:01  -  12-08 @ 07:00  --------------------------------------------------------  IN: 2225 mL / OUT: 1250 mL / NET: 975 mL    12-08 @ 07:01  -  12-08 @ 12:00  --------------------------------------------------------  IN: 265 mL / OUT: 100 mL / NET: 165 mL        PHYSICAL EXAM: Defer physical exam due to COVID-19 status. Please refer to primary team's note for physical exam.    LABS:  12-08    139  |  106  |  33<H>  ----------------------------<  94  3.6   |  22  |  1.47<H>    Ca    8.2<L>      08 Dec 2020 06:06  Phos  2.7     12-08  Mg     2.1     12-08    TPro  7.7  /  Alb  2.7<L>  /  TBili  0.6  /  DBili      /  AST  30  /  ALT  29  /  AlkPhos  68  12-08    Creatinine Trend: 1.47 <--, 1.51 <--, 1.73 <--, 1.97 <--, 1.91 <--, 1.31 <--, 1.61 <--, 1.59 <--                        8.5    10.19 )-----------( 129      ( 08 Dec 2020 06:06 )             27.1     Urine Studies:    Sodium, Random Urine: 84 mmol/L (12-05 @ 18:19)  Creatinine, Random Urine: 74 mg/dL (12-05 @ 18:19)  Chloride, Random Urine: 90 mmol/L (12-05 @ 18:19)

## 2020-12-09 DIAGNOSIS — Z29.9 ENCOUNTER FOR PROPHYLACTIC MEASURES, UNSPECIFIED: ICD-10-CM

## 2020-12-09 DIAGNOSIS — R53.81 OTHER MALAISE: ICD-10-CM

## 2020-12-09 LAB
ALBUMIN SERPL ELPH-MCNC: 2.7 G/DL — LOW (ref 3.5–5)
ALP SERPL-CCNC: 66 U/L — SIGNIFICANT CHANGE UP (ref 40–120)
ALT FLD-CCNC: 35 U/L DA — SIGNIFICANT CHANGE UP (ref 10–60)
ANION GAP SERPL CALC-SCNC: 13 MMOL/L — SIGNIFICANT CHANGE UP (ref 5–17)
AST SERPL-CCNC: 37 U/L — SIGNIFICANT CHANGE UP (ref 10–40)
BILIRUB SERPL-MCNC: 0.5 MG/DL — SIGNIFICANT CHANGE UP (ref 0.2–1.2)
BUN SERPL-MCNC: 27 MG/DL — HIGH (ref 7–18)
CALCIUM SERPL-MCNC: 8.6 MG/DL — SIGNIFICANT CHANGE UP (ref 8.4–10.5)
CHLORIDE SERPL-SCNC: 103 MMOL/L — SIGNIFICANT CHANGE UP (ref 96–108)
CO2 SERPL-SCNC: 22 MMOL/L — SIGNIFICANT CHANGE UP (ref 22–31)
CREAT SERPL-MCNC: 1.23 MG/DL — SIGNIFICANT CHANGE UP (ref 0.5–1.3)
FUNGITELL: <31 PG/ML — SIGNIFICANT CHANGE UP
GLUCOSE SERPL-MCNC: 100 MG/DL — HIGH (ref 70–99)
HCT VFR BLD CALC: 27.9 % — LOW (ref 39–50)
HGB BLD-MCNC: 8.7 G/DL — LOW (ref 13–17)
MAGNESIUM SERPL-MCNC: 2.2 MG/DL — SIGNIFICANT CHANGE UP (ref 1.6–2.6)
MCHC RBC-ENTMCNC: 28.9 PG — SIGNIFICANT CHANGE UP (ref 27–34)
MCHC RBC-ENTMCNC: 31.2 GM/DL — LOW (ref 32–36)
MCV RBC AUTO: 92.7 FL — SIGNIFICANT CHANGE UP (ref 80–100)
NRBC # BLD: 0 /100 WBCS — SIGNIFICANT CHANGE UP (ref 0–0)
PHOSPHATE SERPL-MCNC: 3 MG/DL — SIGNIFICANT CHANGE UP (ref 2.5–4.5)
PLATELET # BLD AUTO: 90 K/UL — LOW (ref 150–400)
POTASSIUM SERPL-MCNC: 3.2 MMOL/L — LOW (ref 3.5–5.3)
POTASSIUM SERPL-SCNC: 3.2 MMOL/L — LOW (ref 3.5–5.3)
PROT SERPL-MCNC: 7.9 G/DL — SIGNIFICANT CHANGE UP (ref 6–8.3)
RBC # BLD: 3.01 M/UL — LOW (ref 4.2–5.8)
RBC # FLD: 13.7 % — SIGNIFICANT CHANGE UP (ref 10.3–14.5)
SARS-COV-2 RNA SPEC QL NAA+PROBE: SIGNIFICANT CHANGE UP
SODIUM SERPL-SCNC: 138 MMOL/L — SIGNIFICANT CHANGE UP (ref 135–145)
WBC # BLD: 7.76 K/UL — SIGNIFICANT CHANGE UP (ref 3.8–10.5)
WBC # FLD AUTO: 7.76 K/UL — SIGNIFICANT CHANGE UP (ref 3.8–10.5)

## 2020-12-09 RX ORDER — POTASSIUM CHLORIDE 20 MEQ
40 PACKET (EA) ORAL EVERY 4 HOURS
Refills: 0 | Status: COMPLETED | OUTPATIENT
Start: 2020-12-09 | End: 2020-12-09

## 2020-12-09 RX ADMIN — PIPERACILLIN AND TAZOBACTAM 25 GRAM(S): 4; .5 INJECTION, POWDER, LYOPHILIZED, FOR SOLUTION INTRAVENOUS at 17:01

## 2020-12-09 RX ADMIN — Medication 40 MILLIEQUIVALENT(S): at 17:02

## 2020-12-09 RX ADMIN — SENNA PLUS 2 TABLET(S): 8.6 TABLET ORAL at 21:22

## 2020-12-09 RX ADMIN — ENOXAPARIN SODIUM 70 MILLIGRAM(S): 100 INJECTION SUBCUTANEOUS at 17:01

## 2020-12-09 RX ADMIN — PIPERACILLIN AND TAZOBACTAM 25 GRAM(S): 4; .5 INJECTION, POWDER, LYOPHILIZED, FOR SOLUTION INTRAVENOUS at 05:14

## 2020-12-09 RX ADMIN — FLUCONAZOLE 400 MILLIGRAM(S): 150 TABLET ORAL at 12:17

## 2020-12-09 RX ADMIN — Medication 40 MILLIEQUIVALENT(S): at 15:15

## 2020-12-09 RX ADMIN — ENOXAPARIN SODIUM 70 MILLIGRAM(S): 100 INJECTION SUBCUTANEOUS at 05:14

## 2020-12-09 RX ADMIN — AMLODIPINE BESYLATE 10 MILLIGRAM(S): 2.5 TABLET ORAL at 05:14

## 2020-12-09 RX ADMIN — CHLORHEXIDINE GLUCONATE 1 APPLICATION(S): 213 SOLUTION TOPICAL at 05:14

## 2020-12-09 NOTE — PROGRESS NOTE ADULT - PROBLEM SELECTOR PLAN 10
DVT and GI prophylaxis DVT and GI prophylaxis. On full dose lovenox.  F/U cultures. Last day of zosyn. Continue fluconazole until cultures are finalized.  PT and Dietary consults.  Will need oxygen testing before discharge.  Condition remains guarded.   Brother updated on status.

## 2020-12-09 NOTE — PROGRESS NOTE ADULT - NUTRITIONAL ASSESSMENT
This patient has been assessed with a concern for Malnutrition and has been determined to have a diagnosis/diagnoses of Severe protein-calorie malnutrition.    This patient is being managed with:   Diet Dysphagia 2 Mechanical Soft-Thin Liquids-  Entered: Dec  8 2020 11:09AM     This patient has been assessed with a concern for Malnutrition and has been determined to have a diagnosis/diagnoses of Severe protein-calorie malnutrition.  Moderate protein calorie malnutrition  Protein 7.9    Albumin 2.7      This patient is being managed with:   Diet Dysphagia 2 Mechanical Soft-Thin Liquids-  Entered: Dec  8 2020 11:09AM

## 2020-12-09 NOTE — PROGRESS NOTE ADULT - PROBLEM SELECTOR PLAN 5
Bacterial vs aspiration.  Procalcitonin 0.18  Last day of zosyn. Continue fluconazole.  F/U cultures  Strict aspiration precautions.

## 2020-12-09 NOTE — PROGRESS NOTE ADULT - PROBLEM SELECTOR PLAN 3
Secondary to prolonged intubated secondary to COVID  Continue oxygen.  Monitor oxygen saturation.  Completed 10 days of steroids.

## 2020-12-09 NOTE — PROGRESS NOTE ADULT - ASSESSMENT
Patient is a 54yo Male with no PMH recently diagnosed with COVID-19 (5 days PTA) p/w SOB s/p intubated in the ER. Pt admitted to ICU with acute hypoxic respiratory failure 2/2 COVID-19 PNA s/p intubated, BECKY with transaminitis. Nephrology consulted for Elevated serum creatinine.    1. BECKY- unknown baseline SCr. BECKY likely hemodynamically mediated in the setting of septic shock / infection 2/2 COVID-19, hypotension s/p brief course of pressors;  likely ATN; Renal function improving with good urine o/p, now off IVF. FeNa 1.55% and FeUrea 47%; inconclusive.  UA with 100 protein and trace blood with hyaline cast. Will defer Renal US due to COVID status. Strict I/Os. Avoid nephrotoxins/ NSAIDs/ RCA. Monitor BMP.  2. Septic Shock due to Multifocal PNA 2/2 COVID-19- Plan per primary team  3. Hypotension- BP acceptable on amlodipine  10mg O qd; Monitor BP  4. Acute hypoxic respiratory failure- resolved. Plan as per primary team.   5. Hypocalcemia- & Hyperphosphatemia resolved. Monitor ionized calcium and serum phos.

## 2020-12-09 NOTE — PROGRESS NOTE ADULT - ASSESSMENT
55M from home without PMH with progressively worsening SOB and +COVID test x5 days prior to admission presented following SpO2 64% at home.  Started on 15L NRB in ED 2/2 SpO2s 85-90%, desaturated to low 80s and was placed on HFNC, continued to desaturate, was intubated and admitted to ICU 11/15.     S/p 10-day course decadron 11/15-24. Not a candidate for remdesivir initially 2/2 elevated LFTs and subsequently 2/2 CrCl <45. Kidney function poor with elevated Cr to 2.22 on admission, baseline unknown, peaked at 4.43 and gradually improved. U/O supported with Lasix boluses, gradually improved, nephro Dr. Camara following.  CXR with b/l opacities and small pleural effusions, APRs elevated with d-dimer 559->2763, started on heparin gtt for therapeutic AC. Completed 5 day course azithromycin and cftx for CAP. Extuabted 11/30, O2 delivery titrated down, now with NC and standby BiPAP.      11/23 spiked new fever, last fever 12/6. Started on 5-day course of Zosyn for possible aspiration pna, last day 12/9. BCx NGTD, MRSA negative. WBC with 21% eosinophils, possibly 2/2 fungemia, started fungemia and awaiting fungicell. Strength improving gradually. Medically stable for downgrade to SCU today.

## 2020-12-09 NOTE — PROGRESS NOTE ADULT - PROBLEM SELECTOR PLAN 1
Intubated on 11/15  Extubated on 11/30.  Currently maintaining saturation on 2 LPM nasal cannula.  Continue to monitor oxygen saturation.  Will need oxygen testing before discharge.

## 2020-12-09 NOTE — PROGRESS NOTE ADULT - SUBJECTIVE AND OBJECTIVE BOX
[facility] NEPHROLOGY- PROGRESS NOTE    Patient is a 54yo Male with no PMH recently diagnosed with COVID-19 (5 days PTA) p/w SOB s/p intubated in the ER. Pt admitted to ICU with acute hypoxic respiratory failure 2/2 COVID-19 PNA s/p intubated, BECKY with transaminitis. Nephrology consulted for Elevated serum creatinine.  Extubated 11/30    Hospital Medications: Medications reviewed.  REVIEW OF SYSTEMS: Deferred due to COVID status    VITALS:  T(F): 97.3 (12-09-20 @ 14:34), Max: 98.7 (12-08-20 @ 22:55)  HR: 89 (12-09-20 @ 14:34)  BP: 132/66 (12-09-20 @ 14:34)  RR: 18 (12-09-20 @ 14:34)  SpO2: 94% (12-09-20 @ 14:34)  Wt(kg): --    12-08 @ 07:01  -  12-09 @ 07:00  --------------------------------------------------------  IN: 625 mL / OUT: 445 mL / NET: 180 mL        PHYSICAL EXAM: Defer physical exam due to COVID-19 status. Please refer to primary team's note for physical exam.      LABS:  12-09    138  |  103  |  27<H>  ----------------------------<  100<H>  3.2<L>   |  22  |  1.23    Ca    8.6      09 Dec 2020 07:33  Phos  3.0     12-09  Mg     2.2     12-09    TPro  7.9  /  Alb  2.7<L>  /  TBili  0.5  /  DBili      /  AST  37  /  ALT  35  /  AlkPhos  66  12-09    Creatinine Trend: 1.23 <--, 1.47 <--, 1.51 <--, 1.73 <--, 1.97 <--, 1.91 <--, 1.31 <--                        8.7    7.76  )-----------( 90       ( 09 Dec 2020 07:33 )             27.9     Urine Studies:    Sodium, Random Urine: 84 mmol/L (12-05 @ 18:19)  Creatinine, Random Urine: 74 mg/dL (12-05 @ 18:19)  Chloride, Random Urine: 90 mmol/L (12-05 @ 18:19)

## 2020-12-09 NOTE — PROGRESS NOTE ADULT - SUBJECTIVE AND OBJECTIVE BOX
PUSHPA KANG    SCU progress note    INTERVAL HPI/OVERNIGHT EVENTS: ***Patient transferred from ICU to SCU late yesterday.  HPI: 55M from home without PMH with progressively worsening SOB and +COVID test x5 days prior to admission presented following SpO2 64% at home.  Started on 15L NRB in ED 2/2 SpO2s 85-90%, desaturated to low 80s and was placed on HFNC, continued to desaturate, was intubated and admitted to ICU 11/15.     S/p 10-day course decadron 11/15-24. Not a candidate for remdesivir initially 2/2 elevated LFTs and subsequently 2/2 CrCl <45. Kidney function poor with elevated Cr to 2.22 on admission, baseline unknown, peaked at 4.43 and gradually improved. U/O supported with Lasix boluses, gradually improved, nephro Dr. Camara following.  CXR with b/l opacities and small pleural effusions, APRs elevated with d-dimer 559->2763, started on heparin gtt for therapeutic AC. Completed 5 day course azithromycin and cftx for CAP. Extuabted 11/30, O2 delivery titrated down, now with NC and standby BiPAP.      11/23 spiked new fever, last fever 12/6. Started on 5-day course of Zosyn for possible aspiration pna, last day 12/9. BCx NGTD, MRSA negative. WBC with 21% eosinophils, possibly 2/2 fungemia, started fungemia and awaiting fungicell. Strength improving gradually.       DNR [ ]   DNI  [  ]   FULL CODE    Covid - 19 PCR:  Positive 11/30    The 4Ms    What Matters Most: see GOC  Age appropriate Medications/Screen for High Risk Medication: Yes  Mentation: see CAM below  Mobility: defer to physical exam    The Confusion Assessment Method (CAM) Diagnostic Algorithm     1: Acute Onset or Fluctuating Course  - Is there evidence of an acute change in mental status from the patient’s baseline? Did the (abnormal) behavior  fluctuate during the day, that is, tend to come and go, or increase and decrease in severity?  [ ] YES [x ] NO     2: Inattention  - Did the patient have difficulty focusing attention, being easily distractible, or having difficulty keeping track of what was being said?  [ ] YES [x ] NO     3: Disorganized thinking  -Was the patient’s thinking disorganized or incoherent, such as rambling or irrelevant conversation, unclear or illogical flow of ideas, or unpredictable switching from subject to subject?  [ ] YES [x ] NO    4: Altered Level of consciousness?  [ ] YES [x ] NO    The diagnosis of delirium by CAM requires the presence of features 1 and 2 and either 3 or 4.    PRESSORS: [ ] YES [x ] NO  fluconAZOLE   Tablet 400 milliGRAM(s) Oral daily  piperacillin/tazobactam IVPB.. 3.375 Gram(s) IV Intermittent every 12 hours    Cardiovascular:  Heart Failure  Acute   Acute on Chronic  Chronic       amLODIPine   Tablet 10 milliGRAM(s) Oral daily    Pulmonary:    Hematalogic:  enoxaparin Injectable 70 milliGRAM(s) SubCutaneous two times a day    Other:  artificial  tears Solution 1 Drop(s) Both EYES every 4 hours PRN  chlorhexidine 2% Cloths 1 Application(s) Topical <User Schedule>  senna 2 Tablet(s) Oral at bedtime  sodium chloride 0.9% lock flush 10 milliLiter(s) IV Push every 1 hour PRN    amLODIPine   Tablet 10 milliGRAM(s) Oral daily  artificial  tears Solution 1 Drop(s) Both EYES every 4 hours PRN  chlorhexidine 2% Cloths 1 Application(s) Topical <User Schedule>  enoxaparin Injectable 70 milliGRAM(s) SubCutaneous two times a day  fluconAZOLE   Tablet 400 milliGRAM(s) Oral daily  piperacillin/tazobactam IVPB.. 3.375 Gram(s) IV Intermittent every 12 hours  senna 2 Tablet(s) Oral at bedtime  sodium chloride 0.9% lock flush 10 milliLiter(s) IV Push every 1 hour PRN    Drug Dosing Weight  Height (cm): 157.5 (15 Nov 2020 23:00)  Weight (kg): 85.4 (15 Nov 2020 23:00)  BMI (kg/m2): 34.4 (15 Nov 2020 23:00)  BSA (m2): 1.86 (15 Nov 2020 23:00)    CENTRAL LINE: [ ] YES [x ] NO  LOCATION:   DATE INSERTED:  REMOVE: [ ] YES [ ] NO  EXPLAIN:    ANN: [ ] YES [x ] NO    DATE INSERTED:  REMOVE:  [ ] YES [ ] NO  EXPLAIN:    PAST MEDICAL & SURGICAL HISTORY:  COVID-19    No significant past surgical history                12-08 @ 07:01  -  12-09 @ 07:00  --------------------------------------------------------  IN: 625 mL / OUT: 445 mL / NET: 180 mL            PHYSICAL EXAM:    GENERAL: NAD, well-groomed, well-developed  HEAD:  Atraumatic, Normocephalic  EYES: EOMI, PERRLA, conjunctiva and sclera clear  ENMT: No tonsillar erythema, exudates  NECK: Supple, No JVD, small   NERVOUS SYSTEM:  Alert & Oriented X3, Good concentration; Motor Strength 5/5 B/L upper and lower extremities; DTRs 2+ intact and symmetric  CHEST/LUNG: Clear to percussion bilaterally; No rales, rhonchi, wheezing, or rubs  HEART: Regular rate and rhythm; No murmurs, rubs, or gallops  ABDOMEN: Soft, Nontender, Nondistended; Bowel sounds present  EXTREMITIES:  2+ Peripheral Pulses, No clubbing, cyanosis, or edema  LYMPH: No lymphadenopathy noted  SKIN: No rashes or lesions      LABS:  CBC Full  -  ( 09 Dec 2020 07:33 )  WBC Count : 7.76 K/uL  RBC Count : 3.01 M/uL  Hemoglobin : 8.7 g/dL  Hematocrit : 27.9 %  Platelet Count - Automated : 90 K/uL  Mean Cell Volume : 92.7 fl  Mean Cell Hemoglobin : 28.9 pg  Mean Cell Hemoglobin Concentration : 31.2 gm/dL  Auto Neutrophil # : x  Auto Lymphocyte # : x  Auto Monocyte # : x  Auto Eosinophil # : x  Auto Basophil # : x  Auto Neutrophil % : x  Auto Lymphocyte % : x  Auto Monocyte % : x  Auto Eosinophil % : x  Auto Basophil % : x    12-09    138  |  103  |  27<H>  ----------------------------<  100<H>  3.2<L>   |  22  |  1.23    Ca    8.6      09 Dec 2020 07:33  Phos  3.0     12-09  Mg     2.2     12-09    TPro  7.9  /  Alb  2.7<L>  /  TBili  0.5  /  DBili  x   /  AST  37  /  ALT  35  /  AlkPhos  66  12-09              [  ]  DVT Prophylaxis  [  ]  Nutrition, Brand, Rate         Goal Rate        Abnormal Nutritional Status -  Malnutrition   Cachexia      Morbid Obesity BMI >/=40    RADIOLOGY & ADDITIONAL STUDIES:  ***    Goals of Care Discussion with Family/Proxy/Other   - see note from/family meeting set up for...     PUSHPA KANG    SCU progress note    INTERVAL HPI/OVERNIGHT EVENTS: ***Patient transferred from ICU to SCU late yesterday.  HPI: 55M from home without PMH with progressively worsening SOB and +COVID test x5 days prior to admission presented following SpO2 64% at home.  Started on 15L NRB in ED 2/2 SpO2s 85-90%, desaturated to low 80s and was placed on HFNC, continued to desaturate, was intubated and admitted to ICU 11/15.     S/p 10-day course decadron 11/15-24. Not a candidate for remdesivir initially 2/2 elevated LFTs and subsequently 2/2 CrCl <45. Kidney function poor with elevated Cr to 2.22 on admission, baseline unknown, peaked at 4.43 and gradually improved. U/O supported with Lasix boluses, gradually improved, nephro Dr. Camara following.  CXR with b/l opacities and small pleural effusions, APRs elevated with d-dimer 559->2763, started on heparin gtt for therapeutic AC. Completed 5 day course azithromycin and cftx for CAP. Extuabted 11/30, O2 delivery titrated down, now with NC and standby BiPAP.      11/23 spiked new fever, last fever 12/6. Started on 5-day course of Zosyn for possible aspiration pna, last day 12/9. BCx NGTD, MRSA negative. WBC with 21% eosinophils, possibly 2/2 fungemia, started fungemia and awaiting fungicell. Strength improving gradually.       DNR [ ]   DNI  [  ]   FULL CODE    Covid - 19 PCR:  Positive 11/30    [facility] 4Ms    What Matters Most: see GOC  Age appropriate Medications/Screen for High Risk Medication: Yes  Mentation: see CAM below  Mobility: defer to physical exam    The Confusion Assessment Method (CAM) Diagnostic Algorithm     1: Acute Onset or Fluctuating Course  - Is there evidence of an acute change in mental status from the patient’s baseline? Did the (abnormal) behavior  fluctuate during the day, that is, tend to come and go, or increase and decrease in severity?  [ ] YES [x ] NO     2: Inattention  - Did the patient have difficulty focusing attention, being easily distractible, or having difficulty keeping track of what was being said?  [ ] YES [x ] NO     3: Disorganized thinking  -Was the patient’s thinking disorganized or incoherent, such as rambling or irrelevant conversation, unclear or illogical flow of ideas, or unpredictable switching from subject to subject?  [ ] YES [x ] NO    4: Altered Level of consciousness?  [ ] YES [x ] NO    The diagnosis of delirium by CAM requires the presence of features 1 and 2 and either 3 or 4.    PRESSORS: [ ] YES [x ] NO  fluconAZOLE   Tablet 400 milliGRAM(s) Oral daily  piperacillin/tazobactam IVPB.. 3.375 Gram(s) IV Intermittent every 12 hours    Cardiovascular:  Heart Failure  Acute   Acute on Chronic  Chronic       amLODIPine   Tablet 10 milliGRAM(s) Oral daily    Pulmonary:    Hematalogic:  enoxaparin Injectable 70 milliGRAM(s) SubCutaneous two times a day    Other:  artificial  tears Solution 1 Drop(s) Both EYES every 4 hours PRN  chlorhexidine 2% Cloths 1 Application(s) Topical <User Schedule>  senna 2 Tablet(s) Oral at bedtime  sodium chloride 0.9% lock flush 10 milliLiter(s) IV Push every 1 hour PRN    amLODIPine   Tablet 10 milliGRAM(s) Oral daily  artificial  tears Solution 1 Drop(s) Both EYES every 4 hours PRN  chlorhexidine 2% Cloths 1 Application(s) Topical <User Schedule>  enoxaparin Injectable 70 milliGRAM(s) SubCutaneous two times a day  fluconAZOLE   Tablet 400 milliGRAM(s) Oral daily  piperacillin/tazobactam IVPB.. 3.375 Gram(s) IV Intermittent every 12 hours  senna 2 Tablet(s) Oral at bedtime  sodium chloride 0.9% lock flush 10 milliLiter(s) IV Push every 1 hour PRN    Drug Dosing Weight  Height (cm): 157.5 (15 Nov 2020 23:00)  Weight (kg): 85.4 (15 Nov 2020 23:00)  BMI (kg/m2): 34.4 (15 Nov 2020 23:00)  BSA (m2): 1.86 (15 Nov 2020 23:00)    CENTRAL LINE: [ ] YES [x ] NO  LOCATION:   DATE INSERTED:  REMOVE: [ ] YES [ ] NO  EXPLAIN:    NAN: [ ] YES [x ] NO    DATE INSERTED:  REMOVE:  [ ] YES [ ] NO  EXPLAIN:    PAST MEDICAL & SURGICAL HISTORY:  COVID-19    No significant past surgical history                12-08 @ 07:01  -  12-09 @ 07:00  --------------------------------------------------------  IN: 625 mL / OUT: 445 mL / NET: 180 mL            PHYSICAL EXAM:    GENERAL: NAD, well-groomed, well-developed  HEAD:  Atraumatic, Normocephalic  EYES: EOMI, PERRLA, conjunctiva and sclera clear  ENMT: No tonsillar erythema, exudates  NECK: Supple, No JVD, small ecchymotic area left side of neck.  NERVOUS SYSTEM:  Alert & Oriented X3, Follows commands. Moving all extremities. Extremities weak.  CHEST/LUNG: Diminished breath sounds bilaterally. No crackles noted.  HEART: Regular rate and rhythm; No murmurs, rubs, or gallops  ABDOMEN: Soft, Nontender, Nondistended; Bowel sounds present  EXTREMITIES:  2+ Peripheral Pulses, No clubbing, cyanosis, or edema  LYMPH: No lymphadenopathy noted  SKIN: No rashes or lesions      LABS:  CBC Full  -  ( 09 Dec 2020 07:33 )  WBC Count : 7.76 K/uL  RBC Count : 3.01 M/uL  Hemoglobin : 8.7 g/dL  Hematocrit : 27.9 %  Platelet Count - Automated : 90 K/uL  Mean Cell Volume : 92.7 fl  Mean Cell Hemoglobin : 28.9 pg  Mean Cell Hemoglobin Concentration : 31.2 gm/dL  Auto Neutrophil # : x  Auto Lymphocyte # : x  Auto Monocyte # : x  Auto Eosinophil # : x  Auto Basophil # : x  Auto Neutrophil % : x  Auto Lymphocyte % : x  Auto Monocyte % : x  Auto Eosinophil % : x  Auto Basophil % : x    12-09    138  |  103  |  27<H>  ----------------------------<  100<H>  3.2<L>   |  22  |  1.23    Ca    8.6      09 Dec 2020 07:33  Phos  3.0     12-09  Mg     2.2     12-09    TPro  7.9  /  Alb  2.7<L>  /  TBili  0.5  /  DBili  x   /  AST  37  /  ALT  35  /  AlkPhos  66  12-09              [  ]  DVT Prophylaxis  [  ]  Nutrition, Brand, Rate         Goal Rate        Abnormal Nutritional Status -  Malnutrition   Cachexia      Morbid Obesity BMI >/=40    RADIOLOGY & ADDITIONAL STUDIES:  ***    Goals of Care Discussion with Family/Proxy/Other   - see note from/family meeting set up for...

## 2020-12-10 DIAGNOSIS — J18.9 PNEUMONIA, UNSPECIFIED ORGANISM: ICD-10-CM

## 2020-12-10 DIAGNOSIS — D64.9 ANEMIA, UNSPECIFIED: ICD-10-CM

## 2020-12-10 DIAGNOSIS — N17.9 ACUTE KIDNEY FAILURE, UNSPECIFIED: ICD-10-CM

## 2020-12-10 DIAGNOSIS — E43 UNSPECIFIED SEVERE PROTEIN-CALORIE MALNUTRITION: ICD-10-CM

## 2020-12-10 DIAGNOSIS — R74.01 ELEVATION OF LEVELS OF LIVER TRANSAMINASE LEVELS: ICD-10-CM

## 2020-12-10 DIAGNOSIS — J80 ACUTE RESPIRATORY DISTRESS SYNDROME: ICD-10-CM

## 2020-12-10 LAB
ALBUMIN SERPL ELPH-MCNC: 3 G/DL — LOW (ref 3.5–5)
ALP SERPL-CCNC: 71 U/L — SIGNIFICANT CHANGE UP (ref 40–120)
ALT FLD-CCNC: 40 U/L DA — SIGNIFICANT CHANGE UP (ref 10–60)
ANION GAP SERPL CALC-SCNC: 10 MMOL/L — SIGNIFICANT CHANGE UP (ref 5–17)
AST SERPL-CCNC: 37 U/L — SIGNIFICANT CHANGE UP (ref 10–40)
BASOPHILS # BLD AUTO: 0.05 K/UL — SIGNIFICANT CHANGE UP (ref 0–0.2)
BASOPHILS NFR BLD AUTO: 0.6 % — SIGNIFICANT CHANGE UP (ref 0–2)
BILIRUB SERPL-MCNC: 0.6 MG/DL — SIGNIFICANT CHANGE UP (ref 0.2–1.2)
BUN SERPL-MCNC: 22 MG/DL — HIGH (ref 7–18)
CALCIUM SERPL-MCNC: 9.1 MG/DL — SIGNIFICANT CHANGE UP (ref 8.4–10.5)
CHLORIDE SERPL-SCNC: 102 MMOL/L — SIGNIFICANT CHANGE UP (ref 96–108)
CO2 SERPL-SCNC: 26 MMOL/L — SIGNIFICANT CHANGE UP (ref 22–31)
CREAT SERPL-MCNC: 1.11 MG/DL — SIGNIFICANT CHANGE UP (ref 0.5–1.3)
EOSINOPHIL # BLD AUTO: 1.94 K/UL — HIGH (ref 0–0.5)
EOSINOPHIL NFR BLD AUTO: 22.7 % — HIGH (ref 0–6)
GLUCOSE SERPL-MCNC: 90 MG/DL — SIGNIFICANT CHANGE UP (ref 70–99)
HCT VFR BLD CALC: 32.3 % — LOW (ref 39–50)
HGB BLD-MCNC: 10 G/DL — LOW (ref 13–17)
IMM GRANULOCYTES NFR BLD AUTO: 1.1 % — SIGNIFICANT CHANGE UP (ref 0–1.5)
LYMPHOCYTES # BLD AUTO: 1.22 K/UL — SIGNIFICANT CHANGE UP (ref 1–3.3)
LYMPHOCYTES # BLD AUTO: 14.3 % — SIGNIFICANT CHANGE UP (ref 13–44)
MAGNESIUM SERPL-MCNC: 2.1 MG/DL — SIGNIFICANT CHANGE UP (ref 1.6–2.6)
MCHC RBC-ENTMCNC: 28 PG — SIGNIFICANT CHANGE UP (ref 27–34)
MCHC RBC-ENTMCNC: 31 GM/DL — LOW (ref 32–36)
MCV RBC AUTO: 90.5 FL — SIGNIFICANT CHANGE UP (ref 80–100)
MONOCYTES # BLD AUTO: 0.61 K/UL — SIGNIFICANT CHANGE UP (ref 0–0.9)
MONOCYTES NFR BLD AUTO: 7.1 % — SIGNIFICANT CHANGE UP (ref 2–14)
NEUTROPHILS # BLD AUTO: 4.64 K/UL — SIGNIFICANT CHANGE UP (ref 1.8–7.4)
NEUTROPHILS NFR BLD AUTO: 54.2 % — SIGNIFICANT CHANGE UP (ref 43–77)
NRBC # BLD: 0 /100 WBCS — SIGNIFICANT CHANGE UP (ref 0–0)
PHOSPHATE SERPL-MCNC: 3.2 MG/DL — SIGNIFICANT CHANGE UP (ref 2.5–4.5)
PLATELET # BLD AUTO: 197 K/UL — SIGNIFICANT CHANGE UP (ref 150–400)
POTASSIUM SERPL-MCNC: 3.5 MMOL/L — SIGNIFICANT CHANGE UP (ref 3.5–5.3)
POTASSIUM SERPL-SCNC: 3.5 MMOL/L — SIGNIFICANT CHANGE UP (ref 3.5–5.3)
PROT SERPL-MCNC: 8.5 G/DL — HIGH (ref 6–8.3)
RBC # BLD: 3.57 M/UL — LOW (ref 4.2–5.8)
RBC # FLD: 13.3 % — SIGNIFICANT CHANGE UP (ref 10.3–14.5)
SODIUM SERPL-SCNC: 138 MMOL/L — SIGNIFICANT CHANGE UP (ref 135–145)
WBC # BLD: 8.55 K/UL — SIGNIFICANT CHANGE UP (ref 3.8–10.5)
WBC # FLD AUTO: 8.55 K/UL — SIGNIFICANT CHANGE UP (ref 3.8–10.5)

## 2020-12-10 PROCEDURE — 71045 X-RAY EXAM CHEST 1 VIEW: CPT | Mod: 26

## 2020-12-10 RX ORDER — BENZOCAINE AND MENTHOL 5; 1 G/100ML; G/100ML
1 LIQUID ORAL EVERY 12 HOURS
Refills: 0 | Status: DISCONTINUED | OUTPATIENT
Start: 2020-12-10 | End: 2020-12-18

## 2020-12-10 RX ADMIN — PIPERACILLIN AND TAZOBACTAM 25 GRAM(S): 4; .5 INJECTION, POWDER, LYOPHILIZED, FOR SOLUTION INTRAVENOUS at 05:27

## 2020-12-10 RX ADMIN — FLUCONAZOLE 400 MILLIGRAM(S): 150 TABLET ORAL at 11:19

## 2020-12-10 RX ADMIN — ENOXAPARIN SODIUM 70 MILLIGRAM(S): 100 INJECTION SUBCUTANEOUS at 05:27

## 2020-12-10 RX ADMIN — BENZOCAINE AND MENTHOL 1 LOZENGE: 5; 1 LIQUID ORAL at 21:25

## 2020-12-10 RX ADMIN — CHLORHEXIDINE GLUCONATE 1 APPLICATION(S): 213 SOLUTION TOPICAL at 05:27

## 2020-12-10 RX ADMIN — ENOXAPARIN SODIUM 70 MILLIGRAM(S): 100 INJECTION SUBCUTANEOUS at 18:34

## 2020-12-10 RX ADMIN — SENNA PLUS 2 TABLET(S): 8.6 TABLET ORAL at 21:25

## 2020-12-10 RX ADMIN — AMLODIPINE BESYLATE 10 MILLIGRAM(S): 2.5 TABLET ORAL at 05:27

## 2020-12-10 RX ADMIN — PIPERACILLIN AND TAZOBACTAM 25 GRAM(S): 4; .5 INJECTION, POWDER, LYOPHILIZED, FOR SOLUTION INTRAVENOUS at 18:34

## 2020-12-10 NOTE — DISCHARGE NOTE PROVIDER - CARE PROVIDER_API CALL
Carmen Chau)  Medicine  Dept Director  [address] [place] [address]  Phone: [phone]  Fax: [phone]  Follow Up Time: 1-3 days

## 2020-12-10 NOTE — DISCHARGE NOTE PROVIDER - CONDITIONS AT DISCHARGE
Hemodynamically stable for discharge to rehab Hemodynamically stable for discharge to rehab. Discussed w/ Dr. Johansen

## 2020-12-10 NOTE — PROGRESS NOTE ADULT - PROBLEM SELECTOR PLAN 10
DVT and GI prophylaxis. On full dose lovenox.  F/U cultures. Last day of zosyn. Continue fluconazole until cultures are finalized.  PT and Dietary consults.  Will need oxygen testing before discharge.  Condition remains guarded.   Brother updated on status.

## 2020-12-10 NOTE — DISCHARGE NOTE PROVIDER - HOSPITAL COURSE
55M from home without PMH with progressively worsening SOB and +COVID test x5 days prior to admission presented following SpO2 64% at home.  Started on 15L NRB in ED 2/2 SpO2s 85-90%, desaturated to low 80s and was placed on HFNC, continued to desaturate, was intubated and admitted to ICU 11/15.     S/p 10-day course decadron 11/15-24. Not a candidate for remdesivir initially 2/2 elevated LFTs and subsequently 2/2 CrCl <45. Kidney function poor with elevated Cr to 2.22 on admission, baseline unknown, peaked at 4.43 and gradually improved. U/O supported with Lasix boluses, gradually improved, nephro Dr. Camara following.  CXR with b/l opacities and small pleural effusions, APRs elevated with d-dimer 559->2763, started on heparin gtt for therapeutic AC. Completed 5 day course azithromycin and cftx for CAP. Extuabted 11/30, O2 delivery titrated down, now with NC and standby BiPAP.      11/23 spiked new fever, last fever 12/6. Started on 5-day course of Zosyn for possible aspiration pna, last day 12/9. BCx NGTD, MRSA negative. WBC with 21% eosinophils, possibly 2/2 fungemia, started fungemia and awaiting fungicell. Strength improving gradually. Medically stable for downgrade to SCU today.   12/8  transferred to SCU  12/10 Maintaining oxygen saturation off oxygen.    ****************************************************************************************INCOMPLETE************************************************************************ 55M from home without PMH with progressively worsening SOB and +COVID test x5 days prior to admission presented following SpO2 64% at home.  Started on 15L NRB in ED 2/2 SpO2s 85-90%, desaturated to low 80s and was placed on HFNC, continued to desaturate, was intubated and admitted to ICU 11/15.     S/p 10-day course decadron 11/15-24. Not a candidate for remdesivir initially 2/2 elevated LFTs and subsequently 2/2 CrCl <45. Kidney function poor with elevated Cr to 2.22 on admission, baseline unknown, peaked at 4.43 and gradually improved. U/O supported with Lasix boluses, gradually improved, nephro Dr. Camara following.  CXR with b/l opacities and small pleural effusions, APRs elevated with d-dimer 559->2763, started on heparin gtt for therapeutic AC. Completed 5 day course azithromycin and cftx for CAP. Extuabted 11/30, O2 delivery titrated down, now with NC and standby BiPAP.      11/23 spiked new fever, last fever 12/6. Started on 5-day course of Zosyn for possible aspiration pna, last day 12/9. BCx NGTD, MRSA negative. WBC with 21% eosinophils, possibly 2/2 fungemia, started fungemia and awaiting fungicell. Strength improving gradually. Medically stable for downgrade to SCU ( [facility] Care Unit) today.   12/10 Maintaining oxygen saturation off oxygen.  12/16- Pt has remained stable and saturating well off oxygen.  Medically clear for d/c to [facility].  Last COVID PCR positive on 12/16/2020 and [facility] facility is aware of Covid status.

## 2020-12-10 NOTE — DISCHARGE NOTE PROVIDER - NSDCMRMEDTOKEN_GEN_ALL_CORE_FT
amLODIPine 10 mg oral tablet: 1 tab(s) orally once a day  enoxaparin: 70 milligram(s) subcutaneous 2 times a day  menthol-benzocaine 3.6 mg-15 mg mucous membrane lozenge:  mucous membrane   ocular lubricant ophthalmic solution: 1 drop(s) to each affected eye every 4 hours, As needed, Dry Eyes  senna oral tablet: 2 tab(s) orally once a day (at bedtime)

## 2020-12-10 NOTE — PROGRESS NOTE ADULT - SUBJECTIVE AND OBJECTIVE BOX
PUSHPA KANG    SCU progress note    INTERVAL HPI/OVERNIGHT EVENTS: ***No overnight events    DNR [ ]   DNI  [  ]   FULL CODE    Covid - 19 PCR: negative 12/9    The 4Ms    What Matters Most: see GOC  Age appropriate Medications/Screen for High Risk Medication: Yes  Mentation: see CAM below  Mobility: defer to physical exam    The Confusion Assessment Method (CAM) Diagnostic Algorithm     1: Acute Onset or Fluctuating Course  - Is there evidence of an acute change in mental status from the patient’s baseline? Did the (abnormal) behavior  fluctuate during the day, that is, tend to come and go, or increase and decrease in severity?  [ ] YES [x ] NO     2: Inattention  - Did the patient have difficulty focusing attention, being easily distractible, or having difficulty keeping track of what was being said?  [ ] YES [x ] NO     3: Disorganized thinking  -Was the patient’s thinking disorganized or incoherent, such as rambling or irrelevant conversation, unclear or illogical flow of ideas, or unpredictable switching from subject to subject?  [ ] YES [x ] NO    4: Altered Level of consciousness?  [ ] YES [x ] NO    The diagnosis of delirium by CAM requires the presence of features 1 and 2 and either 3 or 4.    PRESSORS: [ ] YES [ ] NO  fluconAZOLE   Tablet 400 milliGRAM(s) Oral daily  piperacillin/tazobactam IVPB.. 3.375 Gram(s) IV Intermittent every 12 hours    Cardiovascular:  Heart Failure  Acute   Acute on Chronic  Chronic       amLODIPine   Tablet 10 milliGRAM(s) Oral daily    Pulmonary:    Hematalogic:  enoxaparin Injectable 70 milliGRAM(s) SubCutaneous two times a day    Other:  artificial  tears Solution 1 Drop(s) Both EYES every 4 hours PRN  chlorhexidine 2% Cloths 1 Application(s) Topical <User Schedule>  senna 2 Tablet(s) Oral at bedtime  sodium chloride 0.9% lock flush 10 milliLiter(s) IV Push every 1 hour PRN    amLODIPine   Tablet 10 milliGRAM(s) Oral daily  artificial  tears Solution 1 Drop(s) Both EYES every 4 hours PRN  chlorhexidine 2% Cloths 1 Application(s) Topical <User Schedule>  enoxaparin Injectable 70 milliGRAM(s) SubCutaneous two times a day  fluconAZOLE   Tablet 400 milliGRAM(s) Oral daily  piperacillin/tazobactam IVPB.. 3.375 Gram(s) IV Intermittent every 12 hours  senna 2 Tablet(s) Oral at bedtime  sodium chloride 0.9% lock flush 10 milliLiter(s) IV Push every 1 hour PRN    Drug Dosing Weight  Height (cm): 157.5 (15 Nov 2020 23:00)  Weight (kg): 85.4 (15 Nov 2020 23:00)  BMI (kg/m2): 34.4 (15 Nov 2020 23:00)  BSA (m2): 1.86 (15 Nov 2020 23:00)    CENTRAL LINE: [ ] YES [x ] NO  LOCATION:   DATE INSERTED:  REMOVE: [ ] YES [ ] NO  EXPLAIN:    ANN: [ ] YES [x ] NO    DATE INSERTED:  REMOVE:  [ ] YES [ ] NO  EXPLAIN:    PAST MEDICAL & SURGICAL HISTORY:  COVID-19    No significant past surgical history              PHYSICAL EXAM:    GENERAL: NAD, well-developed  HEAD:  Atraumatic, Normocephalic  EYES: EOMI, PERRLA, conjunctiva and sclera clear  ENMT: No tonsillar erythema, exudates  NECK: Supple, No JVD  NERVOUS SYSTEM:  Alert & Oriented X3, Follows commands, moving all extremities  CHEST/LUNG: Diminished breath sounds bilaterally with a few scattered rhonchi  HEART: Regular rate and rhythm; No murmurs, rubs, or gallops  ABDOMEN: Soft, Nontender, Nondistended; Bowel sounds present  EXTREMITIES:  2+ Peripheral Pulses, No clubbing, cyanosis, or edema  LYMPH: No lymphadenopathy noted  SKIN: No rashes or lesions      LABS:  CBC Full  -  ( 10 Dec 2020 05:12 )  WBC Count : 8.55 K/uL  RBC Count : 3.57 M/uL  Hemoglobin : 10.0 g/dL  Hematocrit : 32.3 %  Platelet Count - Automated : 197 K/uL  Mean Cell Volume : 90.5 fl  Mean Cell Hemoglobin : 28.0 pg  Mean Cell Hemoglobin Concentration : 31.0 gm/dL  Auto Neutrophil # : 4.64 K/uL  Auto Lymphocyte # : 1.22 K/uL  Auto Monocyte # : 0.61 K/uL  Auto Eosinophil # : 1.94 K/uL  Auto Basophil # : 0.05 K/uL  Auto Neutrophil % : 54.2 %  Auto Lymphocyte % : 14.3 %  Auto Monocyte % : 7.1 %  Auto Eosinophil % : 22.7 %  Auto Basophil % : 0.6 %    12-10    138  |  102  |  22<H>  ----------------------------<  90  3.5   |  26  |  1.11    Ca    9.1      10 Dec 2020 05:12  Phos  3.2     12-10  Mg     2.1     12-10    TPro  8.5<H>  /  Alb  3.0<L>  /  TBili  0.6  /  DBili  x   /  AST  37  /  ALT  40  /  AlkPhos  71  12-10              [  ]  DVT Prophylaxis  [  ]  Nutrition, Brand, Rate         Goal Rate        Abnormal Nutritional Status -  Malnutrition   Cachexia          RADIOLOGY & ADDITIONAL STUDIES:  ***  < from: Xray Chest 1 View- PORTABLE-Routine (Xray Chest 1 View- PORTABLE-Routine in AM.) (12.10.20 @ 12:22) >  Heart is magnified by technique.    On December 5 there are fairly significant diffuse interstitial infiltrates concentrated in the mid lower lung fields.    These infiltrates are again noted but there is significantly improved aeration.    IMPRESSION: Improved aeration of bilateral lung infiltrates.    < end of copied text >    Goals of Care Discussion with Family/Proxy/Other

## 2020-12-11 LAB
ALBUMIN SERPL ELPH-MCNC: 2.9 G/DL — LOW (ref 3.5–5)
ALP SERPL-CCNC: 72 U/L — SIGNIFICANT CHANGE UP (ref 40–120)
ALT FLD-CCNC: 37 U/L DA — SIGNIFICANT CHANGE UP (ref 10–60)
ANION GAP SERPL CALC-SCNC: 7 MMOL/L — SIGNIFICANT CHANGE UP (ref 5–17)
ANISOCYTOSIS BLD QL: SLIGHT — SIGNIFICANT CHANGE UP
AST SERPL-CCNC: 29 U/L — SIGNIFICANT CHANGE UP (ref 10–40)
BASOPHILS # BLD AUTO: 0.1 K/UL — SIGNIFICANT CHANGE UP (ref 0–0.2)
BASOPHILS NFR BLD AUTO: 1 % — SIGNIFICANT CHANGE UP (ref 0–2)
BILIRUB SERPL-MCNC: 0.5 MG/DL — SIGNIFICANT CHANGE UP (ref 0.2–1.2)
BUN SERPL-MCNC: 22 MG/DL — HIGH (ref 7–18)
CALCIUM SERPL-MCNC: 8.9 MG/DL — SIGNIFICANT CHANGE UP (ref 8.4–10.5)
CHLORIDE SERPL-SCNC: 105 MMOL/L — SIGNIFICANT CHANGE UP (ref 96–108)
CO2 SERPL-SCNC: 26 MMOL/L — SIGNIFICANT CHANGE UP (ref 22–31)
CREAT SERPL-MCNC: 1.27 MG/DL — SIGNIFICANT CHANGE UP (ref 0.5–1.3)
EOSINOPHIL # BLD AUTO: 2.33 K/UL — HIGH (ref 0–0.5)
EOSINOPHIL NFR BLD AUTO: 24 % — HIGH (ref 0–6)
GLUCOSE SERPL-MCNC: 94 MG/DL — SIGNIFICANT CHANGE UP (ref 70–99)
HCT VFR BLD CALC: 28.8 % — LOW (ref 39–50)
HGB BLD-MCNC: 9 G/DL — LOW (ref 13–17)
LYMPHOCYTES # BLD AUTO: 0.87 K/UL — LOW (ref 1–3.3)
LYMPHOCYTES # BLD AUTO: 9 % — LOW (ref 13–44)
MACROCYTES BLD QL: SLIGHT — SIGNIFICANT CHANGE UP
MAGNESIUM SERPL-MCNC: 1.9 MG/DL — SIGNIFICANT CHANGE UP (ref 1.6–2.6)
MANUAL SMEAR VERIFICATION: SIGNIFICANT CHANGE UP
MCHC RBC-ENTMCNC: 28.5 PG — SIGNIFICANT CHANGE UP (ref 27–34)
MCHC RBC-ENTMCNC: 31.3 GM/DL — LOW (ref 32–36)
MCV RBC AUTO: 91.1 FL — SIGNIFICANT CHANGE UP (ref 80–100)
MONOCYTES # BLD AUTO: 0.58 K/UL — SIGNIFICANT CHANGE UP (ref 0–0.9)
MONOCYTES NFR BLD AUTO: 6 % — SIGNIFICANT CHANGE UP (ref 2–14)
NEUTROPHILS # BLD AUTO: 5.62 K/UL — SIGNIFICANT CHANGE UP (ref 1.8–7.4)
NEUTROPHILS NFR BLD AUTO: 58 % — SIGNIFICANT CHANGE UP (ref 43–77)
NRBC # BLD: 0 /100 — SIGNIFICANT CHANGE UP (ref 0–0)
PHOSPHATE SERPL-MCNC: 3.1 MG/DL — SIGNIFICANT CHANGE UP (ref 2.5–4.5)
PLAT MORPH BLD: NORMAL — SIGNIFICANT CHANGE UP
PLATELET # BLD AUTO: 253 K/UL — SIGNIFICANT CHANGE UP (ref 150–400)
POIKILOCYTOSIS BLD QL AUTO: SLIGHT — SIGNIFICANT CHANGE UP
POTASSIUM SERPL-MCNC: 3.1 MMOL/L — LOW (ref 3.5–5.3)
POTASSIUM SERPL-SCNC: 3.1 MMOL/L — LOW (ref 3.5–5.3)
PROT SERPL-MCNC: 8 G/DL — SIGNIFICANT CHANGE UP (ref 6–8.3)
RBC # BLD: 3.16 M/UL — LOW (ref 4.2–5.8)
RBC # FLD: 13.4 % — SIGNIFICANT CHANGE UP (ref 10.3–14.5)
RBC BLD AUTO: ABNORMAL
SARS-COV-2 RNA SPEC QL NAA+PROBE: SIGNIFICANT CHANGE UP
SMUDGE CELLS # BLD: PRESENT — SIGNIFICANT CHANGE UP
SODIUM SERPL-SCNC: 138 MMOL/L — SIGNIFICANT CHANGE UP (ref 135–145)
VARIANT LYMPHS # BLD: 2 % — SIGNIFICANT CHANGE UP (ref 0–6)
WBC # BLD: 9.69 K/UL — SIGNIFICANT CHANGE UP (ref 3.8–10.5)
WBC # FLD AUTO: 9.69 K/UL — SIGNIFICANT CHANGE UP (ref 3.8–10.5)

## 2020-12-11 RX ORDER — METOPROLOL TARTRATE 50 MG
2.5 TABLET ORAL ONCE
Refills: 0 | Status: COMPLETED | OUTPATIENT
Start: 2020-12-11 | End: 2020-12-11

## 2020-12-11 RX ORDER — POTASSIUM CHLORIDE 20 MEQ
40 PACKET (EA) ORAL EVERY 4 HOURS
Refills: 0 | Status: COMPLETED | OUTPATIENT
Start: 2020-12-11 | End: 2020-12-11

## 2020-12-11 RX ADMIN — ENOXAPARIN SODIUM 70 MILLIGRAM(S): 100 INJECTION SUBCUTANEOUS at 05:07

## 2020-12-11 RX ADMIN — AMLODIPINE BESYLATE 10 MILLIGRAM(S): 2.5 TABLET ORAL at 05:07

## 2020-12-11 RX ADMIN — CHLORHEXIDINE GLUCONATE 1 APPLICATION(S): 213 SOLUTION TOPICAL at 05:08

## 2020-12-11 RX ADMIN — ENOXAPARIN SODIUM 70 MILLIGRAM(S): 100 INJECTION SUBCUTANEOUS at 17:17

## 2020-12-11 RX ADMIN — Medication 40 MILLIEQUIVALENT(S): at 17:48

## 2020-12-11 RX ADMIN — FLUCONAZOLE 400 MILLIGRAM(S): 150 TABLET ORAL at 12:38

## 2020-12-11 RX ADMIN — Medication 40 MILLIEQUIVALENT(S): at 12:38

## 2020-12-11 RX ADMIN — PIPERACILLIN AND TAZOBACTAM 25 GRAM(S): 4; .5 INJECTION, POWDER, LYOPHILIZED, FOR SOLUTION INTRAVENOUS at 17:16

## 2020-12-11 RX ADMIN — PIPERACILLIN AND TAZOBACTAM 25 GRAM(S): 4; .5 INJECTION, POWDER, LYOPHILIZED, FOR SOLUTION INTRAVENOUS at 05:06

## 2020-12-11 NOTE — PROGRESS NOTE ADULT - SUBJECTIVE AND OBJECTIVE BOX
PUSHPA KANG    SCU progress note    INTERVAL HPI/OVERNIGHT EVENTS: ***No overnight events.    DNR [ ]   DNI  [  ]   FULL CODE.    Covid - 19 PCR: Negative    The 4Ms    What Matters Most: see GOC  Age appropriate Medications/Screen for High Risk Medication: Yes  Mentation: see CAM below  Mobility: defer to physical exam    The Confusion Assessment Method (CAM) Diagnostic Algorithm     1: Acute Onset or Fluctuating Course  - Is there evidence of an acute change in mental status from the patient’s baseline? Did the (abnormal) behavior  fluctuate during the day, that is, tend to come and go, or increase and decrease in severity?  [ ] YES [x ] NO     2: Inattention  - Did the patient have difficulty focusing attention, being easily distractible, or having difficulty keeping track of what was being said?  [ ] YES [x ] NO     3: Disorganized thinking  -Was the patient’s thinking disorganized or incoherent, such as rambling or irrelevant conversation, unclear or illogical flow of ideas, or unpredictable switching from subject to subject?  [ ] YES [x ] NO    4: Altered Level of consciousness?  [ ] YES [x ] NO    The diagnosis of delirium by CAM requires the presence of features 1 and 2 and either 3 or 4.    PRESSORS: [ ] YES [x ] NO  fluconAZOLE   Tablet 400 milliGRAM(s) Oral daily  piperacillin/tazobactam IVPB.. 3.375 Gram(s) IV Intermittent every 12 hours    Cardiovascular:  Heart Failure  Acute   Acute on Chronic  Chronic       amLODIPine   Tablet 10 milliGRAM(s) Oral daily    Pulmonary:    Hematalogic:  enoxaparin Injectable 70 milliGRAM(s) SubCutaneous two times a day    Other:  artificial  tears Solution 1 Drop(s) Both EYES every 4 hours PRN  benzocaine 15 mG/menthol 3.6 mG (Sugar-Free) Lozenge 1 Lozenge Oral every 12 hours PRN  chlorhexidine 2% Cloths 1 Application(s) Topical <User Schedule>  potassium chloride   Powder 40 milliEquivalent(s) Oral every 4 hours  senna 2 Tablet(s) Oral at bedtime  sodium chloride 0.9% lock flush 10 milliLiter(s) IV Push every 1 hour PRN    amLODIPine   Tablet 10 milliGRAM(s) Oral daily  artificial  tears Solution 1 Drop(s) Both EYES every 4 hours PRN  benzocaine 15 mG/menthol 3.6 mG (Sugar-Free) Lozenge 1 Lozenge Oral every 12 hours PRN  chlorhexidine 2% Cloths 1 Application(s) Topical <User Schedule>  enoxaparin Injectable 70 milliGRAM(s) SubCutaneous two times a day  fluconAZOLE   Tablet 400 milliGRAM(s) Oral daily  piperacillin/tazobactam IVPB.. 3.375 Gram(s) IV Intermittent every 12 hours  potassium chloride   Powder 40 milliEquivalent(s) Oral every 4 hours  senna 2 Tablet(s) Oral at bedtime  sodium chloride 0.9% lock flush 10 milliLiter(s) IV Push every 1 hour PRN    Drug Dosing Weight  Height (cm): 157.5 (15 Nov 2020 23:00)  Weight (kg): 85.4 (15 Nov 2020 23:00)  BMI (kg/m2): 34.4 (15 Nov 2020 23:00)  BSA (m2): 1.86 (15 Nov 2020 23:00)    CENTRAL LINE: [ ] YES [x ] NO  LOCATION:   DATE INSERTED:  REMOVE: [ ] YES [ ] NO  EXPLAIN:    ANN: [ ] YES [x ] NO    DATE INSERTED:  REMOVE:  [ ] YES [ ] NO  EXPLAIN:    PAST MEDICAL & SURGICAL HISTORY:  COVID-19    No significant past surgical history          PHYSICAL EXAM:    GENERAL: NAD  HEAD:  Atraumatic, Normocephalic  EYES: EOMI, PERRLA, conjunctiva and sclera clear  ENMT: No tonsillar erythema, exudates  NECK: Supple, No JVD  NERVOUS SYSTEM:  Alert & Oriented X3, Moving all extremities  CHEST/LUNG: Diminished breath sounds bilateral bases  HEART: Regular rate and rhythm; No murmurs, rubs, or gallops  ABDOMEN: Soft, Nontender, Nondistended; Bowel sounds present  EXTREMITIES:  2+ Peripheral Pulses, No clubbing, cyanosis, or edema  LYMPH: No lymphadenopathy noted  SKIN: No rashes or lesions      LABS:  CBC Full  -  ( 11 Dec 2020 07:21 )  WBC Count : 9.69 K/uL  RBC Count : 3.16 M/uL  Hemoglobin : 9.0 g/dL  Hematocrit : 28.8 %  Platelet Count - Automated : 253 K/uL  Mean Cell Volume : 91.1 fl  Mean Cell Hemoglobin : 28.5 pg  Mean Cell Hemoglobin Concentration : 31.3 gm/dL  Auto Neutrophil # : 5.62 K/uL  Auto Lymphocyte # : 0.87 K/uL  Auto Monocyte # : 0.58 K/uL  Auto Eosinophil # : 2.33 K/uL  Auto Basophil # : 0.10 K/uL  Auto Neutrophil % : 58.0 %  Auto Lymphocyte % : 9.0 %  Auto Monocyte % : 6.0 %  Auto Eosinophil % : 24.0 %  Auto Basophil % : 1.0 %    12-11    138  |  105  |  22<H>  ----------------------------<  94  3.1<L>   |  26  |  1.27    Ca    8.9      11 Dec 2020 07:21  Phos  3.1     12-11  Mg     1.9     12-11    TPro  8.0  /  Alb  2.9<L>  /  TBili  0.5  /  DBili  x   /  AST  29  /  ALT  37  /  AlkPhos  72  12-11              [  ]  DVT Prophylaxis  [  ]  Nutrition, Brand, Rate         Goal Rate        Abnormal Nutritional Status -  Malnutrition   Cachexia      Morbid Obesity BMI >/=40    RADIOLOGY & ADDITIONAL STUDIES:  ***  < from: Xray Chest 1 View- PORTABLE-Routine (Xray Chest 1 View- PORTABLE-Routine in AM.) (12.10.20 @ 12:22) >  Heart is magnified by technique.    On December 5 there are fairly significant diffuse interstitial infiltrates concentrated in the mid lower lung fields.    These infiltrates are again noted but there is significantly improved aeration.    IMPRESSION: Improved aeration of bilateral lung infiltrates.    < end of copied text >    Goals of Care Discussion with Family/Proxy/Other

## 2020-12-11 NOTE — PROGRESS NOTE ADULT - PROBLEM SELECTOR PLAN 3
Secondary to prolonged intubattion secondary to COVID.  Monitor oxygen saturation.  Completed 10 days of steroids.

## 2020-12-11 NOTE — PROGRESS NOTE ADULT - PROBLEM SELECTOR PLAN 10
DVT and GI prophylaxis. On full dose lovenox.  F/U cultures. Last day of zosyn. Continue fluconazole until cultures are finalized.  PT and Dietary consults.  Will need oxygen testing before discharge.  Condition remains guarded.   Brother updated on status.  Discharge planning.

## 2020-12-11 NOTE — PROGRESS NOTE ADULT - PROBLEM SELECTOR PLAN 1
Secondary to COVID  Acute resolved.  Intubated on 11/15  Extubated on 11/30.  Currently maintaining saturation on room air.  Continue to monitor oxygen saturation.  Will need oxygen testing before discharge.

## 2020-12-11 NOTE — CHART NOTE - NSCHARTNOTEFT_GEN_A_CORE
Assessment:   55yMalePatient is a 55y old  Male who presents with a chief complaint of Acute Hypoxic Respiratory failure (11 Dec 2020 09:11)  Pt On airborne isolation. Observed Pt via glass door. Pt OOB  to chair. Pt is [place] speaking. D/W Brother via phone.  Pt DG from  ICU to 4 N on 12/8. S/P Extubation. S/P SLP eval on 12/7- Pureed Nectar Liquids. Pt is on Dysphagia [facility]. soft  thin liquids. D/W RN Pt tolerating PO.  Plan is to D/C today.       Factors impacting intake: [ ] none [ ] nausea  [ ] vomiting [ ] diarrhea [ ] constipation  [ ]chewing problems [ ] swallowing issues  [ x] other:     Diet Prescription: Diet, Dysphagia 2 Mechanical Soft-Thin Liquids (12-08-20 @ 11:09)    Intake:  ~50 % of meal.    Current Weight:   % Weight  Change    Pertinent Medications: MEDICATIONS  (STANDING):  amLODIPine   Tablet 10 milliGRAM(s) Oral daily  chlorhexidine 2% Cloths 1 Application(s) Topical <User Schedule>  enoxaparin Injectable 70 milliGRAM(s) SubCutaneous two times a day  fluconAZOLE   Tablet 400 milliGRAM(s) Oral daily  metoprolol tartrate Injectable 2.5 milliGRAM(s) IV Push once  piperacillin/tazobactam IVPB.. 3.375 Gram(s) IV Intermittent every 12 hours  potassium chloride   Powder 40 milliEquivalent(s) Oral every 4 hours  senna 2 Tablet(s) Oral at bedtime    MEDICATIONS  (PRN):  artificial  tears Solution 1 Drop(s) Both EYES every 4 hours PRN Dry Eyes  benzocaine 15 mG/menthol 3.6 mG (Sugar-Free) Lozenge 1 Lozenge Oral every 12 hours PRN Sore Throat  sodium chloride 0.9% lock flush 10 milliLiter(s) IV Push every 1 hour PRN Pre/post blood products, medications, blood draw, and to maintain line patency    Pertinent Labs: 12-11 Na138 mmol/L Glu 94 mg/dL K+ 3.1 mmol/L<L> Cr  1.27 mg/dL BUN 22 mg/dL<H> 12-11 Phos 3.1 mg/dL 12-11 Alb 2.9 g/dL<L> 12-02 Chol --    LDL --    HDL --    Trig 289 mg/dL<H>     CAPILLARY BLOOD GLUCOSE        Skin:     Estimated Needs:   [ ] no change since previous assessment  [ ] recalculated:     Previous Nutrition Diagnosis:   [ ] Inadequate Energy Intake [ ]Inadequate Oral Intake [ ] Excessive Energy Intake   [ ] Underweight [ ] Increased Nutrient Needs [ ] Overweight/Obesity   [ ] Altered GI Function [ ] Unintended Weight Loss [ ] Food & Nutrition Related Knowledge Deficit [ x] Malnutrition  severe    Nutrition Diagnosis is [x ] ongoing  [ ] resolved [ ] not applicable     New Nutrition Diagnosis: [ ] not applicable       Interventions:   Recommend  [ ] Change Diet To:  [x ] Nutrition Supplement  TWO doug HN BID.   [ ] Nutrition Support  [ ] Other:     Monitoring and Evaluation:   [ ] PO intake [ x ] Tolerance to diet prescription [ x ] weights [ x ] labs[ x ] follow up per protocol  [ ] other:

## 2020-12-11 NOTE — PROGRESS NOTE ADULT - ASSESSMENT
55M from home without PMH with progressively worsening SOB and +COVID test x5 days prior to admission presented following SpO2 64% at home.  Started on 15L NRB in ED 2/2 SpO2s 85-90%, desaturated to low 80s and was placed on HFNC, continued to desaturate, was intubated and admitted to ICU 11/15.     S/p 10-day course decadron 11/15-24. Not a candidate for remdesivir initially 2/2 elevated LFTs and subsequently 2/2 CrCl <45. Kidney function poor with elevated Cr to 2.22 on admission, baseline unknown, peaked at 4.43 and gradually improved. U/O supported with Lasix boluses, gradually improved, nephro Dr. Camara following.  CXR with b/l opacities and small pleural effusions, APRs elevated with d-dimer 559->2763, started on heparin gtt for therapeutic AC. Completed 5 day course azithromycin and cftx for CAP. Extuabted 11/30, O2 delivery titrated down, now with NC and standby BiPAP.      11/23 spiked new fever, last fever 12/6. Started on 5-day course of Zosyn for possible aspiration pna, last day 12/9. BCx NGTD, MRSA negative. WBC with 21% eosinophils, possibly 2/2 fungemia, started fungemia and awaiting fungicell. Strength improving gradually.  12/9 Transferred to SCU.

## 2020-12-12 LAB
ALBUMIN SERPL ELPH-MCNC: 2.9 G/DL — LOW (ref 3.5–5)
ALP SERPL-CCNC: 69 U/L — SIGNIFICANT CHANGE UP (ref 40–120)
ALT FLD-CCNC: 39 U/L DA — SIGNIFICANT CHANGE UP (ref 10–60)
ANION GAP SERPL CALC-SCNC: 9 MMOL/L — SIGNIFICANT CHANGE UP (ref 5–17)
AST SERPL-CCNC: 31 U/L — SIGNIFICANT CHANGE UP (ref 10–40)
BASOPHILS # BLD AUTO: 0.06 K/UL — SIGNIFICANT CHANGE UP (ref 0–0.2)
BASOPHILS NFR BLD AUTO: 0.7 % — SIGNIFICANT CHANGE UP (ref 0–2)
BILIRUB SERPL-MCNC: 0.4 MG/DL — SIGNIFICANT CHANGE UP (ref 0.2–1.2)
BUN SERPL-MCNC: 20 MG/DL — HIGH (ref 7–18)
CALCIUM SERPL-MCNC: 8.9 MG/DL — SIGNIFICANT CHANGE UP (ref 8.4–10.5)
CHLORIDE SERPL-SCNC: 107 MMOL/L — SIGNIFICANT CHANGE UP (ref 96–108)
CO2 SERPL-SCNC: 24 MMOL/L — SIGNIFICANT CHANGE UP (ref 22–31)
CREAT SERPL-MCNC: 1.22 MG/DL — SIGNIFICANT CHANGE UP (ref 0.5–1.3)
CULTURE RESULTS: SIGNIFICANT CHANGE UP
CULTURE RESULTS: SIGNIFICANT CHANGE UP
EOSINOPHIL # BLD AUTO: 2.23 K/UL — HIGH (ref 0–0.5)
EOSINOPHIL NFR BLD AUTO: 24.4 % — HIGH (ref 0–6)
GLUCOSE SERPL-MCNC: 95 MG/DL — SIGNIFICANT CHANGE UP (ref 70–99)
HCT VFR BLD CALC: 28.9 % — LOW (ref 39–50)
HGB BLD-MCNC: 8.9 G/DL — LOW (ref 13–17)
IMM GRANULOCYTES NFR BLD AUTO: 1 % — SIGNIFICANT CHANGE UP (ref 0–1.5)
LYMPHOCYTES # BLD AUTO: 0.98 K/UL — LOW (ref 1–3.3)
LYMPHOCYTES # BLD AUTO: 10.7 % — LOW (ref 13–44)
MAGNESIUM SERPL-MCNC: 2 MG/DL — SIGNIFICANT CHANGE UP (ref 1.6–2.6)
MCHC RBC-ENTMCNC: 28.4 PG — SIGNIFICANT CHANGE UP (ref 27–34)
MCHC RBC-ENTMCNC: 30.8 GM/DL — LOW (ref 32–36)
MCV RBC AUTO: 92.3 FL — SIGNIFICANT CHANGE UP (ref 80–100)
MONOCYTES # BLD AUTO: 0.56 K/UL — SIGNIFICANT CHANGE UP (ref 0–0.9)
MONOCYTES NFR BLD AUTO: 6.1 % — SIGNIFICANT CHANGE UP (ref 2–14)
NEUTROPHILS # BLD AUTO: 5.22 K/UL — SIGNIFICANT CHANGE UP (ref 1.8–7.4)
NEUTROPHILS NFR BLD AUTO: 57.1 % — SIGNIFICANT CHANGE UP (ref 43–77)
NRBC # BLD: 0 /100 WBCS — SIGNIFICANT CHANGE UP (ref 0–0)
PHOSPHATE SERPL-MCNC: 3 MG/DL — SIGNIFICANT CHANGE UP (ref 2.5–4.5)
PLATELET # BLD AUTO: 285 K/UL — SIGNIFICANT CHANGE UP (ref 150–400)
POTASSIUM SERPL-MCNC: 3.9 MMOL/L — SIGNIFICANT CHANGE UP (ref 3.5–5.3)
POTASSIUM SERPL-SCNC: 3.9 MMOL/L — SIGNIFICANT CHANGE UP (ref 3.5–5.3)
PROT SERPL-MCNC: 7.9 G/DL — SIGNIFICANT CHANGE UP (ref 6–8.3)
RBC # BLD: 3.13 M/UL — LOW (ref 4.2–5.8)
RBC # FLD: 13.7 % — SIGNIFICANT CHANGE UP (ref 10.3–14.5)
SODIUM SERPL-SCNC: 140 MMOL/L — SIGNIFICANT CHANGE UP (ref 135–145)
SPECIMEN SOURCE: SIGNIFICANT CHANGE UP
SPECIMEN SOURCE: SIGNIFICANT CHANGE UP
WBC # BLD: 9.14 K/UL — SIGNIFICANT CHANGE UP (ref 3.8–10.5)
WBC # FLD AUTO: 9.14 K/UL — SIGNIFICANT CHANGE UP (ref 3.8–10.5)

## 2020-12-12 RX ADMIN — ENOXAPARIN SODIUM 70 MILLIGRAM(S): 100 INJECTION SUBCUTANEOUS at 05:29

## 2020-12-12 RX ADMIN — AMLODIPINE BESYLATE 10 MILLIGRAM(S): 2.5 TABLET ORAL at 05:29

## 2020-12-12 RX ADMIN — PIPERACILLIN AND TAZOBACTAM 25 GRAM(S): 4; .5 INJECTION, POWDER, LYOPHILIZED, FOR SOLUTION INTRAVENOUS at 05:29

## 2020-12-12 RX ADMIN — FLUCONAZOLE 400 MILLIGRAM(S): 150 TABLET ORAL at 11:23

## 2020-12-12 RX ADMIN — SENNA PLUS 2 TABLET(S): 8.6 TABLET ORAL at 21:08

## 2020-12-12 RX ADMIN — CHLORHEXIDINE GLUCONATE 1 APPLICATION(S): 213 SOLUTION TOPICAL at 05:29

## 2020-12-12 RX ADMIN — ENOXAPARIN SODIUM 70 MILLIGRAM(S): 100 INJECTION SUBCUTANEOUS at 17:26

## 2020-12-12 NOTE — PROGRESS NOTE ADULT - PROBLEM SELECTOR PLAN 4
Bacterial vs aspiration.  Procalcitonin 0.18  Last day of zosyn. Continue fluconazole.  F/U cultures  Strict aspiration precautions. Bacterial vs aspiration.  Procalcitonin 0.18  S/p Zosyn 12/6-12/12 5 day course, completed  Continue fluconazole until sensitivities for fungi result  F/U cultures  Strict aspiration precautions.

## 2020-12-12 NOTE — PROGRESS NOTE ADULT - PROBLEM SELECTOR PLAN 10
DVT and GI prophylaxis. On full dose lovenox.  F/U cultures. Last day of zosyn. Continue fluconazole until cultures are finalized.  PT and Dietary consults.  Will need oxygen testing before discharge.  Condition remains guarded.   Brother updated on status.  Discharge planning. DVT and GI prophylaxis. On full dose Lovenox.  F/U cultures. Continue fluconazole until cultures are finalized.  PT and Dietary consults.  Will need oxygen testing before discharge.  Condition remains guarded.   Brother updated on status.  Discharge planning ongoing;   Dispo: [facility] possibly, pending final decision and approval

## 2020-12-12 NOTE — PROGRESS NOTE ADULT - PROBLEM SELECTOR PLAN 2
Secondary to COVID  S/P 10 days of steroids.  Repeat COVID PCR. PCR yesterday negative. Secondary to COVID  S/P 10 days of steroids 11/15 to 11/24  Repeat COVID PCR negative 12/10 and 12/9 Secondary to COVID  S/P 10 days of steroids 11/15 to 11/24  Repeat COVID PCR negative 12/10 and 12/9  On Room air now, saturating well

## 2020-12-12 NOTE — PROGRESS NOTE ADULT - PROBLEM SELECTOR PLAN 3
Secondary to prolonged intubattion secondary to COVID.  Monitor oxygen saturation.  Completed 10 days of steroids. Secondary to prolonged intubation secondary to COVID.  Monitor oxygen saturation.  Completed 10 days of steroids. Secondary to prolonged intubation and COVID.  Monitor oxygen saturation.  Completed 10 days of steroids.  Continue to monitor

## 2020-12-12 NOTE — PROGRESS NOTE ADULT - PROBLEM SELECTOR PLAN 1
Secondary to COVID  Acute resolved.  Intubated on 11/15  Extubated on 11/30.  Currently maintaining saturation on room air.  Continue to monitor oxygen saturation.  Will need oxygen testing before discharge. Secondary to COVID  Acute, resolved.  Intubated on 11/15  Extubated on 11/30.  Currently maintaining saturation >93% on room air.  Continue to monitor oxygen saturation.  Will need oxygen testing before discharge.

## 2020-12-12 NOTE — PROGRESS NOTE ADULT - PROBLEM SELECTOR PLAN 8
Improving.  Creatinine 1.11 today.  Daily CMP and phos levels.  Strict I&O  Avoid nephrotoxins. Improving 1.22  Creatinine improved  Daily CMP and phos levels.  Strict I&O  Avoid nephrotoxins. Improving, Creatinine 1.22 this morning  Creatinine improved  Daily CMP and phos levels.  Strict I&O  Avoid nephrotoxins.  Renal Dr. Camara, appreciate recs

## 2020-12-12 NOTE — PROGRESS NOTE ADULT - SUBJECTIVE AND OBJECTIVE BOX
PUSHPA KANG    SCU progress note    INTERVAL HPI/OVERNIGHT EVENTS: No acute events overnight.    DNR [ ]   DNI  [  ]   FULL CODE.    Covid - 19 PCR: Negative    The 4Ms    What Matters Most: see GOC  Age appropriate Medications/Screen for High Risk Medication: Yes  Mentation: see CAM below  Mobility: defer to physical exam    The Confusion Assessment Method (CAM) Diagnostic Algorithm     1: Acute Onset or Fluctuating Course  - Is there evidence of an acute change in mental status from the patient’s baseline? Did the (abnormal) behavior  fluctuate during the day, that is, tend to come and go, or increase and decrease in severity?  [ ] YES [x ] NO     2: Inattention  - Did the patient have difficulty focusing attention, being easily distractible, or having difficulty keeping track of what was being said?  [ ] YES [x ] NO     3: Disorganized thinking  -Was the patient’s thinking disorganized or incoherent, such as rambling or irrelevant conversation, unclear or illogical flow of ideas, or unpredictable switching from subject to subject?  [ ] YES [x ] NO    4: Altered Level of consciousness?  [ ] YES [x ] NO    The diagnosis of delirium by CAM requires the presence of features 1 and 2 and either 3 or 4.    PRESSORS: [ ] YES [x ] NO  fluconAZOLE   Tablet 400 milliGRAM(s) Oral daily  piperacillin/tazobactam IVPB.. 3.375 Gram(s) IV Intermittent every 12 hours    Cardiovascular:  Heart Failure  Acute   Acute on Chronic  Chronic       amLODIPine   Tablet 10 milliGRAM(s) Oral daily    Pulmonary:    Hematalogic:  enoxaparin Injectable 70 milliGRAM(s) SubCutaneous two times a day    MEDICATIONS  (STANDING):  amLODIPine   Tablet 10 milliGRAM(s) Oral daily  chlorhexidine 2% Cloths 1 Application(s) Topical <User Schedule>  enoxaparin Injectable 70 milliGRAM(s) SubCutaneous two times a day  fluconAZOLE   Tablet 400 milliGRAM(s) Oral daily  piperacillin/tazobactam IVPB.. 3.375 Gram(s) IV Intermittent every 12 hours  senna 2 Tablet(s) Oral at bedtime    MEDICATIONS  (PRN):  artificial  tears Solution 1 Drop(s) Both EYES every 4 hours PRN Dry Eyes  benzocaine 15 mG/menthol 3.6 mG (Sugar-Free) Lozenge 1 Lozenge Oral every 12 hours PRN Sore Throat  sodium chloride 0.9% lock flush 10 milliLiter(s) IV Push every 1 hour PRN Pre/post blood products, medications, blood draw, and to maintain line patency    Drug Dosing Weight  Height (cm): 157.5 (15 Nov 2020 23:00)  Weight (kg): 85.4 (15 Nov 2020 23:00)  BMI (kg/m2): 34.4 (15 Nov 2020 23:00)  BSA (m2): 1.86 (15 Nov 2020 23:00)    CENTRAL LINE: [ ] YES [x ] NO  LOCATION:   DATE INSERTED:  REMOVE: [ ] YES [ ] NO  EXPLAIN:    ANN: [ ] YES [x ] NO    DATE INSERTED:  REMOVE:  [ ] YES [ ] NO  EXPLAIN:    PAST MEDICAL & SURGICAL HISTORY:  COVID-19    No significant past surgical history          PHYSICAL EXAM:    GENERAL: NAD  HEAD:  Atraumatic, Normocephalic  EYES: EOMI, PERRLA, conjunctiva and sclera clear  ENMT: No tonsillar erythema, exudates  NECK: Supple, No JVD  NERVOUS SYSTEM:  Alert & Oriented X3, Moving all extremities  CHEST/LUNG: Diminished breath sounds bilateral bases  HEART: Regular rate and rhythm; No murmurs, rubs, or gallops  ABDOMEN: Soft, Nontender, Nondistended; Bowel sounds present  EXTREMITIES:  2+ Peripheral Pulses, No clubbing, cyanosis, or edema  LYMPH: No lymphadenopathy noted  SKIN: No rashes or lesions      LABS:  CBC Full  -  ( 12 Dec 2020 06:52 )  WBC Count : 9.14 K/uL  RBC Count : 3.13 M/uL  Hemoglobin : 8.9 g/dL  Hematocrit : 28.9 %  Platelet Count - Automated : 285 K/uL  Mean Cell Volume : 92.3 fl  Mean Cell Hemoglobin : 28.4 pg  Mean Cell Hemoglobin Concentration : 30.8 gm/dL  Auto Neutrophil # : 5.22 K/uL  Auto Lymphocyte # : 0.98 K/uL  Auto Monocyte # : 0.56 K/uL  Auto Eosinophil # : 2.23 K/uL  Auto Basophil # : 0.06 K/uL  Auto Neutrophil % : 57.1 %  Auto Lymphocyte % : 10.7 %  Auto Monocyte % : 6.1 %  Auto Eosinophil % : 24.4 %  Auto Basophil % : 0.7 %  12-12    140  |  107  |  20<H>  ----------------------------<  95  3.9   |  24  |  1.22    Ca    8.9      12 Dec 2020 06:52  Phos  3.0     12-12  Mg     2.0     12-12    TPro  7.9  /  Alb  2.9<L>  /  TBili  0.4  /  DBili  x   /  AST  31  /  ALT  39  /  AlkPhos  69  12-12    [ x ]  DVT Prophylaxis - Lovenox as above  [  ]  Nutrition, Brand, Rate         Goal Rate        Abnormal Nutritional Status -  Malnutrition   Cachexia      Morbid Obesity BMI >/=40    RADIOLOGY & ADDITIONAL STUDIES:  ***  < from: Xray Chest 1 View- PORTABLE-Routine (Xray Chest 1 View- PORTABLE-Routine in AM.) (12.10.20 @ 12:22) >  Heart is magnified by technique.  On December 5 there are fairly significant diffuse interstitial infiltrates concentrated in the mid lower lung fields.  These infiltrates are again noted but there is significantly improved aeration.  IMPRESSION: Improved aeration of bilateral lung infiltrates.  < end of copied text >    Goals of Care Discussion with Family/Proxy/Other        PUSHPA KANG    SCU progress note    INTERVAL HPI/OVERNIGHT EVENTS: No acute events overnight.    DNR [ ]   DNI  [  ]   FULL CODE.    Covid - 19 PCR:   COVID-19 PCR: NotDetec (10 Dec 2020 16:20)  COVID-19 PCR: NotDetec (09 Dec 2020 15:55)  SARS-CoV-2: Detected (30 Nov 2020 20:14)  COVID-19 PCR: Detected (24 Nov 2020 14:04)  SARS-CoV-2: Detected (15 Nov 2020 19:52)      The 4Ms    What Matters Most: see GOC  Age appropriate Medications/Screen for High Risk Medication: Yes  Mentation: see CAM below  Mobility: defer to physical exam    The Confusion Assessment Method (CAM) Diagnostic Algorithm     1: Acute Onset or Fluctuating Course  - Is there evidence of an acute change in mental status from the patient’s baseline? Did the (abnormal) behavior  fluctuate during the day, that is, tend to come and go, or increase and decrease in severity?  [ ] YES [x ] NO     2: Inattention  - Did the patient have difficulty focusing attention, being easily distractible, or having difficulty keeping track of what was being said?  [ ] YES [x ] NO     3: Disorganized thinking  -Was the patient’s thinking disorganized or incoherent, such as rambling or irrelevant conversation, unclear or illogical flow of ideas, or unpredictable switching from subject to subject?  [ ] YES [x ] NO    4: Altered Level of consciousness?  [ ] YES [x ] NO    The diagnosis of delirium by CAM requires the presence of features 1 and 2 and either 3 or 4.    PRESSORS: [ ] YES [x ] NO  MEDICATIONS  (STANDING):  amLODIPine   Tablet 10 milliGRAM(s) Oral daily  chlorhexidine 2% Cloths 1 Application(s) Topical <User Schedule>  enoxaparin Injectable 70 milliGRAM(s) SubCutaneous two times a day  fluconAZOLE   Tablet 400 milliGRAM(s) Oral daily  piperacillin/tazobactam IVPB.. 3.375 Gram(s) IV Intermittent every 12 hours  senna 2 Tablet(s) Oral at bedtime    MEDICATIONS  (PRN):  artificial  tears Solution 1 Drop(s) Both EYES every 4 hours PRN Dry Eyes  benzocaine 15 mG/menthol 3.6 mG (Sugar-Free) Lozenge 1 Lozenge Oral every 12 hours PRN Sore Throat  sodium chloride 0.9% lock flush 10 milliLiter(s) IV Push every 1 hour PRN Pre/post blood products, medications, blood draw, and to maintain line patency      Drug Dosing Weight  Height (cm): 157.5 (15 Nov 2020 23:00)  Weight (kg): 85.4 (15 Nov 2020 23:00)  BMI (kg/m2): 34.4 (15 Nov 2020 23:00)  BSA (m2): 1.86 (15 Nov 2020 23:00)    CENTRAL LINE: [ ] YES [x ] NO  LOCATION:   DATE INSERTED:  REMOVE: [ ] YES [ ] NO  EXPLAIN:    ANN: [ ] YES [x ] NO    DATE INSERTED:  REMOVE:  [ ] YES [ ] NO  EXPLAIN:    PAST MEDICAL & SURGICAL HISTORY:  COVID-19    No significant past surgical history    Vital Signs Last 24 Hrs  T(C): 36.6 (12 Dec 2020 05:40), Max: 37.1 (11 Dec 2020 14:30)  T(F): 97.9 (12 Dec 2020 05:40), Max: 98.8 (11 Dec 2020 14:30)  HR: 75 (12 Dec 2020 05:40) (75 - 94)  BP: 125/77 (12 Dec 2020 05:40) (106/64 - 130/76)  BP(mean): --  RR: 20 (12 Dec 2020 05:40) (14 - 20)  SpO2: 98% (12 Dec 2020 05:40) (98% - 98%)      PHYSICAL EXAM:    GENERAL: NAD  HEAD:  Atraumatic, Normocephalic  EYES: EOMI, PERRLA, conjunctiva and sclera clear  ENMT: No tonsillar erythema, exudates  NECK: Supple, No JVD  NERVOUS SYSTEM:  Alert & Oriented X3, Moving all extremities  CHEST/LUNG: Diminished breath sounds bilateral bases  HEART: Regular rate and rhythm; No murmurs, rubs, or gallops  ABDOMEN: Soft, Nontender, Nondistended; Bowel sounds present  EXTREMITIES:  2+ Peripheral Pulses, No clubbing, cyanosis, or edema  LYMPH: No lymphadenopathy noted  SKIN: No rashes or lesions      LABS:  CBC Full  -  ( 12 Dec 2020 06:52 )  WBC Count : 9.14 K/uL  RBC Count : 3.13 M/uL  Hemoglobin : 8.9 g/dL  Hematocrit : 28.9 %  Platelet Count - Automated : 285 K/uL  Mean Cell Volume : 92.3 fl  Mean Cell Hemoglobin : 28.4 pg  Mean Cell Hemoglobin Concentration : 30.8 gm/dL  Auto Neutrophil # : 5.22 K/uL  Auto Lymphocyte # : 0.98 K/uL  Auto Monocyte # : 0.56 K/uL  Auto Eosinophil # : 2.23 K/uL  Auto Basophil # : 0.06 K/uL  Auto Neutrophil % : 57.1 %  Auto Lymphocyte % : 10.7 %  Auto Monocyte % : 6.1 %  Auto Eosinophil % : 24.4 %  Auto Basophil % : 0.7 %  12-12    140  |  107  |  20<H>  ----------------------------<  95  3.9   |  24  |  1.22    Ca    8.9      12 Dec 2020 06:52  Phos  3.0     12-12  Mg     2.0     12-12    TPro  7.9  /  Alb  2.9<L>  /  TBili  0.4  /  DBili  x   /  AST  31  /  ALT  39  /  AlkPhos  69  12-12    [ x ]  DVT Prophylaxis - Lovenox as above  [  ]  Nutrition, Brand, Rate         Goal Rate        Abnormal Nutritional Status -  Malnutrition   Cachexia      Morbid Obesity BMI >/=40    RADIOLOGY & ADDITIONAL STUDIES: Reviewed  Most Recent Xray:  < from: Xray Chest 1 View- PORTABLE-Routine (Xray Chest 1 View- PORTABLE-Routine in AM.) (12.10.20 @ 12:22) >  Heart is magnified by technique.  On December 5 there are fairly significant diffuse interstitial infiltrates concentrated in the mid lower lung fields.  These infiltrates are again noted but there is significantly improved aeration.  IMPRESSION: Improved aeration of bilateral lung infiltrates.  < end of copied text >    Goals of Care Discussion with Family/Proxy/Other        PUSHPA KANG    SCU progress note    INTERVAL HPI/OVERNIGHT EVENTS: No acute events overnight. On room air with >98% oxygen saturation. Pending BENIGNO placement. s/p zosyn; Continues on Diflucan    DNR [ ]   DNI  [  ]   FULL CODE.    Covid - 19 PCR:   COVID-19 PCR: NotDetec (10 Dec 2020 16:20)  COVID-19 PCR: NotDetec (09 Dec 2020 15:55)  SARS-CoV-2: Detected (30 Nov 2020 20:14)  COVID-19 PCR: Detected (24 Nov 2020 14:04)  SARS-CoV-2: Detected (15 Nov 2020 19:52)      The 4Ms    What Matters Most: see GOC  Age appropriate Medications/Screen for High Risk Medication: Yes  Mentation: see CAM below  Mobility: defer to physical exam    The Confusion Assessment Method (CAM) Diagnostic Algorithm     1: Acute Onset or Fluctuating Course  - Is there evidence of an acute change in mental status from the patient’s baseline? Did the (abnormal) behavior  fluctuate during the day, that is, tend to come and go, or increase and decrease in severity?  [ ] YES [x ] NO     2: Inattention  - Did the patient have difficulty focusing attention, being easily distractible, or having difficulty keeping track of what was being said?  [ ] YES [x ] NO     3: Disorganized thinking  -Was the patient’s thinking disorganized or incoherent, such as rambling or irrelevant conversation, unclear or illogical flow of ideas, or unpredictable switching from subject to subject?  [ ] YES [x ] NO    4: Altered Level of consciousness?  [ ] YES [x ] NO    The diagnosis of delirium by CAM requires the presence of features 1 and 2 and either 3 or 4.    PRESSORS: [ ] YES [x ] NO    MEDICATIONS  (STANDING):  amLODIPine   Tablet 10 milliGRAM(s) Oral daily  chlorhexidine 2% Cloths 1 Application(s) Topical <User Schedule>  enoxaparin Injectable 70 milliGRAM(s) SubCutaneous two times a day  fluconAZOLE   Tablet 400 milliGRAM(s) Oral daily  senna 2 Tablet(s) Oral at bedtime    MEDICATIONS  (PRN):  artificial  tears Solution 1 Drop(s) Both EYES every 4 hours PRN Dry Eyes  benzocaine 15 mG/menthol 3.6 mG (Sugar-Free) Lozenge 1 Lozenge Oral every 12 hours PRN Sore Throat  sodium chloride 0.9% lock flush 10 milliLiter(s) IV Push every 1 hour PRN Pre/post blood products, medications, blood draw, and to maintain line patency        Drug Dosing Weight  Height (cm): 157.5 (15 Nov 2020 23:00)  Weight (kg): 85.4 (15 Nov 2020 23:00)  BMI (kg/m2): 34.4 (15 Nov 2020 23:00)  BSA (m2): 1.86 (15 Nov 2020 23:00)    CENTRAL LINE: [ ] YES [x ] NO  LOCATION:   DATE INSERTED:  REMOVE: [ ] YES [ ] NO  EXPLAIN:    ANN: [ ] YES [x ] NO    DATE INSERTED:  REMOVE:  [ ] YES [ ] NO  EXPLAIN:    PAST MEDICAL & SURGICAL HISTORY:  COVID-19    No significant past surgical history    Vital Signs Last 24 Hrs  T(C): 36.6 (12 Dec 2020 05:40), Max: 37.1 (11 Dec 2020 14:30)  T(F): 97.9 (12 Dec 2020 05:40), Max: 98.8 (11 Dec 2020 14:30)  HR: 75 (12 Dec 2020 05:40) (75 - 94)  BP: 125/77 (12 Dec 2020 05:40) (106/64 - 130/76)  BP(mean): --  RR: 20 (12 Dec 2020 05:40) (14 - 20)  SpO2: 98% (12 Dec 2020 05:40) (98% - 98%)      PHYSICAL EXAM:    GENERAL: NAD  HEAD:  Atraumatic, Normocephalic  EYES: EOMI, PERRLA, conjunctiva and sclera clear  ENMT: No tonsillar erythema, exudates  NECK: Supple, No JVD  NERVOUS SYSTEM:  Alert & Oriented X3, Moving all extremities  CHEST/LUNG: Diminished breath sounds bilateral bases  HEART: Regular rate and rhythm; No murmurs, rubs, or gallops  ABDOMEN: Soft, Nontender, Nondistended; Bowel sounds present  EXTREMITIES:  2+ Peripheral Pulses, No clubbing, cyanosis, or edema  LYMPH: No lymphadenopathy noted  SKIN: No rashes or lesions      LABS:  CBC Full  -  ( 12 Dec 2020 06:52 )  WBC Count : 9.14 K/uL  RBC Count : 3.13 M/uL  Hemoglobin : 8.9 g/dL  Hematocrit : 28.9 %  Platelet Count - Automated : 285 K/uL  Mean Cell Volume : 92.3 fl  Mean Cell Hemoglobin : 28.4 pg  Mean Cell Hemoglobin Concentration : 30.8 gm/dL  Auto Neutrophil # : 5.22 K/uL  Auto Lymphocyte # : 0.98 K/uL  Auto Monocyte # : 0.56 K/uL  Auto Eosinophil # : 2.23 K/uL  Auto Basophil # : 0.06 K/uL  Auto Neutrophil % : 57.1 %  Auto Lymphocyte % : 10.7 %  Auto Monocyte % : 6.1 %  Auto Eosinophil % : 24.4 %  Auto Basophil % : 0.7 %  12-12    140  |  107  |  20<H>  ----------------------------<  95  3.9   |  24  |  1.22    Ca    8.9      12 Dec 2020 06:52  Phos  3.0     12-12  Mg     2.0     12-12    TPro  7.9  /  Alb  2.9<L>  /  TBili  0.4  /  DBili  x   /  AST  31  /  ALT  39  /  AlkPhos  69  12-12    [ x ]  DVT Prophylaxis - Lovenox as above  [  ]  Nutrition, Brand, Rate         Goal Rate        Abnormal Nutritional Status -  Malnutrition   Cachexia      Morbid Obesity BMI >/=40    RADIOLOGY & ADDITIONAL STUDIES: Reviewed  Most Recent Xray:  < from: Xray Chest 1 View- PORTABLE-Routine (Xray Chest 1 View- PORTABLE-Routine in AM.) (12.10.20 @ 12:22) >  Heart is magnified by technique.  On December 5 there are fairly significant diffuse interstitial infiltrates concentrated in the mid lower lung fields.  These infiltrates are again noted but there is significantly improved aeration.  IMPRESSION: Improved aeration of bilateral lung infiltrates.  < end of copied text >    Goals of Care Discussion with Family/Proxy/Other

## 2020-12-12 NOTE — PROGRESS NOTE ADULT - PROBLEM SELECTOR PLAN 7
Encourage oral intake.  Continue supplementation. Encourage oral intake.  Continue supplementation.  Hypoalbuminemia; Nutrition following, appreciate recs

## 2020-12-12 NOTE — PROGRESS NOTE ADULT - PROBLEM SELECTOR PLAN 9
potassium replacement underway.  Continue to monitor. Resolved; 3.9 today  Continue to monitor.  Daily CMP

## 2020-12-12 NOTE — PROGRESS NOTE ADULT - ASSESSMENT
55M from home without PMH with progressively worsening SOB and +COVID test x5 days prior to admission presented following SpO2 64% at home.  Started on 15L NRB in ED 2/2 SpO2s 85-90%, desaturated to low 80s and was placed on HFNC, continued to desaturate, was intubated and admitted to ICU 11/15.     S/p 10-day course decadron 11/15-24. Not a candidate for remdesivir initially 2/2 elevated LFTs and subsequently 2/2 CrCl <45. Kidney function poor with elevated Cr to 2.22 on admission, baseline unknown, peaked at 4.43 and gradually improved. U/O supported with Lasix boluses, gradually improved, nephro Dr. Camara following.  CXR with b/l opacities and small pleural effusions, APRs elevated with d-dimer [phone], started on heparin gtt for therapeutic AC. Completed 5 day course azithromycin and cftx for CAP. Extuabted 11/30, O2 delivery titrated down, now with NC and standby BiPAP.      11/23 spiked new fever, last fever 12/6. Started on 5-day course of Zosyn for possible aspiration pna, last day 12/9. BCx NGTD, MRSA negative. WBC with 21% eosinophils, possibly 2/2 fungemia, started fungemia and awaiting fungicell. Strength improving gradually.  12/9 Transferred to SCU.   55M from home without PMH with progressively worsening SOB and +COVID test x5 days prior to admission presented following SpO2 64% at home.  Started on 15L NRB in ED 2/2 SpO2s 85-90%, desaturated to low 80s and was placed on HFNC, continued to desaturate, was intubated and admitted to ICU 11/15.     S/p 10-day course decadron 11/15-24. Not a candidate for remdesivir initially 2/2 elevated LFTs and subsequently 2/2 CrCl <45. Kidney function poor with elevated Cr to 2.22 on admission, baseline unknown, peaked at 4.43 and gradually improved. U/O supported with Lasix boluses, gradually improved, nephro Dr. Camara following.  CXR with b/l opacities and small pleural effusions, APRs elevated with d-dimer [phone], started on heparin gtt for therapeutic AC. Completed 5 day course Azithromycin and Ceftriaxone for CAP. Extubated 11/30, O2 delivery titrated down, now with NC and standby BiPAP.      last fever 12/6. Started on 5-day course of Zosyn for possible aspiration pna, last day 12/11;  BCx NGTD, MRSA negative. WBC with 21% eosinophils, possibly 2/2 fungemia, started on Diflucan and awaiting fungicell result. Mobility and  physical strength improving gradually.    12/9 Transferred to SCU.

## 2020-12-13 LAB
ALBUMIN SERPL ELPH-MCNC: 2.9 G/DL — LOW (ref 3.5–5)
ALP SERPL-CCNC: 69 U/L — SIGNIFICANT CHANGE UP (ref 40–120)
ALT FLD-CCNC: 42 U/L DA — SIGNIFICANT CHANGE UP (ref 10–60)
ANION GAP SERPL CALC-SCNC: 9 MMOL/L — SIGNIFICANT CHANGE UP (ref 5–17)
AST SERPL-CCNC: 31 U/L — SIGNIFICANT CHANGE UP (ref 10–40)
BASOPHILS # BLD AUTO: 0.06 K/UL — SIGNIFICANT CHANGE UP (ref 0–0.2)
BASOPHILS NFR BLD AUTO: 0.6 % — SIGNIFICANT CHANGE UP (ref 0–2)
BILIRUB SERPL-MCNC: 0.4 MG/DL — SIGNIFICANT CHANGE UP (ref 0.2–1.2)
BUN SERPL-MCNC: 23 MG/DL — HIGH (ref 7–18)
CALCIUM SERPL-MCNC: 9.4 MG/DL — SIGNIFICANT CHANGE UP (ref 8.4–10.5)
CHLORIDE SERPL-SCNC: 104 MMOL/L — SIGNIFICANT CHANGE UP (ref 96–108)
CO2 SERPL-SCNC: 25 MMOL/L — SIGNIFICANT CHANGE UP (ref 22–31)
CREAT SERPL-MCNC: 1.31 MG/DL — HIGH (ref 0.5–1.3)
EOSINOPHIL # BLD AUTO: 2.41 K/UL — HIGH (ref 0–0.5)
EOSINOPHIL NFR BLD AUTO: 24.2 % — HIGH (ref 0–6)
GLUCOSE SERPL-MCNC: 93 MG/DL — SIGNIFICANT CHANGE UP (ref 70–99)
HCT VFR BLD CALC: 28.5 % — LOW (ref 39–50)
HGB BLD-MCNC: 8.9 G/DL — LOW (ref 13–17)
IMM GRANULOCYTES NFR BLD AUTO: 1.4 % — SIGNIFICANT CHANGE UP (ref 0–1.5)
LYMPHOCYTES # BLD AUTO: 1.19 K/UL — SIGNIFICANT CHANGE UP (ref 1–3.3)
LYMPHOCYTES # BLD AUTO: 11.9 % — LOW (ref 13–44)
MAGNESIUM SERPL-MCNC: 2 MG/DL — SIGNIFICANT CHANGE UP (ref 1.6–2.6)
MCHC RBC-ENTMCNC: 28.8 PG — SIGNIFICANT CHANGE UP (ref 27–34)
MCHC RBC-ENTMCNC: 31.2 GM/DL — LOW (ref 32–36)
MCV RBC AUTO: 92.2 FL — SIGNIFICANT CHANGE UP (ref 80–100)
MONOCYTES # BLD AUTO: 0.63 K/UL — SIGNIFICANT CHANGE UP (ref 0–0.9)
MONOCYTES NFR BLD AUTO: 6.3 % — SIGNIFICANT CHANGE UP (ref 2–14)
NEUTROPHILS # BLD AUTO: 5.53 K/UL — SIGNIFICANT CHANGE UP (ref 1.8–7.4)
NEUTROPHILS NFR BLD AUTO: 55.6 % — SIGNIFICANT CHANGE UP (ref 43–77)
NRBC # BLD: 0 /100 WBCS — SIGNIFICANT CHANGE UP (ref 0–0)
PHOSPHATE SERPL-MCNC: 3.7 MG/DL — SIGNIFICANT CHANGE UP (ref 2.5–4.5)
PLATELET # BLD AUTO: 326 K/UL — SIGNIFICANT CHANGE UP (ref 150–400)
POTASSIUM SERPL-MCNC: 3.4 MMOL/L — LOW (ref 3.5–5.3)
POTASSIUM SERPL-SCNC: 3.4 MMOL/L — LOW (ref 3.5–5.3)
PROT SERPL-MCNC: 8.2 G/DL — SIGNIFICANT CHANGE UP (ref 6–8.3)
RBC # BLD: 3.09 M/UL — LOW (ref 4.2–5.8)
RBC # FLD: 13.8 % — SIGNIFICANT CHANGE UP (ref 10.3–14.5)
SARS-COV-2 RNA SPEC QL NAA+PROBE: DETECTED
SODIUM SERPL-SCNC: 138 MMOL/L — SIGNIFICANT CHANGE UP (ref 135–145)
WBC # BLD: 9.96 K/UL — SIGNIFICANT CHANGE UP (ref 3.8–10.5)
WBC # FLD AUTO: 9.96 K/UL — SIGNIFICANT CHANGE UP (ref 3.8–10.5)

## 2020-12-13 PROCEDURE — 93306 TTE W/DOPPLER COMPLETE: CPT | Mod: 26

## 2020-12-13 RX ORDER — POTASSIUM CHLORIDE 20 MEQ
40 PACKET (EA) ORAL ONCE
Refills: 0 | Status: COMPLETED | OUTPATIENT
Start: 2020-12-13 | End: 2020-12-13

## 2020-12-13 RX ADMIN — SENNA PLUS 2 TABLET(S): 8.6 TABLET ORAL at 21:21

## 2020-12-13 RX ADMIN — AMLODIPINE BESYLATE 10 MILLIGRAM(S): 2.5 TABLET ORAL at 05:57

## 2020-12-13 RX ADMIN — CHLORHEXIDINE GLUCONATE 1 APPLICATION(S): 213 SOLUTION TOPICAL at 05:57

## 2020-12-13 RX ADMIN — BENZOCAINE AND MENTHOL 1 LOZENGE: 5; 1 LIQUID ORAL at 23:32

## 2020-12-13 RX ADMIN — FLUCONAZOLE 400 MILLIGRAM(S): 150 TABLET ORAL at 11:23

## 2020-12-13 RX ADMIN — ENOXAPARIN SODIUM 70 MILLIGRAM(S): 100 INJECTION SUBCUTANEOUS at 05:56

## 2020-12-13 RX ADMIN — BENZOCAINE AND MENTHOL 1 LOZENGE: 5; 1 LIQUID ORAL at 11:24

## 2020-12-13 RX ADMIN — ENOXAPARIN SODIUM 70 MILLIGRAM(S): 100 INJECTION SUBCUTANEOUS at 17:03

## 2020-12-13 RX ADMIN — Medication 40 MILLIEQUIVALENT(S): at 09:24

## 2020-12-13 NOTE — PROGRESS NOTE ADULT - PROBLEM SELECTOR PLAN 4
Bacterial vs aspiration.  Procalcitonin 0.18  S/p Zosyn 12/6-12/12 5 day course, completed  Continue fluconazole until sensitivities for fungi result  F/U cultures  Strict aspiration precautions.

## 2020-12-13 NOTE — PROGRESS NOTE ADULT - PROBLEM SELECTOR PLAN 2
Secondary to COVID  S/P 10 days of steroids 11/15 to 11/24  Repeat COVID PCR negative 12/10 and 12/9  On Room air now, saturating well Secondary to COVID  S/P 10 days of steroids 11/15 to 11/24  Repeat COVID PCR negative 12/10 and 12/9; Reordered 12/13- follow up results  On Room air now, saturating well

## 2020-12-13 NOTE — PROGRESS NOTE ADULT - PROBLEM SELECTOR PLAN 8
Creatinine improved  Daily CMP and phos levels.  Strict I&O  Avoid nephrotoxins.  Renal Dr. Camara, appreciate recs

## 2020-12-13 NOTE — PROGRESS NOTE ADULT - PROBLEM SELECTOR PLAN 10
DVT and GI prophylaxis. On full dose Lovenox.  F/U cultures. Continue fluconazole until cultures are finalized.  PT and Dietary consults.  Will need oxygen testing before discharge.  Condition remains guarded.   Brother updated on status.  Discharge planning ongoing;   Dispo: [facility] possibly, pending final decision and approval DVT and GI prophylaxis. On full dose Lovenox.  Stool: ova/parasite ordered 12/13 for elevated eosinophils; Follow up results; Can be followed outpatient; Complete 7 day course of Diflucan 12/8 to 12/14  PT and Dietary consults.  Will need oxygen testing before discharge. Currently on room air saturating well;  Condition remains guarded.   Discharge planning ongoing;   Dispo: [facility] possibly?, pending final decision and approval

## 2020-12-13 NOTE — PROGRESS NOTE ADULT - PROBLEM SELECTOR PLAN 7
Encourage oral intake.  Continue supplementation.  Hypoalbuminemia; Nutrition following, appreciate recs

## 2020-12-13 NOTE — PROGRESS NOTE ADULT - ASSESSMENT
55M from home without PMH with progressively worsening SOB and +COVID test x5 days prior to admission presented following SpO2 64% at home.  Started on 15L NRB in ED 2/2 SpO2s 85-90%, desaturated to low 80s and was placed on HFNC, continued to desaturate, was intubated and admitted to ICU 11/15.     S/p 10-day course decadron 11/15-24. Not a candidate for remdesivir initially 2/2 elevated LFTs and subsequently 2/2 CrCl <45. Kidney function poor with elevated Cr to 2.22 on admission, baseline unknown, peaked at 4.43 and gradually improved. U/O supported with Lasix boluses, gradually improved, nephro Dr. Camara following.  CXR with b/l opacities and small pleural effusions, APRs elevated with d-dimer [phone], started on heparin gtt for therapeutic AC. Completed 5 day course Azithromycin and Ceftriaxone for CAP. Extubated 11/30, O2 delivery titrated down, now with NC and standby BiPAP.      last fever 12/6. Started on 5-day course of Zosyn for possible aspiration pna, last day 12/11;  BCx NGTD, MRSA negative. WBC with 21% eosinophils, possibly 2/2 fungemia, started on Diflucan and awaiting fungicell result. Mobility and  physical strength improving gradually.    12/9 Transferred to SCU.   55M from home without PMH with progressively worsening SOB and +COVID test x5 days prior to admission presented following SpO2 64% at home.  Started on 15L NRB in ED 2/2 SpO2s 85-90%, desaturated to low 80s and was placed on HFNC, continued to desaturate, was intubated and admitted to ICU 11/15.     S/p 10-day course decadron 11/15-24. Not a candidate for remdesivir initially 2/2 elevated LFTs and subsequently 2/2 CrCl <45. Kidney function poor with elevated Cr to 2.22 on admission, baseline unknown, peaked at 4.43 and gradually improved. U/O supported with Lasix boluses, gradually improved, nephro Dr. Camara following.  CXR with b/l opacities and small pleural effusions, APRs elevated with d-dimer [phone], started on heparin gtt for therapeutic AC. Completed 5 day course Azithromycin and Ceftriaxone for CAP. Extubated 11/30, O2 delivery titrated down, now with NC and standby BiPAP.      last fever 12/6. Started on 5-day course of Zosyn for possible aspiration pna, last day 12/11;  BCx NGTD, MRSA negative. WBC with 21% eosinophils, possibly 2/2 fungemia, started on Diflucan and awaiting fungicell result. Mobility and  physical strength improving gradually.    12/9 Transferred to SCU.  12/13 Ova/Parasite stool ordered in light of elevated eosinophil; Will complete diflucan 7 day course on (12/8 to 12/14) Pe; Follow up results; stool results can be followed outpatient if discharge planning on Monday

## 2020-12-13 NOTE — PROGRESS NOTE ADULT - PROBLEM SELECTOR PLAN 3
Secondary to prolonged intubation and COVID.  Monitor oxygen saturation.  Completed 10 days of steroids.  Continue to monitor

## 2020-12-13 NOTE — PROGRESS NOTE ADULT - SUBJECTIVE AND OBJECTIVE BOX
PUSHPA KANG    SCU progress note    INTERVAL HPI/OVERNIGHT EVENTS: No acute events overnight. On room air with >98% oxygen saturation. Pending BENIGNO placement. s/p zosyn; Continues on Diflucan, Hypokalemic this morning to 3.4; Supplemented with KCl. Stable in no acute distress    DNR [ ]   DNI  [  ]   FULL CODE.    Covid - 19 PCR:   COVID-19 PCR: NotDetec (10 Dec 2020 16:20)  COVID-19 PCR: NotDetec (09 Dec 2020 15:55)  SARS-CoV-2: Detected (30 Nov 2020 20:14)  COVID-19 PCR: Detected (24 Nov 2020 14:04)  SARS-CoV-2: Detected (15 Nov 2020 19:52)      The 4Ms    What Matters Most: see GOC  Age appropriate Medications/Screen for High Risk Medication: Yes  Mentation: see CAM below  Mobility: defer to physical exam    The Confusion Assessment Method (CAM) Diagnostic Algorithm     1: Acute Onset or Fluctuating Course  - Is there evidence of an acute change in mental status from the patient’s baseline? Did the (abnormal) behavior  fluctuate during the day, that is, tend to come and go, or increase and decrease in severity?  [ ] YES [x ] NO     2: Inattention  - Did the patient have difficulty focusing attention, being easily distractible, or having difficulty keeping track of what was being said?  [ ] YES [x ] NO     3: Disorganized thinking  -Was the patient’s thinking disorganized or incoherent, such as rambling or irrelevant conversation, unclear or illogical flow of ideas, or unpredictable switching from subject to subject?  [ ] YES [x ] NO    4: Altered Level of consciousness?  [ ] YES [x ] NO    The diagnosis of delirium by CAM requires the presence of features 1 and 2 and either 3 or 4.    PRESSORS: [ ] YES [x ] NO    MEDICATIONS  (STANDING):  amLODIPine   Tablet 10 milliGRAM(s) Oral daily  chlorhexidine 2% Cloths 1 Application(s) Topical <User Schedule>  enoxaparin Injectable 70 milliGRAM(s) SubCutaneous two times a day  fluconAZOLE   Tablet 400 milliGRAM(s) Oral daily  potassium chloride   Powder 40 milliEquivalent(s) Oral once  senna 2 Tablet(s) Oral at bedtime    MEDICATIONS  (PRN):  artificial  tears Solution 1 Drop(s) Both EYES every 4 hours PRN Dry Eyes  benzocaine 15 mG/menthol 3.6 mG (Sugar-Free) Lozenge 1 Lozenge Oral every 12 hours PRN Sore Throat  sodium chloride 0.9% lock flush 10 milliLiter(s) IV Push every 1 hour PRN Pre/post blood products, medications, blood draw, and to maintain line patency        Drug Dosing Weight  Height (cm): 157.5 (15 Nov 2020 23:00)  Weight (kg): 85.4 (15 Nov 2020 23:00)  BMI (kg/m2): 34.4 (15 Nov 2020 23:00)  BSA (m2): 1.86 (15 Nov 2020 23:00)    CENTRAL LINE: [ ] YES [x ] NO  LOCATION:   DATE INSERTED:  REMOVE: [ ] YES [ ] NO  EXPLAIN:    ANN: [ ] YES [x ] NO    DATE INSERTED:  REMOVE:  [ ] YES [ ] NO  EXPLAIN:    PAST MEDICAL & SURGICAL HISTORY:  COVID-19    No significant past surgical history    ICU Vital Signs Last 24 Hrs  T(C): 36.4 (13 Dec 2020 05:49), Max: 36.8 (12 Dec 2020 13:45)  T(F): 97.6 (13 Dec 2020 05:49), Max: 98.2 (12 Dec 2020 13:45)  HR: 71 (13 Dec 2020 05:49) (71 - 88)  BP: 129/75 (13 Dec 2020 05:49) (117/69 - 129/75)  BP(mean): --  ABP: --  ABP(mean): --  RR: 16 (13 Dec 2020 05:49) (16 - 16)  SpO2: 99% (13 Dec 2020 05:49) (98% - 99%)    PHYSICAL EXAM:    GENERAL: NAD  HEAD:  Atraumatic, Normocephalic  EYES: EOMI, PERRLA, conjunctiva and sclera clear  ENMT: No tonsillar erythema, exudates  NECK: Supple, No JVD  NERVOUS SYSTEM:  Alert & Oriented X3, Moving all extremities  CHEST/LUNG: Diminished breath sounds bilateral bases  HEART: Regular rate and rhythm; No murmurs, rubs, or gallops  ABDOMEN: Soft, Nontender, Nondistended; Bowel sounds present  EXTREMITIES:  2+ Peripheral Pulses, No clubbing, cyanosis, or edema  LYMPH: No lymphadenopathy noted  SKIN: No rashes or lesions      LABS:  12-13    138  |  104  |  23<H>  ----------------------------<  93  3.4<L>   |  25  |  1.31<H>    Ca    9.4      13 Dec 2020 06:57  Phos  3.7     12-13  Mg     2.0     12-13    TPro  8.2  /  Alb  2.9<L>  /  TBili  0.4  /  DBili  x   /  AST  31  /  ALT  42  /  AlkPhos  69  12-13    CBC Full  -  ( 13 Dec 2020 06:57 )  WBC Count : 9.96 K/uL  RBC Count : 3.09 M/uL  Hemoglobin : 8.9 g/dL  Hematocrit : 28.5 %  Platelet Count - Automated : 326 K/uL  Mean Cell Volume : 92.2 fl  Mean Cell Hemoglobin : 28.8 pg  Mean Cell Hemoglobin Concentration : 31.2 gm/dL  Auto Neutrophil # : 5.53 K/uL  Auto Lymphocyte # : 1.19 K/uL  Auto Monocyte # : 0.63 K/uL  Auto Eosinophil # : 2.41 K/uL  Auto Basophil # : 0.06 K/uL  Auto Neutrophil % : 55.6 %  Auto Lymphocyte % : 11.9 %  Auto Monocyte % : 6.3 %  Auto Eosinophil % : 24.2 %  Auto Basophil % : 0.6 %    [ x ]  DVT Prophylaxis - Lovenox as above  [  ]  Nutrition, Brand, Rate         Goal Rate        Abnormal Nutritional Status -  Malnutrition   Cachexia      Morbid Obesity BMI >/=40    RADIOLOGY & ADDITIONAL STUDIES: Reviewed  Most Recent Xray:  < from: Xray Chest 1 View- PORTABLE-Routine (Xray Chest 1 View- PORTABLE-Routine in AM.) (12.10.20 @ 12:22) >  Heart is magnified by technique.  On December 5 there are fairly significant diffuse interstitial infiltrates concentrated in the mid lower lung fields.  These infiltrates are again noted but there is significantly improved aeration.  IMPRESSION: Improved aeration of bilateral lung infiltrates.  < end of copied text >    Goals of Care Discussion with Family/Proxy/Other        PUSHPA KANG    SCU progress note    INTERVAL HPI/OVERNIGHT EVENTS: No acute events overnight. On room air with >98% oxygen saturation. Pending BENIGNO placement. s/p zosyn; Continues on Diflucan, Hypokalemic this morning to 3.4; Supplemented with KCl. Stable in no acute distress. COVID ordered 12/13 in anticipation of discharge planning.     DNR [ ]   DNI  [  ]   FULL CODE.    Covid - 19 PCR:   COVID-19 PCR: NotDetec (10 Dec 2020 16:20)  COVID-19 PCR: NotDetec (09 Dec 2020 15:55)  SARS-CoV-2: Detected (30 Nov 2020 20:14)  COVID-19 PCR: Detected (24 Nov 2020 14:04)  SARS-CoV-2: Detected (15 Nov 2020 19:52)      The 4Ms    What Matters Most: see GOC  Age appropriate Medications/Screen for High Risk Medication: Yes  Mentation: see CAM below  Mobility: defer to physical exam    The Confusion Assessment Method (CAM) Diagnostic Algorithm     1: Acute Onset or Fluctuating Course  - Is there evidence of an acute change in mental status from the patient’s baseline? Did the (abnormal) behavior  fluctuate during the day, that is, tend to come and go, or increase and decrease in severity?  [ ] YES [x ] NO     2: Inattention  - Did the patient have difficulty focusing attention, being easily distractible, or having difficulty keeping track of what was being said?  [ ] YES [x ] NO     3: Disorganized thinking  -Was the patient’s thinking disorganized or incoherent, such as rambling or irrelevant conversation, unclear or illogical flow of ideas, or unpredictable switching from subject to subject?  [ ] YES [x ] NO    4: Altered Level of consciousness?  [ ] YES [x ] NO    The diagnosis of delirium by CAM requires the presence of features 1 and 2 and either 3 or 4.    PRESSORS: [ ] YES [x ] NO    MEDICATIONS  (STANDING):  amLODIPine   Tablet 10 milliGRAM(s) Oral daily  chlorhexidine 2% Cloths 1 Application(s) Topical <User Schedule>  enoxaparin Injectable 70 milliGRAM(s) SubCutaneous two times a day  fluconAZOLE   Tablet 400 milliGRAM(s) Oral daily  potassium chloride   Powder 40 milliEquivalent(s) Oral once  senna 2 Tablet(s) Oral at bedtime    MEDICATIONS  (PRN):  artificial  tears Solution 1 Drop(s) Both EYES every 4 hours PRN Dry Eyes  benzocaine 15 mG/menthol 3.6 mG (Sugar-Free) Lozenge 1 Lozenge Oral every 12 hours PRN Sore Throat  sodium chloride 0.9% lock flush 10 milliLiter(s) IV Push every 1 hour PRN Pre/post blood products, medications, blood draw, and to maintain line patency        Drug Dosing Weight  Height (cm): 157.5 (15 Nov 2020 23:00)  Weight (kg): 85.4 (15 Nov 2020 23:00)  BMI (kg/m2): 34.4 (15 Nov 2020 23:00)  BSA (m2): 1.86 (15 Nov 2020 23:00)    CENTRAL LINE: [ ] YES [x ] NO  LOCATION:   DATE INSERTED:  REMOVE: [ ] YES [ ] NO  EXPLAIN:    ANN: [ ] YES [x ] NO    DATE INSERTED:  REMOVE:  [ ] YES [ ] NO  EXPLAIN:    PAST MEDICAL & SURGICAL HISTORY:  COVID-19    No significant past surgical history    ICU Vital Signs Last 24 Hrs  T(C): 36.4 (13 Dec 2020 05:49), Max: 36.8 (12 Dec 2020 13:45)  T(F): 97.6 (13 Dec 2020 05:49), Max: 98.2 (12 Dec 2020 13:45)  HR: 71 (13 Dec 2020 05:49) (71 - 88)  BP: 129/75 (13 Dec 2020 05:49) (117/69 - 129/75)  BP(mean): --  ABP: --  ABP(mean): --  RR: 16 (13 Dec 2020 05:49) (16 - 16)  SpO2: 99% (13 Dec 2020 05:49) (98% - 99%)    PHYSICAL EXAM:    GENERAL: NAD  HEAD:  Atraumatic, Normocephalic  EYES: EOMI, PERRLA, conjunctiva and sclera clear  ENMT: No tonsillar erythema, exudates  NECK: Supple, No JVD  NERVOUS SYSTEM:  Alert & Oriented X3, Moving all extremities  CHEST/LUNG: Diminished breath sounds bilateral bases  HEART: Regular rate and rhythm; No murmurs, rubs, or gallops  ABDOMEN: Soft, Nontender, Nondistended; Bowel sounds present  EXTREMITIES:  2+ Peripheral Pulses, No clubbing, cyanosis, or edema  LYMPH: No lymphadenopathy noted  SKIN: No rashes or lesions      LABS:  12-13    138  |  104  |  23<H>  ----------------------------<  93  3.4<L>   |  25  |  1.31<H>    Ca    9.4      13 Dec 2020 06:57  Phos  3.7     12-13  Mg     2.0     12-13    TPro  8.2  /  Alb  2.9<L>  /  TBili  0.4  /  DBili  x   /  AST  31  /  ALT  42  /  AlkPhos  69  12-13    CBC Full  -  ( 13 Dec 2020 06:57 )  WBC Count : 9.96 K/uL  RBC Count : 3.09 M/uL  Hemoglobin : 8.9 g/dL  Hematocrit : 28.5 %  Platelet Count - Automated : 326 K/uL  Mean Cell Volume : 92.2 fl  Mean Cell Hemoglobin : 28.8 pg  Mean Cell Hemoglobin Concentration : 31.2 gm/dL  Auto Neutrophil # : 5.53 K/uL  Auto Lymphocyte # : 1.19 K/uL  Auto Monocyte # : 0.63 K/uL  Auto Eosinophil # : 2.41 K/uL  Auto Basophil # : 0.06 K/uL  Auto Neutrophil % : 55.6 %  Auto Lymphocyte % : 11.9 %  Auto Monocyte % : 6.3 %  Auto Eosinophil % : 24.2 %  Auto Basophil % : 0.6 %    [ x ]  DVT Prophylaxis - Lovenox as above  [  ]  Nutrition, Brand, Rate         Goal Rate        Abnormal Nutritional Status -  Malnutrition   Cachexia      Morbid Obesity BMI >/=40    RADIOLOGY & ADDITIONAL STUDIES: Reviewed  Most Recent Xray:  < from: Xray Chest 1 View- PORTABLE-Routine (Xray Chest 1 View- PORTABLE-Routine in AM.) (12.10.20 @ 12:22) >  Heart is magnified by technique.  On December 5 there are fairly significant diffuse interstitial infiltrates concentrated in the mid lower lung fields.  These infiltrates are again noted but there is significantly improved aeration.  IMPRESSION: Improved aeration of bilateral lung infiltrates.  < end of copied text >    Goals of Care Discussion with Family/Proxy/Other

## 2020-12-13 NOTE — PROGRESS NOTE ADULT - PROBLEM SELECTOR PLAN 1
Secondary to COVID  Acute, resolved.  Intubated on 11/15  Extubated on 11/30.  Currently maintaining saturation >93% on room air.  Continue to monitor oxygen saturation.  Will need oxygen testing before discharge. Secondary to COVID  Acute, resolved.  Intubated on 11/15  Extubated on 11/30.  Currently maintaining saturation >93% on room air.  Continue to monitor oxygen saturation.  May need oxygen testing before discharge. Has been on room air with good saturation.

## 2020-12-14 DIAGNOSIS — J96.01 ACUTE RESPIRATORY FAILURE WITH HYPOXIA: ICD-10-CM

## 2020-12-14 DIAGNOSIS — U07.1 COVID-19: ICD-10-CM

## 2020-12-14 DIAGNOSIS — E44.0 MODERATE PROTEIN-CALORIE MALNUTRITION: ICD-10-CM

## 2020-12-14 LAB
ALBUMIN SERPL ELPH-MCNC: 3 G/DL — LOW (ref 3.5–5)
ALP SERPL-CCNC: 66 U/L — SIGNIFICANT CHANGE UP (ref 40–120)
ALT FLD-CCNC: 41 U/L DA — SIGNIFICANT CHANGE UP (ref 10–60)
ANION GAP SERPL CALC-SCNC: 9 MMOL/L — SIGNIFICANT CHANGE UP (ref 5–17)
ANISOCYTOSIS BLD QL: SLIGHT — SIGNIFICANT CHANGE UP
AST SERPL-CCNC: 27 U/L — SIGNIFICANT CHANGE UP (ref 10–40)
BASOPHILS # BLD AUTO: 0 K/UL — SIGNIFICANT CHANGE UP (ref 0–0.2)
BASOPHILS NFR BLD AUTO: 0 % — SIGNIFICANT CHANGE UP (ref 0–2)
BILIRUB SERPL-MCNC: 0.3 MG/DL — SIGNIFICANT CHANGE UP (ref 0.2–1.2)
BUN SERPL-MCNC: 25 MG/DL — HIGH (ref 7–18)
CALCIUM SERPL-MCNC: 9.3 MG/DL — SIGNIFICANT CHANGE UP (ref 8.4–10.5)
CHLORIDE SERPL-SCNC: 103 MMOL/L — SIGNIFICANT CHANGE UP (ref 96–108)
CO2 SERPL-SCNC: 24 MMOL/L — SIGNIFICANT CHANGE UP (ref 22–31)
CREAT SERPL-MCNC: 1.25 MG/DL — SIGNIFICANT CHANGE UP (ref 0.5–1.3)
CULTURE RESULTS: SIGNIFICANT CHANGE UP
EOSINOPHIL # BLD AUTO: 2.61 K/UL — HIGH (ref 0–0.5)
EOSINOPHIL NFR BLD AUTO: 25 % — HIGH (ref 0–6)
GLUCOSE SERPL-MCNC: 92 MG/DL — SIGNIFICANT CHANGE UP (ref 70–99)
HCT VFR BLD CALC: 27.6 % — LOW (ref 39–50)
HGB BLD-MCNC: 8.7 G/DL — LOW (ref 13–17)
LYMPHOCYTES # BLD AUTO: 1.15 K/UL — SIGNIFICANT CHANGE UP (ref 1–3.3)
LYMPHOCYTES # BLD AUTO: 11 % — LOW (ref 13–44)
MAGNESIUM SERPL-MCNC: 2 MG/DL — SIGNIFICANT CHANGE UP (ref 1.6–2.6)
MANUAL SMEAR VERIFICATION: SIGNIFICANT CHANGE UP
MCHC RBC-ENTMCNC: 28.7 PG — SIGNIFICANT CHANGE UP (ref 27–34)
MCHC RBC-ENTMCNC: 31.5 GM/DL — LOW (ref 32–36)
MCV RBC AUTO: 91.1 FL — SIGNIFICANT CHANGE UP (ref 80–100)
MICROCYTES BLD QL: SLIGHT — SIGNIFICANT CHANGE UP
MONOCYTES # BLD AUTO: 1.15 K/UL — HIGH (ref 0–0.9)
MONOCYTES NFR BLD AUTO: 11 % — SIGNIFICANT CHANGE UP (ref 2–14)
NEUTROPHILS # BLD AUTO: 5.33 K/UL — SIGNIFICANT CHANGE UP (ref 1.8–7.4)
NEUTROPHILS NFR BLD AUTO: 50 % — SIGNIFICANT CHANGE UP (ref 43–77)
NEUTS BAND # BLD: 1 % — SIGNIFICANT CHANGE UP (ref 0–8)
NRBC # BLD: 0 /100 — SIGNIFICANT CHANGE UP (ref 0–0)
PHOSPHATE SERPL-MCNC: 3.6 MG/DL — SIGNIFICANT CHANGE UP (ref 2.5–4.5)
PLAT MORPH BLD: NORMAL — SIGNIFICANT CHANGE UP
PLATELET # BLD AUTO: 356 K/UL — SIGNIFICANT CHANGE UP (ref 150–400)
POTASSIUM SERPL-MCNC: 3.8 MMOL/L — SIGNIFICANT CHANGE UP (ref 3.5–5.3)
POTASSIUM SERPL-SCNC: 3.8 MMOL/L — SIGNIFICANT CHANGE UP (ref 3.5–5.3)
PROT SERPL-MCNC: 8.2 G/DL — SIGNIFICANT CHANGE UP (ref 6–8.3)
RBC # BLD: 3.03 M/UL — LOW (ref 4.2–5.8)
RBC # FLD: 14 % — SIGNIFICANT CHANGE UP (ref 10.3–14.5)
RBC BLD AUTO: ABNORMAL
SODIUM SERPL-SCNC: 136 MMOL/L — SIGNIFICANT CHANGE UP (ref 135–145)
SPECIMEN SOURCE: SIGNIFICANT CHANGE UP
VARIANT LYMPHS # BLD: 2 % — SIGNIFICANT CHANGE UP (ref 0–6)
WBC # BLD: 10.45 K/UL — SIGNIFICANT CHANGE UP (ref 3.8–10.5)
WBC # FLD AUTO: 10.45 K/UL — SIGNIFICANT CHANGE UP (ref 3.8–10.5)

## 2020-12-14 RX ADMIN — SENNA PLUS 2 TABLET(S): 8.6 TABLET ORAL at 21:37

## 2020-12-14 RX ADMIN — FLUCONAZOLE 400 MILLIGRAM(S): 150 TABLET ORAL at 12:37

## 2020-12-14 RX ADMIN — CHLORHEXIDINE GLUCONATE 1 APPLICATION(S): 213 SOLUTION TOPICAL at 05:19

## 2020-12-14 RX ADMIN — BENZOCAINE AND MENTHOL 1 LOZENGE: 5; 1 LIQUID ORAL at 12:37

## 2020-12-14 RX ADMIN — ENOXAPARIN SODIUM 70 MILLIGRAM(S): 100 INJECTION SUBCUTANEOUS at 05:19

## 2020-12-14 RX ADMIN — AMLODIPINE BESYLATE 10 MILLIGRAM(S): 2.5 TABLET ORAL at 05:19

## 2020-12-14 RX ADMIN — ENOXAPARIN SODIUM 70 MILLIGRAM(S): 100 INJECTION SUBCUTANEOUS at 17:41

## 2020-12-14 NOTE — PROGRESS NOTE ADULT - PROBLEM SELECTOR PLAN 8
DVT and GI prophylaxis. On full dose Lovenox.  Stool: ova/parasite ordered 12/13 for elevated eosinophils; Follow up results; Can be followed outpatient; Complete 7 day course of Diflucan 12/8 to 12/14  PT and Dietary consults.  Will need oxygen testing before discharge. Currently on room air saturating well;  Condition remains guarded.   Discharge planning ongoing;

## 2020-12-14 NOTE — PROGRESS NOTE ADULT - PROBLEM SELECTOR PLAN 4
Bacterial vs aspiration.  Procalcitonin 0.18  S/p Zosyn 12/6-12/12 5 day course, completed  Blood cultures negative.  Diflucan d/c

## 2020-12-14 NOTE — PROGRESS NOTE ADULT - PROBLEM SELECTOR PLAN 1
Secondary to COVID. Acute resolved.  Intubated on 11/15. Extubated 11/30  Maintaining oxygen saturation on RA.

## 2020-12-14 NOTE — PROGRESS NOTE ADULT - PROBLEM SELECTOR PLAN 2
S/P 10 days of steroids 11/15 to 11/24  Maintaining saturation on room air.  COVID PCR positive 12/13

## 2020-12-14 NOTE — PROGRESS NOTE ADULT - ASSESSMENT
55M from home without PMH with progressively worsening SOB and +COVID test x5 days prior to admission presented following SpO2 64% at home.  Started on 15L NRB in ED 2/2 SpO2s 85-90%, desaturated to low 80s and was placed on HFNC, continued to desaturate, was intubated and admitted to ICU 11/15.     S/p 10-day course decadron 11/15-24. Not a candidate for remdesivir initially 2/2 elevated LFTs and subsequently 2/2 CrCl <45. Kidney function poor with elevated Cr to 2.22 on admission, baseline unknown, peaked at 4.43 and gradually improved. U/O supported with Lasix boluses, gradually improved, nephro Dr. Camara following.  CXR with b/l opacities and small pleural effusions, APRs elevated with d-dimer 559->2763, started on heparin gtt for therapeutic AC. Completed 5 day course Azithromycin and Ceftriaxone for CAP. Extubated 11/30, O2 delivery titrated down, now with NC and standby BiPAP.      last fever 12/6. Started on 5-day course of Zosyn for possible aspiration pna, last day 12/11;  BCx NGTD, MRSA negative. WBC with 21% eosinophils, possibly 2/2 fungemia, started on Diflucan and awaiting fungicell result. Mobility and  physical strength improving gradually.    12/9 Transferred to SCU.  12/13 Ova/Parasite stool ordered in light of elevated eosinophil; Will complete diflucan 7 day course on (12/8 to 12/14) Pe; Follow up results; stool results can be followed outpatient if discharge planning on Monday 12/13  COVID PCR positive.

## 2020-12-14 NOTE — PROGRESS NOTE ADULT - SUBJECTIVE AND OBJECTIVE BOX
PUSHPA KANG    SCU progress note    INTERVAL HPI/OVERNIGHT EVENTS: ***COVID PCR positive 12/13    DNR [ ]   DNI  [  ]   FULL CODE    Covid - 19 PCR: POSITIVE 12/13    The 4Ms    What Matters Most: see [facility]  Age appropriate Medications/Screen for High Risk Medication: Yes  Mentation: see CAM below  Mobility: defer to physical exam    The Confusion Assessment Method (CAM) Diagnostic Algorithm     1: Acute Onset or Fluctuating Course  - Is there evidence of an acute change in mental status from the patient’s baseline? Did the (abnormal) behavior  fluctuate during the day, that is, tend to come and go, or increase and decrease in severity?  [ ] YES [x ] NO     2: Inattention  - Did the patient have difficulty focusing attention, being easily distractible, or having difficulty keeping track of what was being said?  [ ] YES [x ] NO     3: Disorganized thinking  -Was the patient’s thinking disorganized or incoherent, such as rambling or irrelevant conversation, unclear or illogical flow of ideas, or unpredictable switching from subject to subject?  [ ] YES [x ] NO    4: Altered Level of consciousness?  [ ] YES [x ] NO    The diagnosis of delirium by CAM requires the presence of features 1 and 2 and either 3 or 4.    PRESSORS: [ ] YES [x ] NO  fluconAZOLE   Tablet 400 milliGRAM(s) Oral daily    Cardiovascular:  Heart Failure  Acute   Acute on Chronic  Chronic       amLODIPine   Tablet 10 milliGRAM(s) Oral daily    Pulmonary:    Hematalogic:  enoxaparin Injectable 70 milliGRAM(s) SubCutaneous two times a day    Other:  artificial  tears Solution 1 Drop(s) Both EYES every 4 hours PRN  benzocaine 15 mG/menthol 3.6 mG (Sugar-Free) Lozenge 1 Lozenge Oral every 12 hours PRN  chlorhexidine 2% Cloths 1 Application(s) Topical <User Schedule>  senna 2 Tablet(s) Oral at bedtime  sodium chloride 0.9% lock flush 10 milliLiter(s) IV Push every 1 hour PRN    amLODIPine   Tablet 10 milliGRAM(s) Oral daily  artificial  tears Solution 1 Drop(s) Both EYES every 4 hours PRN  benzocaine 15 mG/menthol 3.6 mG (Sugar-Free) Lozenge 1 Lozenge Oral every 12 hours PRN  chlorhexidine 2% Cloths 1 Application(s) Topical <User Schedule>  enoxaparin Injectable 70 milliGRAM(s) SubCutaneous two times a day  fluconAZOLE   Tablet 400 milliGRAM(s) Oral daily  senna 2 Tablet(s) Oral at bedtime  sodium chloride 0.9% lock flush 10 milliLiter(s) IV Push every 1 hour PRN    Drug Dosing Weight  Height (cm): 157.5 (15 Nov 2020 23:00)  Weight (kg): 85.4 (15 Nov 2020 23:00)  BMI (kg/m2): 34.4 (15 Nov 2020 23:00)  BSA (m2): 1.86 (15 Nov 2020 23:00)    CENTRAL LINE: [ ] YES [x ] NO  LOCATION:   DATE INSERTED:  REMOVE: [ ] YES [ ] NO  EXPLAIN:    ANN: [ ] YES [x ] NO    DATE INSERTED:  REMOVE:  [ ] YES [ ] NO  EXPLAIN:    PAST MEDICAL & SURGICAL HISTORY:  COVID-19    No significant past surgical history            PHYSICAL EXAM:    GENERAL: NAD, well-groomed, well-developed  HEAD:  Atraumatic, Normocephalic  EYES: EOMI, PERRLA, conjunctiva and sclera clear  ENMT: No tonsillar erythema, exudates  NECK: Supple, No JVD  NERVOUS SYSTEM:  Alert & Oriented X3, Follows commands, moving all extremities.  CHEST/LUNG: Diminished breath sounds bilateral bases.  HEART: Regular rate and rhythm; No murmurs, rubs, or gallops  ABDOMEN: Soft, Nontender, Nondistended; Bowel sounds present  EXTREMITIES:  2+ Peripheral Pulses, No clubbing, cyanosis, or edema  LYMPH: No lymphadenopathy noted  SKIN: No rashes or lesions      LABS:  CBC Full  -  ( 14 Dec 2020 08:32 )  WBC Count : 10.45 K/uL  RBC Count : 3.03 M/uL  Hemoglobin : 8.7 g/dL  Hematocrit : 27.6 %  Platelet Count - Automated : 356 K/uL  Mean Cell Volume : 91.1 fl  Mean Cell Hemoglobin : 28.7 pg  Mean Cell Hemoglobin Concentration : 31.5 gm/dL  Auto Neutrophil # : 5.33 K/uL  Auto Lymphocyte # : 1.15 K/uL  Auto Monocyte # : 1.15 K/uL  Auto Eosinophil # : 2.61 K/uL  Auto Basophil # : 0.00 K/uL  Auto Neutrophil % : 50.0 %  Auto Lymphocyte % : 11.0 %  Auto Monocyte % : 11.0 %  Auto Eosinophil % : 25.0 %  Auto Basophil % : 0.0 %    12-14    136  |  103  |  25<H>  ----------------------------<  92  3.8   |  24  |  1.25    Ca    9.3      14 Dec 2020 08:32  Phos  3.6     12-14  Mg     2.0     12-14    TPro  8.2  /  Alb  3.0<L>  /  TBili  0.3  /  DBili  x   /  AST  27  /  ALT  41  /  AlkPhos  66  12-14              [  ]  DVT Prophylaxis  [  ]  Nutrition, Brand, Rate         Goal Rate        Abnormal Nutritional Status -  Malnutrition   Cachexia          RADIOLOGY & ADDITIONAL STUDIES:  ***  < from: Xray Chest 1 View- PORTABLE-Routine (Xray Chest 1 View- PORTABLE-Routine in AM.) (12.10.20 @ 12:22) >  Heart is magnified by technique.    On December 5 there are fairly significant diffuse interstitial infiltrates concentrated in the mid lower lung fields.    These infiltrates are again noted but there is significantly improved aeration.    IMPRESSION: Improved aeration of bilateral lung infiltrates.    < end of copied text >    Goals of Care Discussion with Family/Proxy/Other

## 2020-12-14 NOTE — PROGRESS NOTE ADULT - PROBLEM SELECTOR PLAN 3
Secondary to prolonged intubation and COVID.  Monitor oxygen saturation. Maintaining saturation on RA  Completed 10 days of steroids.

## 2020-12-15 DIAGNOSIS — Z02.9 ENCOUNTER FOR ADMINISTRATIVE EXAMINATIONS, UNSPECIFIED: ICD-10-CM

## 2020-12-15 LAB
ALBUMIN SERPL ELPH-MCNC: 3 G/DL — LOW (ref 3.5–5)
ALP SERPL-CCNC: 69 U/L — SIGNIFICANT CHANGE UP (ref 40–120)
ALT FLD-CCNC: 43 U/L DA — SIGNIFICANT CHANGE UP (ref 10–60)
ANION GAP SERPL CALC-SCNC: 10 MMOL/L — SIGNIFICANT CHANGE UP (ref 5–17)
AST SERPL-CCNC: 29 U/L — SIGNIFICANT CHANGE UP (ref 10–40)
BASOPHILS # BLD AUTO: 0.06 K/UL — SIGNIFICANT CHANGE UP (ref 0–0.2)
BASOPHILS NFR BLD AUTO: 0.6 % — SIGNIFICANT CHANGE UP (ref 0–2)
BILIRUB SERPL-MCNC: 0.4 MG/DL — SIGNIFICANT CHANGE UP (ref 0.2–1.2)
BUN SERPL-MCNC: 27 MG/DL — HIGH (ref 7–18)
CALCIUM SERPL-MCNC: 9.3 MG/DL — SIGNIFICANT CHANGE UP (ref 8.4–10.5)
CHLORIDE SERPL-SCNC: 102 MMOL/L — SIGNIFICANT CHANGE UP (ref 96–108)
CO2 SERPL-SCNC: 24 MMOL/L — SIGNIFICANT CHANGE UP (ref 22–31)
CREAT SERPL-MCNC: 1.27 MG/DL — SIGNIFICANT CHANGE UP (ref 0.5–1.3)
EOSINOPHIL # BLD AUTO: 2.34 K/UL — HIGH (ref 0–0.5)
EOSINOPHIL NFR BLD AUTO: 21.8 % — HIGH (ref 0–6)
GLUCOSE SERPL-MCNC: 89 MG/DL — SIGNIFICANT CHANGE UP (ref 70–99)
HCT VFR BLD CALC: 28.8 % — LOW (ref 39–50)
HGB BLD-MCNC: 9.1 G/DL — LOW (ref 13–17)
IMM GRANULOCYTES NFR BLD AUTO: 1.2 % — SIGNIFICANT CHANGE UP (ref 0–1.5)
LYMPHOCYTES # BLD AUTO: 1.5 K/UL — SIGNIFICANT CHANGE UP (ref 1–3.3)
LYMPHOCYTES # BLD AUTO: 14 % — SIGNIFICANT CHANGE UP (ref 13–44)
MAGNESIUM SERPL-MCNC: 2 MG/DL — SIGNIFICANT CHANGE UP (ref 1.6–2.6)
MCHC RBC-ENTMCNC: 29 PG — SIGNIFICANT CHANGE UP (ref 27–34)
MCHC RBC-ENTMCNC: 31.6 GM/DL — LOW (ref 32–36)
MCV RBC AUTO: 91.7 FL — SIGNIFICANT CHANGE UP (ref 80–100)
MONOCYTES # BLD AUTO: 0.69 K/UL — SIGNIFICANT CHANGE UP (ref 0–0.9)
MONOCYTES NFR BLD AUTO: 6.4 % — SIGNIFICANT CHANGE UP (ref 2–14)
NEUTROPHILS # BLD AUTO: 6.02 K/UL — SIGNIFICANT CHANGE UP (ref 1.8–7.4)
NEUTROPHILS NFR BLD AUTO: 56 % — SIGNIFICANT CHANGE UP (ref 43–77)
NRBC # BLD: 0 /100 WBCS — SIGNIFICANT CHANGE UP (ref 0–0)
PHOSPHATE SERPL-MCNC: 4.1 MG/DL — SIGNIFICANT CHANGE UP (ref 2.5–4.5)
PLATELET # BLD AUTO: 382 K/UL — SIGNIFICANT CHANGE UP (ref 150–400)
POTASSIUM SERPL-MCNC: 3.9 MMOL/L — SIGNIFICANT CHANGE UP (ref 3.5–5.3)
POTASSIUM SERPL-SCNC: 3.9 MMOL/L — SIGNIFICANT CHANGE UP (ref 3.5–5.3)
PROT SERPL-MCNC: 8.3 G/DL — SIGNIFICANT CHANGE UP (ref 6–8.3)
RBC # BLD: 3.14 M/UL — LOW (ref 4.2–5.8)
RBC # FLD: 13.8 % — SIGNIFICANT CHANGE UP (ref 10.3–14.5)
SODIUM SERPL-SCNC: 136 MMOL/L — SIGNIFICANT CHANGE UP (ref 135–145)
WBC # BLD: 10.74 K/UL — HIGH (ref 3.8–10.5)
WBC # FLD AUTO: 10.74 K/UL — HIGH (ref 3.8–10.5)

## 2020-12-15 RX ADMIN — ENOXAPARIN SODIUM 70 MILLIGRAM(S): 100 INJECTION SUBCUTANEOUS at 05:13

## 2020-12-15 RX ADMIN — SENNA PLUS 2 TABLET(S): 8.6 TABLET ORAL at 21:19

## 2020-12-15 RX ADMIN — CHLORHEXIDINE GLUCONATE 1 APPLICATION(S): 213 SOLUTION TOPICAL at 05:13

## 2020-12-15 RX ADMIN — AMLODIPINE BESYLATE 10 MILLIGRAM(S): 2.5 TABLET ORAL at 05:12

## 2020-12-15 RX ADMIN — ENOXAPARIN SODIUM 70 MILLIGRAM(S): 100 INJECTION SUBCUTANEOUS at 17:21

## 2020-12-15 NOTE — PROGRESS NOTE ADULT - ATTENDING COMMENTS
[facility] NEPHROLOGY  Marty Rivas M.D.  Manuel Sampson D.O.  Olivia Camara M.D.  Tatiana Lara, MSN, ANP-C  [phone]    [address] [place] [address]
[facility] NEPHROLOGY  Marty Rivas M.D.  Manuel Sampson D.O.  Olivia Camara M.D.  Tatiana Lara, MSN, ANP-C  [phone]    [address] [place] [address]
[facility] NEPHROLOGY  Marty Rivas M.D.  Manuel Sampson D.O.  Olivia Camara M.D.  Tatiana Lara, MSN, ANP-C  [phone]    [address] [place] [address]
[facility] NEPHROLOGY  Marty Rivas M.D.  Manuel Sampson D.O.  Olivia Camara M.D.  Tatiana Lara, MSN, ANP-C  [phone]    [address] [place] [address]
[facility] NEPHROLOGY  Marty Rivas M.D.  Manuel Sampson D.O.  Olivia Camara M.D.  Tatiana Lara, MSN, ANP-C  [phone]    [address] [place] [address]
[facility] NEPHROLOGY  Marty Rivas M.D.  Manuel Sampson D.O.  Olivia Camara M.D.  Tatiana Lara, MSN, ANP-C  [phone]    [address] [place] [address]
[facility] NEPHROLOGY  Marty Rivas M.D.  Manuel Sampson D.O.  Olivia Camara M.D.  Tatiana Lara, MSN, ANP-C  [phone]    [address] [place] [address]
[facility] NEPHROLOGY  Marty Rivas M.D.  Manuel Sampson D.O.  Olivia Camara M.D.  Tatiana Lara, MSN, ANP-C  [phone]    [address] [place] [address]
[facility] NEPHROLOGY  Marty Rivas M.D.  Manuel Sampson D.O.  Olivia Camara M.D.  Tatiana Lara, MSN, ANP-C  [phone]    [address] [place] [address]
[facility] NEPHROLOGY  Marty Rivas M.D.  Manuel Sampson D.O.  Olivia Camara M.D.  Tatiana Lara, MSN, ANP-C  [phone]    [address] [place] [address]
[facility] NEPHROLOGY  Marty Rivas M.D.  Manuel Sampson D.O.  Olivia Camara M.D.  Tatiana Lara, MSN, ANP-C  [phone]    [address] [place] [address]
[facility] NEPHROLOGY  Marty Rivas M.D.  Manuel Sampson D.O.  Olivia Camara M.D.  Tatiana Lara, MSN, ANP-C  [phone]    [address] [place] [address]
[facility] NEPHROLOGY  Marty Rivas M.D.  Manuel Sampson D.O.  Olivia Camara M.D.  Tatiana Lara, MSN, ANP-C  [phone]    [address] [place] [address]
[facility] NEPHROLOGY  Marty Rivas M.D.  Manuel Sampson D.O.  Olivia Camara M.D.  Tatiana Lara, MSN, ANP-C  [phone]    [address] [place] [address]
[facility] NEPHROLOGY  Marty Rivas M.D.  Manuel Sampson D.O.  Olivia Camara M.D.  Tatiana Lara, MSN, ANP-C  [phone]    [address] [place] [address]
[facility] NEPHROLOGY  Marty Rivas M.D.  Manuel Sampson D.O.  Olivia Camara M.D.  Tatiana Lara, MSN, ANP-C  [phone]    [address] [place] [address]
[facility] NEPHROLOGY  Marty Rivas M.D.  Manuel Sampson D.O.  Olivia Camara M.D.  Tatiana Lara, MSN, ANP-C  [phone]    [address] [place] [address]
[facility] NEPHROLOGY  Marty Rivas M.D.  Manuel Sampson D.O.  Olivia Camara M.D.  Tatiana Lara, MSN, ANP-C  [phone]    [address] [place] [address]
[facility] NEPHROLOGY  Marty Rivas M.D.  Manuel Sampson D.O.  Olivia Camara M.D.  Tatiana Lara, MSN, ANP-C  [phone]    [address] [place] [address]
[facility] NEPHROLOGY  Marty Rivas M.D.  Manuel Sampson D.O.  Olivia Camara M.D.  Tatiana Lara, MSN, ANP-C  [phone]    [address] [place] [address]
[facility] NEPHROLOGY  Marty Rivas M.D.  Manuel Sampson D.O.  Olivia Camara M.D.  Tatiana Lara, MSN, ANP-C  [phone]    [address] [place] [address]
Assesement:  -  Acute respiratory failure  -  ARDS  -  COVID19 Infection  -  Acute renal failure  -  Transaminitis     Plan  - Extubated 11/30  - Bipap at standby  - Completed antibiotics  - Monitor respiratory status  - Nephrology follow up appreciated  - DVT and stress ulcer prophylaxis  - Oral diet  - Overall prognosis is guarded.  - OOB to chair  - PT  -Transfer to medical service
Assesement:  -  Acute respiratory failure  -  ARDS  -  COVID19 Infection  -  Acute renal failure  -  Transaminitis   - Severe debility, suspect critical illness polyneuropathy  - Aspiration pneumonia    Plan  - New infiltrate on CXR, + fever, likely aspirated. Poor management of secretions  - Restarted antibiotics for aspiration coverage  - Extubated 11/30  - Mentation is slowly improving  - Aspiration precautions, requiring frequent suctioning and chest PT  - Monitor respiratory status  - Nephrology follow up appreciated  - DVT and stress ulcer prophylaxis  - Oral diet  - Overall prognosis is guarded.  - PT  - Cont. ICU care for now
Assessment:  1. Acute respiratory failure  2. COVID19 Infection  3. Acute renal failure  4. Transaminitis     Plan  - Cont. mechanical ventilation, titrate down Fio2 as tolerated  - Serial ABGs improving  - Sedation and paralytics started for vent synchrony   - Monitor respiratory status  - Empiric antibiotics, given concern for super imposed bacterial infection  - Monitor urine output and electrolytes  - Nephrology follow up appreciated  - DVT and stress ulcer prophylaxis  - Overall prognosis is guarded
Assesement:  -  Acute respiratory failure  -  ARDS  -  COVID19 Infection  -  Acute renal failure  -  Transaminitis   - Severe debility, suspect critical illness polyneuropathy    Plan  - Extubated 11/30  - Aspiration precautions, requiring frequent suctioning and chest PT  - Completed antibiotics  - Monitor respiratory status  - Nephrology follow up appreciated  - DVT and stress ulcer prophylaxis  - Oral diet  - Overall prognosis is guarded.  - OOB to chair  - PT  - Cont. ICU care for now
Assesement:  -  Acute respiratory failure  -  ARDS  -  COVID19 Infection  -  Acute renal failure  -  Transaminitis   - Severe debility, suspect critical illness polyneuropathy  - Aspiration pneumonia    Plan  - New infiltrate on CXR, + fever, likely aspirated. Poor management of secretions  - Restarted antibiotics for aspiration coverage  - Extubated 11/30  - Mentation is slowly improving  - Aspiration precautions, requiring frequent suctioning and chest PT  - Monitor respiratory status  - Nephrology follow up appreciated  - DVT and stress ulcer prophylaxis  - Oral diet  - Overall prognosis is guarded.  - PT  - Cont. ICU care for now.
Assessment:  1. Acute respiratory failure  2. COVID19 Infection  3. Acute renal failure  4. Transaminitis     Plan  - Cont. mechanical ventilation  - Sedation and paralytics started for vent synchrony   - Monitor respiratory status  - Empiric antibiotics, given concern for super imposed bacterial infection  - Obtain sputum cultures  - IV hydration  - Monitor urine output and electrolytes  - Check US of the abdomen   - DVT and stress ulcer prophylaxis  - Overall prognosis is guarded
IMP: This is a 55 year old man  from home ambulates independently with no significant medical condition presents to the ED with his son with complaints of progressively worsening shortness of breath and difficulty breathing since testing positive for COVID-19 five days ago outpatient . Patient's son states that he brought patient into the ED as he had an oxygen saturation of 64% earlier today. Patient otherwise denies any fevers, chills, and all other acute complaints. Patient reports that currently multiple family members are also sick with COVID-19. Denies smoking, alcohol, illicit drug use. NKDA.  Oxygen saturation came up to 85-90% on 15 L nonrebreather, and patient appears less pale and is less short of breath. later Pt was desaturating on NRB 81% on NRB so was started on HFNC was still desaturated then got intubated in ED .  Pt will be transferred to ICU                 -  Acute respiratory failure  -  ARDS  -  COVID19 Infection  -  Acute renal failure  -  Transaminitis     Plan  - Cont. mechanical ventilation, titrate down Fio2 as tolerated  - Fio2 requirment is decreasing   - started on zosyn and canco 11/23  - monitor vanco level   - Serial ABGs improving  - Sedation and paralytics started for vent synchrony   - Prone positioning as tolerated  - Monitor respiratory status  - Empiric antibiotics, given concern for super imposed bacterial infection  - Monitor urine output, seem to be improving  - Repeat labs in PM  - Creatinine downtrending   - Nephrology follow up appreciated  - DVT and stress ulcer prophylaxis  - Tube feeds, bowel regimen  - Overall prognosis is guarded.
IMP: This is a 55 year old man  from home ambulates independently with no significant medical condition presents to the ED with his son with complaints of progressively worsening shortness of breath and difficulty breathing since testing positive for COVID-19 five days ago outpatient . Patient's son states that he brought patient into the ED as he had an oxygen saturation of 64% earlier today. Patient otherwise denies any fevers, chills, and all other acute complaints. Patient reports that currently multiple family members are also sick with COVID-19. Denies smoking, alcohol, illicit drug use. NKDA.  Oxygen saturation came up to 85-90% on 15 L nonrebreather, and patient appears less pale and is less short of breath. later Pt was desaturating on NRB 81% on NRB so was started on HFNC was still desaturated then got intubated in ED .  Pt will be transferred to ICU                 -  Acute respiratory failure  -  ARDS  -  COVID19 Infection  -  Acute renal failure  -  Transaminitis     Plan  - Cont. mechanical ventilation, titrate down Fio2 as tolerated  - Fio2 requirment is decreasing   - started on zosyn and canco 11/23  - monitor vanco level   - Serial ABGs improving  - Sedation and paralytics started for vent synchrony   - Prone positioning as tolerated  - Monitor respiratory status  - Empiric antibiotics, given concern for super imposed bacterial infection  - Monitor urine output, seem to be improving  - Repeat labs in PM  - Creatinine downtrending   - Nephrology follow up appreciated  - DVT and stress ulcer prophylaxis  - Tube feeds, bowel regimen  - Overall prognosis is guarded.
IMP: This is a 55 year old man  from home ambulates independently with no significant medical condition presents to the ED with his son with complaints of progressively worsening shortness of breath and difficulty breathing since testing positive for COVID-19 five days ago outpatient . Patient's son states that he brought patient into the ED as he had an oxygen saturation of 64% earlier today. Patient otherwise denies any fevers, chills, and all other acute complaints. Patient reports that currently multiple family members are also sick with COVID-19. Denies smoking, alcohol, illicit drug use. NKDA.  Oxygen saturation came up to 85-90% on 15 L nonrebreather, and patient appears less pale and is less short of breath. later Pt was desaturating on NRB 81% on NRB so was started on HFNC was still desaturated then got intubated in ED .  Pt will be transferred to ICU                 -  Acute respiratory failure  -  ARDS  -  COVID19 Infection  -  Acute renal failure  -  Transaminitis     Plan  - Cont. mechanical ventilation, titrate down Fio2 as tolerated  - Serial ABGs improving  - Sedation and paralytics started for vent synchrony   - Prone positioning as tolerated  - Monitor respiratory status  - Empiric antibiotics, given concern for super imposed bacterial infection  - Monitor urine output, seem to be improving  - Repeat labs in PM  - Creatinine downtrending   - Nephrology follow up appreciated  - DVT and stress ulcer prophylaxis  - Tube feeds, bowel regimen  - Overall prognosis is guarded.
IMP: This is a 55 year old man  from home ambulates independently with no significant medical condition presents to the ED with his son with complaints of progressively worsening shortness of breath and difficulty breathing since testing positive for COVID-19 five days ago outpatient . Patient's son states that he brought patient into the ED as he had an oxygen saturation of 64% earlier today. Patient otherwise denies any fevers, chills, and all other acute complaints. Patient reports that currently multiple family members are also sick with COVID-19. Denies smoking, alcohol, illicit drug use. NKDA.  Oxygen saturation came up to 85-90% on 15 L nonrebreather, and patient appears less pale and is less short of breath. later Pt was desaturating on NRB 81% on NRB so was started on HFNC was still desaturated then got intubated in ED .  Pt will be transferred to ICU                 -  Acute respiratory failure  -  ARDS  -  COVID19 Infection  -  Acute renal failure  -  Transaminitis     Plan  - Cont. mechanical ventilation, titrate down Fio2 as tolerated  - Serial ABGs improving  - Sedation and paralytics started for vent synchrony   - Prone positioning as tolerated  - Monitor respiratory status  - Empiric antibiotics, given concern for super imposed bacterial infection  - Monitor urine output, seem to be improving  - Repeat labs in PM  - Creatinine downtrending   - Nephrology follow up appreciated  - DVT and stress ulcer prophylaxis  - Tube feeds, bowel regimen  - Overall prognosis is guarded.
IMP: This is a 55 year old man  from home ambulates independently with no significant medical condition presents to the ED with his son with complaints of progressively worsening shortness of breath and difficulty breathing since testing positive for COVID-19 five days ago outpatient . Patient's son states that he brought patient into the ED as he had an oxygen saturation of 64% earlier today. Patient otherwise denies any fevers, chills, and all other acute complaints. Patient reports that currently multiple family members are also sick with COVID-19. Denies smoking, alcohol, illicit drug use. NKDA.  Oxygen saturation came up to 85-90% on 15 L nonrebreather, and patient appears less pale and is less short of breath. later Pt was desaturating on NRB 81% on NRB so was started on HFNC was still desaturated then got intubated in ED .  Pt will be transferred to ICU                 -  Acute respiratory failure  -  ARDS  -  COVID19 Infection  -  Acute renal failure  -  Transaminitis     Plan  - Extubated 11/30  - started on zosyn and canco 11/23  - monitor vanco level   - pat is more awake but not following  commands  - Prone positioning as tolerated  - Monitor respiratory status  - Empiric antibiotics, given concern for super imposed bacterial infection  - Monitor urine output, seem to be improving  - Nephrology follow up appreciated  - DVT and stress ulcer prophylaxis  - Tube feeds, bowel regimen  - Overall prognosis is guarded.  - OOB to chair  - PT  - D/C ferny
[facility] NEPHROLOGY  Marty Rivas M.D.  Manuel Sampson D.O.  Olivia Camara M.D.  Tatiana Lara, MSN, ANP-C  [phone]    [address] [place] [address]
Assessment:  1. Acute respiratory failure  2. COVID19 Infection  3. Acute renal failure  4. Transaminitis     Plan  - Cont. mechanical ventilation, titrate down Fio2 as tolerated  - Sedation and paralytics started for vent synchrony   - Monitor respiratory status  - Empiric antibiotics, given concern for super imposed bacterial infection  - Monitor urine output and electrolytes  - Check US of the abdomen   - Nephrology follow up  - Monitor urine output  - DVT and stress ulcer prophylaxis  - Overall prognosis is guarded
Assessment:  1. Acute respiratory failure  2. COVID19 Infection  3. Acute renal failure  4. Transaminitis     Plan  - Cont. mechanical ventilation, titrate down Fio2 as tolerated  - Serial ABGs  - Sedation and paralytics started for vent synchrony   - Monitor respiratory status  - Empiric antibiotics, given concern for super imposed bacterial infection  - Monitor urine output and electrolytes  - Check US of the abdomen   - Nephrology follow up appreciated  - DVT and stress ulcer prophylaxis  - Overall prognosis is guarded
Assessment:  1. Acute respiratory failure  2. COVID19 Infection  3. Acute renal failure  4. Transaminitis     Plan  - Cont. mechanical ventilation, titrate down Fio2 as tolerated  - Serial ABGs improving  - Sedation and paralytics started for vent synchrony   - Monitor respiratory status  - Empiric antibiotics, given concern for super imposed bacterial infection  - Monitor urine output, seem to be improving  - Repeat labs in PM  - Creatinine downtrending   - Nephrology follow up appreciated  - DVT and stress ulcer prophylaxis  - Tube feeds, bowel regimen  - Overall prognosis is guarded
Assessment:  1. Acute respiratory failure  2. COVID19 Infection  3. Acute renal failure  4. Transaminitis     Plan  - Cont. mechanical ventilation, titrate down Fio2 as tolerated  - Serial ABGs improving  - Sedation and paralytics started for vent synchrony   - Prone positioning as tolerated  - Monitor respiratory status  - Empiric antibiotics, given concern for super imposed bacterial infection  - Monitor urine output, seem to be improving  - Repeat labs in PM  - Creatinine downtrending   - Nephrology follow up appreciated  - DVT and stress ulcer prophylaxis  - Tube feeds, bowel regimen  - Overall prognosis is guarded
IMP: This is a 55 year old man  from home ambulates independently with no significant medical condition presents to the ED with his son with complaints of progressively worsening shortness of breath and difficulty breathing since testing positive for COVID-19 five days ago outpatient . Patient's son states that he brought patient into the ED as he had an oxygen saturation of 64% earlier today. Patient otherwise denies any fevers, chills, and all other acute complaints. Patient reports that currently multiple family members are also sick with COVID-19. Denies smoking, alcohol, illicit drug use. NKDA.  Oxygen saturation came up to 85-90% on 15 L nonrebreather, and patient appears less pale and is less short of breath. later Pt was desaturating on NRB 81% on NRB so was started on HFNC was still desaturated then got intubated in ED .  Pt will be transferred to ICU                 -  Acute respiratory failure  -  ARDS  -  COVID19 Infection  -  Acute renal failure  -  Transaminitis     Plan  - Cont. mechanical ventilation, titrate down Fio2 as tolerated  - Fio2 requirment is decreasing   - started on zosyn and canco 11/23  - monitor vanco level   - Serial ABGs improving  - Sedation and paralytics started for vent synchrony   - Prone positioning as tolerated  - Monitor respiratory status  - Empiric antibiotics, given concern for super imposed bacterial infection  - Monitor urine output, seem to be improving  - Repeat labs in PM  - Creatinine downtrending   - Nephrology follow up appreciated  - DVT and stress ulcer prophylaxis  - Tube feeds, bowel regimen  - Overall prognosis is guarded.
IMP: This is a 55 year old man  from home ambulates independently with no significant medical condition presents to the ED with his son with complaints of progressively worsening shortness of breath and difficulty breathing since testing positive for COVID-19 five days ago outpatient . Patient's son states that he brought patient into the ED as he had an oxygen saturation of 64% earlier today. Patient otherwise denies any fevers, chills, and all other acute complaints. Patient reports that currently multiple family members are also sick with COVID-19. Denies smoking, alcohol, illicit drug use. NKDA.  Oxygen saturation came up to 85-90% on 15 L nonrebreather, and patient appears less pale and is less short of breath. later Pt was desaturating on NRB 81% on NRB so was started on HFNC was still desaturated then got intubated in ED .  Pt will be transferred to ICU                 -  Acute respiratory failure  -  ARDS  -  COVID19 Infection  -  Acute renal failure  -  Transaminitis     Plan  - Cont. mechanical ventilation, titrate down Fio2 as tolerated  - Fio2 requirment is decreasing   - started on zosyn and canco 11/23  - monitor vanco level   - Serial ABGs improving  - Sedation and paralytics started for vent synchrony   - Prone positioning as tolerated  - Monitor respiratory status  - Empiric antibiotics, given concern for super imposed bacterial infection  - Monitor urine output, seem to be improving  - Repeat labs in PM  - Creatinine downtrending   - Nephrology follow up appreciated  - DVT and stress ulcer prophylaxis  - Tube feeds, bowel regimen  - Overall prognosis is guarded.
Assesement:  -  Acute respiratory failure  -  ARDS  -  COVID19 Infection  -  Acute renal failure  -  Transaminitis   - Severe debility, suspect critical illness polyneuropathy    Plan  - Extubated 11/30  - Mentation is slowly improving  - Aspiration precautions, requiring frequent suctioning and chest PT  - Completed antibiotics  - Monitor respiratory status  - Nephrology follow up appreciated  - DVT and stress ulcer prophylaxis  - Oral diet  - Overall prognosis is guarded.  - PT  - Cont. ICU care for now
Assesement:  -  Acute respiratory failure  -  ARDS  -  COVID19 Infection  -  Acute renal failure  -  Transaminitis   - Severe debility, suspect critical illness polyneuropathy  - Aspiration pneumonia    Plan  - OOB to chair   - Extubated 11/30  - Mentation is slowly improving  - Aspiration precautions, requiring frequent suctioning and chest PT  - Monitor respiratory status  - Nephrology follow up appreciated  - DVT and stress ulcer prophylaxis  - Oral diet  - Overall prognosis is guarded.  - PT  - transfer to  unit
IMP: This is a 55 year old man  from home ambulates independently with no significant medical condition presents to the ED with his son with complaints of progressively worsening shortness of breath and difficulty breathing since testing positive for COVID-19 five days ago outpatient . Patient's son states that he brought patient into the ED as he had an oxygen saturation of 64% earlier today. Patient otherwise denies any fevers, chills, and all other acute complaints. Patient reports that currently multiple family members are also sick with COVID-19. Denies smoking, alcohol, illicit drug use. NKDA.  Oxygen saturation came up to 85-90% on 15 L nonrebreather, and patient appears less pale and is less short of breath. later Pt was desaturating on NRB 81% on NRB so was started on HFNC was still desaturated then got intubated in ED .  Pt will be transferred to ICU                 -  Acute respiratory failure  -  ARDS  -  COVID19 Infection  -  Acute renal failure  -  Transaminitis     Plan  - Cont. mechanical ventilation, titrate down Fio2 as tolerated  - Fio2 requirment is decreasing   - started on zosyn and canco 11/23  - monitor vanco level   - Serial ABGs improving  - off paralytic and iv pain meds decreasing   - pat is more awake but not following commnads  - Prone positioning as tolerated  - Monitor respiratory status  - Empiric antibiotics, given concern for super imposed bacterial infection  - Monitor urine output, seem to be improving  - Nephrology follow up appreciated  - DVT and stress ulcer prophylaxis  - Tube feeds, bowel regimen  - Overall prognosis is guarded.
IMP: This is a 55 year old man  from home ambulates independently with no significant medical condition presents to the ED with his son with complaints of progressively worsening shortness of breath and difficulty breathing since testing positive for COVID-19 five days ago outpatient . Patient's son states that he brought patient into the ED as he had an oxygen saturation of 64% earlier today. Patient otherwise denies any fevers, chills, and all other acute complaints. Patient reports that currently multiple family members are also sick with COVID-19. Denies smoking, alcohol, illicit drug use. NKDA.  Oxygen saturation came up to 85-90% on 15 L nonrebreather, and patient appears less pale and is less short of breath. later Pt was desaturating on NRB 81% on NRB so was started on HFNC was still desaturated then got intubated in ED .  Pt will be transferred to ICU                 -  Acute respiratory failure  -  ARDS  -  COVID19 Infection  -  Acute renal failure  -  Transaminitis     Plan  - Extubated 11/30  - started on zosyn and canco 11/23  - monitor vanco level   - pat is more awake but not following commnads  - Prone positioning as tolerated  - Monitor respiratory status  - Empiric antibiotics, given concern for super imposed bacterial infection  - Monitor urine output, seem to be improving  - Nephrology follow up appreciated  - DVT and stress ulcer prophylaxis  - Tube feeds, bowel regimen  - Overall prognosis is guarded.
IMP: This is a 55 year old man  from home ambulates independently with no significant medical condition presents to the ED with his son with complaints of progressively worsening shortness of breath and difficulty breathing since testing positive for COVID-19 five days ago outpatient . Patient's son states that he brought patient into the ED as he had an oxygen saturation of 64% earlier today. Patient otherwise denies any fevers, chills, and all other acute complaints. Patient reports that currently multiple family members are also sick with COVID-19. Denies smoking, alcohol, illicit drug use. NKDA.  Oxygen saturation came up to 85-90% on 15 L nonrebreather, and patient appears less pale and is less short of breath. later Pt was desaturating on NRB 81% on NRB so was started on HFNC was still desaturated then got intubated in ED .  Pt will be transferred to ICU                 -  Acute respiratory failure  -  ARDS  -  COVID19 Infection  -  Acute renal failure  -  Transaminitis     Plan  - started on SBT with gaol for extubation   - Fio2 requirment is decreasing   - started on zosyn and canco 11/23  - monitor vanco level   - pat is more awake but not following commnads  - Prone positioning as tolerated  - Monitor respiratory status  - Empiric antibiotics, given concern for super imposed bacterial infection  - Monitor urine output, seem to be improving  - Nephrology follow up appreciated  - DVT and stress ulcer prophylaxis  - Tube feeds, bowel regimen  - Overall prognosis is guarded.
IMP: This is a 55 year old man  from home ambulates independently with no significant medical condition presents to the ED with his son with complaints of progressively worsening shortness of breath and difficulty breathing since testing positive for COVID-19 five days ago outpatient . Patient's son states that he brought patient into the ED as he had an oxygen saturation of 64% earlier today. Patient otherwise denies any fevers, chills, and all other acute complaints. Patient reports that currently multiple family members are also sick with COVID-19. Denies smoking, alcohol, illicit drug use. NKDA.  Oxygen saturation came up to 85-90% on 15 L nonrebreather, and patient appears less pale and is less short of breath. later Pt was desaturating on NRB 81% on NRB so was started on HFNC was still desaturated then got intubated in ED .  Pt will be transferred to ICU                 -  Acute respiratory failure  -  ARDS  -  COVID19 Infection  -  Acute renal failure  -  Transaminitis     Plan  - Cont. mechanical ventilation, titrate down Fio2 as tolerated  - Fio2 requirment is decreasing   - started on zosyn and canco 11/23  - monitor vanco level   - Serial ABGs improving  - Sedation and paralytics started for vent synchrony   - Prone positioning as tolerated  - Monitor respiratory status  - Empiric antibiotics, given concern for super imposed bacterial infection  - Monitor urine output, seem to be improving  - Repeat labs in PM  - Creatinine downtrending   - Nephrology follow up appreciated  - DVT and stress ulcer prophylaxis  - Tube feeds, bowel regimen  - Overall prognosis is guarded.
-clinaically improved daily  -for rehab   -repeat covid-19 pcr 12/13 positive   -continue isolation
Problem/Plan - 1:  ·  Problem: Acute respiratory failure with hypoxia and hypercapnia.  Plan: Secondary to COVID. Acute resolved.  Intubated on 11/15. Extubated 11/30  Maintaining oxygen saturation on RA.      Problem/Plan - 2:  ·  Problem: Pneumonia due to COVID-19 virus.  Plan: S/P 10 days of steroids 11/15 to 11/24  Maintaining saturation on room air.  COVID PCR positive 12/13.
-for BENIGNO
Problem/Plan - 1:  ·  Problem: Acute respiratory failure with hypoxia and hypercapnia.  Plan: Secondary to COVID  Acute resolved.  Intubated on 11/15  Extubated on 11/30.  Currently maintaining saturation on room air.  Continue to monitor oxygen saturation.  Will need oxygen testing before discharge.     d/c for BENIGNO
Problem/Plan - 1:  ·  Problem: Acute respiratory failure with hypoxia and hypercapnia.  Plan: Intubated on 11/15  Extubated on 11/30.  Currently maintaining saturation on 2 LPM nasal cannula.  Continue to monitor oxygen saturation.  Will need oxygen testing before discharge.      Problem/Plan - 2:  ·  Problem: Pneumonia due to COVID-19 virus.  Plan: Secondary to COVID  S/P 10 days of steroids.  Repeat COVID PCR.
-for placement

## 2020-12-15 NOTE — PROGRESS NOTE ADULT - PROBLEM SELECTOR PLAN 9
DVT and GI prophylaxis. On full dose Lovenox.  Stool: ova/parasite ordered 12/13 for elevated eosinophils; Follow up results; Can be followed outpatient.  Continues to slowly improve.

## 2020-12-15 NOTE — PROGRESS NOTE ADULT - PROBLEM SELECTOR PLAN 2
S/P 10 days of steroids 11/15 to 11/24  Maintaining saturation on room air.  COVID PCR positive 12/13.

## 2020-12-15 NOTE — PROGRESS NOTE ADULT - NUTRITIONAL ASSESSMENT
This patient has been assessed with a concern for Malnutrition and has been determined to have a diagnosis/diagnoses of Mild/Moderate Protein Calorie Malnutrition    This patient is being managed with:   Diet Dysphagia 2 Mechanical Soft-Thin Liquids-  Entered: Dec  8 2020 11:09AM

## 2020-12-15 NOTE — PROGRESS NOTE ADULT - SUBJECTIVE AND OBJECTIVE BOX
PUSHPA KANG    SCU progress note    INTERVAL HPI/OVERNIGHT EVENTS: ***No overnight events    DNR [ ]   DNI  [  ]  Full Code    Covid - 19 PCR: Positive 12/13    The 4Ms    What Matters Most: see GOC  Age appropriate Medications/Screen for High Risk Medication: Yes  Mentation: see CAM below  Mobility: defer to physical exam    The Confusion Assessment Method (CAM) Diagnostic Algorithm     1: Acute Onset or Fluctuating Course  - Is there evidence of an acute change in mental status from the patient’s baseline? Did the (abnormal) behavior  fluctuate during the day, that is, tend to come and go, or increase and decrease in severity?  [ ] YES [x ] NO     2: Inattention  - Did the patient have difficulty focusing attention, being easily distractible, or having difficulty keeping track of what was being said?  [ ] YES [x ] NO     3: Disorganized thinking  -Was the patient’s thinking disorganized or incoherent, such as rambling or irrelevant conversation, unclear or illogical flow of ideas, or unpredictable switching from subject to subject?  [ ] YES [x ] NO    4: Altered Level of consciousness?  [ ] YES [x ] NO    The diagnosis of delirium by CAM requires the presence of features 1 and 2 and either 3 or 4.    PRESSORS: [ ] YES [x ] NO    Cardiovascular:  Heart Failure  Acute   Acute on Chronic  Chronic       amLODIPine   Tablet 10 milliGRAM(s) Oral daily    Pulmonary:    Hematalogic:  enoxaparin Injectable 70 milliGRAM(s) SubCutaneous two times a day    Other:  artificial  tears Solution 1 Drop(s) Both EYES every 4 hours PRN  benzocaine 15 mG/menthol 3.6 mG (Sugar-Free) Lozenge 1 Lozenge Oral every 12 hours PRN  chlorhexidine 2% Cloths 1 Application(s) Topical <User Schedule>  senna 2 Tablet(s) Oral at bedtime  sodium chloride 0.9% lock flush 10 milliLiter(s) IV Push every 1 hour PRN    amLODIPine   Tablet 10 milliGRAM(s) Oral daily  artificial  tears Solution 1 Drop(s) Both EYES every 4 hours PRN  benzocaine 15 mG/menthol 3.6 mG (Sugar-Free) Lozenge 1 Lozenge Oral every 12 hours PRN  chlorhexidine 2% Cloths 1 Application(s) Topical <User Schedule>  enoxaparin Injectable 70 milliGRAM(s) SubCutaneous two times a day  senna 2 Tablet(s) Oral at bedtime  sodium chloride 0.9% lock flush 10 milliLiter(s) IV Push every 1 hour PRN    Drug Dosing Weight  Height (cm): 157.5 (15 Nov 2020 23:00)  Weight (kg): 85.4 (15 Nov 2020 23:00)  BMI (kg/m2): 34.4 (15 Nov 2020 23:00)  BSA (m2): 1.86 (15 Nov 2020 23:00)    CENTRAL LINE: [ ] YES [x ] NO  LOCATION:   DATE INSERTED:  REMOVE: [ ] YES [ ] NO  EXPLAIN:    ANN: [ ] YES [x ] NO    DATE INSERTED:  REMOVE:  [ ] YES [ ] NO  EXPLAIN:    PAST MEDICAL & SURGICAL HISTORY:  COVID-19    No significant past surgical history                PHYSICAL EXAM:    GENERAL: NAD, well-groomed, well-developed  HEAD:  Atraumatic, Normocephalic  EYES: EOMI, PERRLA, conjunctiva and sclera clear  ENMT: No tonsillar erythema, exudates  NECK: Supple, No JVD  NERVOUS SYSTEM:  Alert & Oriented X3, Follows commands, moving all extremities  CHEST/LUNG: Diminished breath sounds bilateral bases  HEART: Regular rate and rhythm; No murmurs, rubs, or gallops  ABDOMEN: Soft, Nontender, Nondistended; Bowel sounds present  EXTREMITIES:  2+ Peripheral Pulses, No clubbing, cyanosis, or edema  LYMPH: No lymphadenopathy noted  SKIN: No rashes or lesions      LABS:  CBC Full  -  ( 15 Dec 2020 06:33 )  WBC Count : 10.74 K/uL  RBC Count : 3.14 M/uL  Hemoglobin : 9.1 g/dL  Hematocrit : 28.8 %  Platelet Count - Automated : 382 K/uL  Mean Cell Volume : 91.7 fl  Mean Cell Hemoglobin : 29.0 pg  Mean Cell Hemoglobin Concentration : 31.6 gm/dL  Auto Neutrophil # : 6.02 K/uL  Auto Lymphocyte # : 1.50 K/uL  Auto Monocyte # : 0.69 K/uL  Auto Eosinophil # : 2.34 K/uL  Auto Basophil # : 0.06 K/uL  Auto Neutrophil % : 56.0 %  Auto Lymphocyte % : 14.0 %  Auto Monocyte % : 6.4 %  Auto Eosinophil % : 21.8 %  Auto Basophil % : 0.6 %    12-15    136  |  102  |  27<H>  ----------------------------<  89  3.9   |  24  |  1.27    Ca    9.3      15 Dec 2020 06:33  Phos  4.1     12-15  Mg     2.0     12-15    TPro  8.3  /  Alb  3.0<L>  /  TBili  0.4  /  DBili  x   /  AST  29  /  ALT  43  /  AlkPhos  69  12-15              [  ]  DVT Prophylaxis  [  ]  Nutrition, Brand, Rate         Goal Rate        Abnormal Nutritional Status -  Malnutrition   Cachexia      Morbid Obesity BMI >/=40    RADIOLOGY & ADDITIONAL STUDIES:  ***  < from: Xray Chest 1 View- PORTABLE-Routine (Xray Chest 1 View- PORTABLE-Routine in AM.) (12.10.20 @ 12:22) >  On December 5 there are fairly significant diffuse interstitial infiltrates concentrated in the mid lower lung fields.    These infiltrates are again noted but there is significantly improved aeration.    IMPRESSION: Improved aeration of bilateral lung infiltrates.    < end of copied text >    Goals of Care Discussion with Family/Proxy/Other   - see note below

## 2020-12-15 NOTE — PROGRESS NOTE ADULT - NSHPATTENDINGPLANDISCUSS_GEN_ALL_CORE
ICU team
icu team on rounds
Agree with above assessment and plan as transcribed.

## 2020-12-16 LAB
ALBUMIN SERPL ELPH-MCNC: 2.9 G/DL — LOW (ref 3.5–5)
ALP SERPL-CCNC: 65 U/L — SIGNIFICANT CHANGE UP (ref 40–120)
ALT FLD-CCNC: 41 U/L DA — SIGNIFICANT CHANGE UP (ref 10–60)
ANION GAP SERPL CALC-SCNC: 9 MMOL/L — SIGNIFICANT CHANGE UP (ref 5–17)
AST SERPL-CCNC: 22 U/L — SIGNIFICANT CHANGE UP (ref 10–40)
BASOPHILS # BLD AUTO: 0.06 K/UL — SIGNIFICANT CHANGE UP (ref 0–0.2)
BASOPHILS NFR BLD AUTO: 0.6 % — SIGNIFICANT CHANGE UP (ref 0–2)
BILIRUB SERPL-MCNC: 0.4 MG/DL — SIGNIFICANT CHANGE UP (ref 0.2–1.2)
BUN SERPL-MCNC: 29 MG/DL — HIGH (ref 7–18)
CALCIUM SERPL-MCNC: 9 MG/DL — SIGNIFICANT CHANGE UP (ref 8.4–10.5)
CHLORIDE SERPL-SCNC: 105 MMOL/L — SIGNIFICANT CHANGE UP (ref 96–108)
CO2 SERPL-SCNC: 24 MMOL/L — SIGNIFICANT CHANGE UP (ref 22–31)
CREAT SERPL-MCNC: 1.17 MG/DL — SIGNIFICANT CHANGE UP (ref 0.5–1.3)
EOSINOPHIL # BLD AUTO: 2.16 K/UL — HIGH (ref 0–0.5)
EOSINOPHIL NFR BLD AUTO: 19.9 % — HIGH (ref 0–6)
GLUCOSE SERPL-MCNC: 93 MG/DL — SIGNIFICANT CHANGE UP (ref 70–99)
HCT VFR BLD CALC: 27.4 % — LOW (ref 39–50)
HGB BLD-MCNC: 8.6 G/DL — LOW (ref 13–17)
IMM GRANULOCYTES NFR BLD AUTO: 0.9 % — SIGNIFICANT CHANGE UP (ref 0–1.5)
LYMPHOCYTES # BLD AUTO: 1.43 K/UL — SIGNIFICANT CHANGE UP (ref 1–3.3)
LYMPHOCYTES # BLD AUTO: 13.2 % — SIGNIFICANT CHANGE UP (ref 13–44)
MCHC RBC-ENTMCNC: 28.9 PG — SIGNIFICANT CHANGE UP (ref 27–34)
MCHC RBC-ENTMCNC: 31.4 GM/DL — LOW (ref 32–36)
MCV RBC AUTO: 91.9 FL — SIGNIFICANT CHANGE UP (ref 80–100)
MONOCYTES # BLD AUTO: 0.79 K/UL — SIGNIFICANT CHANGE UP (ref 0–0.9)
MONOCYTES NFR BLD AUTO: 7.3 % — SIGNIFICANT CHANGE UP (ref 2–14)
NEUTROPHILS # BLD AUTO: 6.33 K/UL — SIGNIFICANT CHANGE UP (ref 1.8–7.4)
NEUTROPHILS NFR BLD AUTO: 58.1 % — SIGNIFICANT CHANGE UP (ref 43–77)
NRBC # BLD: 0 /100 WBCS — SIGNIFICANT CHANGE UP (ref 0–0)
PHOSPHATE SERPL-MCNC: 3.5 MG/DL — SIGNIFICANT CHANGE UP (ref 2.5–4.5)
PLATELET # BLD AUTO: 374 K/UL — SIGNIFICANT CHANGE UP (ref 150–400)
POTASSIUM SERPL-MCNC: 3.9 MMOL/L — SIGNIFICANT CHANGE UP (ref 3.5–5.3)
POTASSIUM SERPL-SCNC: 3.9 MMOL/L — SIGNIFICANT CHANGE UP (ref 3.5–5.3)
PROT SERPL-MCNC: 8.1 G/DL — SIGNIFICANT CHANGE UP (ref 6–8.3)
RBC # BLD: 2.98 M/UL — LOW (ref 4.2–5.8)
RBC # FLD: 13.6 % — SIGNIFICANT CHANGE UP (ref 10.3–14.5)
SARS-COV-2 RNA SPEC QL NAA+PROBE: DETECTED
SODIUM SERPL-SCNC: 138 MMOL/L — SIGNIFICANT CHANGE UP (ref 135–145)
WBC # BLD: 10.87 K/UL — HIGH (ref 3.8–10.5)
WBC # FLD AUTO: 10.87 K/UL — HIGH (ref 3.8–10.5)

## 2020-12-16 RX ADMIN — ENOXAPARIN SODIUM 70 MILLIGRAM(S): 100 INJECTION SUBCUTANEOUS at 17:35

## 2020-12-16 RX ADMIN — BENZOCAINE AND MENTHOL 1 LOZENGE: 5; 1 LIQUID ORAL at 21:32

## 2020-12-16 RX ADMIN — SENNA PLUS 2 TABLET(S): 8.6 TABLET ORAL at 21:31

## 2020-12-16 RX ADMIN — ENOXAPARIN SODIUM 70 MILLIGRAM(S): 100 INJECTION SUBCUTANEOUS at 07:32

## 2020-12-16 RX ADMIN — CHLORHEXIDINE GLUCONATE 1 APPLICATION(S): 213 SOLUTION TOPICAL at 07:33

## 2020-12-16 RX ADMIN — AMLODIPINE BESYLATE 10 MILLIGRAM(S): 2.5 TABLET ORAL at 07:32

## 2020-12-16 NOTE — PROGRESS NOTE ADULT - PROBLEM SELECTOR PROBLEM 9
Need for prophylactic measure
Debility
Debility
Hypokalemia
Need for prophylactic measure

## 2020-12-16 NOTE — PROGRESS NOTE ADULT - SUBJECTIVE AND OBJECTIVE BOX
PUSHPA KANG    SCU progress note    INTERVAL HPI/OVERNIGHT EVENTS: ***No overnight events.    DNR [ ]   DNI  [  ]   FULL CODE    Covid - 19 PCR: Positive 12/13    The 4Ms    What Matters Most: see GOC  Age appropriate Medications/Screen for High Risk Medication: Yes  Mentation: see CAM below  Mobility: defer to physical exam    The Confusion Assessment Method (CAM) Diagnostic Algorithm     1: Acute Onset or Fluctuating Course  - Is there evidence of an acute change in mental status from the patient’s baseline? Did the (abnormal) behavior  fluctuate during the day, that is, tend to come and go, or increase and decrease in severity?  [ ] YES [x ] NO     2: Inattention  - Did the patient have difficulty focusing attention, being easily distractible, or having difficulty keeping track of what was being said?  [ ] YES [x ] NO     3: Disorganized thinking  -Was the patient’s thinking disorganized or incoherent, such as rambling or irrelevant conversation, unclear or illogical flow of ideas, or unpredictable switching from subject to subject?  [ ] YES [x ] NO    4: Altered Level of consciousness?  [ ] YES [x ] NO    The diagnosis of delirium by CAM requires the presence of features 1 and 2 and either 3 or 4.    PRESSORS: [ ] YES [x ] NO    Cardiovascular:  Heart Failure  Acute   Acute on Chronic  Chronic       amLODIPine   Tablet 10 milliGRAM(s) Oral daily    Pulmonary:    Hematalogic:  enoxaparin Injectable 70 milliGRAM(s) SubCutaneous two times a day    Other:  artificial  tears Solution 1 Drop(s) Both EYES every 4 hours PRN  benzocaine 15 mG/menthol 3.6 mG (Sugar-Free) Lozenge 1 Lozenge Oral every 12 hours PRN  chlorhexidine 2% Cloths 1 Application(s) Topical <User Schedule>  senna 2 Tablet(s) Oral at bedtime  sodium chloride 0.9% lock flush 10 milliLiter(s) IV Push every 1 hour PRN    amLODIPine   Tablet 10 milliGRAM(s) Oral daily  artificial  tears Solution 1 Drop(s) Both EYES every 4 hours PRN  benzocaine 15 mG/menthol 3.6 mG (Sugar-Free) Lozenge 1 Lozenge Oral every 12 hours PRN  chlorhexidine 2% Cloths 1 Application(s) Topical <User Schedule>  enoxaparin Injectable 70 milliGRAM(s) SubCutaneous two times a day  senna 2 Tablet(s) Oral at bedtime  sodium chloride 0.9% lock flush 10 milliLiter(s) IV Push every 1 hour PRN    Drug Dosing Weight  Height (cm): 157.5 (15 Nov 2020 23:00)  Weight (kg): 85.4 (15 Nov 2020 23:00)  BMI (kg/m2): 34.4 (15 Nov 2020 23:00)  BSA (m2): 1.86 (15 Nov 2020 23:00)    CENTRAL LINE: [ ] YES [x ] NO  LOCATION:   DATE INSERTED:  REMOVE: [ ] YES [ ] NO  EXPLAIN:    ANN: [ ] YES [x ] NO    DATE INSERTED:  REMOVE:  [ ] YES [ ] NO  EXPLAIN:    PAST MEDICAL & SURGICAL HISTORY:  COVID-19    No significant past surgical history          PHYSICAL EXAM:    GENERAL: NAD, well-groomed, well-developed  HEAD:  Atraumatic, Normocephalic  EYES: EOMI, PERRLA, conjunctiva and sclera clear  ENMT: No tonsillar erythema, exudates  NECK: Supple, No JVD  NERVOUS SYSTEM:  Alert & Oriented X3, Follows commands, moving all extremities.  CHEST/LUNG: Diminished breath sounds bilateral bases  HEART: Regular rate and rhythm; No murmurs, rubs, or gallops  ABDOMEN: Soft, Nontender, Nondistended; Bowel sounds present  EXTREMITIES:  2+ Peripheral Pulses, No clubbing, cyanosis, or edema  LYMPH: No lymphadenopathy noted  SKIN: No rashes or lesions      LABS:  CBC Full  -  ( 16 Dec 2020 06:22 )  WBC Count : 10.87 K/uL  RBC Count : 2.98 M/uL  Hemoglobin : 8.6 g/dL  Hematocrit : 27.4 %  Platelet Count - Automated : 374 K/uL  Mean Cell Volume : 91.9 fl  Mean Cell Hemoglobin : 28.9 pg  Mean Cell Hemoglobin Concentration : 31.4 gm/dL  Auto Neutrophil # : 6.33 K/uL  Auto Lymphocyte # : 1.43 K/uL  Auto Monocyte # : 0.79 K/uL  Auto Eosinophil # : 2.16 K/uL  Auto Basophil # : 0.06 K/uL  Auto Neutrophil % : 58.1 %  Auto Lymphocyte % : 13.2 %  Auto Monocyte % : 7.3 %  Auto Eosinophil % : 19.9 %  Auto Basophil % : 0.6 %    12-16    138  |  105  |  29<H>  ----------------------------<  93  3.9   |  24  |  1.17    Ca    9.0      16 Dec 2020 06:22  Phos  3.5     12-16  Mg     2.0     12-15    TPro  8.1  /  Alb  2.9<L>  /  TBili  0.4  /  DBili  x   /  AST  22  /  ALT  41  /  AlkPhos  65  12-16              [  ]  DVT Prophylaxis  [  ]  Nutrition, Brand, Rate         Goal Rate        Abnormal Nutritional Status -  Malnutrition   Cachexia         RADIOLOGY & ADDITIONAL STUDIES:  ***  < from: Xray Chest 1 View- PORTABLE-Routine (Xray Chest 1 View- PORTABLE-Routine in AM.) (12.10.20 @ 12:22) >  Heart is magnified by technique.    On December 5 there are fairly significant diffuse interstitial infiltrates concentrated in the mid lower lung fields.    These infiltrates are again noted but there is significantly improved aeration.    IMPRESSION: Improved aeration of bilateral lung infiltrates.    < end of copied text >    Goals of Care Discussion with Family/Proxy/Other   -

## 2020-12-17 LAB
ALBUMIN SERPL ELPH-MCNC: 3.1 G/DL — LOW (ref 3.5–5)
ALP SERPL-CCNC: 76 U/L — SIGNIFICANT CHANGE UP (ref 40–120)
ALT FLD-CCNC: 42 U/L DA — SIGNIFICANT CHANGE UP (ref 10–60)
ANION GAP SERPL CALC-SCNC: 11 MMOL/L — SIGNIFICANT CHANGE UP (ref 5–17)
AST SERPL-CCNC: 21 U/L — SIGNIFICANT CHANGE UP (ref 10–40)
BASOPHILS # BLD AUTO: 0.05 K/UL — SIGNIFICANT CHANGE UP (ref 0–0.2)
BASOPHILS NFR BLD AUTO: 0.5 % — SIGNIFICANT CHANGE UP (ref 0–2)
BILIRUB SERPL-MCNC: 0.3 MG/DL — SIGNIFICANT CHANGE UP (ref 0.2–1.2)
BUN SERPL-MCNC: 26 MG/DL — HIGH (ref 7–18)
CALCIUM SERPL-MCNC: 8.8 MG/DL — SIGNIFICANT CHANGE UP (ref 8.4–10.5)
CHLORIDE SERPL-SCNC: 105 MMOL/L — SIGNIFICANT CHANGE UP (ref 96–108)
CO2 SERPL-SCNC: 22 MMOL/L — SIGNIFICANT CHANGE UP (ref 22–31)
CREAT SERPL-MCNC: 1.25 MG/DL — SIGNIFICANT CHANGE UP (ref 0.5–1.3)
EOSINOPHIL # BLD AUTO: 1.85 K/UL — HIGH (ref 0–0.5)
EOSINOPHIL NFR BLD AUTO: 18.8 % — HIGH (ref 0–6)
GLUCOSE SERPL-MCNC: 106 MG/DL — HIGH (ref 70–99)
HCT VFR BLD CALC: 28.4 % — LOW (ref 39–50)
HGB BLD-MCNC: 8.7 G/DL — LOW (ref 13–17)
IMM GRANULOCYTES NFR BLD AUTO: 0.8 % — SIGNIFICANT CHANGE UP (ref 0–1.5)
LYMPHOCYTES # BLD AUTO: 1.29 K/UL — SIGNIFICANT CHANGE UP (ref 1–3.3)
LYMPHOCYTES # BLD AUTO: 13.1 % — SIGNIFICANT CHANGE UP (ref 13–44)
MAGNESIUM SERPL-MCNC: 1.9 MG/DL — SIGNIFICANT CHANGE UP (ref 1.6–2.6)
MCHC RBC-ENTMCNC: 28.2 PG — SIGNIFICANT CHANGE UP (ref 27–34)
MCHC RBC-ENTMCNC: 30.6 GM/DL — LOW (ref 32–36)
MCV RBC AUTO: 91.9 FL — SIGNIFICANT CHANGE UP (ref 80–100)
MONOCYTES # BLD AUTO: 0.82 K/UL — SIGNIFICANT CHANGE UP (ref 0–0.9)
MONOCYTES NFR BLD AUTO: 8.3 % — SIGNIFICANT CHANGE UP (ref 2–14)
NEUTROPHILS # BLD AUTO: 5.74 K/UL — SIGNIFICANT CHANGE UP (ref 1.8–7.4)
NEUTROPHILS NFR BLD AUTO: 58.5 % — SIGNIFICANT CHANGE UP (ref 43–77)
NRBC # BLD: 0 /100 WBCS — SIGNIFICANT CHANGE UP (ref 0–0)
PHOSPHATE SERPL-MCNC: 4 MG/DL — SIGNIFICANT CHANGE UP (ref 2.5–4.5)
PLATELET # BLD AUTO: 345 K/UL — SIGNIFICANT CHANGE UP (ref 150–400)
POTASSIUM SERPL-MCNC: 3.7 MMOL/L — SIGNIFICANT CHANGE UP (ref 3.5–5.3)
POTASSIUM SERPL-SCNC: 3.7 MMOL/L — SIGNIFICANT CHANGE UP (ref 3.5–5.3)
PROT SERPL-MCNC: 8.2 G/DL — SIGNIFICANT CHANGE UP (ref 6–8.3)
RBC # BLD: 3.09 M/UL — LOW (ref 4.2–5.8)
RBC # FLD: 13.6 % — SIGNIFICANT CHANGE UP (ref 10.3–14.5)
SODIUM SERPL-SCNC: 138 MMOL/L — SIGNIFICANT CHANGE UP (ref 135–145)
WBC # BLD: 9.83 K/UL — SIGNIFICANT CHANGE UP (ref 3.8–10.5)
WBC # FLD AUTO: 9.83 K/UL — SIGNIFICANT CHANGE UP (ref 3.8–10.5)

## 2020-12-17 RX ORDER — POTASSIUM CHLORIDE 20 MEQ
20 PACKET (EA) ORAL ONCE
Refills: 0 | Status: COMPLETED | OUTPATIENT
Start: 2020-12-17 | End: 2020-12-17

## 2020-12-17 RX ADMIN — Medication 20 MILLIEQUIVALENT(S): at 12:07

## 2020-12-17 RX ADMIN — AMLODIPINE BESYLATE 10 MILLIGRAM(S): 2.5 TABLET ORAL at 05:21

## 2020-12-17 RX ADMIN — ENOXAPARIN SODIUM 70 MILLIGRAM(S): 100 INJECTION SUBCUTANEOUS at 17:26

## 2020-12-17 RX ADMIN — ENOXAPARIN SODIUM 70 MILLIGRAM(S): 100 INJECTION SUBCUTANEOUS at 05:21

## 2020-12-17 RX ADMIN — CHLORHEXIDINE GLUCONATE 1 APPLICATION(S): 213 SOLUTION TOPICAL at 05:21

## 2020-12-17 RX ADMIN — SENNA PLUS 2 TABLET(S): 8.6 TABLET ORAL at 21:42

## 2020-12-17 RX ADMIN — BENZOCAINE AND MENTHOL 1 LOZENGE: 5; 1 LIQUID ORAL at 21:43

## 2020-12-17 NOTE — PROGRESS NOTE ADULT - PROBLEM SELECTOR PLAN 3
Resolved.  Likely from septic shock.  Continue to monitor. S/P 10 days of steroids 11/15 to 11/24  Maintaining saturation on room air.

## 2020-12-17 NOTE — PROGRESS NOTE ADULT - PROBLEM SELECTOR PLAN 4
likely multifactorial, prolonged hospitalization/sepsis  f/u anemia panel Resolved.  Likely from septic shock.  Continue to monitor.

## 2020-12-17 NOTE — PROGRESS NOTE ADULT - PROBLEM SELECTOR PLAN 5
Encourage oral intake.  Continue supplementation. likely multifactorial, prolonged hospitalization/sepsis  f/u anemia panel

## 2020-12-17 NOTE — PROGRESS NOTE ADULT - PROBLEM SELECTOR PLAN 2
S/P 10 days of steroids 11/15 to 11/24  Maintaining saturation on room air. Secondary to COVID. Acute resolved.  Intubated on 11/15. Extubated 11/30  Maintaining oxygen saturation on RA.

## 2020-12-17 NOTE — PROGRESS NOTE ADULT - PROBLEM SELECTOR PLAN 1
Secondary to COVID. Acute resolved.  Intubated on 11/15. Extubated 11/30  Maintaining oxygen saturation on RA. Needs to be COVID negative in order to go to ACUTE rehab.  Repeat COVID ordered for 12/19

## 2020-12-17 NOTE — PROGRESS NOTE ADULT - SUBJECTIVE AND OBJECTIVE BOX
PUSHPA KANG    SCU progress note    INTERVAL HPI/OVERNIGHT EVENTS: ***    DNR [ ]   DNI  [  ]    Covid - 19 PCR:     The 4Ms    What Matters Most: see GOC  Age appropriate Medications/Screen for High Risk Medication: Yes  Mentation: see CAM below  Mobility: defer to physical exam    The Confusion Assessment Method (CAM) Diagnostic Algorithm     1: Acute Onset or Fluctuating Course  - Is there evidence of an acute change in mental status from the patient’s baseline? Did the (abnormal) behavior  fluctuate during the day, that is, tend to come and go, or increase and decrease in severity?  [ ] YES [ ] NO     2: Inattention  - Did the patient have difficulty focusing attention, being easily distractible, or having difficulty keeping track of what was being said?  [ ] YES [ ] NO     3: Disorganized thinking  -Was the patient’s thinking disorganized or incoherent, such as rambling or irrelevant conversation, unclear or illogical flow of ideas, or unpredictable switching from subject to subject?  [ ] YES [ ] NO    4: Altered Level of consciousness?  [ ] YES [ ] NO    The diagnosis of delirium by CAM requires the presence of features 1 and 2 and either 3 or 4.    PRESSORS: [ ] YES [ ] NO    Cardiovascular:  Heart Failure  Acute   Acute on Chronic  Chronic       amLODIPine   Tablet 10 milliGRAM(s) Oral daily    Pulmonary:    Hematalogic:  enoxaparin Injectable 70 milliGRAM(s) SubCutaneous two times a day    Other:  artificial  tears Solution 1 Drop(s) Both EYES every 4 hours PRN  benzocaine 15 mG/menthol 3.6 mG (Sugar-Free) Lozenge 1 Lozenge Oral every 12 hours PRN  chlorhexidine 2% Cloths 1 Application(s) Topical <User Schedule>  senna 2 Tablet(s) Oral at bedtime  sodium chloride 0.9% lock flush 10 milliLiter(s) IV Push every 1 hour PRN    amLODIPine   Tablet 10 milliGRAM(s) Oral daily  artificial  tears Solution 1 Drop(s) Both EYES every 4 hours PRN  benzocaine 15 mG/menthol 3.6 mG (Sugar-Free) Lozenge 1 Lozenge Oral every 12 hours PRN  chlorhexidine 2% Cloths 1 Application(s) Topical <User Schedule>  enoxaparin Injectable 70 milliGRAM(s) SubCutaneous two times a day  senna 2 Tablet(s) Oral at bedtime  sodium chloride 0.9% lock flush 10 milliLiter(s) IV Push every 1 hour PRN    Drug Dosing Weight  Height (cm): 157.5 (15 Nov 2020 23:00)  Weight (kg): 85.4 (15 Nov 2020 23:00)  BMI (kg/m2): 34.4 (15 Nov 2020 23:00)  BSA (m2): 1.86 (15 Nov 2020 23:00)    CENTRAL LINE: [ ] YES [ ] NO  LOCATION:   DATE INSERTED:  REMOVE: [ ] YES [ ] NO  EXPLAIN:    ANN: [ ] YES [ ] NO    DATE INSERTED:  REMOVE:  [ ] YES [ ] NO  EXPLAIN:    PAST MEDICAL & SURGICAL HISTORY:  COVID-19    No significant past surgical history                      PHYSICAL EXAM:    GENERAL: NAD, well-groomed, well-developed  HEAD:  Atraumatic, Normocephalic  EYES: EOMI, PERRLA, conjunctiva and sclera clear  ENMT: No tonsillar erythema, exudates, or enlargement; Moist mucous membranes, Good dentition, No lesions  NECK: Supple, No JVD, Normal thyroid  NERVOUS SYSTEM:  Alert & Oriented X3, Good concentration; Motor Strength 5/5 B/L upper and lower extremities; DTRs 2+ intact and symmetric  CHEST/LUNG: Clear to percussion bilaterally; No rales, rhonchi, wheezing, or rubs  HEART: Regular rate and rhythm; No murmurs, rubs, or gallops  ABDOMEN: Soft, Nontender, Nondistended; Bowel sounds present  EXTREMITIES:  2+ Peripheral Pulses, No clubbing, cyanosis, or edema  LYMPH: No lymphadenopathy noted  SKIN: No rashes or lesions      LABS:  CBC Full  -  ( 17 Dec 2020 05:39 )  WBC Count : 9.83 K/uL  RBC Count : 3.09 M/uL  Hemoglobin : 8.7 g/dL  Hematocrit : 28.4 %  Platelet Count - Automated : 345 K/uL  Mean Cell Volume : 91.9 fl  Mean Cell Hemoglobin : 28.2 pg  Mean Cell Hemoglobin Concentration : 30.6 gm/dL  Auto Neutrophil # : 5.74 K/uL  Auto Lymphocyte # : 1.29 K/uL  Auto Monocyte # : 0.82 K/uL  Auto Eosinophil # : 1.85 K/uL  Auto Basophil # : 0.05 K/uL  Auto Neutrophil % : 58.5 %  Auto Lymphocyte % : 13.1 %  Auto Monocyte % : 8.3 %  Auto Eosinophil % : 18.8 %  Auto Basophil % : 0.5 %    12-17    138  |  105  |  26<H>  ----------------------------<  106<H>  3.7   |  22  |  1.25    Ca    8.8      17 Dec 2020 05:39  Phos  4.0     12-17  Mg     1.9     12-17    TPro  8.2  /  Alb  3.1<L>  /  TBili  0.3  /  DBili  x   /  AST  21  /  ALT  42  /  AlkPhos  76  12-17              [  ]  DVT Prophylaxis  [  ]  Nutrition, Brand, Rate         Goal Rate        Abnormal Nutritional Status -  Malnutrition   Cachexia      Morbid Obesity BMI >/=40    RADIOLOGY & ADDITIONAL STUDIES:  ***    Goals of Care Discussion with Family/Proxy/Other   - see note from/family meeting set up for...     PUSHPA KANG    SCU progress note    INTERVAL HPI/OVERNIGHT EVENTS: no acute events    DNR [ ]   DNI  [  ] FULL CODE    Covid - 19 PCR:  + 12/16    The 4Ms    What Matters Most: see GOC  Age appropriate Medications/Screen for High Risk Medication: Yes  Mentation: see CAM below  Mobility: defer to physical exam    The Confusion Assessment Method (CAM) Diagnostic Algorithm     1: Acute Onset or Fluctuating Course  - Is there evidence of an acute change in mental status from the patient’s baseline? Did the (abnormal) behavior  fluctuate during the day, that is, tend to come and go, or increase and decrease in severity?  [ ] YES [x ] NO     2: Inattention  - Did the patient have difficulty focusing attention, being easily distractible, or having difficulty keeping track of what was being said?  [ ] YES [x ] NO     3: Disorganized thinking  -Was the patient’s thinking disorganized or incoherent, such as rambling or irrelevant conversation, unclear or illogical flow of ideas, or unpredictable switching from subject to subject?  [ ] YES [x ] NO    4: Altered Level of consciousness?  [ ] YES [x ] NO    The diagnosis of delirium by CAM requires the presence of features 1 and 2 and either 3 or 4.    PRESSORS: [ ] YES [ x] NO    Cardiovascular:  Heart Failure  Acute   Acute on Chronic  Chronic       amLODIPine   Tablet 10 milliGRAM(s) Oral daily    Pulmonary:    Hematalogic:  enoxaparin Injectable 70 milliGRAM(s) SubCutaneous two times a day    Other:  artificial  tears Solution 1 Drop(s) Both EYES every 4 hours PRN  benzocaine 15 mG/menthol 3.6 mG (Sugar-Free) Lozenge 1 Lozenge Oral every 12 hours PRN  chlorhexidine 2% Cloths 1 Application(s) Topical <User Schedule>  senna 2 Tablet(s) Oral at bedtime  sodium chloride 0.9% lock flush 10 milliLiter(s) IV Push every 1 hour PRN    amLODIPine   Tablet 10 milliGRAM(s) Oral daily  artificial  tears Solution 1 Drop(s) Both EYES every 4 hours PRN  benzocaine 15 mG/menthol 3.6 mG (Sugar-Free) Lozenge 1 Lozenge Oral every 12 hours PRN  chlorhexidine 2% Cloths 1 Application(s) Topical <User Schedule>  enoxaparin Injectable 70 milliGRAM(s) SubCutaneous two times a day  senna 2 Tablet(s) Oral at bedtime  sodium chloride 0.9% lock flush 10 milliLiter(s) IV Push every 1 hour PRN    Drug Dosing Weight  Height (cm): 157.5 (15 Nov 2020 23:00)  Weight (kg): 85.4 (15 Nov 2020 23:00)  BMI (kg/m2): 34.4 (15 Nov 2020 23:00)  BSA (m2): 1.86 (15 Nov 2020 23:00)    CENTRAL LINE: [ ] YES [x ] NO  LOCATION:   DATE INSERTED:  REMOVE: [ ] YES [ ] NO  EXPLAIN:    ANN: [ ] YES [ x] NO    DATE INSERTED:  REMOVE:  [ ] YES [ ] NO  EXPLAIN:    PAST MEDICAL & SURGICAL HISTORY:  COVID-19    No significant past surgical history                      PHYSICAL EXAM:    GENERAL: NAD, well-groomed, well-developed  HEAD:  Atraumatic, Normocephalic  EYES: EOMI, PERRLA, conjunctiva and sclera clear  ENMT: No tonsillar erythema, exudates, or enlargement; Moist mucous membranes, Good dentition, No lesions  NECK: Supple, No JVD, Normal thyroid  NERVOUS SYSTEM:  Alert & Oriented X3, Good concentration; Motor Strength 5/5 B/L upper and lower extremities; DTRs 2+ intact and symmetric  CHEST/LUNG: ctab  HEART: Regular rate and rhythm; No murmurs, rubs, or gallops  ABDOMEN: Soft, Nontender, Nondistended; Bowel sounds present  EXTREMITIES:  2+ Peripheral Pulses, No clubbing, cyanosis, or edema  LYMPH: No lymphadenopathy noted  SKIN: No rashes or lesions      LABS:  CBC Full  -  ( 17 Dec 2020 05:39 )  WBC Count : 9.83 K/uL  RBC Count : 3.09 M/uL  Hemoglobin : 8.7 g/dL  Hematocrit : 28.4 %  Platelet Count - Automated : 345 K/uL  Mean Cell Volume : 91.9 fl  Mean Cell Hemoglobin : 28.2 pg  Mean Cell Hemoglobin Concentration : 30.6 gm/dL  Auto Neutrophil # : 5.74 K/uL  Auto Lymphocyte # : 1.29 K/uL  Auto Monocyte # : 0.82 K/uL  Auto Eosinophil # : 1.85 K/uL  Auto Basophil # : 0.05 K/uL  Auto Neutrophil % : 58.5 %  Auto Lymphocyte % : 13.1 %  Auto Monocyte % : 8.3 %  Auto Eosinophil % : 18.8 %  Auto Basophil % : 0.5 %    12-17    138  |  105  |  26<H>  ----------------------------<  106<H>  3.7   |  22  |  1.25    Ca    8.8      17 Dec 2020 05:39  Phos  4.0     12-17  Mg     1.9     12-17    TPro  8.2  /  Alb  3.1<L>  /  TBili  0.3  /  DBili  x   /  AST  21  /  ALT  42  /  AlkPhos  76  12-17              [ x ]  DVT Prophylaxis- full dose lovenox  [  ]  Nutrition, Brand, Rate         Goal Rate        Abnormal Nutritional Status -  Malnutrition   Cachexia      Morbid Obesity BMI >/=40    RADIOLOGY & ADDITIONAL STUDIES:  < from: Xray Chest 1 View- PORTABLE-Routine (Xray Chest 1 View- PORTABLE-Routine in AM.) (12.10.20 @ 12:22) >  INTERPRETATION:  AP erect chest on December 10, 2020 at 11:12 AM. Patient has Covid pneumonia.    Heart is magnified by technique.    On December 5 there are fairly significant diffuse interstitial infiltrates concentrated in the mid lower lung fields.    These infiltrates are again noted but there is significantly improved aeration.    IMPRESSION: Improved aeration of bilateral lung infiltrates.    < end of copied text >

## 2020-12-17 NOTE — PROGRESS NOTE ADULT - PROBLEM SELECTOR PLAN 7
Needs to be COVID negative in order to go to ACUTE rehab.  Repeat COVID ordered for 12/19 Resolved.  Monitor kidney function.

## 2020-12-17 NOTE — PROGRESS NOTE ADULT - ASSESSMENT
55M from home without PMH with progressively worsening SOB and +COVID test x5 days prior to admission presented following SpO2 64% at home.  Started on 15L NRB in ED 2/2 SpO2s 85-90%, desaturated to low 80s and was placed on HFNC, continued to desaturate, was intubated and admitted to ICU 11/15.     S/p 10-day course decadron 11/15-24. Not a candidate for remdesivir initially 2/2 elevated LFTs and subsequently 2/2 CrCl <45. Kidney function poor with elevated Cr to 2.22 on admission, baseline unknown, peaked at 4.43 and gradually improved. U/O supported with Lasix boluses, gradually improved, nephro Dr. Camara following.  CXR with b/l opacities and small pleural effusions, APRs elevated with d-dimer [id number]->[id number], started on heparin gtt for therapeutic AC. Completed 5 day course Azithromycin and Ceftriaxone for CAP. Extubated 11/30, O2 delivery titrated down, now with NC and standby BiPAP.      last fever 12/6. Started on 5-day course of Zosyn for possible aspiration pna, last day 12/11;  BCx NGTD, MRSA negative. WBC with 21% eosinophils, possibly 2/2 fungemia, started on Diflucan and awaiting fungicell result. Mobility and  physical strength improving gradually.    12/9 Transferred to SCU.  12/13 Ova/Parasite stool ordered in light of elevated eosinophil; Will complete diflucan 7 day course on (12/8 to 12/14) Pe; Follow up results; stool results can be followed outpatient if discharge planning on Monday 12/13  COVID PCR positive.  12/16 + PCR CORONAVIRUS INSTRUCTIONS:

## 2020-12-18 ENCOUNTER — INPATIENT (INPATIENT)
Facility: HOSPITAL | Age: 55
LOS: 8 days | Discharge: HOME | DRG: 949 | End: 2020-12-27
Attending: PHYSICAL MEDICINE & REHABILITATION | Admitting: PHYSICAL MEDICINE & REHABILITATION
Payer: MEDICAID

## 2020-12-18 ENCOUNTER — TRANSCRIPTION ENCOUNTER (OUTPATIENT)
Age: 55
End: 2020-12-18

## 2020-12-18 VITALS
DIASTOLIC BLOOD PRESSURE: 71 MMHG | OXYGEN SATURATION: 99 % | HEART RATE: 84 BPM | TEMPERATURE: 98 F | SYSTOLIC BLOOD PRESSURE: 123 MMHG | RESPIRATION RATE: 16 BRPM

## 2020-12-18 VITALS — WEIGHT: 144.18 LBS | HEIGHT: 63 IN

## 2020-12-18 DIAGNOSIS — U07.1 COVID-19: ICD-10-CM

## 2020-12-18 LAB
ANION GAP SERPL CALC-SCNC: 9 MMOL/L — SIGNIFICANT CHANGE UP (ref 5–17)
BUN SERPL-MCNC: 27 MG/DL — HIGH (ref 7–18)
CALCIUM SERPL-MCNC: 9 MG/DL — SIGNIFICANT CHANGE UP (ref 8.4–10.5)
CHLORIDE SERPL-SCNC: 106 MMOL/L — SIGNIFICANT CHANGE UP (ref 96–108)
CO2 SERPL-SCNC: 24 MMOL/L — SIGNIFICANT CHANGE UP (ref 22–31)
CREAT SERPL-MCNC: 1.41 MG/DL — HIGH (ref 0.5–1.3)
FERRITIN SERPL-MCNC: 625 NG/ML — HIGH (ref 30–400)
FOLATE SERPL-MCNC: 5.2 NG/ML — SIGNIFICANT CHANGE UP
GLUCOSE SERPL-MCNC: 100 MG/DL — HIGH (ref 70–99)
IRON SATN MFR SERPL: 14 % — LOW (ref 20–55)
IRON SATN MFR SERPL: 35 UG/DL — LOW (ref 65–170)
POTASSIUM SERPL-MCNC: 4 MMOL/L — SIGNIFICANT CHANGE UP (ref 3.5–5.3)
POTASSIUM SERPL-SCNC: 4 MMOL/L — SIGNIFICANT CHANGE UP (ref 3.5–5.3)
RBC # BLD: 2.98 M/UL — LOW (ref 4.2–5.8)
RETICS #: 95.4 K/UL — SIGNIFICANT CHANGE UP (ref 25–125)
RETICS/RBC NFR: 3.2 % — HIGH (ref 0.5–2.5)
SARS-COV-2 RNA SPEC QL NAA+PROBE: SIGNIFICANT CHANGE UP
SODIUM SERPL-SCNC: 139 MMOL/L — SIGNIFICANT CHANGE UP (ref 135–145)
TIBC SERPL-MCNC: 246 UG/DL — LOW (ref 250–450)
UIBC SERPL-MCNC: 211 UG/DL — SIGNIFICANT CHANGE UP (ref 110–370)
VIT B12 SERPL-MCNC: 527 PG/ML — SIGNIFICANT CHANGE UP (ref 232–1245)

## 2020-12-18 PROCEDURE — 97116 GAIT TRAINING THERAPY: CPT

## 2020-12-18 PROCEDURE — 84484 ASSAY OF TROPONIN QUANT: CPT

## 2020-12-18 PROCEDURE — 82550 ASSAY OF CK (CPK): CPT

## 2020-12-18 PROCEDURE — 87641 MR-STAPH DNA AMP PROBE: CPT

## 2020-12-18 PROCEDURE — 94660 CPAP INITIATION&MGMT: CPT

## 2020-12-18 PROCEDURE — 36415 COLL VENOUS BLD VENIPUNCTURE: CPT

## 2020-12-18 PROCEDURE — 85652 RBC SED RATE AUTOMATED: CPT

## 2020-12-18 PROCEDURE — 94760 N-INVAS EAR/PLS OXIMETRY 1: CPT

## 2020-12-18 PROCEDURE — 80048 BASIC METABOLIC PNL TOTAL CA: CPT

## 2020-12-18 PROCEDURE — 84100 ASSAY OF PHOSPHORUS: CPT

## 2020-12-18 PROCEDURE — 93005 ELECTROCARDIOGRAM TRACING: CPT

## 2020-12-18 PROCEDURE — 82607 VITAMIN B-12: CPT

## 2020-12-18 PROCEDURE — 93306 TTE W/DOPPLER COMPLETE: CPT

## 2020-12-18 PROCEDURE — 82570 ASSAY OF URINE CREATININE: CPT

## 2020-12-18 PROCEDURE — 85025 COMPLETE CBC W/AUTO DIFF WBC: CPT

## 2020-12-18 PROCEDURE — 87449 NOS EACH ORGANISM AG IA: CPT

## 2020-12-18 PROCEDURE — 86140 C-REACTIVE PROTEIN: CPT

## 2020-12-18 PROCEDURE — 82746 ASSAY OF FOLIC ACID SERUM: CPT

## 2020-12-18 PROCEDURE — 80202 ASSAY OF VANCOMYCIN: CPT

## 2020-12-18 PROCEDURE — 85027 COMPLETE CBC AUTOMATED: CPT

## 2020-12-18 PROCEDURE — 80053 COMPREHEN METABOLIC PANEL: CPT

## 2020-12-18 PROCEDURE — 81001 URINALYSIS AUTO W/SCOPE: CPT

## 2020-12-18 PROCEDURE — 84145 PROCALCITONIN (PCT): CPT

## 2020-12-18 PROCEDURE — 83615 LACTATE (LD) (LDH) ENZYME: CPT

## 2020-12-18 PROCEDURE — 97530 THERAPEUTIC ACTIVITIES: CPT

## 2020-12-18 PROCEDURE — 99291 CRITICAL CARE FIRST HOUR: CPT | Mod: 25

## 2020-12-18 PROCEDURE — 82962 GLUCOSE BLOOD TEST: CPT

## 2020-12-18 PROCEDURE — 84443 ASSAY THYROID STIM HORMONE: CPT

## 2020-12-18 PROCEDURE — 83036 HEMOGLOBIN GLYCOSYLATED A1C: CPT

## 2020-12-18 PROCEDURE — 86769 SARS-COV-2 COVID-19 ANTIBODY: CPT

## 2020-12-18 PROCEDURE — 83540 ASSAY OF IRON: CPT

## 2020-12-18 PROCEDURE — 82553 CREATINE MB FRACTION: CPT

## 2020-12-18 PROCEDURE — 87640 STAPH A DNA AMP PROBE: CPT

## 2020-12-18 PROCEDURE — 83605 ASSAY OF LACTIC ACID: CPT

## 2020-12-18 PROCEDURE — 87635 SARS-COV-2 COVID-19 AMP PRB: CPT

## 2020-12-18 PROCEDURE — 87177 OVA AND PARASITES SMEARS: CPT

## 2020-12-18 PROCEDURE — 85730 THROMBOPLASTIN TIME PARTIAL: CPT

## 2020-12-18 PROCEDURE — 87070 CULTURE OTHR SPECIMN AEROBIC: CPT

## 2020-12-18 PROCEDURE — 85379 FIBRIN DEGRADATION QUANT: CPT

## 2020-12-18 PROCEDURE — 71045 X-RAY EXAM CHEST 1 VIEW: CPT

## 2020-12-18 PROCEDURE — 82728 ASSAY OF FERRITIN: CPT

## 2020-12-18 PROCEDURE — 97112 NEUROMUSCULAR REEDUCATION: CPT

## 2020-12-18 PROCEDURE — 96375 TX/PRO/DX INJ NEW DRUG ADDON: CPT

## 2020-12-18 PROCEDURE — 82436 ASSAY OF URINE CHLORIDE: CPT

## 2020-12-18 PROCEDURE — 99233 SBSQ HOSP IP/OBS HIGH 50: CPT

## 2020-12-18 PROCEDURE — 83550 IRON BINDING TEST: CPT

## 2020-12-18 PROCEDURE — 83735 ASSAY OF MAGNESIUM: CPT

## 2020-12-18 PROCEDURE — U0003: CPT

## 2020-12-18 PROCEDURE — 84300 ASSAY OF URINE SODIUM: CPT

## 2020-12-18 PROCEDURE — 84478 ASSAY OF TRIGLYCERIDES: CPT

## 2020-12-18 PROCEDURE — 87040 BLOOD CULTURE FOR BACTERIA: CPT

## 2020-12-18 PROCEDURE — 80061 LIPID PANEL: CPT

## 2020-12-18 PROCEDURE — 83880 ASSAY OF NATRIURETIC PEPTIDE: CPT

## 2020-12-18 PROCEDURE — 82803 BLOOD GASES ANY COMBINATION: CPT

## 2020-12-18 PROCEDURE — 0225U NFCT DS DNA&RNA 21 SARSCOV2: CPT

## 2020-12-18 PROCEDURE — 97110 THERAPEUTIC EXERCISES: CPT

## 2020-12-18 PROCEDURE — P9047: CPT

## 2020-12-18 PROCEDURE — 83935 ASSAY OF URINE OSMOLALITY: CPT

## 2020-12-18 PROCEDURE — 84540 ASSAY OF URINE/UREA-N: CPT

## 2020-12-18 PROCEDURE — 85045 AUTOMATED RETICULOCYTE COUNT: CPT

## 2020-12-18 PROCEDURE — 85610 PROTHROMBIN TIME: CPT

## 2020-12-18 PROCEDURE — 92610 EVALUATE SWALLOWING FUNCTION: CPT

## 2020-12-18 PROCEDURE — 87086 URINE CULTURE/COLONY COUNT: CPT

## 2020-12-18 PROCEDURE — 94003 VENT MGMT INPAT SUBQ DAY: CPT

## 2020-12-18 PROCEDURE — 96374 THER/PROPH/DIAG INJ IV PUSH: CPT

## 2020-12-18 PROCEDURE — 82306 VITAMIN D 25 HYDROXY: CPT

## 2020-12-18 PROCEDURE — 84156 ASSAY OF PROTEIN URINE: CPT

## 2020-12-18 PROCEDURE — 94002 VENT MGMT INPAT INIT DAY: CPT

## 2020-12-18 RX ORDER — CHLORHEXIDINE GLUCONATE 213 G/1000ML
1 SOLUTION TOPICAL DAILY
Refills: 0 | Status: DISCONTINUED | OUTPATIENT
Start: 2020-12-18 | End: 2020-12-23

## 2020-12-18 RX ORDER — BENZOCAINE AND MENTHOL 5; 1 G/100ML; G/100ML
1 LIQUID ORAL EVERY 12 HOURS
Refills: 0 | Status: DISCONTINUED | OUTPATIENT
Start: 2020-12-18 | End: 2020-12-27

## 2020-12-18 RX ORDER — ENOXAPARIN SODIUM 100 MG/ML
70 INJECTION SUBCUTANEOUS
Qty: 0 | Refills: 0 | DISCHARGE
Start: 2020-12-18

## 2020-12-18 RX ORDER — SENNA PLUS 8.6 MG/1
2 TABLET ORAL AT BEDTIME
Refills: 0 | Status: DISCONTINUED | OUTPATIENT
Start: 2020-12-18 | End: 2020-12-27

## 2020-12-18 RX ORDER — ENOXAPARIN SODIUM 100 MG/ML
70 INJECTION SUBCUTANEOUS
Refills: 0 | Status: DISCONTINUED | OUTPATIENT
Start: 2020-12-18 | End: 2020-12-19

## 2020-12-18 RX ORDER — AMLODIPINE BESYLATE 2.5 MG/1
10 TABLET ORAL DAILY
Refills: 0 | Status: DISCONTINUED | OUTPATIENT
Start: 2020-12-19 | End: 2020-12-27

## 2020-12-18 RX ORDER — AMLODIPINE BESYLATE 2.5 MG/1
1 TABLET ORAL
Qty: 0 | Refills: 0 | DISCHARGE
Start: 2020-12-18

## 2020-12-18 RX ORDER — SENNA PLUS 8.6 MG/1
2 TABLET ORAL
Qty: 0 | Refills: 0 | DISCHARGE
Start: 2020-12-18

## 2020-12-18 RX ORDER — BENZOCAINE AND MENTHOL 5; 1 G/100ML; G/100ML
0 LIQUID ORAL
Qty: 0 | Refills: 0 | DISCHARGE
Start: 2020-12-18

## 2020-12-18 RX ADMIN — SENNA PLUS 2 TABLET(S): 8.6 TABLET ORAL at 22:13

## 2020-12-18 RX ADMIN — AMLODIPINE BESYLATE 10 MILLIGRAM(S): 2.5 TABLET ORAL at 05:30

## 2020-12-18 RX ADMIN — ENOXAPARIN SODIUM 70 MILLIGRAM(S): 100 INJECTION SUBCUTANEOUS at 05:30

## 2020-12-18 RX ADMIN — BENZOCAINE AND MENTHOL 1 LOZENGE: 5; 1 LIQUID ORAL at 11:41

## 2020-12-18 RX ADMIN — ENOXAPARIN SODIUM 70 MILLIGRAM(S): 100 INJECTION SUBCUTANEOUS at 17:16

## 2020-12-18 RX ADMIN — CHLORHEXIDINE GLUCONATE 1 APPLICATION(S): 213 SOLUTION TOPICAL at 05:30

## 2020-12-18 NOTE — PROGRESS NOTE ADULT - SUBJECTIVE AND OBJECTIVE BOX
PUSHPA KANG    SCU progress note    INTERVAL HPI/OVERNIGHT EVENTS: No acute events    DNR [ ]   DNI  [  ]- Full code    Covid - 19 PCR:     The 4Ms    What Matters Most: see GOC  Age appropriate Medications/Screen for High Risk Medication: Yes  Mentation: see CAM below  Mobility: defer to physical exam    The Confusion Assessment Method (CAM) Diagnostic Algorithm     1: Acute Onset or Fluctuating Course  - Is there evidence of an acute change in mental status from the patient’s baseline? Did the (abnormal) behavior  fluctuate during the day, that is, tend to come and go, or increase and decrease in severity?  [ ] YES [x ] NO     2: Inattention  - Did the patient have difficulty focusing attention, being easily distractible, or having difficulty keeping track of what was being said?  [ ] YES [x ] NO     3: Disorganized thinking  -Was the patient’s thinking disorganized or incoherent, such as rambling or irrelevant conversation, unclear or illogical flow of ideas, or unpredictable switching from subject to subject?  [ ] YES [x ] NO    4: Altered Level of consciousness?  [ ] YES [x] NO    The diagnosis of delirium by CAM requires the presence of features 1 and 2 and either 3 or 4.    PRESSORS: [ ] YES [x ] NO    Cardiovascular:  Heart Failure  Acute   Acute on Chronic  Chronic       amLODIPine   Tablet 10 milliGRAM(s) Oral daily    Pulmonary:    Hematalogic:  enoxaparin Injectable 70 milliGRAM(s) SubCutaneous two times a day    Other:  artificial  tears Solution 1 Drop(s) Both EYES every 4 hours PRN  benzocaine 15 mG/menthol 3.6 mG (Sugar-Free) Lozenge 1 Lozenge Oral every 12 hours PRN  chlorhexidine 2% Cloths 1 Application(s) Topical <User Schedule>  senna 2 Tablet(s) Oral at bedtime  sodium chloride 0.9% lock flush 10 milliLiter(s) IV Push every 1 hour PRN    amLODIPine   Tablet 10 milliGRAM(s) Oral daily  artificial  tears Solution 1 Drop(s) Both EYES every 4 hours PRN  benzocaine 15 mG/menthol 3.6 mG (Sugar-Free) Lozenge 1 Lozenge Oral every 12 hours PRN  chlorhexidine 2% Cloths 1 Application(s) Topical <User Schedule>  enoxaparin Injectable 70 milliGRAM(s) SubCutaneous two times a day  senna 2 Tablet(s) Oral at bedtime  sodium chloride 0.9% lock flush 10 milliLiter(s) IV Push every 1 hour PRN    Drug Dosing Weight  Height (cm): 157.5 (15 Nov 2020 23:00)  Weight (kg): 85.4 (15 Nov 2020 23:00)  BMI (kg/m2): 34.4 (15 Nov 2020 23:00)  BSA (m2): 1.86 (15 Nov 2020 23:00)    CENTRAL LINE: [ ] YES [x ] NO  LOCATION:   DATE INSERTED:  REMOVE: [ ] YES [x ] NO  EXPLAIN:    ANN: [ ] YES [x ] NO    DATE INSERTED:  REMOVE:  [ ] YES [x ] NO  EXPLAIN:    PAST MEDICAL & SURGICAL HISTORY:  COVID-19    No significant past surgical history                      PHYSICAL EXAM:    GENERAL: NAD, well-groomed, well-developed  HEAD:  Atraumatic, Normocephalic  EYES: EOMI, PERRLA, conjunctiva and sclera clear  ENMT: No tonsillar erythema, exudates, or enlargement; Moist mucous membranes, Good dentition, No lesions  NECK: Supple, No JVD, Normal thyroid  NERVOUS SYSTEM:  Alert & Oriented X3, Good concentration; Motor Strength 5/5 B/L upper and lower extremities;   CHEST/LUNG: Clear to auscultation bilaterally; No rales, rhonchi, wheezing, or rubs  HEART: Regular rate and rhythm; No murmurs, rubs, or gallops  ABDOMEN: Soft, Nontender, Nondistended; Bowel sounds present  EXTREMITIES:  2+ Peripheral Pulses, No clubbing, cyanosis, or edema  LYMPH: No lymphadenopathy noted  SKIN: No rashes or lesions      LABS:  CBC Full  -  ( 18 Dec 2020 06:03 )  WBC Count : x  RBC Count : 2.98 M/uL  Hemoglobin : x  Hematocrit : x  Platelet Count - Automated : x  Mean Cell Volume : x  Mean Cell Hemoglobin : x  Mean Cell Hemoglobin Concentration : x  Auto Neutrophil # : x  Auto Lymphocyte # : x  Auto Monocyte # : x  Auto Eosinophil # : x  Auto Basophil # : x  Auto Neutrophil % : x  Auto Lymphocyte % : x  Auto Monocyte % : x  Auto Eosinophil % : x  Auto Basophil % : x    12-18    139  |  106  |  27<H>  ----------------------------<  100<H>  4.0   |  24  |  1.41<H>    Ca    9.0      18 Dec 2020 06:03  Phos  4.0     12-17  Mg     1.9     12-17    TPro  8.2  /  Alb  3.1<L>  /  TBili  0.3  /  DBili  x   /  AST  21  /  ALT  42  /  AlkPhos  76  12-17              [  ]  DVT Prophylaxis  [  ]  Nutrition, Brand, Rate         Goal Rate        Abnormal Nutritional Status -  Malnutrition   Cachexia      Morbid Obesity BMI >/=40    RADIOLOGY & ADDITIONAL STUDIES:  ***    Goals of Care Discussion with Family/Proxy/Other   - see note from/family meeting set up for...     PUSHPA KANG    SCU progress note    INTERVAL HPI/OVERNIGHT EVENTS: No acute events- Medically stable for d/c but awaiting neg covid test.      DNR [ ]   DNI  [  ]- Full code    Covid - 19 PCR:     The 4Ms    What Matters Most: see GOC  Age appropriate Medications/Screen for High Risk Medication: Yes  Mentation: see CAM below  Mobility: defer to physical exam    The Confusion Assessment Method (CAM) Diagnostic Algorithm     1: Acute Onset or Fluctuating Course  - Is there evidence of an acute change in mental status from the patient’s baseline? Did the (abnormal) behavior  fluctuate during the day, that is, tend to come and go, or increase and decrease in severity?  [ ] YES [x ] NO     2: Inattention  - Did the patient have difficulty focusing attention, being easily distractible, or having difficulty keeping track of what was being said?  [ ] YES [x ] NO     3: Disorganized thinking  -Was the patient’s thinking disorganized or incoherent, such as rambling or irrelevant conversation, unclear or illogical flow of ideas, or unpredictable switching from subject to subject?  [ ] YES [x ] NO    4: Altered Level of consciousness?  [ ] YES [x] NO    The diagnosis of delirium by CAM requires the presence of features 1 and 2 and either 3 or 4.    PRESSORS: [ ] YES [x ] NO    Cardiovascular:  Heart Failure  Acute   Acute on Chronic  Chronic       amLODIPine   Tablet 10 milliGRAM(s) Oral daily    Pulmonary:    Hematalogic:  enoxaparin Injectable 70 milliGRAM(s) SubCutaneous two times a day    Other:  artificial  tears Solution 1 Drop(s) Both EYES every 4 hours PRN  benzocaine 15 mG/menthol 3.6 mG (Sugar-Free) Lozenge 1 Lozenge Oral every 12 hours PRN  chlorhexidine 2% Cloths 1 Application(s) Topical <User Schedule>  senna 2 Tablet(s) Oral at bedtime  sodium chloride 0.9% lock flush 10 milliLiter(s) IV Push every 1 hour PRN    amLODIPine   Tablet 10 milliGRAM(s) Oral daily  artificial  tears Solution 1 Drop(s) Both EYES every 4 hours PRN  benzocaine 15 mG/menthol 3.6 mG (Sugar-Free) Lozenge 1 Lozenge Oral every 12 hours PRN  chlorhexidine 2% Cloths 1 Application(s) Topical <User Schedule>  enoxaparin Injectable 70 milliGRAM(s) SubCutaneous two times a day  senna 2 Tablet(s) Oral at bedtime  sodium chloride 0.9% lock flush 10 milliLiter(s) IV Push every 1 hour PRN    Drug Dosing Weight  Height (cm): 157.5 (15 Nov 2020 23:00)  Weight (kg): 85.4 (15 Nov 2020 23:00)  BMI (kg/m2): 34.4 (15 Nov 2020 23:00)  BSA (m2): 1.86 (15 Nov 2020 23:00)    CENTRAL LINE: [ ] YES [x ] NO  LOCATION:   DATE INSERTED:  REMOVE: [ ] YES [x ] NO  EXPLAIN:    ANN: [ ] YES [x ] NO    DATE INSERTED:  REMOVE:  [ ] YES [x ] NO  EXPLAIN:    PAST MEDICAL & SURGICAL HISTORY:  COVID-19    No significant past surgical history                      PHYSICAL EXAM:    GENERAL: NAD, well-groomed, well-developed  HEAD:  Atraumatic, Normocephalic  EYES: EOMI, PERRLA, conjunctiva and sclera clear  ENMT: No tonsillar erythema, exudates, or enlargement; Moist mucous membranes, Good dentition, No lesions  NECK: Supple, No JVD, Normal thyroid  NERVOUS SYSTEM:  Alert & Oriented X3, Good concentration; Motor Strength 5/5 B/L upper and lower extremities;   CHEST/LUNG: Clear to auscultation bilaterally; No rales, rhonchi, wheezing, or rubs  HEART: Regular rate and rhythm; No murmurs, rubs, or gallops  ABDOMEN: Soft, Nontender, Nondistended; Bowel sounds present  EXTREMITIES:  2+ Peripheral Pulses, No clubbing, cyanosis, or edema  LYMPH: No lymphadenopathy noted  SKIN: No rashes or lesions      LABS:  CBC Full  -  ( 18 Dec 2020 06:03 )  WBC Count : x  RBC Count : 2.98 M/uL  Hemoglobin : x  Hematocrit : x  Platelet Count - Automated : x  Mean Cell Volume : x  Mean Cell Hemoglobin : x  Mean Cell Hemoglobin Concentration : x  Auto Neutrophil # : x  Auto Lymphocyte # : x  Auto Monocyte # : x  Auto Eosinophil # : x  Auto Basophil # : x  Auto Neutrophil % : x  Auto Lymphocyte % : x  Auto Monocyte % : x  Auto Eosinophil % : x  Auto Basophil % : x    12-18    139  |  106  |  27<H>  ----------------------------<  100<H>  4.0   |  24  |  1.41<H>    Ca    9.0      18 Dec 2020 06:03  Phos  4.0     12-17  Mg     1.9     12-17    TPro  8.2  /  Alb  3.1<L>  /  TBili  0.3  /  DBili  x   /  AST  21  /  ALT  42  /  AlkPhos  76  12-17              [  ]  DVT Prophylaxis  [  ]  Nutrition, Brand, Rate         Goal Rate        Abnormal Nutritional Status -  Malnutrition   Cachexia      Morbid Obesity BMI >/=40    RADIOLOGY & ADDITIONAL STUDIES:  ***    Goals of Care Discussion with Family/Proxy/Other   - see note from/family meeting set up for...     PUSHPA KANG    SCU progress note    INTERVAL HPI/OVERNIGHT EVENTS: No acute events- Medically stable for d/c but awaiting neg covid test.      DNR [ ]   DNI  [  ]- Full code    Covid - 19 PCR:     The 4Ms    What Matters Most: see GOC  Age appropriate Medications/Screen for High Risk Medication: Yes  Mentation: see CAM below  Mobility: defer to physical exam    The Confusion Assessment Method (CAM) Diagnostic Algorithm     1: Acute Onset or Fluctuating Course  - Is there evidence of an acute change in mental status from the patient’s baseline? Did the (abnormal) behavior  fluctuate during the day, that is, tend to come and go, or increase and decrease in severity?  [ ] YES [x ] NO     2: Inattention  - Did the patient have difficulty focusing attention, being easily distractible, or having difficulty keeping track of what was being said?  [ ] YES [x ] NO     3: Disorganized thinking  -Was the patient’s thinking disorganized or incoherent, such as rambling or irrelevant conversation, unclear or illogical flow of ideas, or unpredictable switching from subject to subject?  [ ] YES [x ] NO    4: Altered Level of consciousness?  [ ] YES [x] NO    The diagnosis of delirium by CAM requires the presence of features 1 and 2 and either 3 or 4.    PRESSORS: [ ] YES [x ] NO    Cardiovascular:  Heart Failure  Acute   Acute on Chronic  Chronic       amLODIPine   Tablet 10 milliGRAM(s) Oral daily    Pulmonary:    Hematalogic:  enoxaparin Injectable 70 milliGRAM(s) SubCutaneous two times a day    Other:  artificial  tears Solution 1 Drop(s) Both EYES every 4 hours PRN  benzocaine 15 mG/menthol 3.6 mG (Sugar-Free) Lozenge 1 Lozenge Oral every 12 hours PRN  chlorhexidine 2% Cloths 1 Application(s) Topical <User Schedule>  senna 2 Tablet(s) Oral at bedtime  sodium chloride 0.9% lock flush 10 milliLiter(s) IV Push every 1 hour PRN    amLODIPine   Tablet 10 milliGRAM(s) Oral daily  artificial  tears Solution 1 Drop(s) Both EYES every 4 hours PRN  benzocaine 15 mG/menthol 3.6 mG (Sugar-Free) Lozenge 1 Lozenge Oral every 12 hours PRN  chlorhexidine 2% Cloths 1 Application(s) Topical <User Schedule>  enoxaparin Injectable 70 milliGRAM(s) SubCutaneous two times a day  senna 2 Tablet(s) Oral at bedtime  sodium chloride 0.9% lock flush 10 milliLiter(s) IV Push every 1 hour PRN    Drug Dosing Weight  Height (cm): 157.5 (15 Nov 2020 23:00)  Weight (kg): 85.4 (15 Nov 2020 23:00)  BMI (kg/m2): 34.4 (15 Nov 2020 23:00)  BSA (m2): 1.86 (15 Nov 2020 23:00)    CENTRAL LINE: [ ] YES [x ] NO  LOCATION:   DATE INSERTED:  REMOVE: [ ] YES [x ] NO  EXPLAIN:    ANN: [ ] YES [x ] NO    DATE INSERTED:  REMOVE:  [ ] YES [ ] NO  EXPLAIN:    PAST MEDICAL & SURGICAL HISTORY:  COVID-19    No significant past surgical history                      PHYSICAL EXAM:    GENERAL: NAD, well-groomed, well-developed  HEAD:  Atraumatic, Normocephalic  EYES: EOMI, PERRLA, conjunctiva and sclera clear  ENMT: No tonsillar erythema, exudates, or enlargement; Moist mucous membranes, Good dentition, No lesions  NECK: Supple, No JVD, Normal thyroid  NERVOUS SYSTEM:  Alert & Oriented X3, Good concentration; Motor Strength 5/5 B/L upper and lower extremities;   CHEST/LUNG: Clear to auscultation bilaterally; No rales, rhonchi, wheezing, or rubs  HEART: Regular rate and rhythm; No murmurs, rubs, or gallops  ABDOMEN: Soft, Nontender, Nondistended; Bowel sounds present  EXTREMITIES:  2+ Peripheral Pulses, No clubbing, cyanosis, or edema  LYMPH: No lymphadenopathy noted  SKIN: No rashes or lesions      LABS:  CBC Full  -  ( 18 Dec 2020 06:03 )  WBC Count : x  RBC Count : 2.98 M/uL  Hemoglobin : x  Hematocrit : x  Platelet Count - Automated : x  Mean Cell Volume : x  Mean Cell Hemoglobin : x  Mean Cell Hemoglobin Concentration : x  Auto Neutrophil # : x  Auto Lymphocyte # : x  Auto Monocyte # : x  Auto Eosinophil # : x  Auto Basophil # : x  Auto Neutrophil % : x  Auto Lymphocyte % : x  Auto Monocyte % : x  Auto Eosinophil % : x  Auto Basophil % : x    12-18    139  |  106  |  27<H>  ----------------------------<  100<H>  4.0   |  24  |  1.41<H>    Ca    9.0      18 Dec 2020 06:03  Phos  4.0     12-17  Mg     1.9     12-17    TPro  8.2  /  Alb  3.1<L>  /  TBili  0.3  /  DBili  x   /  AST  21  /  ALT  42  /  AlkPhos  76  12-17              [  ]  DVT Prophylaxis  [  ]  Nutrition, Brand, Rate         Goal Rate        Abnormal Nutritional Status -  Malnutrition   Cachexia      Morbid Obesity BMI >/=40    RADIOLOGY & ADDITIONAL STUDIES:  ***    Goals of Care Discussion with Family/Proxy/Other   - see note from/family meeting set up for...     PUSHPA KANG    SCU progress note    INTERVAL HPI/OVERNIGHT EVENTS: No acute events- Medically stable for d/c but awaiting neg covid test.      DNR [ ]   DNI  [  ]- Full code    Covid - 19 PCR: Positive 12/16    The 4Ms    What Matters Most: see GO  Age appropriate Medications/Screen for High Risk Medication: Yes  Mentation: see CAM below  Mobility: defer to physical exam    The Confusion Assessment Method (CAM) Diagnostic Algorithm     1: Acute Onset or Fluctuating Course  - Is there evidence of an acute change in mental status from the patient’s baseline? Did the (abnormal) behavior  fluctuate during the day, that is, tend to come and go, or increase and decrease in severity?  [ ] YES [x ] NO     2: Inattention  - Did the patient have difficulty focusing attention, being easily distractible, or having difficulty keeping track of what was being said?  [ ] YES [x ] NO     3: Disorganized thinking  -Was the patient’s thinking disorganized or incoherent, such as rambling or irrelevant conversation, unclear or illogical flow of ideas, or unpredictable switching from subject to subject?  [ ] YES [x ] NO    4: Altered Level of consciousness?  [ ] YES [x] NO    The diagnosis of delirium by CAM requires the presence of features 1 and 2 and either 3 or 4.    PRESSORS: [ ] YES [x ] NO    Cardiovascular:  Heart Failure  Acute   Acute on Chronic  Chronic       amLODIPine   Tablet 10 milliGRAM(s) Oral daily    Pulmonary:    Hematalogic:  enoxaparin Injectable 70 milliGRAM(s) SubCutaneous two times a day    Other:  artificial  tears Solution 1 Drop(s) Both EYES every 4 hours PRN  benzocaine 15 mG/menthol 3.6 mG (Sugar-Free) Lozenge 1 Lozenge Oral every 12 hours PRN  chlorhexidine 2% Cloths 1 Application(s) Topical <User Schedule>  senna 2 Tablet(s) Oral at bedtime  sodium chloride 0.9% lock flush 10 milliLiter(s) IV Push every 1 hour PRN    amLODIPine   Tablet 10 milliGRAM(s) Oral daily  artificial  tears Solution 1 Drop(s) Both EYES every 4 hours PRN  benzocaine 15 mG/menthol 3.6 mG (Sugar-Free) Lozenge 1 Lozenge Oral every 12 hours PRN  chlorhexidine 2% Cloths 1 Application(s) Topical <User Schedule>  enoxaparin Injectable 70 milliGRAM(s) SubCutaneous two times a day  senna 2 Tablet(s) Oral at bedtime  sodium chloride 0.9% lock flush 10 milliLiter(s) IV Push every 1 hour PRN    Drug Dosing Weight  Height (cm): 157.5 (15 Nov 2020 23:00)  Weight (kg): 85.4 (15 Nov 2020 23:00)  BMI (kg/m2): 34.4 (15 Nov 2020 23:00)  BSA (m2): 1.86 (15 Nov 2020 23:00)    CENTRAL LINE: [ ] YES [x ] NO  LOCATION:   DATE INSERTED:  REMOVE: [ ] YES [x ] NO  EXPLAIN:    ANN: [ ] YES [x ] NO    DATE INSERTED:  REMOVE:  [ ] YES [ ] NO  EXPLAIN:    PAST MEDICAL & SURGICAL HISTORY:  COVID-19    No significant past surgical history                      PHYSICAL EXAM:    GENERAL: NAD, well-groomed, well-developed  HEAD:  Atraumatic, Normocephalic  EYES: EOMI, PERRLA, conjunctiva and sclera clear  ENMT: No tonsillar erythema, exudates, or enlargement; Moist mucous membranes, Good dentition, No lesions  NECK: Supple, No JVD, Normal thyroid  NERVOUS SYSTEM:  Alert & Oriented X3, Good concentration; Motor Strength 5/5 B/L upper and lower extremities;   CHEST/LUNG: Clear to auscultation bilaterally; No rales, rhonchi, wheezing, or rubs  HEART: Regular rate and rhythm; No murmurs, rubs, or gallops  ABDOMEN: Soft, Nontender, Nondistended; Bowel sounds present  EXTREMITIES:  2+ Peripheral Pulses, No clubbing, cyanosis, or edema  LYMPH: No lymphadenopathy noted  SKIN: No rashes or lesions      LABS:  CBC Full  -  ( 18 Dec 2020 06:03 )  WBC Count : x  RBC Count : 2.98 M/uL  Hemoglobin : x  Hematocrit : x  Platelet Count - Automated : x  Mean Cell Volume : x  Mean Cell Hemoglobin : x  Mean Cell Hemoglobin Concentration : x  Auto Neutrophil # : x  Auto Lymphocyte # : x  Auto Monocyte # : x  Auto Eosinophil # : x  Auto Basophil # : x  Auto Neutrophil % : x  Auto Lymphocyte % : x  Auto Monocyte % : x  Auto Eosinophil % : x  Auto Basophil % : x    12-18    139  |  106  |  27<H>  ----------------------------<  100<H>  4.0   |  24  |  1.41<H>    Ca    9.0      18 Dec 2020 06:03  Phos  4.0     12-17  Mg     1.9     12-17    TPro  8.2  /  Alb  3.1<L>  /  TBili  0.3  /  DBili  x   /  AST  21  /  ALT  42  /  AlkPhos  76  12-17              [  ]  DVT Prophylaxis  [  ]  Nutrition, Brand, Rate         Goal Rate        Abnormal Nutritional Status -  Malnutrition   Cachexia      Morbid Obesity BMI >/=40    RADIOLOGY & ADDITIONAL STUDIES:  ***    Goals of Care Discussion with Family/Proxy/Other   - see note from/family meeting set up for...

## 2020-12-18 NOTE — DISCHARGE NOTE NURSING/CASE MANAGEMENT/SOCIAL WORK - PATIENT PORTAL LINK FT
You can access the FollowMyHealth Patient Portal offered by [facility] by registering at the following website: [email or web address]. By joining [employer]s FollowMyHealth portal, you will also be able to view your health information using other applications (apps) compatible with our system.

## 2020-12-18 NOTE — DISCHARGE NOTE NURSING/CASE MANAGEMENT/SOCIAL WORK - NSDCPELOVENOXFU_GEN_ALL_CORE
Go for blood tests as directed. Your doctor will do lab tests at regular visits to monitor the effects of this medicine. Please follow up with your doctor and keep your health care provider appointments
<<-----Click here for Discharge Medication Review

## 2020-12-18 NOTE — PROGRESS NOTE ADULT - PROBLEM SELECTOR PROBLEM 8
Need for prophylactic measure
Discharge planning issues
Acute renal failure
Anemia
Anemia
Discharge planning issues

## 2020-12-18 NOTE — PROGRESS NOTE ADULT - ASSESSMENT
55M from home without PMH with progressively worsening SOB and +COVID test x5 days prior to admission presented following SpO2 64% at home.  Started on 15L NRB in ED 2/2 SpO2s 85-90%, desaturated to low 80s and was placed on HFNC, continued to desaturate, was intubated and admitted to ICU 11/15.     S/p 10-day course decadron 11/15-24. Not a candidate for remdesivir initially 2/2 elevated LFTs and subsequently 2/2 CrCl <45. Kidney function poor with elevated Cr to 2.22 on admission, baseline unknown, peaked at 4.43 and gradually improved. U/O supported with Lasix boluses, gradually improved, nephro Dr. Camara following.  CXR with b/l opacities and small pleural effusions, APRs elevated with d-dimer 559->2763, started on heparin gtt for therapeutic AC. Completed 5 day course Azithromycin and Ceftriaxone for CAP. Extubated 11/30, O2 delivery titrated down, now with NC and standby BiPAP.      last fever 12/6. Started on 5-day course of Zosyn for possible aspiration pna, last day 12/11;  BCx NGTD, MRSA negative. WBC with 21% eosinophils, possibly 2/2 fungemia, started on Diflucan and awaiting fungicell result. Mobility and  physical strength improving gradually.    12/9 Transferred to SCU.  12/13 Ova/Parasite stool ordered in light of elevated eosinophil; Will complete diflucan 7 day course on (12/8 to 12/14) Pe; Follow up results; stool results can be followed outpatient if discharge planning on Monday 12/13  COVID PCR positive.  12/16 + PCR CORONAVIRUS INSTRUCTIONS:   12/18- COVID 19 PCR positive on 12/16- may potentially be d/c today. Pending Auth per CM- if not then will need repeat Covid- PCR on Sunday- 12/20

## 2020-12-18 NOTE — H&P ADULT - NSHPSOCIALHISTORY_GEN_ALL_CORE
Smoking - Denied  EtOH - Denied   Drugs - Denied     Lives with brother in apartment   PTA: Independent in ADLs and ambulation     CURRENT FUNCTIONAL STATUS  Bed Mobility: supervision  Transfers: supervision  Gait: 15ft RW

## 2020-12-18 NOTE — PROGRESS NOTE ADULT - PROVIDER SPECIALTY LIST ADULT
Critical Care
Nephrology
Critical Care
Critical Care

## 2020-12-18 NOTE — CHART NOTE - NSCHARTNOTEFT_GEN_A_CORE
Reassessment:   55yMalePatient is a 55y old  Male who presents with a chief complaint of Acute Hypoxic Respiratory failure (18 Dec 2020 11:02)  pt pending d/c to acute rehab. pt seen through window, no supp o2. per RN pt eating well with no current GI issues.     Factors impacting intake: [ ] none [ ] nausea  [ ] vomiting [ ] diarrhea [ ] constipation  [ ]chewing problems [ ] swallowing issues  [ ] other:     Diet Prescription: Diet, Dysphagia 2 Mechanical Soft-Thin Liquids (20 @ 11:09)    Intake: good per RN    Daily Weight in k.5 (17 Dec 2020 08:32)    % Weight Change    Pertinent Medications: MEDICATIONS  (STANDING):  amLODIPine   Tablet 10 milliGRAM(s) Oral daily  chlorhexidine 2% Cloths 1 Application(s) Topical <User Schedule>  enoxaparin Injectable 70 milliGRAM(s) SubCutaneous two times a day  senna 2 Tablet(s) Oral at bedtime    MEDICATIONS  (PRN):  artificial  tears Solution 1 Drop(s) Both EYES every 4 hours PRN Dry Eyes  benzocaine 15 mG/menthol 3.6 mG (Sugar-Free) Lozenge 1 Lozenge Oral every 12 hours PRN Sore Throat  sodium chloride 0.9% lock flush 10 milliLiter(s) IV Push every 1 hour PRN Pre/post blood products, medications, blood draw, and to maintain line patency    Pertinent Labs: 12 Na139 mmol/L Glu 100 mg/dL<H> K+ 4.0 mmol/L Cr  1.41 mg/dL<H> BUN 27 mg/dL<H>  Phos 4.0 mg/dL  Alb 3.1 g/dL<L>  Chol --    LDL --    HDL --    Trig 289 mg/dL<H>     CAPILLARY BLOOD GLUCOSE        Skin:     Estimated Needs:   [ X] no change since previous assessment  [ ] recalculated:     Previous Nutrition Diagnosis:   [ ] Inadequate Energy Intake [ ]Inadequate Oral Intake [ ] Excessive Energy Intake   [ ] Underweight [ ] Increased Nutrient Needs [ ] Overweight/Obesity   [ ] Altered GI Function [ ] Unintended Weight Loss [ ] Food & Nutrition Related Knowledge Deficit [ X] Malnutrition-severe    Nutrition Diagnosis is [ X] ongoing  [ ] resolved [ ] not applicable     New Nutrition Diagnosis: [ ] not applicable       Interventions:   Recommend  [ ] Change Diet To:  [ ] Nutrition Supplement  [ ] Nutrition Support  [X ] Other: continue as tolerated. good PO per RN    Monitoring and Evaluation:   [X ] PO intake [ x ] Tolerance to diet prescription [ x ] weights [ x ] labs[ x ] follow up per protocol  [ ] other:

## 2020-12-18 NOTE — PROGRESS NOTE ADULT - PROBLEM SELECTOR PLAN 1
Needs to be COVID negative in order to go to ACUTE rehab.  Repeat COVID ordered for 12/20 as requested during IDR.

## 2020-12-18 NOTE — H&P ADULT - NSHPPHYSICALEXAM_GEN_ALL_CORE
Vital Signs  T(C): 36.6 (12-18-20 @ 11:30), Max: 36.8 (12-18-20 @ 05:35)  HR: 84 (12-18-20 @ 11:30) (84 - 92)  BP: 123/71 (12-18-20 @ 11:30) (117/69 - 127/76)  RR: 16 (12-18-20 @ 11:30) (16 - 92)  SpO2: 99% (12-18-20 @ 11:30) (98% - 100%)    Gen - NAD, Comfortable  HEENT - NCAT, EOMI, MMM  Neck - Supple, No limited ROM  Pulm - CTAB,   Cardiovascular - RRR, S1S2,   Abdomen - Soft, NT/ND, +BS  Extremities - No C/C/E, No calf tenderness  Neuro-     Cognitive - AAOx3     Communication - Fluent, No dysarthria, but hypophonic.     Attention: Intact      Memory: Recall 3 objects immediate and only 2/3 objects 3 min later.         Motor -                    LEFT    UE - ShAB 4/5, EF 3/5, EE 3/5, WE 4/5,  3/5                    RIGHT UE - ShAB 4/5, EF 4/5, EE 4/5, WE 4/5,  4/5                    LEFT    LE - HF 4/5, KE 4/5, DF 4/5, PF 4/5                    RIGHT LE - HF 5/5, KE 5/5, DF 5/5, PF 5/5        Sensory - Intact to LT     Tone - normal  Psychiatric - Mood stable, Affect WNL  Skin: Intact  Wounds: None Present

## 2020-12-18 NOTE — H&P ADULT - ASSESSMENT
Assessment/Plan:  PUSHPA KANG is a 55M who was admitted for acute hypoxic respiratory failure due to COVID-19 PNA now presents with debility.    #AHRF 2/2 COVID-19 PNA  - s/p intubation 11/15-30.  - s/p decadron x10d. Not a candidate for Remdesivir due to elevated LFTs  - s/p azithromycin, CTX   - Monitor SpO2 on RA, oxygen supplementation via nasal cannula PRN  - Start comprehensive rehab program of PT/OT/SLP - 3 hours a day, 5 days a week    #aspiration PNA  - s/p zosyn x5d (last 12/9)  - aspiration precautions    #HTN  - new onset during hospitalization  - cont amlodipine 10mg qd  - monitor BP    #Acute renal failure  - Cr 2.22 -> 4.43 on admission to [facility].   - 12/18 Cr 1.41  - Monitor CMP  - Avoid nephrotoxins  - Strict I/Os    #elevated LFTs  - likely 2/2 COVID infection--resolved  - monitor CMP  - avoid hepatotoxins    Pain  - Tylenol PRN    GI / Bowel  - Senna qHS     / Bladder  - Currently patient voids:      [ ] independent      [ ] external collection device (condom cath)      [ ] Indwelling isaacs catheter      [ ] Intermittent catheterization  - Continue bladder scans Q**** hours with straight cath for >***cc.  - Toileting schedule every 4 hours    Skin / Pressure injury  - Skin assessment on admission performed [  ] :   - Monitor Incisions:    - Turn q2 hours in bed while awake, air mattress  - nursing to monitor skin qShift  - soft heel protectors  - skin barrier cream PRN    Diet/Dysphagia:  - Diet Consistency: dysphagia 2 - mech soft and thin liquids  - Aspiration Precautions  - SLP consult for swallow function evaluation and treatment  - Nutrition consult for protein calorie malnutrition    DVT prophylaxis:   - full dose lovenox 70mg subq BID      Outpatient Follow-up:  Carmen Chau)  Medicine  Dept Director  [address] [place] [address]  Phone: [phone]  Fax: [phone]      Code Status/Emergency Contact:  Yg Kang (brother) [phone]    ---------------    Goals: Safe discharge to home  Estimated Length of Stay: 10-14 days  Rehab Potential: Good  Medical Prognosis: Good  Estimated Disposition: Home with home care  Impairment Codes: 16 debility    PRESCREEN COMPARISON:  I have reviewed the prescreen information and I have found no relevant changes between the preadmission screening and my post admission evaluation.    RATIONALE FOR INPATIENT ADMISSION: Patient demonstrates the following:  [X] Medically appropriate for rehabilitation admission  [X] Has attainable rehab goals with an appropriate initial discharge plan  [X]Has rehabilitation potential (expected to make a significant improvement within a reasonable period of time)  [X] Requires close medical management by a rehab physician, rehab nursing care, Hospitalist and comprehensive interdisciplinary team (including PT, OT and/or SLP, Prosthetics and Orthotics)

## 2020-12-18 NOTE — H&P ADULT - HISTORY OF PRESENT ILLNESS
PUSHPA KANG is a 55M without significant PMH presented to [facility] on 11/15/20 with progressively worsening SOB and SpO2 64% on RA at home. Patient has a +COVID test 5 days prior. In the ED, he was started on 15L NRB with SpO2 improvement to 85-90%. However he then desaturated to love 80s and was placed on HFNC. Patient continued to desaturate, was intubated and admitted to ICU. Patient completed a 10d course of decadron. He was not a candidate for Remdesivir due to elevated LFTs. Hospital course complicated by BECKY (Cr 2.22 on admission, peaked 4.43). CXR showed BL opacities and pleural effusions. Patient completed azithro and CTX for CAP. He was successfully extubated on 11/30/20. Developed fevers, likely 2/2 aspiration PNA, completed zosyn and fluconazole

## 2020-12-18 NOTE — H&P ADULT - NSHPLABSRESULTS_GEN_ALL_CORE
RECENT LABS/IMAGING                          8.7    9.83  )-----------( 345      ( 17 Dec 2020 05:39 )             28.4     12-18    139  |  106  |  27<H>  ----------------------------<  100<H>  4.0   |  24  |  1.41<H>    Ca    9.0      18 Dec 2020 06:03  Phos  4.0     12-17  Mg     1.9     12-17    TPro  8.2  /  Alb  3.1<L>  /  TBili  0.3  /  DBili  x   /  AST  21  /  ALT  42  /  AlkPhos  76  12-17                                8.7    9.83  )-----------( 345      ( 17 Dec 2020 05:39 )             28.4     12-18    139  |  106  |  27<H>  ----------------------------<  100<H>  4.0   |  24  |  1.41<H>    Ca    9.0      18 Dec 2020 06:03  Phos  4.0     12-17  Mg     1.9     12-17    TPro  8.2  /  Alb  3.1<L>  /  TBili  0.3  /  DBili  x   /  AST  21  /  ALT  42  /  AlkPhos  76  12-17

## 2020-12-18 NOTE — H&P ADULT - NSHPREVIEWOFSYSTEMS_GEN_ALL_CORE
REVIEW OF SYSTEMS  Constitutional: No fever, No Chills, No fatigue  HEENT: No eye pain, No visual disturbances, No difficulty hearing, No Dysphagia   Pulm: No cough,  No shortness of breath  Cardio: No chest pain, No palpitations  GI:  No abdominal pain, No nausea, No vomiting, No diarrhea, No constipation, No incontinence  : No dysuria, No frequency, No hematuria, No incontinence  Neuro: No headaches, no numbness, +memory loss, +generalized muscle weakness isaura. LUE   Skin: No itching, No rashes, No lesions   Endo: No temperature intolerance  MSK: No joint pain, No joint swelling, No muscle pain, No Neck or back pain  Psych:  No anxiety

## 2020-12-18 NOTE — PROGRESS NOTE ADULT - REASON FOR ADMISSION
Acute Hypoxic Respiratory failure

## 2020-12-18 NOTE — CHART NOTE - NSCHARTNOTESELECT_GEN_ALL_CORE
Malnutrition Notification
Line removal/Event Note
Nutrition Services
Transfer Note
Transfer Note

## 2020-12-19 LAB
ALBUMIN SERPL ELPH-MCNC: 3 G/DL — LOW (ref 3.3–5)
ALP SERPL-CCNC: 67 U/L — SIGNIFICANT CHANGE UP (ref 40–120)
ALT FLD-CCNC: 33 U/L — SIGNIFICANT CHANGE UP (ref 10–45)
ANION GAP SERPL CALC-SCNC: 11 MMOL/L — SIGNIFICANT CHANGE UP (ref 5–17)
AST SERPL-CCNC: 26 U/L — SIGNIFICANT CHANGE UP (ref 10–40)
BILIRUB SERPL-MCNC: 0.3 MG/DL — SIGNIFICANT CHANGE UP (ref 0.2–1.2)
BUN SERPL-MCNC: 27 MG/DL — HIGH (ref 7–23)
CALCIUM SERPL-MCNC: 9.3 MG/DL — SIGNIFICANT CHANGE UP (ref 8.4–10.5)
CHLORIDE SERPL-SCNC: 103 MMOL/L — SIGNIFICANT CHANGE UP (ref 96–108)
CO2 SERPL-SCNC: 24 MMOL/L — SIGNIFICANT CHANGE UP (ref 22–31)
CREAT SERPL-MCNC: 1.42 MG/DL — HIGH (ref 0.5–1.3)
GLUCOSE SERPL-MCNC: 97 MG/DL — SIGNIFICANT CHANGE UP (ref 70–99)
HCT VFR BLD CALC: 27.1 % — LOW (ref 39–50)
HGB BLD-MCNC: 8.6 G/DL — LOW (ref 13–17)
MCHC RBC-ENTMCNC: 29.1 PG — SIGNIFICANT CHANGE UP (ref 27–34)
MCHC RBC-ENTMCNC: 31.7 GM/DL — LOW (ref 32–36)
MCV RBC AUTO: 91.6 FL — SIGNIFICANT CHANGE UP (ref 80–100)
NRBC # BLD: 0 /100 WBCS — SIGNIFICANT CHANGE UP (ref 0–0)
PLATELET # BLD AUTO: 321 K/UL — SIGNIFICANT CHANGE UP (ref 150–400)
POTASSIUM SERPL-MCNC: 4 MMOL/L — SIGNIFICANT CHANGE UP (ref 3.5–5.3)
POTASSIUM SERPL-SCNC: 4 MMOL/L — SIGNIFICANT CHANGE UP (ref 3.5–5.3)
PROT SERPL-MCNC: 8.3 G/DL — SIGNIFICANT CHANGE UP (ref 6–8.3)
RBC # BLD: 2.96 M/UL — LOW (ref 4.2–5.8)
RBC # FLD: 13.3 % — SIGNIFICANT CHANGE UP (ref 10.3–14.5)
SODIUM SERPL-SCNC: 138 MMOL/L — SIGNIFICANT CHANGE UP (ref 135–145)
WBC # BLD: 8.78 K/UL — SIGNIFICANT CHANGE UP (ref 3.8–10.5)
WBC # FLD AUTO: 8.78 K/UL — SIGNIFICANT CHANGE UP (ref 3.8–10.5)

## 2020-12-19 PROCEDURE — 99223 1ST HOSP IP/OBS HIGH 75: CPT

## 2020-12-19 PROCEDURE — 99221 1ST HOSP IP/OBS SF/LOW 40: CPT

## 2020-12-19 RX ORDER — ENOXAPARIN SODIUM 100 MG/ML
40 INJECTION SUBCUTANEOUS
Refills: 0 | Status: DISCONTINUED | OUTPATIENT
Start: 2020-12-19 | End: 2020-12-27

## 2020-12-19 RX ADMIN — AMLODIPINE BESYLATE 10 MILLIGRAM(S): 2.5 TABLET ORAL at 06:06

## 2020-12-19 RX ADMIN — ENOXAPARIN SODIUM 40 MILLIGRAM(S): 100 INJECTION SUBCUTANEOUS at 17:02

## 2020-12-19 RX ADMIN — SENNA PLUS 2 TABLET(S): 8.6 TABLET ORAL at 22:04

## 2020-12-19 RX ADMIN — ENOXAPARIN SODIUM 70 MILLIGRAM(S): 100 INJECTION SUBCUTANEOUS at 06:06
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
## 2020-12-19 NOTE — DIETITIAN INITIAL EVALUATION ADULT. - ORAL INTAKE PTA/DIET HISTORY
on Mechanical Soft (Dysphagia 2-Minced & Moist) Diet w/ Thin Liquids   Nutrition Education Provided on Mechanical Soft (Dysphagia 2-Minced & Moist) Diet    Patient Does Not Follow Diet @Home But Tries to Eat Healthy , Consumes 3 Meals a Day & Does Take Vitamin/Supplements @Home (Calcium, Vitamin D)

## 2020-12-19 NOTE — DIETITIAN INITIAL EVALUATION ADULT. - OTHER INFO
Initial Nutrition Assessment   55yr Old Male   Dx: COVID  Diet - Mechanical Soft (Dysphagia 2-Minced & Moist) Diet w/ Thin Liquids   Denies Food Allergy/Intolerance  Tolerates Diet Well - No Significant Chewing/Swallowing Complications (Per Patient) - Speech Therapy To Follow  No Pressure Ulcers (as Per Nursing Flow Sheets)  No Edema Noted (as Per Nursing Flow Sheets)  No Recent Nausea/Vomiting/Diarrhea/Constipation (as Per Patient)

## 2020-12-19 NOTE — DIETITIAN INITIAL EVALUATION ADULT. - PERSON TAUGHT/METHOD
Nutrition Education Provided on Mechanical Soft (Dysphagia 2-Minced & Moist) Diet/verbal instruction/patient instructed

## 2020-12-19 NOTE — DIETITIAN INITIAL EVALUATION ADULT. - ADD RECOMMEND
1) Monitor Weights, Intake, Tolerance, Skin & Labwork   2) Nutrition Education Provided on Mechanical Soft (Dysphagia 2-Minced & Moist) Diet 3) Continue Nutrition Plan of Care

## 2020-12-19 NOTE — CONSULT NOTE ADULT - SUBJECTIVE AND OBJECTIVE BOX
HPI:  56yo male without significant PMH presented [facility] for acute rehab from [facility] with complaints of progressively worsening SOB, noted to have acute respiratory failure with hypoxia secondary to COVID-19, requiring intubation and ICU admission for further management. Pt s/p 10 day course of Decadron. Not a candidate for Remdesivir due to elevated LFTs. Hospital course with further complication due to BECKY and concern for superimposed Gram Negative Bacterial PNA, s/p Azithromycin/Ceftriaxone. Pt successfully extubated, not hypoxic. Further complication with aspiration PNA, completed zosyn and fluconazole. Pt stable for discharge to acute rehab.    Pt seen and examined at bedside. No acute events overnight  Pt denies cp, palpitations, sob, abd pain, N/V, fever, chills      Home Medications:  amLODIPine 10 mg oral tablet: 1 tab(s) orally once a day (18 Dec 2020 14:29)  enoxaparin: 70 milligram(s) subcutaneous 2 times a day (18 Dec 2020 14:29)  menthol-benzocaine 3.6 mg-15 mg mucous membrane lozenge:  mucous membrane  (18 Dec 2020 14:29)  ocular lubricant ophthalmic solution: 1 drop(s) to each affected eye every 4 hours, As needed, Dry Eyes (18 Dec 2020 14:29)  senna oral tablet: 2 tab(s) orally once a day (at bedtime) (18 Dec 2020 14:29)      PAST MEDICAL & SURGICAL HISTORY:  COVID-19  No significant past surgical history        Review of Systems:   CONSTITUTIONAL: No fever, weight loss, or fatigue  EYES: No eye pain, visual disturbances, or discharge  ENMT:  No difficulty hearing, tinnitus, vertigo; No sinus or throat pain  NECK: No pain or stiffness  BREASTS: No pain, masses, or nipple discharge  RESPIRATORY: No cough, wheezing, chills or hemoptysis; No shortness of breath  CARDIOVASCULAR: No chest pain, palpitations, dizziness, or leg swelling  GASTROINTESTINAL: No abdominal or epigastric pain. No nausea, vomiting, or hematemesis; No diarrhea or constipation. No melena or hematochezia.  GENITOURINARY: No dysuria, frequency, hematuria, or incontinence  NEUROLOGICAL: No headaches, memory loss, loss of strength, numbness, or tremors  SKIN: No itching, burning, rashes, or lesions   LYMPH NODES: No enlarged glands  ENDOCRINE: No heat or cold intolerance; No hair loss  MUSCULOSKELETAL: No joint pain or swelling; No muscle, back, or extremity pain  PSYCHIATRIC: No depression, anxiety, mood swings, or difficulty sleeping  HEME/LYMPH: No easy bruising, or bleeding gums  ALLERY AND IMMUNOLOGIC: No hives or eczema    Allergies  No Known Allergies    Social History:   Lives at home with brother  Denies smoking, rare/social EtOH use  Works for [employer]     FAMILY HISTORY:  HTN    MEDICATIONS  (STANDING):  amLODIPine   Tablet 10 milliGRAM(s) Oral daily  chlorhexidine 4% Liquid 1 Application(s) Topical daily  enoxaparin Injectable 70 milliGRAM(s) SubCutaneous two times a day  senna 2 Tablet(s) Oral at bedtime    MEDICATIONS  (PRN):  artificial  tears Solution 1 Drop(s) Both EYES every 4 hours PRN Dry Eyes  benzocaine 15 mG/menthol 3.6 mG (Sugar-Free) Lozenge 1 Lozenge Oral every 12 hours PRN Sore Throat      Vital Signs Last 24 Hrs  T(C): 36.9 (19 Dec 2020 07:36), Max: 36.9 (19 Dec 2020 07:36)  T(F): 98.4 (19 Dec 2020 07:36), Max: 98.4 (19 Dec 2020 07:36)  HR: 87 (19 Dec 2020 07:36) (87 - 95)  BP: 127/79 (19 Dec 2020 07:36) (110/74 - 127/79)  BP(mean): --  RR: 15 (19 Dec 2020 07:36) (15 - 15)  SpO2: 98% (19 Dec 2020 07:36) (97% - 98%)  CAPILLARY BLOOD GLUCOSE            PHYSICAL EXAM:  GENERAL: NAD, well-developed  HEAD:  Atraumatic, Normocephalic  EYES: EOMI, PERRLA, conjunctiva and sclera clear  NECK: Supple, No JVD  CHEST/LUNG: Clear to auscultation bilaterally; No wheeze  HEART: Regular rate and rhythm; No murmurs, rubs, or gallops  ABDOMEN: Soft, Nontender, Nondistended; Bowel sounds present  EXTREMITIES:  2+ Peripheral Pulses, No clubbing, cyanosis, or edema  PSYCH: AAOx3  NEUROLOGY: non-focal  SKIN: No rashes or lesions    LABS:                        8.6    8.78  )-----------( 321      ( 19 Dec 2020 06:20 )             27.1     12-19    138  |  103  |  27<H>  ----------------------------<  97  4.0   |  24  |  1.42<H>    Ca    9.3      19 Dec 2020 06:20    TPro  8.3  /  Alb  3.0<L>  /  TBili  0.3  /  DBili  x   /  AST  26  /  ALT  33  /  AlkPhos  67  12-19                RADIOLOGY & ADDITIONAL TESTS:    Imaging Personally Reviewed:    Consultant(s) Notes Reviewed:      Care Discussed with Consultants/Other Providers:

## 2020-12-19 NOTE — PROGRESS NOTE ADULT - ASSESSMENT
Assessment/Plan:  PUSHPA KANG is a 55M who was admitted for acute hypoxic respiratory failure due to COVID-19 PNA now presents with debility.    #AHRF 2/2 COVID-19 PNA  - s/p intubation 11/15-30.  - s/p decadron x10d. Not a candidate for Remdesivir due to elevated LFTs  - s/p azithromycin, CTX   - Monitor SpO2 on RA, oxygen supplementation via nasal cannula PRN  - Start comprehensive rehab program of PT/OT/SLP - 3 hours a day, 5 days a week    #aspiration PNA  - s/p zosyn x5d (last 12/9)  - aspiration precautions    #HTN  - new onset during hospitalization  - cont amlodipine 10mg qd  - monitor BP    #Acute renal failure  - Cr 2.22 -> 4.43 on admission to [facility].   - 12/18 Cr 1.41  - Monitor CMP  - Avoid nephrotoxins  - Strict I/Os    #elevated LFTs  - likely 2/2 COVID infection--resolved  - monitor CMP  - avoid hepatotoxins    Pain  - Tylenol PRN    GI / Bowel  - Senna qHS     / Bladder  - Currently patient voids:      [ ] independent      [ ] external collection device (condom cath)      [ ] Indwelling isaacs catheter      [ ] Intermittent catheterization  - Continue bladder scans Q**** hours with straight cath for >***cc.  - Toileting schedule every 4 hours    Skin / Pressure injury  - Skin assessment on admission performed [  ] :   - Monitor Incisions:    - Turn q2 hours in bed while awake, air mattress  - nursing to monitor skin qShift  - soft heel protectors  - skin barrier cream PRN    Diet/Dysphagia:  - Diet Consistency: dysphagia 2 - mech soft and thin liquids  - Aspiration Precautions  - SLP consult for swallow function evaluation and treatment  - Nutrition consult for protein calorie malnutrition    DVT prophylaxis:   - full dose lovenox 70mg subq BID      Outpatient Follow-up:  Carmen Chau)  Medicine  Dept Director  [address] [place] [address]  Phone: [phone]  Fax: [phone]      Code Status/Emergency Contact:  Yg Kang (brother) [phone]    ---------------    Goals: Safe discharge to home  Estimated Length of Stay: 10-14 days  Rehab Potential: Good  Medical Prognosis: Good  Estimated Disposition: Home with home care  Impairment Codes: 16 debility    PRESCREEN COMPARISON:  I have reviewed the prescreen information and I have found no relevant changes between the preadmission screening and my post admission evaluation.    RATIONALE FOR INPATIENT ADMISSION: Patient demonstrates the following:  [X] Medically appropriate for rehabilitation admission  [X] Has attainable rehab goals with an appropriate initial discharge plan  [X]Has rehabilitation potential (expected to make a significant improvement within a reasonable period of time)  [X] Requires close medical management by a rehab physician, rehab nursing care, Hospitalist and comprehensive interdisciplinary team (including PT, OT and/or SLP, Prosthetics and Orthotics)     Assessment/Plan:  PUSHPA KANG is a 55M who was admitted for acute hypoxic respiratory failure due to COVID-19 PNA now presents with debility.    #AHRF 2/2 COVID-19 PNA  - s/p intubation 11/15-30.  - s/p decadron x10d. Not a candidate for Remdesivir due to elevated LFTs  - s/p azithromycin, CTX   - Monitor SpO2 on RA, oxygen supplementation via nasal cannula PRN  - Start comprehensive rehab program of PT/OT/SLP - 3 hours a day, 5 days a week    #aspiration PNA  - s/p zosyn x5d (last 12/9)  - aspiration precautions    #HTN  - new onset during hospitalization  - cont amlodipine 10mg qd  - monitor BP    #Acute renal failure  - Cr 2.22 -> 4.43 on admission to [facility].   - 12/18 Cr 1.41, 12/19 CR 1.42  - Monitor CMP  - Avoid nephrotoxins  - Strict I/Os  - Hospitalist consult    #elevated LFTs  - likely 2/2 COVID infection--resolved  - monitor CMP  - avoid hepatotoxins    Pain  - Tylenol PRN    GI / Bowel  - Senna qHS     / Bladder  - Currently patient voids:      [ ] independent      [ ] external collection device (condom cath)      [ ] Indwelling isaacs catheter      [ ] Intermittent catheterization  - Continue bladder scans Q**** hours with straight cath for >***cc.  - Toileting schedule every 4 hours    Skin / Pressure injury  - Skin assessment on admission performed [  ] :   - Monitor Incisions:    - Turn q2 hours in bed while awake, air mattress  - nursing to monitor skin qShift  - soft heel protectors  - skin barrier cream PRN    Diet/Dysphagia:  - Diet Consistency: dysphagia 2 - mech soft and thin liquids  - Aspiration Precautions  - SLP consult for swallow function evaluation and treatment  - Nutrition consult for protein calorie malnutrition    DVT prophylaxis:   - full dose lovenox 70mg subq BID    Labs:  CBC, CMP 12/21      Outpatient Follow-up:  Carmen Chau)  Medicine  Dept Director  [address] [place] [address]  Phone: [phone]  Fax: [phone]      Code Status/Emergency Contact:  Yg Kang (brother) [phone]    ---------------    Goals: Safe discharge to home  Estimated Length of Stay: 10-14 days  Rehab Potential: Good  Medical Prognosis: Good  Estimated Disposition: Home with home care  Impairment Codes: 16 debility

## 2020-12-19 NOTE — DIETITIAN INITIAL EVALUATION ADULT. - EDUCATION DIETARY MODIFICATIONS
Nutrition Education Provided on Mechanical Soft (Dysphagia 2-Minced & Moist) Diet/(2) meets goals/outcomes/verbalization

## 2020-12-19 NOTE — PROGRESS NOTE ADULT - SUBJECTIVE AND OBJECTIVE BOX
History of Present Illness:  Reason for Admission: 16 Debility  History of Present Illness:   PUSHPA KANG is a 55M without significant PMH presented to [facility] Ed on 11/15/20 with progressively worsening SOB and SpO2 64% on RA at home. Patient has a +COVID test 5 days prior. In the ED, he was started on 15L NRB with SpO2 improvement to 85-90%. However he then desaturated to love 80s and was placed on HFNC. Patient continued to desaturate, was intubated and admitted to ICU. Patient completed a 10d course of decadron. He was not a candidate for Remdesivir due to elevated LFTs. Hospital course complicated by BECKY (Cr 2.22 on admission, peaked 4.43). CXR showed BL opacities and pleural effusions. Patient completed azithro and CTX for CAP. He was successfully extubated on 11/30/20. Developed fevers, likely 2/2 aspiration PNA, completed zosyn and fluconazole     Subjective/Review of Systems: Patient seen this morning at bedside. Patient in bed in NAD, no SOB, no n/v, no pain.  Review of Systems: REVIEW OF SYSTEMS  Constitutional: No fever, No fatigue  HEENT: No eye pain, No visual disturbances  Pulm: No cough,  No shortness of breath  Cardio: No chest pain  GI:  No abdominal pain, No nausea, No vomiting  : No dysuria, No incontinence  Neuro: No headaches, no numbness, +memory loss, +generalized muscle weakness    Skin: No itching, No rashes  MSK: No joint pain, No joint swelling, No muscle pain, No Neck or back pain  Psych:  No anxiety      Allergies and Intolerances:        Allergies:  	No Known Allergies:       Patient History:    Past Medical, Past Surgical, and Family History:  PAST MEDICAL HISTORY:  COVID-19.     PAST SURGICAL HISTORY:  No significant past surgical history.     Social History:  Social History (marital status, living situation, occupation, tobacco use, alcohol and drug use, and sexual history): Smoking - Denied  EtOH - Denied   Drugs - Denied     Lives with brother in apartment   PTA: Independent in ADLs and ambulation W      Physical Exam:   Physical Exam: Vital Signs      Gen - NAD, Comfortable  HEENT - NCAT, EOMI, MMM  Neck - Supple, No limited ROM  Pulm - CTAB,   Cardiovascular - RRR, S1S2,   Abdomen - Soft, NT/ND, +BS  Extremities - No C/C/E, No calf tenderness  Neuro-     Cognitive - AAOx3     Communication - Fluent, No dysarthria, but hypophonic.     Attention: Intact      Memory: Recall 3 objects immediate and only 2/3 objects 3 min later.         Motor -                    LEFT    UE - ShAB 4/5, EF 3/5, EE 3/5, WE 4/5,  3/5                    RIGHT UE - ShAB 4/5, EF 4/5, EE 4/5, WE 4/5,  4/5                    LEFT    LE - HF 4/5, KE 4/5, DF 4/5, PF 4/5                    RIGHT LE - HF 5/5, KE 5/5, DF 5/5, PF 5/5        Sensory - Intact to LT     Tone - normal  Psychiatric - Mood stable, Affect WNL  Skin: Intact  Wounds: None Present       Labs and Results:  Labs, Radiology, Cardiology, and Other Results: RECENT LABS/IMAGING                          8.7    9.83  )-----------( 345      ( 17 Dec 2020 05:39 )             28.4     12-18    139  |  106  |  27<H>  ----------------------------<  100<H>  4.0   |  24  |  1.41<H>    Ca    9.0      18 Dec 2020 06:03  Phos  4.0     12-17  Mg     1.9     12-17    TPro  8.2  /  Alb  3.1<L>  /  TBili  0.3  /  DBili  x   /  AST  21  /  ALT  42  /  AlkPhos  76  12-17                                8.7    9.83  )-----------( 345      ( 17 Dec 2020 05:39 )             28.4     12-18    139  |  106  |  27<H>  ----------------------------<  100<H>  4.0   |  24  |  1.41<H>    Ca    9.0      18 Dec 2020 06:03  Phos  4.0     12-17  Mg     1.9     12-17    TPro  8.2  /  Alb  3.1<L>  /  TBili  0.3  /  DBili  x   /  AST  21  /  ALT  42  /  AlkPhos  76  12-17        History of Present Illness:  Reason for Admission: 16 Debility  History of Present Illness:   PUSHPA KANG is a 55M without significant PMH presented to [facility] Ed on 11/15/20 with progressively worsening SOB and SpO2 64% on RA at home. Patient has a +COVID test 5 days prior. In the ED, he was started on 15L NRB with SpO2 improvement to 85-90%. However he then desaturated to love 80s and was placed on HFNC. Patient continued to desaturate, was intubated and admitted to ICU. Patient completed a 10d course of decadron. He was not a candidate for Remdesivir due to elevated LFTs. Hospital course complicated by BECKY (Cr 2.22 on admission, peaked 4.43). CXR showed BL opacities and pleural effusions. Patient completed azithro and CTX for CAP. He was successfully extubated on 11/30/20. Developed fevers, likely 2/2 aspiration PNA, completed zosyn and fluconazole     Subjective/Review of Systems: Patient seen this morning at bedside. Patient in bed in NAD, no SOB, no n/v, no pain.  Review of Systems: REVIEW OF SYSTEMS  Constitutional: No fever, No fatigue  HEENT: No eye pain, No visual disturbances  Pulm: No cough,  No shortness of breath  Cardio: No chest pain  GI:  No abdominal pain, No nausea, No vomiting  : No dysuria, No incontinence  Neuro: No headaches, no numbness, +memory loss, +generalized muscle weakness    Skin: No itching, No rashes  MSK: No joint pain, No joint swelling, No muscle pain, No Neck or back pain  Psych:  No anxiety      Allergies and Intolerances:        Allergies:  	No Known Allergies:       Patient History:    Past Medical, Past Surgical, and Family History:  PAST MEDICAL HISTORY:  COVID-19.     PAST SURGICAL HISTORY:  No significant past surgical history.     Social History:  Social History (marital status, living situation, occupation, tobacco use, alcohol and drug use, and sexual history): Smoking - Denied  EtOH - Denied   Drugs - Denied     Lives with brother in apartment   PTA: Independent in ADLs and ambulation W      Physical Exam:   Physical Exam: Vital Signs  Vital Signs Last 24 Hrs  T(C): 36.9 (19 Dec 2020 07:36), Max: 36.9 (19 Dec 2020 07:36)  T(F): 98.4 (19 Dec 2020 07:36), Max: 98.4 (19 Dec 2020 07:36)  HR: 87 (19 Dec 2020 07:36) (84 - 95)  BP: 127/79 (19 Dec 2020 07:36) (110/74 - 127/79)  BP(mean): --  RR: 15 (19 Dec 2020 07:36) (15 - 16)  SpO2: 98% (19 Dec 2020 07:36) (97% - 99%)    Gen - NAD, Comfortable  HEENT - NCAT, MMM  Neck - Supple, No limited ROM  Pulm - CTAB,   Cardiovascular - RRR, S1S2,   Abdomen - Soft, NT/ND, +BS  Extremities - No C/C/E, No calf tenderness  Neuro-     Cognitive - AAOx3        Motor -                    LEFT    UE - ShAB 4/5, EF 3/5, WE 4/5,  3-4/5                    RIGHT UE - ShAB 4/5, EF 4/5, WE 4/5,  4/5                    LEFT    LE - HF 4/5, KE 4/5, DF 4/5, PF 4/5                    RIGHT LE - HF 5/5, KE 5/5, DF 5/5, PF 5/5        Sensory - Intact to LT     Tone - normal  Psychiatric - Mood stable, Affect WNL  Skin: Intact  Wounds: None Present       Labs and Results:  Labs, Radiology, Cardiology, and Other Results: RECENT LABS/IMAGING      LABS:                        8.6    8.78  )-----------( 321      ( 19 Dec 2020 06:20 )             27.1     19 Dec 2020 06:20    138    |  103    |  27     ----------------------------<  97     4.0     |  24     |  1.42     Ca    9.3        19 Dec 2020 06:20    TPro  8.3    /  Alb  3.0    /  TBili  0.3    /  DBili  x      /  AST  26     /  ALT  33     /  AlkPhos  67     19 Dec 2020 06:20                        8.7    9.83  )-----------( 345      ( 17 Dec 2020 05:39 )             28.4     12-18    139  |  106  |  27<H>  ----------------------------<  100<H>  4.0   |  24  |  1.41<H>    Ca    9.0      18 Dec 2020 06:03  Phos  4.0     12-17  Mg     1.9     12-17    TPro  8.2  /  Alb  3.1<L>  /  TBili  0.3  /  DBili  x   /  AST  21  /  ALT  42  /  AlkPhos  76  12-17                                8.7    9.83  )-----------( 345      ( 17 Dec 2020 05:39 )             28.4     12-18    139  |  106  |  27<H>  ----------------------------<  100<H>  4.0   |  24  |  1.41<H>    Ca    9.0      18 Dec 2020 06:03  Phos  4.0     12-17  Mg     1.9     12-17    TPro  8.2  /  Alb  3.1<L>  /  TBili  0.3  /  DBili  x   /  AST  21  /  ALT  42  /  AlkPhos  76  12-17

## 2020-12-19 NOTE — DIETITIAN INITIAL EVALUATION ADULT. - REASON INDICATOR FOR ASSESSMENT
Initial Assessment   Mechanically Altered Diet   Patient Speaks Panamanian, RD is Fluent in Panamanian

## 2020-12-20 PROCEDURE — 99232 SBSQ HOSP IP/OBS MODERATE 35: CPT

## 2020-12-20 PROCEDURE — 99233 SBSQ HOSP IP/OBS HIGH 50: CPT

## 2020-12-20 RX ADMIN — ENOXAPARIN SODIUM 40 MILLIGRAM(S): 100 INJECTION SUBCUTANEOUS at 17:25

## 2020-12-20 RX ADMIN — AMLODIPINE BESYLATE 10 MILLIGRAM(S): 2.5 TABLET ORAL at 05:26

## 2020-12-20 RX ADMIN — ENOXAPARIN SODIUM 40 MILLIGRAM(S): 100 INJECTION SUBCUTANEOUS at 05:26

## 2020-12-20 RX ADMIN — SENNA PLUS 2 TABLET(S): 8.6 TABLET ORAL at 22:21

## 2020-12-20 NOTE — PROGRESS NOTE ADULT - ASSESSMENT
Prescription sent to the pharmacy via e-scribe. 56yo male without significant PMH presented [facility] for acute rehab from [facility] with complaints of progressively worsening SOB, noted to have acute respiratory failure with hypoxia secondary to COVID-19    #Debility  #Acute respiratory failure with hypoxia; Resolved  #COVID-19  - s/p decadron x10d. Not a candidate for Remdesivir due to elevated LFTs  - s/p azithromycin, CTX   - Monitor SpO2 on RA, oxygen supplementation via nasal cannula PRN  - Continue comprehensive rehab program of PT/OT/SLP - 3 hours a day, 5 days a week    #aspiration PNA  - s/p zosyn x5d (last 12/9)  - aspiration precautions  - Dysphagia 2 diet. ADAT  - SLP follow up    #HTN  - new onset during hospitalization  - cont amlodipine 10mg qd  - monitor BP    #Acute renal failure  - Secondary to COVID-19  - Fluctuating Cr  - Monitor CMP  - Avoid nephrotoxins  - Urine Lytes if renal function worsening  - Encourage PO intake   - Strict I/Os    #elevated LFTs  - likely 2/2 COVID infection--resolved  - monitor CMP  - avoid hepatotoxins    #DVT ppx  - Lovenox 40mg BID  - Eventual discharge home with Xarelto 10mg daily or Eliquis 2.5mg BID, depending on pt's renal function

## 2020-12-20 NOTE — PROGRESS NOTE ADULT - ASSESSMENT
Assessment/Plan:  PUSHPA KANG is a 55M who was admitted for acute hypoxic respiratory failure due to COVID-19 PNA now presents with debility.    #AHRF 2/2 COVID-19 PNA  - s/p intubation 11/15-30.  - s/p decadron x10d. Not a candidate for Remdesivir due to elevated LFTs  - s/p azithromycin, CTX   - Monitor SpO2 on RA, oxygen supplementation via nasal cannula PRN  - Continue comprehensive rehab program of PT/OT/SLP - 3 hours a day, 5 days a week    #aspiration PNA  - s/p zosyn x5d (last 12/9)  - aspiration precautions    #HTN  - new onset during hospitalization  - cont amlodipine 10mg qd  - monitor BP    #Acute renal failure  - Cr 2.22 -> 4.43 on admission to [facility].   - 12/18 Cr 1.41, 12/19 CR 1.42  - Monitor CMP  - Avoid nephrotoxins  - Strict I/Os  - Hospitalist consult appreciated    #elevated LFTs  - likely 2/2 COVID infection--resolved  - monitor CMP  - avoid hepatotoxins    Pain  - Tylenol PRN    GI / Bowel  - Senna qHS     / Bladder  - Currently patient voids:      [ ] independent      [ ] external collection device (condom cath)      [ ] Indwelling isaacs catheter      [ ] Intermittent catheterization  - Continue bladder scans Q**** hours with straight cath for >***cc.  - Toileting schedule every 4 hours    Skin / Pressure injury  - Skin assessment on admission performed [  ] :   - Monitor Incisions:    - Turn q2 hours in bed while awake, air mattress  - nursing to monitor skin qShift  - soft heel protectors  - skin barrier cream PRN    Diet/Dysphagia:  - Diet Consistency: dysphagia 2 - mech soft and thin liquids  - Aspiration Precautions  - SLP consult for swallow function evaluation and treatment  - Nutrition consult for protein calorie malnutrition    DVT prophylaxis:   - decreased to lovenox 40mg subq BID    Labs:  CBC, CMP 12/21

## 2020-12-20 NOTE — PROGRESS NOTE ADULT - SUBJECTIVE AND OBJECTIVE BOX
History of Present Illness:  Reason for Admission: 16 Debility  History of Present Illness:   PUSHPA KANG is a 55M without significant PMH presented to [facility] on 11/15/20 with progressively worsening SOB and SpO2 64% on RA at home. Patient has a +COVID test 5 days prior. In the ED, he was started on 15L NRB with SpO2 improvement to 85-90%. However he then desaturated to love 80s and was placed on HFNC. Patient continued to desaturate, was intubated and admitted to ICU. Patient completed a 10d course of decadron. He was not a candidate for Remdesivir due to elevated LFTs. Hospital course complicated by BECKY (Cr 2.22 on admission, peaked 4.43). CXR showed BL opacities and pleural effusions. Patient completed azithro and CTX for CAP. He was successfully extubated on 11/30/20. Developed fevers, likely 2/2 aspiration PNA, completed zosyn and fluconazole     Subjective/Review of Systems: Patient seen this morning at bedside. Patient in bed in NAD, no SOB, no n/v, no pain.  Review of Systems: REVIEW OF SYSTEMS  Constitutional: No fever, No fatigue  HEENT: No eye pain, No visual disturbances  Pulm: No cough,  No shortness of breath  Cardio: No chest pain  GI:  No abdominal pain, No nausea, No vomiting  : No dysuria, No incontinence  Neuro: No headaches, no numbness, +memory loss, +generalized muscle weakness    Skin: No itching, No rashes  MSK: No joint pain, No joint swelling, No muscle pain, No Neck or back pain  Psych:  No anxiety      Allergies and Intolerances:        Allergies:  	No Known Allergies:       Patient History:    Past Medical, Past Surgical, and Family History:  PAST MEDICAL HISTORY:  COVID-19.     PAST SURGICAL HISTORY:  No significant past surgical history.     Social History:  Social History (marital status, living situation, occupation, tobacco use, alcohol and drug use, and sexual history): Smoking - Denied  EtOH - Denied   Drugs - Denied     Lives with brother in apartment   PTA: Independent in ADLs and ambulation W      Physical Exam:   Physical Exam: Vital Signs  Vital Signs Last 24 Hrs  T(C): 36.9 (19 Dec 2020 22:04), Max: 36.9 (19 Dec 2020 22:04)  T(F): 98.4 (19 Dec 2020 22:04), Max: 98.4 (19 Dec 2020 22:04)  HR: 81 (20 Dec 2020 05:25) (81 - 91)  BP: 118/74 (20 Dec 2020 05:25) (118/74 - 126/72)  BP(mean): --  RR: 16 (20 Dec 2020 05:25) (16 - 16)  SpO2: 99% (20 Dec 2020 05:25) (96% - 99%)    Gen - NAD, Comfortable  HEENT - NCAT, MMM  Neck - Supple, No limited ROM  Pulm - CTAB,   Cardiovascular - RRR, S1S2,   Abdomen - Soft, NT/ND, +BS  Extremities - No C/C/E, No calf tenderness  Neuro-     Cognitive - AAOx3        Motor -                    LEFT    UE - ShAB 4/5, EF 4/5, WE 4/5,  4/5                    RIGHT UE - ShAB 4/5, EF 4/5, WE 4/5,  4/5                    LEFT    LE - HF 4/5, KE 5/5, DF 4/5, PF 5/5                    RIGHT LE - HF 5/5, KE 5/5, DF 4/5, PF 5/5        Sensory - Intact to LT     Tone - normal  Psychiatric - Mood stable  Wounds: None Present       Labs and Results:  Labs, Radiology, Cardiology, and Other Results: RECENT LABS/IMAGING      LABS:                        8.6    8.78  )-----------( 321      ( 19 Dec 2020 06:20 )             27.1     19 Dec 2020 06:20    138    |  103    |  27     ----------------------------<  97     4.0     |  24     |  1.42     Ca    9.3        19 Dec 2020 06:20    TPro  8.3    /  Alb  3.0    /  TBili  0.3    /  DBili  x      /  AST  26     /  ALT  33     /  AlkPhos  67     19 Dec 2020 06:20                        8.7    9.83  )-----------( 345      ( 17 Dec 2020 05:39 )             28.4     12-18    139  |  106  |  27<H>  ----------------------------<  100<H>  4.0   |  24  |  1.41<H>    Ca    9.0      18 Dec 2020 06:03  Phos  4.0     12-17  Mg     1.9     12-17    TPro  8.2  /  Alb  3.1<L>  /  TBili  0.3  /  DBili  x   /  AST  21  /  ALT  42  /  AlkPhos  76  12-17                                8.7    9.83  )-----------( 345      ( 17 Dec 2020 05:39 )             28.4     12-18    139  |  106  |  27<H>  ----------------------------<  100<H>  4.0   |  24  |  1.41<H>    Ca    9.0      18 Dec 2020 06:03  Phos  4.0     12-17  Mg     1.9     12-17    TPro  8.2  /  Alb  3.1<L>  /  TBili  0.3  /  DBili  x   /  AST  21  /  ALT  42  /  AlkPhos  76  12-17

## 2020-12-20 NOTE — PROGRESS NOTE ADULT - SUBJECTIVE AND OBJECTIVE BOX
Patient is a 55y old  Male who presents with a chief complaint of 16 Debility (20 Dec 2020 10:04)      SUBJECTIVE / OVERNIGHT EVENTS:  Pt seen and examined at bedside. No acute events overnight.  Pt denies cp, palpitations, sob, abd pain, N/V, fever, chills.    ROS:  All other review of systems negative    Allergies    No Known Allergies    Intolerances        MEDICATIONS  (STANDING):  amLODIPine   Tablet 10 milliGRAM(s) Oral daily  chlorhexidine 4% Liquid 1 Application(s) Topical daily  enoxaparin Injectable 40 milliGRAM(s) SubCutaneous two times a day  senna 2 Tablet(s) Oral at bedtime    MEDICATIONS  (PRN):  artificial  tears Solution 1 Drop(s) Both EYES every 4 hours PRN Dry Eyes  benzocaine 15 mG/menthol 3.6 mG (Sugar-Free) Lozenge 1 Lozenge Oral every 12 hours PRN Sore Throat      Vital Signs Last 24 Hrs  T(C): 36.4 (20 Dec 2020 09:01), Max: 36.9 (19 Dec 2020 22:04)  T(F): 97.6 (20 Dec 2020 09:01), Max: 98.4 (19 Dec 2020 22:04)  HR: 101 (20 Dec 2020 09:01) (81 - 101)  BP: 115/77 (20 Dec 2020 09:01) (115/77 - 126/72)  BP(mean): --  RR: 15 (20 Dec 2020 09:01) (15 - 16)  SpO2: 99% (20 Dec 2020 09:01) (96% - 99%)  CAPILLARY BLOOD GLUCOSE        I&O's Summary    19 Dec 2020 07:01  -  20 Dec 2020 07:00  --------------------------------------------------------  IN: 360 mL / OUT: 450 mL / NET: -90 mL        PHYSICAL EXAM:  GENERAL: NAD, well-developed  CHEST/LUNG: Clear to auscultation bilaterally; No wheeze  HEART: Regular rate and rhythm; No murmurs, rubs, or gallops  ABDOMEN: Soft, Nontender, Nondistended; Bowel sounds present  EXTREMITIES:  2+ Peripheral Pulses, No clubbing, cyanosis, or edema  PSYCH: Calm  NEUROLOGY: AAOx3, non-focal    LABS:                        8.6    8.78  )-----------( 321      ( 19 Dec 2020 06:20 )             27.1     12-19    138  |  103  |  27<H>  ----------------------------<  97  4.0   |  24  |  1.42<H>    Ca    9.3      19 Dec 2020 06:20    TPro  8.3  /  Alb  3.0<L>  /  TBili  0.3  /  DBili  x   /  AST  26  /  ALT  33  /  AlkPhos  67  12-19              RADIOLOGY & ADDITIONAL TESTS:  Results Reviewed:   Imaging Personally Reviewed:  Electrocardiogram Personally Reviewed:    COORDINATION OF CARE:  Care Discussed with Consultants/Other Providers [Y/N]:  Prior or Outpatient Records Reviewed [Y/N]:

## 2020-12-21 LAB
ALBUMIN SERPL ELPH-MCNC: 3.4 G/DL — SIGNIFICANT CHANGE UP (ref 3.3–5)
ALP SERPL-CCNC: 78 U/L — SIGNIFICANT CHANGE UP (ref 40–120)
ALT FLD-CCNC: 55 U/L — HIGH (ref 10–45)
ANION GAP SERPL CALC-SCNC: 8 MMOL/L — SIGNIFICANT CHANGE UP (ref 5–17)
AST SERPL-CCNC: 39 U/L — SIGNIFICANT CHANGE UP (ref 10–40)
BILIRUB SERPL-MCNC: 0.4 MG/DL — SIGNIFICANT CHANGE UP (ref 0.2–1.2)
BUN SERPL-MCNC: 33 MG/DL — HIGH (ref 7–23)
CALCIUM SERPL-MCNC: 9.9 MG/DL — SIGNIFICANT CHANGE UP (ref 8.4–10.5)
CHLORIDE SERPL-SCNC: 105 MMOL/L — SIGNIFICANT CHANGE UP (ref 96–108)
CO2 SERPL-SCNC: 23 MMOL/L — SIGNIFICANT CHANGE UP (ref 22–31)
CREAT SERPL-MCNC: 1.74 MG/DL — HIGH (ref 0.5–1.3)
GLUCOSE SERPL-MCNC: 110 MG/DL — HIGH (ref 70–99)
HCT VFR BLD CALC: 31.4 % — LOW (ref 39–50)
HGB BLD-MCNC: 9.7 G/DL — LOW (ref 13–17)
MCHC RBC-ENTMCNC: 28.2 PG — SIGNIFICANT CHANGE UP (ref 27–34)
MCHC RBC-ENTMCNC: 30.9 GM/DL — LOW (ref 32–36)
MCV RBC AUTO: 91.3 FL — SIGNIFICANT CHANGE UP (ref 80–100)
NRBC # BLD: 0 /100 WBCS — SIGNIFICANT CHANGE UP (ref 0–0)
PLATELET # BLD AUTO: 338 K/UL — SIGNIFICANT CHANGE UP (ref 150–400)
POTASSIUM SERPL-MCNC: 4.7 MMOL/L — SIGNIFICANT CHANGE UP (ref 3.5–5.3)
POTASSIUM SERPL-SCNC: 4.7 MMOL/L — SIGNIFICANT CHANGE UP (ref 3.5–5.3)
PROT SERPL-MCNC: 9.3 G/DL — HIGH (ref 6–8.3)
RBC # BLD: 3.44 M/UL — LOW (ref 4.2–5.8)
RBC # FLD: 13.7 % — SIGNIFICANT CHANGE UP (ref 10.3–14.5)
SODIUM SERPL-SCNC: 136 MMOL/L — SIGNIFICANT CHANGE UP (ref 135–145)
WBC # BLD: 10.56 K/UL — HIGH (ref 3.8–10.5)
WBC # FLD AUTO: 10.56 K/UL — HIGH (ref 3.8–10.5)

## 2020-12-21 PROCEDURE — 99232 SBSQ HOSP IP/OBS MODERATE 35: CPT

## 2020-12-21 PROCEDURE — 99232 SBSQ HOSP IP/OBS MODERATE 35: CPT | Mod: GC

## 2020-12-21 RX ADMIN — SENNA PLUS 2 TABLET(S): 8.6 TABLET ORAL at 22:10

## 2020-12-21 RX ADMIN — ENOXAPARIN SODIUM 40 MILLIGRAM(S): 100 INJECTION SUBCUTANEOUS at 05:43

## 2020-12-21 RX ADMIN — ENOXAPARIN SODIUM 40 MILLIGRAM(S): 100 INJECTION SUBCUTANEOUS at 17:02

## 2020-12-21 RX ADMIN — AMLODIPINE BESYLATE 10 MILLIGRAM(S): 2.5 TABLET ORAL at 05:43

## 2020-12-21 NOTE — PROGRESS NOTE ADULT - ASSESSMENT
Assessment/Plan:  PUSHPA KANG is a 55M who was admitted for acute hypoxic respiratory failure due to COVID-19 PNA now presents with debility.    #AHRF 2/2 COVID-19 PNA  - s/p intubation 11/15-30.  - s/p decadron x10d. Not a candidate for Remdesivir due to elevated LFTs  - s/p azithromycin, CTX   - Monitor SpO2 on RA, oxygen supplementation via nasal cannula PRN  - Continue comprehensive rehab program of PT/OT/SLP - 3 hours a day, 5 days a week    #aspiration PNA  - s/p zosyn x5d (last 12/9)  - aspiration precautions    #HTN  - new onset during hospitalization  - cont amlodipine 10mg qd  - monitor BP    #Acute renal failure  - Cr 2.22 -> 4.43 on admission to [facility].   - 12/18 Cr 1.41, 12/19 CR 1.42  - Monitor CMP  - Avoid nephrotoxins  - Strict I/Os  - Hospitalist consult appreciated    #elevated LFTs  - likely 2/2 COVID infection--resolved  - monitor CMP  - avoid hepatotoxins    Pain  - Tylenol PRN    GI / Bowel  - Senna qHS     / Bladder  - Currently patient voids:      [ ] independent      [ ] external collection device (condom cath)      [ ] Indwelling isaacs catheter      [ ] Intermittent catheterization  - Continue bladder scans Q**** hours with straight cath for >***cc.  - Toileting schedule every 4 hours    Skin / Pressure injury  - Skin assessment on admission performed [  ] :   - Monitor Incisions:    - Turn q2 hours in bed while awake, air mattress  - nursing to monitor skin qShift  - soft heel protectors  - skin barrier cream PRN    Diet/Dysphagia:  - Diet Consistency: dysphagia 2 - mech soft and thin liquids  - Aspiration Precautions  - SLP consult for swallow function evaluation and treatment  - Nutrition consult for protein calorie malnutrition    DVT prophylaxis:   - decreased to lovenox 40mg subq BID

## 2020-12-21 NOTE — PROGRESS NOTE ADULT - ASSESSMENT
54yo male without significant PMH presented [facility] [place] [facility] [place] of progressively worsening SOB, noted to have acute respiratory failure with hypoxia secondary to COVID-19    #Debility  #Acute respiratory failure with hypoxia; Resolved  #COVID-19  - s/p decadron x10d. Not a candidate for Remdesivir due to elevated LFTs  - s/p azithromycin, CTX   - Monitor SpO2 on RA, oxygen supplementation via nasal cannula PRN  - Continue comprehensive rehab program of PT/OT/SLP - 3 hours a day, 5 days a week    #aspiration PNA  - s/p zosyn x5d (last 12/9)  - aspiration precautions  - Dysphagia 2 diet. ADAT  - SLP follow up    #HTN  - new onset during hospitalization  - cont amlodipine 10mg qd  - monitor BP    #Acute renal failure  - Secondary to COVID-19  - Worsening renal function  - Follow up with Urine Lytes   - Avoid nephrotoxins  - Encourage PO intake   - Strict I/Os    #elevated LFTs  - likely 2/2 COVID infection--resolved  - monitor CMP  - avoid hepatotoxins    #DVT ppx  - Lovenox 40mg BID  - Eventual discharge home with Xarelto 10mg daily or Eliquis 2.5mg BID, depending on pt's renal function

## 2020-12-21 NOTE — PROGRESS NOTE ADULT - SUBJECTIVE AND OBJECTIVE BOX
Patient is a 55y old  Male who presents with a chief complaint of 16 Debility (20 Dec 2020 11:45)      SUBJECTIVE / OVERNIGHT EVENTS:  Pt seen and examined at bedside. No acute events overnight.  Pt denies cp, palpitations, sob, abd pain, N/V, fever, chills.    ROS:  All other review of systems negative    Allergies    No Known Allergies    Intolerances        MEDICATIONS  (STANDING):  amLODIPine   Tablet 10 milliGRAM(s) Oral daily  chlorhexidine 4% Liquid 1 Application(s) Topical daily  enoxaparin Injectable 40 milliGRAM(s) SubCutaneous two times a day  senna 2 Tablet(s) Oral at bedtime    MEDICATIONS  (PRN):  artificial  tears Solution 1 Drop(s) Both EYES every 4 hours PRN Dry Eyes  benzocaine 15 mG/menthol 3.6 mG (Sugar-Free) Lozenge 1 Lozenge Oral every 12 hours PRN Sore Throat      Vital Signs Last 24 Hrs  T(C): 36.4 (21 Dec 2020 08:14), Max: 36.8 (20 Dec 2020 22:21)  T(F): 97.6 (21 Dec 2020 08:14), Max: 98.2 (20 Dec 2020 22:21)  HR: 83 (21 Dec 2020 08:14) (83 - 100)  BP: 121/74 (21 Dec 2020 08:14) (116/80 - 126/82)  BP(mean): --  RR: 15 (21 Dec 2020 08:14) (15 - 16)  SpO2: 94% (21 Dec 2020 08:14) (94% - 97%)  CAPILLARY BLOOD GLUCOSE        I&O's Summary    20 Dec 2020 07:01  -  21 Dec 2020 07:00  --------------------------------------------------------  IN: 360 mL / OUT: 125 mL / NET: 235 mL    21 Dec 2020 07:01  -  21 Dec 2020 13:47  --------------------------------------------------------  IN: 480 mL / OUT: 0 mL / NET: 480 mL        PHYSICAL EXAM:  GENERAL: NAD, well-developed  CHEST/LUNG: Clear to auscultation bilaterally; No wheeze  HEART: Regular rate and rhythm; No murmurs, rubs, or gallops  ABDOMEN: Soft, Nontender, Nondistended; Bowel sounds present  EXTREMITIES:  2+ Peripheral Pulses, No clubbing, cyanosis, or edema  PSYCH: Calm  NEUROLOGY: AAOx3, non-focal    LABS:                        9.7    10.56 )-----------( 338      ( 21 Dec 2020 07:10 )             31.4     12-21    141  |  105  |  33<H>  ----------------------------<  110<H>  4.7   |  23  |  1.74<H>    Ca    9.9      21 Dec 2020 07:10    TPro  9.3<H>  /  Alb  3.4  /  TBili  0.4  /  DBili  x   /  AST  39  /  ALT  55<H>  /  AlkPhos  78  12-21              RADIOLOGY & ADDITIONAL TESTS:  Results Reviewed:   Imaging Personally Reviewed:  Electrocardiogram Personally Reviewed:    COORDINATION OF CARE:  Care Discussed with Consultants/Other Providers [Y/N]:  Prior or Outpatient Records Reviewed [Y/N]:

## 2020-12-21 NOTE — PROGRESS NOTE ADULT - SUBJECTIVE AND OBJECTIVE BOX
History of Present Illness:  Reason for Admission: 16 Debility  History of Present Illness:   PUSHPA KANG is a 55M without significant PMH presented to [facility] on 11/15/20 with progressively worsening SOB and SpO2 64% on RA at home. Patient has a +COVID test 5 days prior. In the ED, he was started on 15L NRB with SpO2 improvement to 85-90%. However he then desaturated to love 80s and was placed on HFNC. Patient continued to desaturate, was intubated and admitted to ICU. Patient completed a 10d course of decadron. He was not a candidate for Remdesivir due to elevated LFTs. Hospital course complicated by BECKY (Cr 2.22 on admission, peaked 4.43). CXR showed BL opacities and pleural effusions. Patient completed azithro and CTX for CAP. He was successfully extubated on 11/30/20. Developed fevers, likely 2/2 aspiration PNA, completed zosyn and fluconazole     Subjective/Review of Systems: Patient seen this morning at bedside. Patient in bed in NAD, no SOB, no n/v, no pain.      Review of Systems: REVIEW OF SYSTEMS  Constitutional: No fever, No fatigue  HEENT: No eye pain, No visual disturbances  Pulm: No cough,  No shortness of breath  Cardio: No chest pain  GI:  No abdominal pain, No nausea, No vomiting  : No dysuria, No incontinence  Neuro: No headaches, no numbness, +memory loss, +generalized muscle weakness    Skin: No itching, No rashes  MSK: No joint pain, No joint swelling, No muscle pain, No Neck or back pain  Psych:  No anxiety          Physical Exam:     Vital Signs Last 24 Hrs  T(C): 36.4 (21 Dec 2020 08:14), Max: 36.8 (20 Dec 2020 22:21)  T(F): 97.6 (21 Dec 2020 08:14), Max: 98.2 (20 Dec 2020 22:21)  HR: 83 (21 Dec 2020 08:14) (83 - 100)  BP: 121/74 (21 Dec 2020 08:14) (116/80 - 126/82)  BP(mean): --  RR: 15 (21 Dec 2020 08:14) (15 - 16)  SpO2: 94% (21 Dec 2020 08:14) (94% - 97%)      Gen - NAD, Comfortable  HEENT - NCAT, MMM  Neck - Supple, No limited ROM  Pulm - CTAB,   Cardiovascular - RRR, S1S2,   Abdomen - Soft, NT/ND, +BS  Extremities - No C/C/E, No calf tenderness  Neuro-     Cognitive - AAOx3        Motor -                    LEFT    UE - ShAB 4/5, EF 4/5, WE 4/5,  4/5                    RIGHT UE - ShAB 4/5, EF 4/5, WE 4/5,  4/5                    LEFT    LE - HF 4/5, KE 5/5, DF 4/5, PF 5/5                    RIGHT LE - HF 5/5, KE 5/5, DF 4/5, PF 5/5        Sensory - Intact to LT     Tone - normal  Psychiatric - Mood stable  Wounds: None Present       Labs and Results:                            9.7    10.56 )-----------( 338      ( 21 Dec 2020 07:10 )             31.4     12-21    141  |  105  |  33<H>  ----------------------------<  110<H>  4.7   |  23  |  1.74<H>    Ca    9.9      21 Dec 2020 07:10    TPro  9.3<H>  /  Alb  3.4  /  TBili  0.4  /  DBili  x   /  AST  39  /  ALT  55<H>  /  AlkPhos  78  12-21

## 2020-12-22 LAB
CREAT ?TM UR-MCNC: 150 MG/DL — SIGNIFICANT CHANGE UP
SODIUM UR-SCNC: 83 MMOL/L — SIGNIFICANT CHANGE UP

## 2020-12-22 PROCEDURE — 99232 SBSQ HOSP IP/OBS MODERATE 35: CPT

## 2020-12-22 PROCEDURE — 99232 SBSQ HOSP IP/OBS MODERATE 35: CPT | Mod: GC

## 2020-12-22 RX ADMIN — SENNA PLUS 2 TABLET(S): 8.6 TABLET ORAL at 21:52

## 2020-12-22 RX ADMIN — ENOXAPARIN SODIUM 40 MILLIGRAM(S): 100 INJECTION SUBCUTANEOUS at 06:19

## 2020-12-22 RX ADMIN — AMLODIPINE BESYLATE 10 MILLIGRAM(S): 2.5 TABLET ORAL at 06:19

## 2020-12-22 RX ADMIN — ENOXAPARIN SODIUM 40 MILLIGRAM(S): 100 INJECTION SUBCUTANEOUS at 17:36

## 2020-12-22 NOTE — PROGRESS NOTE ADULT - ASSESSMENT
54yo male without significant PMH presented [facility] for acute rehab from [facility] with complaints of progressively worsening SOB, noted to have acute respiratory failure with hypoxia secondary to COVID-19    #Debility  #Acute respiratory failure with hypoxia; Resolved  #COVID-19  - s/p decadron x10d. Not a candidate for Remdesivir due to elevated LFTs  - s/p azithromycin, CTX   - Monitor SpO2 on RA, oxygen supplementation via nasal cannula PRN  - Continue comprehensive rehab program of PT/OT/SLP - 3 hours a day, 5 days a week    #aspiration PNA  - s/p zosyn x5d (last 12/9)  - aspiration precautions  - Dysphagia 2 diet. ADAT  - SLP follow up    #HTN  - new onset during hospitalization  - cont amlodipine 10mg qd  - monitor BP    #Acute renal failure  - Secondary to COVID-19  - Worsening renal function  - Follow up with Urine Lytes   - Avoid nephrotoxins  - Encourage PO intake   - Strict I/Os    #elevated LFTs  - likely 2/2 COVID infection--resolved  - monitor CMP  - avoid hepatotoxins    #DVT ppx  - Lovenox 40mg BID  - Eventual discharge home with Xarelto 10mg daily or Eliquis 2.5mg BID, depending on pt's renal function

## 2020-12-22 NOTE — PROGRESS NOTE ADULT - SUBJECTIVE AND OBJECTIVE BOX
PUSHPA KANG is a 55M without significant PMH presented to [facility] on 11/15/20 with progressively worsening SOB and SpO2 64% on RA at home. Patient has a +COVID test 5 days prior. In the ED, he was started on 15L NRB with SpO2 improvement to 85-90%. However he then desaturated to love 80s and was placed on HFNC. Patient continued to desaturate, was intubated and admitted to ICU. Patient completed a 10d course of decadron. He was not a candidate for Remdesivir due to elevated LFTs. Hospital course complicated by BECKY (Cr 2.22 on admission, peaked 4.43). CXR showed BL opacities and pleural effusions. Patient completed azithro and CTX for CAP. He was successfully extubated on 11/30/20. Developed fevers, likely 2/2 aspiration PNA, completed zosyn and fluconazole        REVIEW OF SYSTEMS  Constitutional - No fever, No weight loss, No fatigue  HEENT - No eye pain, No visual disturbances, No difficulty hearing, No tinnitus, No vertigo, No neck pain  Neurological - No headaches, No memory loss, No loss of strength, No numbness, No tremors  Musculoskeletal - No joint pain, No joint swelling, No muscle pain  Psychiatric - No depression, No anxiety  All other ROS negative,     RECENT LABS/IMAGING  CBC Full  -  ( 21 Dec 2020 07:10 )  WBC Count : 10.56 K/uL  RBC Count : 3.44 M/uL  Hemoglobin : 9.7 g/dL  Hematocrit : 31.4 %  Platelet Count - Automated : 338 K/uL  Mean Cell Volume : 91.3 fl  Mean Cell Hemoglobin : 28.2 pg  Mean Cell Hemoglobin Concentration : 30.9 gm/dL  Auto Neutrophil # : x  Auto Lymphocyte # : x  Auto Monocyte # : x  Auto Eosinophil # : x  Auto Basophil # : x  Auto Neutrophil % : x  Auto Lymphocyte % : x  Auto Monocyte % : x  Auto Eosinophil % : x  Auto Basophil % : x    12-21    141  |  105  |  33<H>  ----------------------------<  110<H>  4.7   |  23  |  1.74<H>    Ca    9.9      21 Dec 2020 07:10    TPro  9.3<H>  /  Alb  3.4  /  TBili  0.4  /  DBili  x   /  AST  39  /  ALT  55<H>  /  AlkPhos  78  12-21        VITALS  T(C): 36.7 (12-22-20 @ 08:33), Max: 36.8 (12-21-20 @ 20:48)  HR: 96 (12-22-20 @ 08:33) (90 - 96)  BP: 114/78 (12-22-20 @ 08:33) (107/68 - 120/77)  RR: 15 (12-22-20 @ 08:33) (14 - 15)  SpO2: 96% (12-22-20 @ 08:33) (96% - 97%)  Wt(kg): --    MEDICATIONS   amLODIPine   Tablet 10 milliGRAM(s) daily  artificial  tears Solution 1 Drop(s) every 4 hours PRN  benzocaine 15 mG/menthol 3.6 mG (Sugar-Free) Lozenge 1 Lozenge every 12 hours PRN  chlorhexidine 4% Liquid 1 Application(s) daily  enoxaparin Injectable 40 milliGRAM(s) two times a day  senna 2 Tablet(s) at bedtime      PHYSICAL EXAM  Gen - NAD, Comfortable  HEENT - NCAT, EOMI, MMM  Neck - Supple, No limited ROM  Pulm - CTAB,   Cardiovascular - RRR, S1S2,   Abdomen - Soft, NT/ND, +BS  Extremities - No C/C/E, No calf tenderness  Neuro-     Cognitive - AAOx3     Communication - Fluent, No dysarthria, but hypophonic.     Attention: Intact      Memory: Recall 3 objects immediate and only 2/3 objects 3 min later.         Motor -                    LEFT    UE - ShAB 4/5, EF 3/5, EE 3/5, WE 4/5,  3/5                    RIGHT UE - ShAB 4/5, EF 4/5, EE 4/5, WE 4/5,  4/5                    LEFT    LE - HF 4/5, KE 4/5, DF 4/5, PF 4/5                    RIGHT LE - HF 5/5, KE 5/5, DF 5/5, PF 5/5        Sensory - Intact to LT     Tone - normal  Psychiatric - Mood stable, Affect WNL  Skin: Intact  Wounds: None Present      --------------------------------------------------------------------

## 2020-12-22 NOTE — PROGRESS NOTE ADULT - SUBJECTIVE AND OBJECTIVE BOX
Patient is a 55y old  Male who presents with a chief complaint of 16 Debility (21 Dec 2020 17:22)      SUBJECTIVE / OVERNIGHT EVENTS:  Pt seen and examined at bedside. No acute events overnight.  Pt denies cp, palpitations, sob, abd pain, N/V, fever, chills.    ROS:  All other review of systems negative    Allergies    No Known Allergies    Intolerances        MEDICATIONS  (STANDING):  amLODIPine   Tablet 10 milliGRAM(s) Oral daily  chlorhexidine 4% Liquid 1 Application(s) Topical daily  enoxaparin Injectable 40 milliGRAM(s) SubCutaneous two times a day  senna 2 Tablet(s) Oral at bedtime    MEDICATIONS  (PRN):  artificial  tears Solution 1 Drop(s) Both EYES every 4 hours PRN Dry Eyes  benzocaine 15 mG/menthol 3.6 mG (Sugar-Free) Lozenge 1 Lozenge Oral every 12 hours PRN Sore Throat      Vital Signs Last 24 Hrs  T(C): 36.7 (22 Dec 2020 08:33), Max: 36.8 (21 Dec 2020 20:48)  T(F): 98.1 (22 Dec 2020 08:33), Max: 98.3 (21 Dec 2020 20:48)  HR: 96 (22 Dec 2020 08:33) (90 - 96)  BP: 114/78 (22 Dec 2020 08:33) (107/68 - 120/77)  BP(mean): --  RR: 15 (22 Dec 2020 08:33) (14 - 15)  SpO2: 96% (22 Dec 2020 08:33) (96% - 97%)  CAPILLARY BLOOD GLUCOSE        I&O's Summary    21 Dec 2020 07:01  -  22 Dec 2020 07:00  --------------------------------------------------------  IN: 480 mL / OUT: 500 mL / NET: -20 mL        PHYSICAL EXAM:  GENERAL: NAD, well-developed  CHEST/LUNG: Clear to auscultation bilaterally; No wheeze  HEART: Regular rate and rhythm; No murmurs, rubs, or gallops  ABDOMEN: Soft, Nontender, Nondistended; Bowel sounds present  EXTREMITIES:  2+ Peripheral Pulses, No clubbing, cyanosis, or edema  PSYCH: Calm  NEUROLOGY: AAOx3, non-focal    LABS:                        9.7    10.56 )-----------( 338      ( 21 Dec 2020 07:10 )             31.4     12-21    141  |  105  |  33<H>  ----------------------------<  110<H>  4.7   |  23  |  1.74<H>    Ca    9.9      21 Dec 2020 07:10    TPro  9.3<H>  /  Alb  3.4  /  TBili  0.4  /  DBili  x   /  AST  39  /  ALT  55<H>  /  AlkPhos  78  12-21              RADIOLOGY & ADDITIONAL TESTS:  Results Reviewed:   Imaging Personally Reviewed:  Electrocardiogram Personally Reviewed:    COORDINATION OF CARE:  Care Discussed with Consultants/Other Providers [Y/N]:  Prior or Outpatient Records Reviewed [Y/N]:

## 2020-12-23 PROCEDURE — 99232 SBSQ HOSP IP/OBS MODERATE 35: CPT | Mod: GC

## 2020-12-23 PROCEDURE — 74230 X-RAY XM SWLNG FUNCJ C+: CPT | Mod: 26

## 2020-12-23 PROCEDURE — 99232 SBSQ HOSP IP/OBS MODERATE 35: CPT

## 2020-12-23 RX ADMIN — AMLODIPINE BESYLATE 10 MILLIGRAM(S): 2.5 TABLET ORAL at 05:37

## 2020-12-23 RX ADMIN — SENNA PLUS 2 TABLET(S): 8.6 TABLET ORAL at 21:10

## 2020-12-23 RX ADMIN — ENOXAPARIN SODIUM 40 MILLIGRAM(S): 100 INJECTION SUBCUTANEOUS at 05:37

## 2020-12-23 RX ADMIN — ENOXAPARIN SODIUM 40 MILLIGRAM(S): 100 INJECTION SUBCUTANEOUS at 17:24

## 2020-12-23 NOTE — PROGRESS NOTE ADULT - ASSESSMENT
54yo male without significant PMH presented [facility] for acute rehab from [facility] with complaints of progressively worsening SOB, noted to have acute respiratory failure with hypoxia secondary to COVID-19    #Debility  #Acute respiratory failure with hypoxia; Resolved  #COVID-19  - s/p decadron x10d. Not a candidate for Remdesivir due to elevated LFTs  - s/p azithromycin, CTX   - Monitor SpO2 on RA, oxygen supplementation via nasal cannula PRN  - Continue comprehensive rehab program of PT/OT/SLP - 3 hours a day, 5 days a week    #aspiration PNA  - s/p zosyn x5d (last 12/9)  - aspiration precautions  - Dysphagia 2 diet. ADAT  - SLP follow up    #HTN  - new onset during hospitalization  - cont amlodipine 10mg qd  - monitor BP    #Acute renal failure  - Prerenal, FeNa 0.7  - Secondary to COVID-19  - Worsening renal function  - Avoid nephrotoxins  - Encourage PO intake   - Strict I/Os    #elevated LFTs  - likely 2/2 COVID infection--resolved  - monitor CMP  - avoid hepatotoxins    #DVT ppx  - Lovenox 40mg BID  - Eventual discharge home with Xarelto 10mg daily or Eliquis 2.5mg BID, depending on pt's renal function

## 2020-12-23 NOTE — PROGRESS NOTE ADULT - ASSESSMENT
PUSHPA KANG is a 55M who was admitted for acute hypoxic respiratory failure due to COVID-19 PNA now presents with debility.    #AHRF 2/2 COVID-19 PNA  - s/p intubation 11/15-30.  - s/p decadron x10d. Not a candidate for Remdesivir due to elevated LFTs  - s/p azithromycin, CTX   - Monitor SpO2 on RA, oxygen supplementation via nasal cannula PRN  - Start comprehensive rehab program of PT/OT/SLP - 3 hours a day, 5 days a week    #aspiration PNA  - s/p zosyn x5d (last 12/9)  - aspiration precautions    #HTN  - new onset during hospitalization  - cont amlodipine 10mg qd  - monitor BP    #Acute renal failure  - Cr 2.22 -> 4.43 on admission to [place].   - 1/76   - Monitor CMP  - Avoid nephrotoxins  - Strict I/Os  -urine lytes     #elevated LFTs  - likely 2/2 COVID infection--resolved  - monitor CMP  - avoid hepatotoxins    Pain  - Tylenol PRN    GI / Bowel  - Senna qHS     / Bladder  - Currently patient voids:      [ ] independent      [ ] external collection device (condom cath)      [ ] Indwelling isaacs catheter      [ ] Intermittent catheterization  - Continue bladder scans Q**** hours with straight cath for >***cc.  - Toileting schedule every 4 hours    Skin / Pressure injury  - Skin assessment on admission performed [  ] :   - Monitor Incisions:    - Turn q2 hours in bed while awake, air mattress  - nursing to monitor skin qShift  - soft heel protectors  - skin barrier cream PRN    Diet/Dysphagia:  - Diet Consistency: dysphagia 2 - mech soft and thin liquids  - Aspiration Precautions  - SLP consult for swallow function evaluation and treatment  - Nutrition consult for protein calorie malnutrition    DVT prophylaxis:   - full dose lovenox 70mg subq BID      Outpatient Follow-up:  Carmen Chau)  Medicine  Dept Director  [address] [place] [address]  Phone: [phone]  Fax: [phone]      Code Status/Emergency Contact:  Yg Kang (brother) [phone]   PUSHPA KANG is a 55M who was admitted for acute hypoxic respiratory failure due to COVID-19 PNA now presents with debility.    #AHRF 2/2 COVID-19 PNA  - s/p intubation 11/15-30.  - s/p decadron x10d. Not a candidate for Remdesivir due to elevated LFTs  - s/p azithromycin, CTX   - Monitor SpO2 on RA, oxygen supplementation via nasal cannula PRN  - Start comprehensive rehab program of PT/OT/SLP - 3 hours a day, 5 days a week    #aspiration PNA  - s/p zosyn x5d (last 12/9)  - aspiration precautions, MBS-> regualr diet with thins     #HTN  - new onset during hospitalization  - cont amlodipine 10mg qd  - monitor BP    #Acute renal failure  - Cr 2.22 -> 4.43 on admission to [place].   - 1/76   - Monitor CMP  - Avoid nephrotoxins  - Strict I/Os  -urine lytes     #elevated LFTs  - likely 2/2 COVID infection--resolved  - monitor CMP  - avoid hepatotoxins    Pain  - Tylenol PRN    GI / Bowel  - Senna qHS     / Bladder  - Currently patient voids:      [ ] independent      [ ] external collection device (condom cath)      [ ] Indwelling isaacs catheter      [ ] Intermittent catheterization  - Continue bladder scans Q**** hours with straight cath for >***cc.  - Toileting schedule every 4 hours    Skin / Pressure injury  - Skin assessment on admission performed [  ] :   - Monitor Incisions:    - Turn q2 hours in bed while awake, air mattress  - nursing to monitor skin qShift  - soft heel protectors  - skin barrier cream PRN    Diet/Dysphagia:  - Diet Consistency: dysphagia 2 - mech soft and thin liquids  - Aspiration Precautions  - SLP consult for swallow function evaluation and treatment  - Nutrition consult for protein calorie malnutrition    DVT prophylaxis:   - full dose lovenox 70mg subq BID      Outpatient Follow-up:  Carmen Chau)  Medicine  Dept Director  [address] [place], [place] [id number]  Phone: [phone]  Fax: [phone]      Code Status/Emergency Contact:  Yg Kang (brother) [phone]

## 2020-12-23 NOTE — PROGRESS NOTE ADULT - SUBJECTIVE AND OBJECTIVE BOX
PUSHPA KANG is a 55M without significant PMH presented to [facility] on 11/15/20 with progressively worsening SOB and SpO2 64% on RA at home. Patient has a +COVID test 5 days prior. In the ED, he was started on 15L NRB with SpO2 improvement to 85-90%. However he then desaturated to love 80s and was placed on HFNC. Patient continued to desaturate, was intubated and admitted to ICU. Patient completed a 10d course of decadron. He was not a candidate for Remdesivir due to elevated LFTs. Hospital course complicated by BECKY (Cr 2.22 on admission, peaked 4.43). CXR showed BL opacities and pleural effusions. Patient completed azithro and CTX for CAP. He was successfully extubated on 11/30/20. Developed fevers, likely 2/2 aspiration PNA, completed zosyn and fluconazole    Seen and examined. No acute complaints. Slept well.     REVIEW OF SYSTEMS  Constitutional - No fever, No weight loss, No fatigue  HEENT - No eye pain, No visual disturbances, No difficulty hearing, No tinnitus, No vertigo, No neck pain  Neurological - No headaches, No memory loss, No loss of strength, No numbness, No tremors  Musculoskeletal - No joint pain, No joint swelling, No muscle pain  Psychiatric - No depression, No anxiety  All other ROS negative,           PHYSICAL EXAM  Gen - NAD, Comfortable  HEENT - NCAT, EOMI, MMM  Neck - Supple, No limited ROM  Pulm - CTAB,   Cardiovascular - RRR, S1S2,   Abdomen - Soft, NT/ND, +BS  Extremities - No C/C/E, No calf tenderness  Neuro-     Cognitive - AAOx3     Communication - Fluent, No dysarthria, but hypophonic.     Attention: Intact      Memory: Recall 3 objects immediate and only 2/3 objects 3 min later.         Motor -                    LEFT    UE - ShAB 4/5, EF 3/5, EE 3/5, WE 4/5,  3/5                    RIGHT UE - ShAB 4/5, EF 4/5, EE 4/5, WE 4/5,  4/5                    LEFT    LE - HF 4/5, KE 4/5, DF 4/5, PF 4/5                    RIGHT LE - HF 5/5, KE 5/5, DF 5/5, PF 5/5        Sensory - Intact to LT     Tone - normal  Psychiatric - Mood stable, Affect WNL  Skin: Intact  Wounds: None Present      --------------------------------------------------------------------

## 2020-12-23 NOTE — PROGRESS NOTE ADULT - SUBJECTIVE AND OBJECTIVE BOX
Patient is a 55y old  Male who presents with a chief complaint of 16 Debility (22 Dec 2020 16:55)      SUBJECTIVE / OVERNIGHT EVENTS:  Pt seen and examined at bedside. No acute events overnight.  Pt denies cp, palpitations, sob, abd pain, N/V, fever, chills.    ROS:  All other review of systems negative    Allergies    No Known Allergies    Intolerances        MEDICATIONS  (STANDING):  amLODIPine   Tablet 10 milliGRAM(s) Oral daily  enoxaparin Injectable 40 milliGRAM(s) SubCutaneous two times a day  senna 2 Tablet(s) Oral at bedtime    MEDICATIONS  (PRN):  artificial  tears Solution 1 Drop(s) Both EYES every 4 hours PRN Dry Eyes  benzocaine 15 mG/menthol 3.6 mG (Sugar-Free) Lozenge 1 Lozenge Oral every 12 hours PRN Sore Throat      Vital Signs Last 24 Hrs  T(C): 36.7 (22 Dec 2020 21:53), Max: 36.7 (22 Dec 2020 21:53)  T(F): 98.1 (22 Dec 2020 21:53), Max: 98.1 (22 Dec 2020 21:53)  HR: 82 (23 Dec 2020 05:39) (82 - 85)  BP: 127/76 (23 Dec 2020 05:39) (126/77 - 127/76)  BP(mean): --  RR: 14 (22 Dec 2020 21:53) (14 - 14)  SpO2: 97% (22 Dec 2020 21:53) (97% - 97%)  CAPILLARY BLOOD GLUCOSE        I&O's Summary    22 Dec 2020 07:01  -  23 Dec 2020 07:00  --------------------------------------------------------  IN: 560 mL / OUT: 0 mL / NET: 560 mL    23 Dec 2020 07:01  -  23 Dec 2020 11:33  --------------------------------------------------------  IN: 360 mL / OUT: 0 mL / NET: 360 mL        PHYSICAL EXAM:  GENERAL: NAD, well-developed  CHEST/LUNG: Clear to auscultation bilaterally; No wheeze  HEART: Regular rate and rhythm; No murmurs, rubs, or gallops  ABDOMEN: Soft, Nontender, Nondistended; Bowel sounds present  EXTREMITIES:  2+ Peripheral Pulses, No clubbing, cyanosis, or edema  PSYCH: Calm  NEUROLOGY: AAOx3, non-focal    LABS:                    RADIOLOGY & ADDITIONAL TESTS:  Results Reviewed:   Imaging Personally Reviewed:  Electrocardiogram Personally Reviewed:    COORDINATION OF CARE:  Care Discussed with Consultants/Other Providers [Y/N]:  Prior or Outpatient Records Reviewed [Y/N]:

## 2020-12-24 ENCOUNTER — TRANSCRIPTION ENCOUNTER (OUTPATIENT)
Age: 55
End: 2020-12-24

## 2020-12-24 LAB
ALBUMIN SERPL ELPH-MCNC: 3.3 G/DL — SIGNIFICANT CHANGE UP (ref 3.3–5)
ALP SERPL-CCNC: 69 U/L — SIGNIFICANT CHANGE UP (ref 40–120)
ALT FLD-CCNC: 48 U/L — HIGH (ref 10–45)
ANION GAP SERPL CALC-SCNC: 13 MMOL/L — SIGNIFICANT CHANGE UP (ref 5–17)
AST SERPL-CCNC: 26 U/L — SIGNIFICANT CHANGE UP (ref 10–40)
BILIRUB SERPL-MCNC: 0.4 MG/DL — SIGNIFICANT CHANGE UP (ref 0.2–1.2)
BUN SERPL-MCNC: 35 MG/DL — HIGH (ref 7–23)
CALCIUM SERPL-MCNC: 9.1 MG/DL — SIGNIFICANT CHANGE UP (ref 8.4–10.5)
CHLORIDE SERPL-SCNC: 103 MMOL/L — SIGNIFICANT CHANGE UP (ref 96–108)
CO2 SERPL-SCNC: 21 MMOL/L — LOW (ref 22–31)
CREAT SERPL-MCNC: 1.56 MG/DL — HIGH (ref 0.5–1.3)
GLUCOSE SERPL-MCNC: 96 MG/DL — SIGNIFICANT CHANGE UP (ref 70–99)
HCT VFR BLD CALC: 29.4 % — LOW (ref 39–50)
HGB BLD-MCNC: 9.5 G/DL — LOW (ref 13–17)
MCHC RBC-ENTMCNC: 29.4 PG — SIGNIFICANT CHANGE UP (ref 27–34)
MCHC RBC-ENTMCNC: 32.3 GM/DL — SIGNIFICANT CHANGE UP (ref 32–36)
MCV RBC AUTO: 91 FL — SIGNIFICANT CHANGE UP (ref 80–100)
NRBC # BLD: 0 /100 WBCS — SIGNIFICANT CHANGE UP (ref 0–0)
PLATELET # BLD AUTO: 284 K/UL — SIGNIFICANT CHANGE UP (ref 150–400)
POTASSIUM SERPL-MCNC: 3.9 MMOL/L — SIGNIFICANT CHANGE UP (ref 3.5–5.3)
POTASSIUM SERPL-SCNC: 3.9 MMOL/L — SIGNIFICANT CHANGE UP (ref 3.5–5.3)
PROT SERPL-MCNC: 8.4 G/DL — HIGH (ref 6–8.3)
RBC # BLD: 3.23 M/UL — LOW (ref 4.2–5.8)
RBC # FLD: 13.5 % — SIGNIFICANT CHANGE UP (ref 10.3–14.5)
SODIUM SERPL-SCNC: 137 MMOL/L — SIGNIFICANT CHANGE UP (ref 135–145)
WBC # BLD: 9.9 K/UL — SIGNIFICANT CHANGE UP (ref 3.8–10.5)
WBC # FLD AUTO: 9.9 K/UL — SIGNIFICANT CHANGE UP (ref 3.8–10.5)

## 2020-12-24 PROCEDURE — 99232 SBSQ HOSP IP/OBS MODERATE 35: CPT

## 2020-12-24 PROCEDURE — 99232 SBSQ HOSP IP/OBS MODERATE 35: CPT | Mod: GC

## 2020-12-24 RX ORDER — AMLODIPINE BESYLATE 2.5 MG/1
1 TABLET ORAL
Qty: 30 | Refills: 0
Start: 2020-12-24

## 2020-12-24 RX ORDER — RIVAROXABAN 15 MG-20MG
1 KIT ORAL
Qty: 30 | Refills: 0
Start: 2020-12-24

## 2020-12-24 RX ORDER — ACETAMINOPHEN 500 MG
650 TABLET ORAL EVERY 6 HOURS
Refills: 0 | Status: DISCONTINUED | OUTPATIENT
Start: 2020-12-24 | End: 2020-12-27

## 2020-12-24 RX ADMIN — Medication 650 MILLIGRAM(S): at 19:44

## 2020-12-24 RX ADMIN — AMLODIPINE BESYLATE 10 MILLIGRAM(S): 2.5 TABLET ORAL at 05:58

## 2020-12-24 RX ADMIN — ENOXAPARIN SODIUM 40 MILLIGRAM(S): 100 INJECTION SUBCUTANEOUS at 17:24

## 2020-12-24 RX ADMIN — ENOXAPARIN SODIUM 40 MILLIGRAM(S): 100 INJECTION SUBCUTANEOUS at 05:58

## 2020-12-24 RX ADMIN — Medication 650 MILLIGRAM(S): at 18:44

## 2020-12-24 RX ADMIN — SENNA PLUS 2 TABLET(S): 8.6 TABLET ORAL at 22:25

## 2020-12-24 NOTE — DISCHARGE NOTE PROVIDER - PROVIDER TOKENS
PROVIDER:[TOKEN:[[id number]]] PROVIDER:[TOKEN:[[id number]]],PROVIDER:[TOKEN:[[id number]]]

## 2020-12-24 NOTE — DISCHARGE NOTE PROVIDER - CARE PROVIDERS DIRECT ADDRESSES
,DirectAddress_Unknown ,DirectAddress_Unknown,[email or web address]

## 2020-12-24 NOTE — DISCHARGE NOTE PROVIDER - CARE PROVIDER_API CALL
Shae Whyte J  PHYSICAL/REHAB MEDICINE  [address] [address] [place] [address]  Phone: [phone]  Fax: [phone]  Follow Up Time:    Shae Whyte  PHYSICAL/REHAB MEDICINE  [address] [place] [address]  Phone: [phone]  Fax: [phone]  Follow Up Time:     Raghu Paul  CARDIOLOGY  [address] [facility], [address]  [place] [address]  Phone: [phone]  Fax: [phone]  Follow Up Time:

## 2020-12-24 NOTE — PROGRESS NOTE ADULT - ASSESSMENT
56yo male without significant PMH presented [facility] for acute rehab from [facility] with complaints of progressively worsening SOB, noted to have acute respiratory failure with hypoxia secondary to COVID-19    #Debility  #Acute respiratory failure with hypoxia; Resolved  #COVID-19  - s/p decadron x10d. Not a candidate for Remdesivir due to elevated LFTs  - s/p azithromycin, CTX   - Monitor SpO2 on RA, oxygen supplementation via nasal cannula PRN  - Continue comprehensive rehab program of PT/OT/SLP - 3 hours a day, 5 days a week    #aspiration PNA  - s/p zosyn x5d (last 12/9)  - aspiration precautions  - Dysphagia 2 diet. ADAT  - SLP follow up    #HTN  - new onset during hospitalization  - cont amlodipine 10mg qd  - monitor BP    #Acute renal failure  - Prerenal, FeNa 0.7  - Secondary to COVID-19  - Renal function peaked and now downtrending  - Avoid nephrotoxins  - Encourage PO intake   - Strict I/Os    #elevated LFTs  - likely 2/2 COVID infection--resolved  - monitor CMP  - avoid hepatotoxins    #DVT ppx  - Lovenox 40mg BID  - Eventual discharge home with Xarelto 10mg daily or Eliquis 2.5mg BID, depending on pt's renal function

## 2020-12-24 NOTE — PROGRESS NOTE ADULT - ASSESSMENT
PUSHPA KANG is a 55M who was admitted for acute hypoxic respiratory failure due to COVID-19 PNA now presents with debility.    #AHRF 2/2 COVID-19 PNA  - s/p intubation 11/15-30.  - s/p decadron x10d. Not a candidate for Remdesivir due to elevated LFTs  - s/p azithromycin, CTX   - Monitor SpO2 on RA, oxygen supplementation via nasal cannula PRN  comprehensive rehab program of PT/OT/SLP - 3 hours a day, 5 days a week    #aspiration PNA  - s/p zosyn x5d (last 12/9)  - aspiration precautions, MBS-> regualr diet with thins     #HTN  - new onset during hospitalization  - cont amlodipine 10mg qd  - monitor BP    #Acute renal failure  - Cr 2.22 -> 4.43 on admission to [place].   - 1.56today   - Monitor CMP  - Avoid nephrotoxins      #elevated LFTs  - likely 2/2 COVID infection--resolved  - monitor CMP  - avoid hepatotoxins    Pain  - Tylenol PRN    GI / Bowel  - Senna qHS     / Bladder  - Currently patient voids:      [ ] independent           Diet/Dysphagia:  MBS 12/21- regular diet with thins  outpt ENT work up     DVT prophylaxis:   - full dose lovenox 70mg subq BID      Outpatient Follow-up:  Carmen Chau)  Medicine  Dept Director  [address] [place] [address]  Phone: [phone]  Fax: [phone]      Code Status/Emergency Contact:  Yg Kang (brother) [phone]

## 2020-12-24 NOTE — DISCHARGE NOTE PROVIDER - NSDCMRMEDTOKEN_GEN_ALL_CORE_FT
amLODIPine 10 mg oral tablet: 1 tab(s) orally once a day  ocular lubricant ophthalmic solution: 1 drop(s) to each affected eye every 4 hours, As needed, Dry Eyes  Xarelto 10 mg oral tablet: 1 tab(s) orally once a day

## 2020-12-24 NOTE — DISCHARGE NOTE PROVIDER - HOSPITAL COURSE
PUSHPA KANG is a 55M without significant PMH presented to [facility] Ed on 11/15/20 with progressively worsening SOB and SpO2 64% on RA at home. Patient has a +COVID test 5 days prior. In the ED, he was started on 15L NRB with SpO2 improvement to 85-90%. However he then desaturated to love 80s and was placed on HFNC. Patient continued to desaturate, was intubated and admitted to ICU. Patient completed a 10d course of decadron. He was not a candidate for Remdesivir due to elevated LFTs. Hospital course complicated by BECKY (Cr 2.22 on admission, peaked 4.43). CXR showed BL opacities and pleural effusions. Patient completed azithro and CTX for CAP. He was successfully extubated on 11/30/20. Developed fevers, likely 2/2 aspiration PNA, completed zosyn and fluconazole.     In rehab had modified barium swallow, upgraded to regular diet with thin liquids.   He will need outpatient ENT vocal cord evaluation.     Function on dc: supervision

## 2020-12-24 NOTE — PROGRESS NOTE ADULT - SUBJECTIVE AND OBJECTIVE BOX
PUSHPA KANG is a 55M without significant PMH presented to [facility] Ed on 11/15/20 with progressively worsening SOB and SpO2 64% on RA at home. Patient has a +COVID test 5 days prior. In the ED, he was started on 15L NRB with SpO2 improvement to 85-90%. However he then desaturated to love 80s and was placed on HFNC. Patient continued to desaturate, was intubated and admitted to ICU. Patient completed a 10d course of decadron. He was not a candidate for Remdesivir due to elevated LFTs. Hospital course complicated by BECKY (Cr 2.22 on admission, peaked 4.43). CXR showed BL opacities and pleural effusions. Patient completed azithro and CTX for CAP. He was successfully extubated on 11/30/20. Developed fevers, likely 2/2 aspiration PNA, completed zosyn and fluconazole    Seen and examined. No acute complaints. Slept well.     REVIEW OF SYSTEMS  Constitutional - No fever, No weight loss, No fatigue  HEENT - No eye pain, No visual disturbances, No difficulty hearing, No tinnitus, No vertigo, No neck pain  Neurological - No headaches, No memory loss, No loss of strength, No numbness, No tremors  Musculoskeletal - No joint pain, No joint swelling, No muscle pain  Psychiatric - No depression, No anxiety  All other ROS negative,     MEDICATIONS  (STANDING):  amLODIPine   Tablet 10 milliGRAM(s) Oral daily  enoxaparin Injectable 40 milliGRAM(s) SubCutaneous two times a day  senna 2 Tablet(s) Oral at bedtime    MEDICATIONS  (PRN):  artificial  tears Solution 1 Drop(s) Both EYES every 4 hours PRN Dry Eyes  benzocaine 15 mG/menthol 3.6 mG (Sugar-Free) Lozenge 1 Lozenge Oral every 12 hours PRN Sore Throat                            9.5    9.90  )-----------( 284      ( 24 Dec 2020 06:00 )             29.4     12-24    137  |  103  |  35<H>  ----------------------------<  96  3.9   |  21<L>  |  1.56<H>    Ca    9.1      24 Dec 2020 06:00    TPro  8.4<H>  /  Alb  3.3  /  TBili  0.4  /  DBili  x   /  AST  26  /  ALT  48<H>  /  AlkPhos  69  12-24                  PHYSICAL EXAM  Gen - NAD, Comfortable  HEENT - NCAT, EOMI, MMM  Neck - Supple, No limited ROM  Pulm - CTAB,   Cardiovascular - RRR, S1S2,   Abdomen - Soft, NT/ND, +BS  Extremities - No C/C/E, No calf tenderness  Neuro-     Cognitive - AAOx3     Communication - Fluent, No dysarthria, but hypophonic.     Attention: Intact      Memory: Recall 3 objects immediate and only 2/3 objects 3 min later.         Motor -                    LEFT    UE - ShAB 4/5, EF 3/5, EE 3/5, WE 4/5,  3/5                    RIGHT UE - ShAB 4/5, EF 4/5, EE 4/5, WE 4/5,  4/5                    LEFT    LE - HF 4/5, KE 4/5, DF 4/5, PF 4/5                    RIGHT LE - HF 5/5, KE 5/5, DF 5/5, PF 5/5        Sensory - Intact to LT     Tone - normal  Psychiatric - Mood stable, Affect WNL  Skin: Intact  Wounds: None Present      --------------------------------------------------------------------

## 2020-12-24 NOTE — DISCHARGE NOTE PROVIDER - NSDCCPCAREPLAN_GEN_ALL_CORE_FT
PRINCIPAL DISCHARGE DIAGNOSIS  Diagnosis: Pneumonia due to COVID-19 virus  Assessment and Plan of Treatment:        PRINCIPAL DISCHARGE DIAGNOSIS  Diagnosis: Pneumonia due to COVID-19 virus  Assessment and Plan of Treatment:       SECONDARY DISCHARGE DIAGNOSES  Diagnosis: Chest pain  Assessment and Plan of Treatment: please call Dr. Paul office to jaime appointment. return if worsening symptoms

## 2020-12-24 NOTE — PROGRESS NOTE ADULT - SUBJECTIVE AND OBJECTIVE BOX
Patient is a 55y old  Male who presents with a chief complaint of 16 Debility (23 Dec 2020 16:44)      SUBJECTIVE / OVERNIGHT EVENTS:  Pt seen and examined at bedside. No acute events overnight.  Pt denies cp, palpitations, sob, abd pain, N/V, fever, chills.    ROS:  All other review of systems negative    Allergies    No Known Allergies    Intolerances        MEDICATIONS  (STANDING):  amLODIPine   Tablet 10 milliGRAM(s) Oral daily  enoxaparin Injectable 40 milliGRAM(s) SubCutaneous two times a day  senna 2 Tablet(s) Oral at bedtime    MEDICATIONS  (PRN):  artificial  tears Solution 1 Drop(s) Both EYES every 4 hours PRN Dry Eyes  benzocaine 15 mG/menthol 3.6 mG (Sugar-Free) Lozenge 1 Lozenge Oral every 12 hours PRN Sore Throat      Vital Signs Last 24 Hrs  T(C): 36.8 (23 Dec 2020 20:56), Max: 36.8 (23 Dec 2020 20:56)  T(F): 98.2 (23 Dec 2020 20:56), Max: 98.2 (23 Dec 2020 20:56)  HR: 78 (24 Dec 2020 08:07) (78 - 93)  BP: 107/65 (24 Dec 2020 08:07) (107/65 - 125/78)  BP(mean): --  RR: 15 (24 Dec 2020 08:07) (14 - 15)  SpO2: 96% (24 Dec 2020 08:07) (96% - 98%)  CAPILLARY BLOOD GLUCOSE        I&O's Summary    23 Dec 2020 07:01  -  24 Dec 2020 07:00  --------------------------------------------------------  IN: 600 mL / OUT: 0 mL / NET: 600 mL        PHYSICAL EXAM:  GENERAL: NAD, well-developed  HEAD:  Atraumatic, Normocephalic  EYES: EOMI, PERRLA, conjunctiva and sclera clear  NECK: Supple, No JVD  CHEST/LUNG: Clear to auscultation bilaterally; No wheeze  HEART: Regular rate and rhythm; No murmurs, rubs, or gallops  ABDOMEN: Soft, Nontender, Nondistended; Bowel sounds present  EXTREMITIES:  2+ Peripheral Pulses, No clubbing, cyanosis, or edema  NEUROLOGY: AAOx3, non-focal  PSYCH: calm  SKIN: No rashes or lesions    LABS:                        9.5    9.90  )-----------( 284      ( 24 Dec 2020 06:00 )             29.4     12-24    137  |  103  |  35<H>  ----------------------------<  96  3.9   |  21<L>  |  1.56<H>    Ca    9.1      24 Dec 2020 06:00    TPro  8.4<H>  /  Alb  3.3  /  TBili  0.4  /  DBili  x   /  AST  26  /  ALT  48<H>  /  AlkPhos  69  12-24              RADIOLOGY & ADDITIONAL TESTS:  Results Reviewed:   Imaging Personally Reviewed:  Electrocardiogram Personally Reviewed:    COORDINATION OF CARE:  Care Discussed with Consultants/Other Providers [Y/N]:  Prior or Outpatient Records Reviewed [Y/N]:

## 2020-12-25 LAB — SARS-COV-2 RNA SPEC QL NAA+PROBE: SIGNIFICANT CHANGE UP

## 2020-12-25 PROCEDURE — 99232 SBSQ HOSP IP/OBS MODERATE 35: CPT

## 2020-12-25 RX ADMIN — ENOXAPARIN SODIUM 40 MILLIGRAM(S): 100 INJECTION SUBCUTANEOUS at 17:12

## 2020-12-25 RX ADMIN — AMLODIPINE BESYLATE 10 MILLIGRAM(S): 2.5 TABLET ORAL at 08:43

## 2020-12-25 RX ADMIN — ENOXAPARIN SODIUM 40 MILLIGRAM(S): 100 INJECTION SUBCUTANEOUS at 05:53

## 2020-12-25 NOTE — PROGRESS NOTE ADULT - SUBJECTIVE AND OBJECTIVE BOX
Subjective: No new complaints or overnight issues    VITALS  T(C): 36.6 (12-25-20 @ 07:49), Max: 36.9 (12-24-20 @ 22:40)  HR: 87 (12-25-20 @ 07:49) (87 - 94)  BP: 128/81 (12-25-20 @ 07:49) (112/64 - 128/81)  RR: 16 (12-25-20 @ 07:49) (16 - 16)  SpO2: 94% (12-25-20 @ 07:49) (94% - 94%)  Wt(kg): --    REVIEW OF SYSTEMS  none significant      Physical Exam:  Constitutional - NAD, Comfortable  HEENT - NCAT, EOMI  Chest - CTA bilaterally, No wheeze, No rhonchi, No crackles  Cardiovascular - RRR, S1S2,   Abdomen - BS+, Soft, NTND  Extremities - No edema,   Neurologic Exam -    Stable                Cognitive - Awake, Alert, AAO to self, place, date, year, situation     Cranial Nerves - CN 2-12 intact     Motor - no new deficit      Psychiatric - Mood stable, Affect WNL  -    RECENT LABS/IMAGING                        9.5    9.90  )-----------( 284      ( 24 Dec 2020 06:00 )             29.4     12-24    137  |  103  |  35<H>  ----------------------------<  96  3.9   |  21<L>  |  1.56<H>    Ca    9.1      24 Dec 2020 06:00    TPro  8.4<H>  /  Alb  3.3  /  TBili  0.4  /  DBili  x   /  AST  26  /  ALT  48<H>  /  AlkPhos  69  12-24            MEDICATIONS   acetaminophen   Tablet .. 650 milliGRAM(s) every 6 hours PRN  amLODIPine   Tablet 10 milliGRAM(s) daily  artificial  tears Solution 1 Drop(s) every 4 hours PRN  benzocaine 15 mG/menthol 3.6 mG (Sugar-Free) Lozenge 1 Lozenge every 12 hours PRN  enoxaparin Injectable 40 milliGRAM(s) two times a day  senna 2 Tablet(s) at bedtime      --------------------------------------------------------------------

## 2020-12-25 NOTE — PROGRESS NOTE ADULT - ASSESSMENT
54yo male without significant PMH presented [facility] for acute rehab from [facility] with complaints of progressively worsening SOB, noted to have acute respiratory failure with hypoxia secondary to COVID-19    #Debility  #Acute respiratory failure with hypoxia; Resolved  #COVID-19  - s/p decadron x10d. Not a candidate for Remdesivir due to elevated LFTs  - s/p azithromycin, CTX   - Monitor SpO2 on RA, oxygen supplementation via nasal cannula PRN  - Continue comprehensive rehab program of PT/OT/SLP - 3 hours a day, 5 days a week    #aspiration PNA  - s/p zosyn x5d (last 12/9)  - aspiration precautions  - Regular diet    #HTN  - new onset during hospitalization  - cont amlodipine 10mg qd  - monitor BP    #Acute renal failure  - Prerenal, FeNa 0.7  - Secondary to COVID-19  - Renal function peaked and now downtrending  - Avoid nephrotoxins  - Encourage PO intake   - Strict I/Os    #elevated LFTs  - likely 2/2 COVID infection--resolved  - monitor CMP  - avoid hepatotoxins    #DVT ppx  - Lovenox 40mg BID  - Discharge home with Xarelto 10mg daily x 30 days

## 2020-12-25 NOTE — PROGRESS NOTE ADULT - SUBJECTIVE AND OBJECTIVE BOX
Patient is a 55y old  Male who presents with a chief complaint of 16 Debility (24 Dec 2020 15:49)      SUBJECTIVE / OVERNIGHT EVENTS:  Pt seen and examined at bedside. No acute events overnight.  Pt denies cp, palpitations, sob, abd pain, N/V, fever, chills.    ROS:  All other review of systems negative    Allergies    No Known Allergies    Intolerances        MEDICATIONS  (STANDING):  amLODIPine   Tablet 10 milliGRAM(s) Oral daily  enoxaparin Injectable 40 milliGRAM(s) SubCutaneous two times a day  senna 2 Tablet(s) Oral at bedtime    MEDICATIONS  (PRN):  acetaminophen   Tablet .. 650 milliGRAM(s) Oral every 6 hours PRN Temp greater or equal to 38C (100.4F), Mild Pain (1 - 3)  artificial  tears Solution 1 Drop(s) Both EYES every 4 hours PRN Dry Eyes  benzocaine 15 mG/menthol 3.6 mG (Sugar-Free) Lozenge 1 Lozenge Oral every 12 hours PRN Sore Throat      Vital Signs Last 24 Hrs  T(C): 36.6 (25 Dec 2020 07:49), Max: 36.9 (24 Dec 2020 22:40)  T(F): 97.9 (25 Dec 2020 07:49), Max: 98.5 (24 Dec 2020 22:40)  HR: 87 (25 Dec 2020 07:49) (87 - 94)  BP: 128/81 (25 Dec 2020 07:49) (112/64 - 128/81)  BP(mean): --  RR: 16 (25 Dec 2020 07:49) (16 - 16)  SpO2: 94% (25 Dec 2020 07:49) (94% - 94%)  CAPILLARY BLOOD GLUCOSE        I&O's Summary    24 Dec 2020 07:01  -  25 Dec 2020 07:00  --------------------------------------------------------  IN: 900 mL / OUT: 3 mL / NET: 897 mL    25 Dec 2020 07:01  -  25 Dec 2020 11:02  --------------------------------------------------------  IN: 360 mL / OUT: 0 mL / NET: 360 mL        PHYSICAL EXAM:  GENERAL: NAD, well-developed  CHEST/LUNG: Clear to auscultation bilaterally; No wheeze  HEART: Regular rate and rhythm; No murmurs, rubs, or gallops  ABDOMEN: Soft, Nontender, Nondistended; Bowel sounds present  EXTREMITIES:  2+ Peripheral Pulses, No clubbing, cyanosis, or edema  NEUROLOGY: AAOx3, non-focal  PSYCH: calm    LABS:                        9.5    9.90  )-----------( 284      ( 24 Dec 2020 06:00 )             29.4     12-24    137  |  103  |  35<H>  ----------------------------<  96  3.9   |  21<L>  |  1.56<H>    Ca    9.1      24 Dec 2020 06:00    TPro  8.4<H>  /  Alb  3.3  /  TBili  0.4  /  DBili  x   /  AST  26  /  ALT  48<H>  /  AlkPhos  69  12-24              RADIOLOGY & ADDITIONAL TESTS:  Results Reviewed:   Imaging Personally Reviewed:  Electrocardiogram Personally Reviewed:    COORDINATION OF CARE:  Care Discussed with Consultants/Other Providers [Y/N]:  Prior or Outpatient Records Reviewed [Y/N]:

## 2020-12-25 NOTE — PROGRESS NOTE ADULT - ASSESSMENT
56 yo M  admitted to acute Rehabilitation with     Pt. Stable  Active Issues    COVID  --monitor O2 sat on RA    HTN--  BP controlled   cont. norvasc    DVT ppx:  Lovenox    No Medication changes:    Cont. comprehensive inpatient PT, OT and Speech    No acute issues,   Cont. current plan of care and medications as per primary team

## 2020-12-26 ENCOUNTER — TRANSCRIPTION ENCOUNTER (OUTPATIENT)
Age: 55
End: 2020-12-26

## 2020-12-26 PROCEDURE — 99232 SBSQ HOSP IP/OBS MODERATE 35: CPT

## 2020-12-26 PROCEDURE — 93010 ELECTROCARDIOGRAM REPORT: CPT

## 2020-12-26 RX ORDER — LIDOCAINE 4 G/100G
1 CREAM TOPICAL
Refills: 0 | Status: DISCONTINUED | OUTPATIENT
Start: 2020-12-26 | End: 2020-12-27

## 2020-12-26 RX ORDER — ACETAMINOPHEN 500 MG
2 TABLET ORAL
Qty: 0 | Refills: 0 | DISCHARGE
Start: 2020-12-26

## 2020-12-26 RX ORDER — AMLODIPINE BESYLATE 2.5 MG/1
1 TABLET ORAL
Qty: 30 | Refills: 0
Start: 2020-12-26 | End: 2021-01-24

## 2020-12-26 RX ORDER — RIVAROXABAN 15 MG-20MG
1 KIT ORAL
Qty: 30 | Refills: 0
Start: 2020-12-26 | End: 2021-01-24

## 2020-12-26 RX ORDER — FAMOTIDINE 10 MG/ML
20 INJECTION INTRAVENOUS DAILY
Refills: 0 | Status: DISCONTINUED | OUTPATIENT
Start: 2020-12-26 | End: 2020-12-27

## 2020-12-26 RX ADMIN — ENOXAPARIN SODIUM 40 MILLIGRAM(S): 100 INJECTION SUBCUTANEOUS at 07:01

## 2020-12-26 RX ADMIN — AMLODIPINE BESYLATE 10 MILLIGRAM(S): 2.5 TABLET ORAL at 07:01

## 2020-12-26 RX ADMIN — LIDOCAINE 1 APPLICATION(S): 4 CREAM TOPICAL at 17:52

## 2020-12-26 RX ADMIN — ENOXAPARIN SODIUM 40 MILLIGRAM(S): 100 INJECTION SUBCUTANEOUS at 17:50

## 2020-12-26 NOTE — PROGRESS NOTE ADULT - ASSESSMENT
56 yo M  admitted to acute Rehabilitation with debility related to COVID 19 PNA    Pt. Stable  Active Issues    COVID  --monitor O2 sat on RA    Left chest wall pain/left arm pain   - suspect MSK  - f/u EKG ordered 12/26  - lidocaine gel prn  - hospitalist appreciated  - cardiology OP follow up recommended    Acute renal failure 2/2 COVID-19  - creatinine downtrending  - Prerenal, FeNa 0.7  - Avoid nephrotoxins  - Encourage PO intake   - Strict I/Os  - repeat BMP by PCP in 2-3 days of DC    HTN:  -BP controlled   -cont. norvasc    DVT ppx:  - Lovenox  -Discharge home 12/27 with Xarelto 10mg daily x 30 days    No Medication changes:    Cont. comprehensive inpatient PT, OT and Speech    Cont. current plan of care and medications as per primary team   54 yo M  admitted to acute Rehabilitation with debility related to COVID 19 PNA    Pt. Stable  Active Issues    COVID  --monitor O2 sat on RA    Left chest wall pain/left arm pain   - suspect MSK  - EKG 12/26 with NSR. pending official read  - Echo normal 12/13  - lidocaine gel prn  - hospitalist appreciated  - cardiology OP follow up recommended    Acute renal failure 2/2 COVID-19  - creatinine downtrending  - Prerenal, FeNa 0.7  - Avoid nephrotoxins  - Encourage PO intake   - Strict I/Os  - repeat BMP by PCP in 2-3 days of DC    HTN:  -BP controlled   -cont. norvasc    DVT ppx:  - Lovenox  -Discharge home 12/27 with Xarelto 10mg daily x 30 days    No Medication changes:    Cont. comprehensive inpatient PT, OT and Speech    Cont. current plan of care and medications as per primary team

## 2020-12-26 NOTE — PROGRESS NOTE ADULT - ASSESSMENT
54yo male without significant PMH presented [facility] for acute rehab from [facility] with complaints of progressively worsening SOB, noted to have acute respiratory failure with hypoxia secondary to COVID-19    #Debility  #Acute respiratory failure with hypoxia; Resolved  #COVID-19  - s/p decadron x10d. Not a candidate for Remdesivir due to elevated LFTs  - s/p azithromycin, CTX   - Monitor SpO2 on RA, oxygen supplementation via nasal cannula PRN  - Continue comprehensive rehab program of PT/OT/SLP as per primary team    #aspiration PNA  - s/p zosyn x5d (last 12/9)  - aspiration precautions  - Regular diet    #HTN  - new onset during hospitalization  - cont amlodipine 10mg qd  - monitor BP    #Acute renal failure  - Secondary to COVID-19  - creatinine is downtrending  - Prerenal, FeNa 0.7  - Avoid nephrotoxins  - Encourage PO intake   - Strict I/Os  - repeat BMP by PCP in 2-3 days of DC    #elevated LFTs  - likely 2/2 COVID infection--resolved  - monitor CMP  - avoid hepatotoxins    # chest pain with TP  - likely MSK  - EKG  - Echo on dec 13 no significant abnormality  - Tylenol PRN. lidocaine TP  - Famotidine [ was on decadron for covid]  - cardiology cx OP    # Anemia  - stable h/h  - no ssx of bleeding      #DVT ppx  - Lovenox 40mg BID  - Discharge home with Xarelto 10mg daily x 30 days

## 2020-12-26 NOTE — DISCHARGE NOTE NURSING/CASE MANAGEMENT/SOCIAL WORK - PATIENT PORTAL LINK FT
You can access the FollowMyHealth Patient Portal offered by [facility] by registering at the following website: [email or web address]. By joining [employer]’s FollowMyHealth portal, you will also be able to view your health information using other applications (apps) compatible with our system.

## 2020-12-26 NOTE — PROGRESS NOTE ADULT - SUBJECTIVE AND OBJECTIVE BOX
Patient is a 55y old  Male who presents with a chief complaint of 16 Debility (24 Dec 2020 15:49)      SUBJECTIVE / OVERNIGHT EVENTS:  Pt seen and examined at bedside.   No acute events overnight.  reported left sided chest pain for past 4-5 days, constant. no radiation. no h/o heart problem.  no other symptoms      ROS:  Pt denies palpitations, sob, abd pain, N/V, fever, chills, cough, headaches, skin rash. all other ROS neg      Allergies    No Known Allergies    Intolerances        MEDICATIONS  (STANDING):  amLODIPine   Tablet 10 milliGRAM(s) Oral daily  enoxaparin Injectable 40 milliGRAM(s) SubCutaneous two times a day  senna 2 Tablet(s) Oral at bedtime    MEDICATIONS  (PRN):  acetaminophen   Tablet .. 650 milliGRAM(s) Oral every 6 hours PRN Temp greater or equal to 38C (100.4F), Mild Pain (1 - 3)  artificial  tears Solution 1 Drop(s) Both EYES every 4 hours PRN Dry Eyes  benzocaine 15 mG/menthol 3.6 mG (Sugar-Free) Lozenge 1 Lozenge Oral every 12 hours PRN Sore Throat      Vital Signs Last 24 Hrs  T(C): 36.4 (26 Dec 2020 07:08), Max: 36.7 (25 Dec 2020 20:26)  T(F): 97.6 (26 Dec 2020 07:08), Max: 98 (25 Dec 2020 20:26)  HR: 85 (26 Dec 2020 07:08) (85 - 95)  BP: 129/84 (26 Dec 2020 07:08) (118/76 - 129/84)  BP(mean): --  RR: 16 (26 Dec 2020 07:08) (15 - 16)  SpO2: 95% (26 Dec 2020 07:08) (94% - 95%)        GENERAL: Not in distress. Alert    HEENT:  Normocephalic and atraumatic. MMM  NECK: Supple.  No JVD.    CARDIOVASCULAR: RRR S1, S2. No murmur/rubs/gallop  LUNGS: BLAE+, no rales, no wheezing, no rhonchi.    ABDOMEN: ND. Soft,  NT, no guarding / rebound / rigidity. BS normoactive. No CVA tenderness.    EXTREMITIES: no cyanosis, no clubbing, no edema. no leg or calf TP  SKIN: no rash.  NEUROLOGIC: AAO*3.strength is symmetric, sensation intact, speech fluent.    PSYCHIATRIC: Calm.  No agitation.      LABS:                        9.5    9.90  )-----------( 284      ( 24 Dec 2020 06:00 )             29.4     12-24    137  |  103  |  35<H>  ----------------------------<  96  3.9   |  21<L>  |  1.56<H>    Ca    9.1      24 Dec 2020 06:00    TPro  8.4<H>  /  Alb  3.3  /  TBili  0.4  /  DBili  x   /  AST  26  /  ALT  48<H>  /  AlkPhos  69  12-24              RADIOLOGY & ADDITIONAL TESTS:  Results Reviewed:   Imaging Personally Reviewed:  Electrocardiogram Personally Reviewed:    COORDINATION OF CARE:  Care Discussed with Consultants/Other Providers [Y/N]:  Prior or Outpatient Records Reviewed [Y/N]:

## 2020-12-26 NOTE — PROGRESS NOTE ADULT - SUBJECTIVE AND OBJECTIVE BOX
Subjective: Patient seen and examined at bedside. Overnight patient complained of 4-5 days of chest pain, EKG was non revealing for etiology.  Today patient states pain is in left chest wall which is tender to palpation. Also complaining of left medial elbow pain/tingling radiating to wrist.    VITALS  Vital Signs Last 24 Hrs  T(C): 36.4 (26 Dec 2020 07:08), Max: 36.7 (25 Dec 2020 20:26)  T(F): 97.6 (26 Dec 2020 07:08), Max: 98 (25 Dec 2020 20:26)  HR: 85 (26 Dec 2020 07:08) (85 - 95)  BP: 129/84 (26 Dec 2020 07:08) (118/76 - 129/84)  RR: 16 (26 Dec 2020 07:08) (15 - 16)  SpO2: 95% (26 Dec 2020 07:08) (94% - 95%)    REVIEW OF SYSTEMS Last 24h significant for - musculoskeletal chest pain, left arm radicular symptoms    Physical Exam:  Constitutional - NAD, Comfortable  HEENT - NCAT, EOMI  Chest - CTA bilaterally, No wheeze, No rhonchi, No crackles. +left chest wall tenderness over pec major  Cardiovascular - RRR, S1S2,   Abdomen - BS+, Soft, NTND  Extremities - No edema, left upper extremity with painful pronation  Neurologic Exam -    Stable                Cognitive - Awake, Alert, AAO to self, place, date, year, situation     Cranial Nerves - CN 2-12 intact     Motor - no new deficits  Psychiatric - Mood stable, Affect WNL    RECENT LABS/IMAGING                        9.5    9.90  )-----------( 284      ( 24 Dec 2020 06:00 )             29.4     12-24    137  |  103  |  35<H>  ----------------------------<  96  3.9   |  21<L>  |  1.56<H>    Ca    9.1      24 Dec 2020 06:00    TPro  8.4<H>  /  Alb  3.3  /  TBili  0.4  /  DBili  x   /  AST  26  /  ALT  48<H>  /  AlkPhos  69  12-24        MEDICATIONS   acetaminophen   Tablet .. 650 milliGRAM(s) every 6 hours PRN  amLODIPine   Tablet 10 milliGRAM(s) daily  artificial  tears Solution 1 Drop(s) every 4 hours PRN  benzocaine 15 mG/menthol 3.6 mG (Sugar-Free) Lozenge 1 Lozenge every 12 hours PRN  enoxaparin Injectable 40 milliGRAM(s) two times a day  senna 2 Tablet(s) at bedtime      --------------------------------------------------------------------   Subjective: Patient seen and examined at bedside. [place]  ID #[id number]  Overnight patient complained of 4-5 days of chest pain, EKG was non revealing for etiology.  Today patient states pain is in left chest wall which is tender to palpation. Also complaining of left medial elbow pain/tingling radiating to wrist.    VITALS  Vital Signs Last 24 Hrs  T(C): 36.4 (26 Dec 2020 07:08), Max: 36.7 (25 Dec 2020 20:26)  T(F): 97.6 (26 Dec 2020 07:08), Max: 98 (25 Dec 2020 20:26)  HR: 85 (26 Dec 2020 07:08) (85 - 95)  BP: 129/84 (26 Dec 2020 07:08) (118/76 - 129/84)  RR: 16 (26 Dec 2020 07:08) (15 - 16)  SpO2: 95% (26 Dec 2020 07:08) (94% - 95%)    REVIEW OF SYSTEMS Last 24h significant for - musculoskeletal chest pain, left arm radicular symptoms    Physical Exam:  Constitutional - NAD, Comfortable  HEENT - NCAT, EOMI  Chest - CTA bilaterally, No wheeze, No rhonchi, No crackles. +left chest wall tenderness over pec major  Cardiovascular - RRR, S1S2,   Abdomen - BS+, Soft, NTND  Extremities - No edema, left upper extremity with painful pronation  Neurologic Exam -    Stable                Cognitive - Awake, Alert, AAO to self, place, date, year, situation     Cranial Nerves - CN 2-12 intact     Motor - no new deficits  Psychiatric - Mood stable, Affect WNL    RECENT LABS/IMAGING                        9.5    9.90  )-----------( 284      ( 24 Dec 2020 06:00 )             29.4     12-24    137  |  103  |  35<H>  ----------------------------<  96  3.9   |  21<L>  |  1.56<H>    Ca    9.1      24 Dec 2020 06:00    TPro  8.4<H>  /  Alb  3.3  /  TBili  0.4  /  DBili  x   /  AST  26  /  ALT  48<H>  /  AlkPhos  69  12-24        MEDICATIONS   acetaminophen   Tablet .. 650 milliGRAM(s) every 6 hours PRN  amLODIPine   Tablet 10 milliGRAM(s) daily  artificial  tears Solution 1 Drop(s) every 4 hours PRN  benzocaine 15 mG/menthol 3.6 mG (Sugar-Free) Lozenge 1 Lozenge every 12 hours PRN  enoxaparin Injectable 40 milliGRAM(s) two times a day  senna 2 Tablet(s) at bedtime      --------------------------------------------------------------------

## 2020-12-26 NOTE — CHART NOTE - NSCHARTNOTEFT_GEN_A_CORE
NUTRITION FOLLOW UP    SOURCE: Patient [ )   Family [ ]    Medical Record (X)    Diet, Regular (12-23-20 @ 16:43) [Active]    PATIENT REPORT[ ] nausea  [ ] vomiting [ ] diarrhea [ ] constipation  [ ]chewing problems [ ] swallowing issues  [x] other: no GI distress    PO INTAKE: per chart, patient eating % of meals and diet upgraded to Regular. Tolerating well. Will continue to monitor/follow as indicated.     CURRENT WEIGHT:  139# (12/23)    PERTINENT MEDS:   Pertinent Medications: MEDICATIONS  (STANDING):  amLODIPine   Tablet 10 milliGRAM(s) Oral daily  enoxaparin Injectable 40 milliGRAM(s) SubCutaneous two times a day  famotidine    Tablet 20 milliGRAM(s) Oral daily  lidocaine 2% Gel 1 Application(s) Topical two times a day  senna 2 Tablet(s) Oral at bedtime    MEDICATIONS  (PRN):  acetaminophen   Tablet .. 650 milliGRAM(s) Oral every 6 hours PRN Temp greater or equal to 38C (100.4F), Mild Pain (1 - 3)  artificial  tears Solution 1 Drop(s) Both EYES every 4 hours PRN Dry Eyes  benzocaine 15 mG/menthol 3.6 mG (Sugar-Free) Lozenge 1 Lozenge Oral every 12 hours PRN Sore Throat      PERTINENT LABS:  12-24 Na137 mmol/L Glu 96 mg/dL K+ 3.9 mmol/L Cr  1.56 mg/dL<H> BUN 35 mg/dL<H> 12-24 Alb 3.3 g/dL 12-02 Chol --    LDL --    HDL --    Trig 289 mg/dL<H>  11-16-20 @ 10:51 A1C 6.1      SKIN: WDL   EDEMA: none noted  LAST BM: 12/25    ESTIMATED NEEDS:   [X] no change since previous assessment  [ ] recalculated:     PREVIOUS NUTRITION DIAGNOSIS:  chewing/swallowing difficulties    NUTRITION DIAGNOSIS is :  (   )  Ongoing      (x) Resolved,  RD will follow as per nutrition department protocol.    NUTRITION RECOMMENDATIONS:   1. Continue diet as ordered. Encourage fluids.   2. [place] preferences as able.   MONITORING AND EVALUATION:   1. Tolerance to diet prescription   2. PO intake  3. Weights  4. Labs  5. Follow Up per protocol     RD to remain available   Marlene Rodriguez RDN #[id number]

## 2020-12-26 NOTE — DISCHARGE NOTE NURSING/CASE MANAGEMENT/SOCIAL WORK - NSDCFUADDAPPT_GEN_ALL_CORE_FT
Pt. will start Home care on 12/28. SW will follow up with pt. on Monday to set pt. up with PCP. Dr. Shin is covering pts. home care referral. Pt. is scheduled to be transported home by brother on 12/27.

## 2020-12-26 NOTE — PROGRESS NOTE ADULT - ATTENDING COMMENTS
Pt. seen this AM.  Agree with documentation above as per resident with amendments made as appropriate. Patient medically stable.    pt. c/o left upper chest wall pain,  no SOB, no pleuritic CP.  EKG performed and reviewed by Hospitalist--NSR  Pt's pain reproducible with palpation of left upper chest wall.      d/c planning to home tomorrow

## 2020-12-27 VITALS
SYSTOLIC BLOOD PRESSURE: 118 MMHG | DIASTOLIC BLOOD PRESSURE: 72 MMHG | TEMPERATURE: 98 F | RESPIRATION RATE: 14 BRPM | HEART RATE: 80 BPM | OXYGEN SATURATION: 96 %

## 2020-12-27 PROCEDURE — 97167 OT EVAL HIGH COMPLEX 60 MIN: CPT

## 2020-12-27 PROCEDURE — 85027 COMPLETE CBC AUTOMATED: CPT

## 2020-12-27 PROCEDURE — 92523 SPEECH SOUND LANG COMPREHEN: CPT

## 2020-12-27 PROCEDURE — 80053 COMPREHEN METABOLIC PANEL: CPT

## 2020-12-27 PROCEDURE — 92507 TX SP LANG VOICE COMM INDIV: CPT

## 2020-12-27 PROCEDURE — 82570 ASSAY OF URINE CREATININE: CPT

## 2020-12-27 PROCEDURE — 97535 SELF CARE MNGMENT TRAINING: CPT

## 2020-12-27 PROCEDURE — 92610 EVALUATE SWALLOWING FUNCTION: CPT

## 2020-12-27 PROCEDURE — 36415 COLL VENOUS BLD VENIPUNCTURE: CPT

## 2020-12-27 PROCEDURE — U0003: CPT

## 2020-12-27 PROCEDURE — 97116 GAIT TRAINING THERAPY: CPT

## 2020-12-27 PROCEDURE — 97163 PT EVAL HIGH COMPLEX 45 MIN: CPT

## 2020-12-27 PROCEDURE — 93005 ELECTROCARDIOGRAM TRACING: CPT

## 2020-12-27 PROCEDURE — 99238 HOSP IP/OBS DSCHRG MGMT 30/<: CPT

## 2020-12-27 PROCEDURE — 92611 MOTION FLUOROSCOPY/SWALLOW: CPT

## 2020-12-27 PROCEDURE — 87635 SARS-COV-2 COVID-19 AMP PRB: CPT

## 2020-12-27 PROCEDURE — 99232 SBSQ HOSP IP/OBS MODERATE 35: CPT

## 2020-12-27 PROCEDURE — 97110 THERAPEUTIC EXERCISES: CPT

## 2020-12-27 PROCEDURE — 84300 ASSAY OF URINE SODIUM: CPT

## 2020-12-27 PROCEDURE — 74230 X-RAY XM SWLNG FUNCJ C+: CPT

## 2020-12-27 PROCEDURE — 97530 THERAPEUTIC ACTIVITIES: CPT

## 2020-12-27 PROCEDURE — 92526 ORAL FUNCTION THERAPY: CPT

## 2020-12-27 RX ADMIN — LIDOCAINE 1 APPLICATION(S): 4 CREAM TOPICAL at 07:21

## 2020-12-27 RX ADMIN — AMLODIPINE BESYLATE 10 MILLIGRAM(S): 2.5 TABLET ORAL at 07:21

## 2020-12-27 NOTE — PROGRESS NOTE ADULT - ASSESSMENT
54 yo M  admitted to acute Rehabilitation with debility related to COVID 19 PNA    Patient medically stable for discharge to home.  Discharge medications and instructions as per primary team.     COVID  --O2 sat normal on RA    Left chest wall pain/left arm pain   -  MSK  - EKG 12/26 with NSR. pending official read  - Echo normal 12/13  - lidocaine gel prn  - hospitalist appreciated  - cardiology OP follow up recommended    Left neck and right wrist mass  --Likely lipoma  --f/u with PCP for evalution    Acute renal failure 2/2 COVID-19  - creatinine downtrending  - Prerenal, FeNa 0.7  - Avoid nephrotoxins  - Encourage PO intake   - repeat BMP by PCP in 2-3 days of DC    HTN:  -BP controlled   -cont. norvasc  -f/u with PCP as outpatient    DVT ppx:  -Discharge home 12/27 with Xarelto 10mg daily x 30 days    No Medication changes:    Cont. comprehensive inpatient PT, OT and Speech    Cont. current plan of care and medications as per primary team

## 2020-12-27 NOTE — PROGRESS NOTE ADULT - PROVIDER SPECIALTY LIST ADULT
Hospitalist
Hospitalist
Rehab Medicine
Rehab Medicine
Hospitalist
Rehab Medicine
Hospitalist
Hospitalist
Rehab Medicine

## 2020-12-27 NOTE — PROGRESS NOTE ADULT - SUBJECTIVE AND OBJECTIVE BOX
Subjective: No new complaints or overnight issues    VITALS  T(C): 36.6 (12-27-20 @ 09:10), Max: 36.8 (12-26-20 @ 21:35)  HR: 80 (12-27-20 @ 09:10) (80 - 96)  BP: 118/72 (12-27-20 @ 09:10) (118/72 - 124/81)  RR: 14 (12-27-20 @ 09:10) (14 - 15)  SpO2: 96% (12-27-20 @ 09:10) (96% - 96%)  Wt(kg): --    REVIEW OF SYSTEMS  none significant      Physical Exam:  Constitutional - NAD, Comfortable  HEENT - NCAT, EOMI  Chest - CTA bilaterally, No wheeze, No rhonchi, No crackles  Cardiovascular - RRR, S1S2, small Soft mobile mass on left anterior neck--non-painful  Abdomen - BS+, Soft, NTND  Extremities - No edema, small Soft mobile mass on right medial wrist --non-painful  Neurologic Exam -    Stable                Cognitive - Awake, Alert, AAO to self, place, date, year, situation     Cranial Nerves - CN 2-12 intact     Motor - no new deficit      Psychiatric - Mood stable, Affect WNL  -    RECENT LABS/IMAGING                  MEDICATIONS   acetaminophen   Tablet .. 650 milliGRAM(s) every 6 hours PRN  amLODIPine   Tablet 10 milliGRAM(s) daily  artificial  tears Solution 1 Drop(s) every 4 hours PRN  benzocaine 15 mG/menthol 3.6 mG (Sugar-Free) Lozenge 1 Lozenge every 12 hours PRN  enoxaparin Injectable 40 milliGRAM(s) two times a day  famotidine    Tablet 20 milliGRAM(s) daily  lidocaine 2% Gel 1 Application(s) two times a day  senna 2 Tablet(s) at bedtime      --------------------------------------------------------------------

## 2020-12-27 NOTE — PROGRESS NOTE ADULT - ASSESSMENT
54yo male without significant PMH presented [facility] for acute rehab from [facility] with complaints of progressively worsening SOB, noted to have acute respiratory failure with hypoxia secondary to COVID-19    #Debility  #Acute respiratory failure with hypoxia; Resolved  #COVID-19  - s/p decadron x10d. Not a candidate for Remdesivir due to elevated LFTs  - s/p azithromycin, CTX   - Monitor SpO2 on RA, oxygen supplementation via nasal cannula PRN  - Continue comprehensive rehab program of PT/OT/SLP as per primary team    #aspiration PNA  - s/p zosyn x5d (last 12/9)  - aspiration precautions  - Regular diet    #HTN  - new onset during hospitalization  - cont amlodipine 10mg qd  - monitor BP    #Acute renal failure  - Secondary to COVID-19  - creatinine is downtrending  - Prerenal, FeNa 0.7  - Avoid nephrotoxins  - Encourage PO intake   - Strict I/Os  - repeat BMP by PCP in 2-3 days of DC    #elevated LFTs  - likely 2/2 COVID infection--resolved  - monitor CMP  - avoid hepatotoxins    # chest pain with TP  - likely MSK  - EKG: NSR  - Echo on dec 13 no significant abnormality  - Tylenol PRN. lidocaine TP  - Famotidine [ was on decadron for covid]  - cardiology cx and PMD eval OP in 1 week    # Anemia  - stable h/h  - no ssx of bleeding      #DVT ppx  - Lovenox 40mg BID  - Discharge home with Xarelto 10mg daily x 30 days

## 2020-12-27 NOTE — PROGRESS NOTE ADULT - SUBJECTIVE AND OBJECTIVE BOX
Patient is a 55y old  Male who presents with a chief complaint of 16 Debility (24 Dec 2020 15:49)      SUBJECTIVE / OVERNIGHT EVENTS:  Pt seen and examined at bedside.   No acute events overnight.  reported left sided chest pain with left arm pain for past 4-5 days, constant. arm pain better with movement. no h/o heart problem.  no other complaints    ROS:  Pt denies palpitations, sob, abd pain, N/V, fever, chills, cough, headaches, skin rash. all other ROS neg      Allergies    No Known Allergies    Intolerances        MEDICATIONS  (STANDING):  amLODIPine   Tablet 10 milliGRAM(s) Oral daily  enoxaparin Injectable 40 milliGRAM(s) SubCutaneous two times a day  famotidine    Tablet 20 milliGRAM(s) Oral daily  lidocaine 2% Gel 1 Application(s) Topical two times a day  senna 2 Tablet(s) Oral at bedtime    MEDICATIONS  (PRN):  acetaminophen   Tablet .. 650 milliGRAM(s) Oral every 6 hours PRN Temp greater or equal to 38C (100.4F), Mild Pain (1 - 3)  artificial  tears Solution 1 Drop(s) Both EYES every 4 hours PRN Dry Eyes  benzocaine 15 mG/menthol 3.6 mG (Sugar-Free) Lozenge 1 Lozenge Oral every 12 hours PRN Sore Throat        Vital Signs Last 24 Hrs  T(C): 36.6 (27 Dec 2020 09:10), Max: 36.8 (26 Dec 2020 21:35)  T(F): 97.8 (27 Dec 2020 09:10), Max: 98.2 (26 Dec 2020 21:35)  HR: 80 (27 Dec 2020 09:10) (80 - 96)  BP: 118/72 (27 Dec 2020 09:10) (118/72 - 124/81)  BP(mean): --  RR: 14 (27 Dec 2020 09:10) (14 - 15)  SpO2: 96% (27 Dec 2020 09:10) (96% - 96%)      GENERAL: Not in distress. Alert    HEENT:  Normocephalic and atraumatic. MMM  NECK: Supple.  No JVD.    CARDIOVASCULAR: RRR S1, S2. No murmur/rubs/gallop  LUNGS: BLAE+, no rales, no wheezing, no rhonchi.    ABDOMEN: ND. Soft,  NT, no guarding / rebound / rigidity. BS normoactive. No CVA tenderness.    EXTREMITIES: no cyanosis, no clubbing, no edema. no leg or calf TP  SKIN: no rash.  NEUROLOGIC: AAO*3.strength is symmetric, sensation intact, speech fluent.    PSYCHIATRIC: Calm.  No agitation.      LABS:                        9.5    9.90  )-----------( 284      ( 24 Dec 2020 06:00 )             29.4     12-24    137  |  103  |  35<H>  ----------------------------<  96  3.9   |  21<L>  |  1.56<H>    Ca    9.1      24 Dec 2020 06:00    TPro  8.4<H>  /  Alb  3.3  /  TBili  0.4  /  DBili  x   /  AST  26  /  ALT  48<H>  /  AlkPhos  69  12-24              RADIOLOGY & ADDITIONAL TESTS:  Results Reviewed:   Imaging Personally Reviewed:  Electrocardiogram Personally Reviewed:    COORDINATION OF CARE:  Care Discussed with Consultants/Other Providers [Y/N]:  Prior or Outpatient Records Reviewed [Y/N]:

## 2020-12-27 NOTE — PROGRESS NOTE ADULT - REASON FOR ADMISSION
16 Debility

## 2021-03-11 NOTE — DISCHARGE NOTE NURSING/CASE MANAGEMENT/SOCIAL WORK - NSDCPEXARELTOCOMP_GEN_ALL_CORE
03/11/21 1450   PT Last Visit   PT Visit Date 03/11/21   Note Type   Note Type Treatment   Pain Assessment   Pain Assessment Tool Pain Assessment not indicated - pt denies pain   Restrictions/Precautions   Weight Bearing Precautions Per Order No   Other Precautions Fall Risk   General   Chart Reviewed Yes   Family/Caregiver Present No   Cognition   Overall Cognitive Status WFL   Arousal/Participation Alert; Cooperative   Attention Within functional limits   Orientation Level Oriented X4   Memory Within functional limits   Following Commands Follows one step commands without difficulty   Comments pt agreed to PT session   Subjective   Subjective "I have numbness in my legs R > L  I can walk better with my shoes on but I need my other socks on "   Bed Mobility   Sit to Supine 6  Modified independent   Additional items Increased time required;Verbal cues   Additional Comments pt seated at EOB to begin session  Sat at EOB to don/dof socks max x 1 , shoes min x 1 prior to amb, doffed same post amb  Transfers   Sit to Stand 5  Supervision   Additional items Bedrails; Increased time required   Stand to Sit 5  Supervision   Additional items Increased time required;Verbal cues   Ambulation/Elevation   Gait pattern Improper Weight shift; Antalgic;Decreased foot clearance; Forward Flexion;Decreased R stance; Short stride; Excessively slow   Gait Assistance 5  Supervision  (close)   Additional items Assist x 1;Verbal cues   Assistive Device Rolling walker   Distance 125 feet   Stair Management Assistance Not tested   Balance   Static Sitting Good   Dynamic Sitting Fair +   Static Standing Fair   Dynamic Standing Fair -   Ambulatory Fair -   Endurance Deficit   Endurance Deficit Yes   Activity Tolerance   Activity Tolerance Patient limited by fatigue   Nurse Made Aware RN Simran   Exercises   Hip Flexion Sitting;20 reps;AROM; Bilateral   Hip Abduction Sitting;20 reps;AROM; Bilateral   Hip Adduction Sitting;20 reps;AROM; Bilateral  (isometric)   Knee AROM Long Arc Quad Sitting;20 reps;AROM; Bilateral   Ankle Pumps Sitting;20 reps;AROM; Bilateral   Balance training  sitting EOB   Assessment   Prognosis Fair   Problem List Decreased strength;Decreased endurance; Impaired balance;Decreased mobility; Decreased coordination;Decreased safety awareness; Impaired sensation   Assessment Pt seen for PT treatment session this date with interventions consisting of gait training w/ emphasis on improving pt's ability to ambulate level surfaces x 125 feet with close S provided by therapist with RW and Therapeutic exercise consisting of: AROM 20 reps B LE in sitting position  Pt agreeable to PT treatment session upon arrival, pt found seated at EOB, in no apparent distress  In comparison to previous session, pt with improvements in activity tolerance  Post session: pt returned BTB, all needs in reach and RN notified of session findings/recommendations Continue to recommend Home PT at time of d/c in order to maximize pt's functional independence and safety w/ mobility  Pt continues to be functioning below baseline level, and remains limited 2* factors listed above and including decreased strength, endurance & safe functional mobility  PT will continue to see pt while here in order to address the deficits listed above and provide interventions consistent w/ POC in effort to achieve STGs  Goals   Patient Goals to rest now   PT Treatment Day 3   Plan   Treatment/Interventions Functional transfer training;LE strengthening/ROM; Therapeutic exercise; Endurance training;Patient/family training;Equipment eval/education; Bed mobility;Gait training;Spoke to nursing   Progress Slow progress, decreased activity tolerance   PT Frequency 1-2x/wk   Recommendation   PT Discharge Recommendation Home with skilled therapy; Return to previous environment with social support   Equipment Recommended Walker   PT - OK to Discharge Yes  (when med cleared)   AM-PAC Basic Mobility Inpatient   Turning in Bed Without Bedrails 3   Lying on Back to Sitting on Edge of Flat Bed 3   Moving Bed to Chair 3   Standing Up From Chair 3   Walk in Room 3   Climb 3-5 Stairs 3   Basic Mobility Inpatient Raw Score 18   Basic Mobility Standardized Score 41 05   Tito Kline, PTA Rivaroxaban/Xarelto is used to treat and prevent blood clots.  If you are not able to swallow the tablets whole, you may crush the tablets and mix them with a small amount of applesauce and promptly take within four hours. Eat some food right after you swallow the mixture. Take 2.5mg or 10mg tablets of Rivaroxaban/Xarelto with or without food. Take 15mg or 20mg tablets of Rivaroxaban/Xarelto with food. Never skip a dose of Rivaroxaban/Xarelto. If you take Rivaroxaban/Xarelto once a day and you forget to take your dose, take a dose as soon as you remember on the same day. If you take Rivaroxaban/Xarelto 2.5 mg twice a day and you forget to take your dose, skip the missed dose and take your next dose at your usual time. DO NOT take an extra pill to ‘catch up’. If you take Rivaroxaban/Xarelto 15 mg twice a day and you forget to take your dose, take a dose as soon as you remember on the same day. You may take 2 doses at the same time to make up for missed dose ONLY if you take a total of 30mg per day. Notify your doctor that you missed a dose. Take Rivaroxaban/Xarelto at the same time each morning and evening. Rivaroxaban/Xarelto may be taken with other medication or food. Rivaroxaban/Xarelto is used to treat and prevent blood clots.  If you are not able to swallow the tablets whole, you may crush the tablets and mix them with a small amount of applesauce and promptly take within four hours. Eat some food right after you swallow the mixture. Take 2.5mg or 10mg tablets of Rivaroxaban/Xarelto with or without food. Take 15mg or 20mg tablets of Rivaroxaban/Xarelto with food. Never skip a dose of Rivaroxaban/Xarelto.  If you take Rivaroxaban/Xarelto once a day and you forget to take your dose, take a dose as soon as you remember on the same day. If you take Rivaroxaban/Xarelto 2.5 mg twice a day and you forget to take your dose, skip the missed dose and take your next dose at your usual time. DO NOT take an extra pill to ‘catch up’. If you take Rivaroxaban/Xarelto 15 mg twice a day and you forget to take your dose, take a dose as soon as you remember on the same day. You may take 2 doses at the same time to make up for missed dose ONLY if you take a total of 30mg per day. Notify your doctor that you missed a dose. Take Rivaroxaban/Xarelto at the same time each morning and evening. Rivaroxaban/Xarelto may be taken with other medication or food.

## 2021-12-02 ENCOUNTER — EMERGENCY (EMERGENCY)
Facility: HOSPITAL | Age: 56
LOS: 1 days | Discharge: HOME | End: 2021-12-02
Attending: EMERGENCY MEDICINE
Payer: MEDICAID

## 2021-12-02 VITALS
OXYGEN SATURATION: 98 % | DIASTOLIC BLOOD PRESSURE: 80 MMHG | RESPIRATION RATE: 16 BRPM | WEIGHT: 170.86 LBS | TEMPERATURE: 98 F | HEIGHT: 63 IN | SYSTOLIC BLOOD PRESSURE: 131 MMHG | HEART RATE: 95 BPM

## 2021-12-02 VITALS
HEART RATE: 91 BPM | OXYGEN SATURATION: 99 % | DIASTOLIC BLOOD PRESSURE: 74 MMHG | TEMPERATURE: 98 F | RESPIRATION RATE: 18 BRPM | SYSTOLIC BLOOD PRESSURE: 135 MMHG

## 2021-12-02 LAB
ALBUMIN SERPL ELPH-MCNC: 3.5 G/DL — SIGNIFICANT CHANGE UP (ref 3.5–5)
ALP SERPL-CCNC: 59 U/L — SIGNIFICANT CHANGE UP (ref 40–120)
ALT FLD-CCNC: 26 U/L DA — SIGNIFICANT CHANGE UP (ref 10–60)
ANION GAP SERPL CALC-SCNC: 6 MMOL/L — SIGNIFICANT CHANGE UP (ref 5–17)
APPEARANCE UR: CLEAR — SIGNIFICANT CHANGE UP
AST SERPL-CCNC: 13 U/L — SIGNIFICANT CHANGE UP (ref 10–40)
BASOPHILS # BLD AUTO: 0.03 K/UL — SIGNIFICANT CHANGE UP (ref 0–0.2)
BASOPHILS NFR BLD AUTO: 0.3 % — SIGNIFICANT CHANGE UP (ref 0–2)
BILIRUB SERPL-MCNC: 0.7 MG/DL — SIGNIFICANT CHANGE UP (ref 0.2–1.2)
BILIRUB UR-MCNC: NEGATIVE — SIGNIFICANT CHANGE UP
BUN SERPL-MCNC: 27 MG/DL — HIGH (ref 7–18)
CALCIUM SERPL-MCNC: 9 MG/DL — SIGNIFICANT CHANGE UP (ref 8.4–10.5)
CHLORIDE SERPL-SCNC: 111 MMOL/L — HIGH (ref 96–108)
CO2 SERPL-SCNC: 23 MMOL/L — SIGNIFICANT CHANGE UP (ref 22–31)
COLOR SPEC: YELLOW — SIGNIFICANT CHANGE UP
CREAT SERPL-MCNC: 1.55 MG/DL — HIGH (ref 0.5–1.3)
DIFF PNL FLD: ABNORMAL
EOSINOPHIL # BLD AUTO: 0.09 K/UL — SIGNIFICANT CHANGE UP (ref 0–0.5)
EOSINOPHIL NFR BLD AUTO: 0.9 % — SIGNIFICANT CHANGE UP (ref 0–6)
GLUCOSE SERPL-MCNC: 123 MG/DL — HIGH (ref 70–99)
GLUCOSE UR QL: NEGATIVE — SIGNIFICANT CHANGE UP
HCT VFR BLD CALC: 39.9 % — SIGNIFICANT CHANGE UP (ref 39–50)
HGB BLD-MCNC: 13.1 G/DL — SIGNIFICANT CHANGE UP (ref 13–17)
IMM GRANULOCYTES NFR BLD AUTO: 0.3 % — SIGNIFICANT CHANGE UP (ref 0–1.5)
KETONES UR-MCNC: NEGATIVE — SIGNIFICANT CHANGE UP
LEUKOCYTE ESTERASE UR-ACNC: NEGATIVE — SIGNIFICANT CHANGE UP
LIDOCAIN IGE QN: 135 U/L — SIGNIFICANT CHANGE UP (ref 73–393)
LYMPHOCYTES # BLD AUTO: 1.35 K/UL — SIGNIFICANT CHANGE UP (ref 1–3.3)
LYMPHOCYTES # BLD AUTO: 13.4 % — SIGNIFICANT CHANGE UP (ref 13–44)
MCHC RBC-ENTMCNC: 29.8 PG — SIGNIFICANT CHANGE UP (ref 27–34)
MCHC RBC-ENTMCNC: 32.8 GM/DL — SIGNIFICANT CHANGE UP (ref 32–36)
MCV RBC AUTO: 90.7 FL — SIGNIFICANT CHANGE UP (ref 80–100)
MONOCYTES # BLD AUTO: 0.54 K/UL — SIGNIFICANT CHANGE UP (ref 0–0.9)
MONOCYTES NFR BLD AUTO: 5.4 % — SIGNIFICANT CHANGE UP (ref 2–14)
NEUTROPHILS # BLD AUTO: 8.04 K/UL — HIGH (ref 1.8–7.4)
NEUTROPHILS NFR BLD AUTO: 79.7 % — HIGH (ref 43–77)
NITRITE UR-MCNC: NEGATIVE — SIGNIFICANT CHANGE UP
NRBC # BLD: 0 /100 WBCS — SIGNIFICANT CHANGE UP (ref 0–0)
PH UR: 5 — SIGNIFICANT CHANGE UP (ref 5–8)
PLATELET # BLD AUTO: 198 K/UL — SIGNIFICANT CHANGE UP (ref 150–400)
POTASSIUM SERPL-MCNC: 4.2 MMOL/L — SIGNIFICANT CHANGE UP (ref 3.5–5.3)
POTASSIUM SERPL-SCNC: 4.2 MMOL/L — SIGNIFICANT CHANGE UP (ref 3.5–5.3)
PROT SERPL-MCNC: 8.3 G/DL — SIGNIFICANT CHANGE UP (ref 6–8.3)
PROT UR-MCNC: 15
RBC # BLD: 4.4 M/UL — SIGNIFICANT CHANGE UP (ref 4.2–5.8)
RBC # FLD: 13 % — SIGNIFICANT CHANGE UP (ref 10.3–14.5)
SODIUM SERPL-SCNC: 140 MMOL/L — SIGNIFICANT CHANGE UP (ref 135–145)
SP GR SPEC: 1.02 — SIGNIFICANT CHANGE UP (ref 1.01–1.02)
UROBILINOGEN FLD QL: NEGATIVE — SIGNIFICANT CHANGE UP
WBC # BLD: 10.08 K/UL — SIGNIFICANT CHANGE UP (ref 3.8–10.5)
WBC # FLD AUTO: 10.08 K/UL — SIGNIFICANT CHANGE UP (ref 3.8–10.5)

## 2021-12-02 PROCEDURE — 74176 CT ABD & PELVIS W/O CONTRAST: CPT | Mod: 26,MA

## 2021-12-02 PROCEDURE — 99285 EMERGENCY DEPT VISIT HI MDM: CPT

## 2021-12-02 PROCEDURE — 87086 URINE CULTURE/COLONY COUNT: CPT

## 2021-12-02 PROCEDURE — 96374 THER/PROPH/DIAG INJ IV PUSH: CPT

## 2021-12-02 PROCEDURE — 80053 COMPREHEN METABOLIC PANEL: CPT

## 2021-12-02 PROCEDURE — 74176 CT ABD & PELVIS W/O CONTRAST: CPT | Mod: MA

## 2021-12-02 PROCEDURE — 99284 EMERGENCY DEPT VISIT MOD MDM: CPT | Mod: 25

## 2021-12-02 PROCEDURE — 36415 COLL VENOUS BLD VENIPUNCTURE: CPT

## 2021-12-02 PROCEDURE — 83690 ASSAY OF LIPASE: CPT

## 2021-12-02 PROCEDURE — 81001 URINALYSIS AUTO W/SCOPE: CPT

## 2021-12-02 PROCEDURE — 85025 COMPLETE CBC W/AUTO DIFF WBC: CPT

## 2021-12-02 PROCEDURE — 96375 TX/PRO/DX INJ NEW DRUG ADDON: CPT

## 2021-12-02 RX ORDER — TAMSULOSIN HYDROCHLORIDE 0.4 MG/1
1 CAPSULE ORAL
Qty: 7 | Refills: 0
Start: 2021-12-02

## 2021-12-02 RX ORDER — IBUPROFEN 200 MG
1 TABLET ORAL
Qty: 30 | Refills: 0
Start: 2021-12-02

## 2021-12-02 RX ORDER — SODIUM CHLORIDE 9 MG/ML
1000 INJECTION INTRAMUSCULAR; INTRAVENOUS; SUBCUTANEOUS ONCE
Refills: 0 | Status: COMPLETED | OUTPATIENT
Start: 2021-12-02 | End: 2021-12-02

## 2021-12-02 RX ORDER — KETOROLAC TROMETHAMINE 30 MG/ML
30 SYRINGE (ML) INJECTION ONCE
Refills: 0 | Status: DISCONTINUED | OUTPATIENT
Start: 2021-12-02 | End: 2021-12-02

## 2021-12-02 RX ORDER — TAMSULOSIN HYDROCHLORIDE 0.4 MG/1
0.4 CAPSULE ORAL ONCE
Refills: 0 | Status: COMPLETED | OUTPATIENT
Start: 2021-12-02 | End: 2021-12-02

## 2021-12-02 RX ORDER — ONDANSETRON 8 MG/1
4 TABLET, FILM COATED ORAL ONCE
Refills: 0 | Status: COMPLETED | OUTPATIENT
Start: 2021-12-02 | End: 2021-12-02

## 2021-12-02 RX ADMIN — ONDANSETRON 4 MILLIGRAM(S): 8 TABLET, FILM COATED ORAL at 09:12

## 2021-12-02 RX ADMIN — SODIUM CHLORIDE 1000 MILLILITER(S): 9 INJECTION INTRAMUSCULAR; INTRAVENOUS; SUBCUTANEOUS at 09:11

## 2021-12-02 RX ADMIN — Medication 30 MILLIGRAM(S): at 09:11

## 2021-12-02 RX ADMIN — TAMSULOSIN HYDROCHLORIDE 0.4 MILLIGRAM(S): 0.4 CAPSULE ORAL at 12:15

## 2021-12-02 RX ADMIN — Medication 30 MILLIGRAM(S): at 09:40

## 2021-12-02 NOTE — ED PROVIDER NOTE - PHYSICAL EXAMINATION
no testicular tenderness to palpation. tenderness to palpation to right side of abdomen, more Right mid, but also slight RUQ and RLQ

## 2021-12-02 NOTE — ED PROVIDER NOTE - PATIENT PORTAL LINK FT
You can access the FollowMyHealth Patient Portal offered by [facility] by registering at the following website: [email or web address]. By joining [employer]’s FollowMyHealth portal, you will also be able to view your health information using other applications (apps) compatible with our system.

## 2021-12-02 NOTE — ED PROVIDER NOTE - CLINICAL SUMMARY MEDICAL DECISION MAKING FREE TEXT BOX
patient with right abdominal pain for 5 days. suspicion for renal colic. will obtain labs CT scan pain control reassess

## 2021-12-02 NOTE — ED ADULT NURSE NOTE - OBJECTIVE STATEMENT
Pt AOx4, ambulatory, c/o right groin pain , nausea and 1 episode of vomiting since 0200 this morning, Pt denies fever, hematuria, dysuria,.

## 2021-12-02 NOTE — ED PROVIDER NOTE - OBJECTIVE STATEMENT
PT with right sided abdominal pain for 5 days, getting worse. He went to his primary care physician who thought it might be his gallbladder and prescribed cipro. No relief from medication. Patient is status post appendicitis 20 years ago. notes mild nausea. No fever no diarrhea.

## 2021-12-02 NOTE — ED PROVIDER NOTE - NSFOLLOWUPINSTRUCTIONS_ED_ALL_ED_FT
Cólico renal    LO QUE NECESITA SABER:    Un cólico renal es un dolor intenso en la parte inferior de la espalda o en los costados. Usualmente el dolor se siente en un costado, cat podría presentarse en ambos lados de la parte inferior de burgos espalda. El cólico renal podría comenzar rápidamente, ir y venir, y empeorar con el paso del tiempo. Un cólico renal es causado por david obstrucción en el tracto urinario. La causa más común de david obstrucción es david zully en el riñón. Los coágulos de abraham, espasmos en los uréteres y tejido muerto también podrían obstruir burgos tracto urinario.    Aparato urinario femenino                INSTRUCCIONES SOBRE EL ALBERTO HOSPITALARIA:    Regrese a la pro de emergencias si:  •Usted no puede dejar de vomitar.      •Usted nota sangrado nuevo o en aumento cuando orina.      •Usted está orinando menos que de costumbre, o nada en lo absoluto.      •Burgos dolor no mejora aún después del tratamiento.      Llame a burgos médico si:  •Usted tiene fiebre.      •Usted necesita orinar con más frecuencia de lo normal o inmediatamente.      •Usted nota un cálculo en el filtro de la orina después de orinar.      •Usted tiene preguntas o inquietudes acerca de burgos condición o cuidado.      Medicamentos:  •Los medicamentospodrían ayudar a disminuir el dolor y los espasmos musculares. También podría necesitar medicación para calmar el estómago y dejar de vomitar.      •[place] ron medicamentos cathleen se le haya indicado.Consulte con burgos médico si usted donny que burgos medicamento no le está ayudando o si presenta efectos secundarios. Infórmele si es alérgico a algún medicamento. Mantenga david lista actualizada de los medicamentos, las vitaminas y los productos herbales que jaden. Incluya los siguientes datos de los medicamentos: cantidad, frecuencia y motivo de administración. Traiga con usted la lista o los envases de las píldoras a ron citas de seguimiento. Lleve la lista de los medicamentos con usted en contreras de david emergencia.      El manejo de ron síntomas:  •[place] líquidos cathleen se le haya indicado.[place] ayudará a disminuir el dolor y a desechar las obstrucciones del tracto urinario. Pregunte cuánto líquido debe anival cada día y cuáles líquidos son los más adecuados para usted. Es posible que necesite anival alrededor de 3 litros (12 vasos) de líquidos cada día. La mitad de la cantidad total de líquido que jaden a diario debe ser agua. Limite la cantidad de café, té y gaseosas que jaden a 2 vasos al día. La orina debería estar pálida y lucie.      •Filtre burgos orina cada vez que orine.Orine en un colador (embudo con david aneta ana en la parte inferior) o un recipiente de mery para recoger los cálculos renales. Entregue las piedras del riñón a burgos médico en la próxima omkar.  Buscar piedras en el filtro           •Consuma alimentos saludables y variados.Los alimentos saludables incluyen frutas, verduras, pan integral, productos lácteos bajos en grasa, frijoles, hesham magras y pescado. Es posible que necesite aumentar la cantidad de cítricos que consume, cathleen las naranjas. Pregunte a burgos médico cuánta sal, calcio y proteína usted debería consumir.  Alimentos saludables           •Evite realizar actividades en temperaturas altas.El calor podría provocar que se deshidrate y que orine menos.      Acuda a la consulta de control con burgos médico según las indicaciones:Es posible que usted necesite regresar para que le realicen exámenes con el fin de revisar si la obstrucción ha desaparecido. Anote ron preguntas para que se acuerde de hacerlas urban ron visitas.

## 2021-12-03 LAB
CULTURE RESULTS: SIGNIFICANT CHANGE UP
SPECIMEN SOURCE: SIGNIFICANT CHANGE UP

## 2021-12-11 ENCOUNTER — TRANSCRIPTION ENCOUNTER (OUTPATIENT)
Age: 56
End: 2021-12-11

## 2021-12-12 ENCOUNTER — INPATIENT (INPATIENT)
Facility: HOSPITAL | Age: 56
LOS: 0 days | Discharge: HOME | DRG: 661 | End: 2021-12-13
Attending: UROLOGY | Admitting: UROLOGY
Payer: MEDICAID

## 2021-12-12 VITALS
RESPIRATION RATE: 16 BRPM | HEIGHT: 63 IN | TEMPERATURE: 98 F | OXYGEN SATURATION: 97 % | WEIGHT: 166.89 LBS | SYSTOLIC BLOOD PRESSURE: 190 MMHG | DIASTOLIC BLOOD PRESSURE: 90 MMHG | HEART RATE: 61 BPM

## 2021-12-12 DIAGNOSIS — N17.9 ACUTE KIDNEY FAILURE, UNSPECIFIED: ICD-10-CM

## 2021-12-12 LAB
ALBUMIN SERPL ELPH-MCNC: 3.6 G/DL — SIGNIFICANT CHANGE UP (ref 3.5–5)
ALP SERPL-CCNC: 54 U/L — SIGNIFICANT CHANGE UP (ref 40–120)
ALT FLD-CCNC: 16 U/L DA — SIGNIFICANT CHANGE UP (ref 10–60)
ANION GAP SERPL CALC-SCNC: 9 MMOL/L — SIGNIFICANT CHANGE UP (ref 5–17)
APTT BLD: 35.6 SEC — HIGH (ref 27.5–35.5)
AST SERPL-CCNC: 18 U/L — SIGNIFICANT CHANGE UP (ref 10–40)
BASOPHILS # BLD AUTO: 0.03 K/UL — SIGNIFICANT CHANGE UP (ref 0–0.2)
BASOPHILS NFR BLD AUTO: 0.2 % — SIGNIFICANT CHANGE UP (ref 0–2)
BILIRUB DIRECT SERPL-MCNC: 0.2 MG/DL — SIGNIFICANT CHANGE UP (ref 0–0.3)
BILIRUB INDIRECT FLD-MCNC: 0.7 MG/DL — SIGNIFICANT CHANGE UP (ref 0.2–1)
BILIRUB SERPL-MCNC: 0.9 MG/DL — SIGNIFICANT CHANGE UP (ref 0.2–1.2)
BLD GP AB SCN SERPL QL: SIGNIFICANT CHANGE UP
BUN SERPL-MCNC: 43 MG/DL — HIGH (ref 7–18)
CALCIUM SERPL-MCNC: 8.5 MG/DL — SIGNIFICANT CHANGE UP (ref 8.4–10.5)
CHLORIDE SERPL-SCNC: 111 MMOL/L — HIGH (ref 96–108)
CO2 SERPL-SCNC: 19 MMOL/L — LOW (ref 22–31)
CREAT SERPL-MCNC: 4.84 MG/DL — HIGH (ref 0.5–1.3)
EOSINOPHIL # BLD AUTO: 0.04 K/UL — SIGNIFICANT CHANGE UP (ref 0–0.5)
EOSINOPHIL NFR BLD AUTO: 0.3 % — SIGNIFICANT CHANGE UP (ref 0–6)
GLUCOSE SERPL-MCNC: 112 MG/DL — HIGH (ref 70–99)
HCT VFR BLD CALC: 39.7 % — SIGNIFICANT CHANGE UP (ref 39–50)
HGB BLD-MCNC: 12.9 G/DL — LOW (ref 13–17)
IMM GRANULOCYTES NFR BLD AUTO: 0.2 % — SIGNIFICANT CHANGE UP (ref 0–1.5)
INR BLD: 1.14 RATIO — SIGNIFICANT CHANGE UP (ref 0.88–1.16)
LACTATE SERPL-SCNC: 0.5 MMOL/L — LOW (ref 0.7–2)
LIDOCAIN IGE QN: 92 U/L — SIGNIFICANT CHANGE UP (ref 73–393)
LYMPHOCYTES # BLD AUTO: 1.59 K/UL — SIGNIFICANT CHANGE UP (ref 1–3.3)
LYMPHOCYTES # BLD AUTO: 13.1 % — SIGNIFICANT CHANGE UP (ref 13–44)
MCHC RBC-ENTMCNC: 28.9 PG — SIGNIFICANT CHANGE UP (ref 27–34)
MCHC RBC-ENTMCNC: 32.5 GM/DL — SIGNIFICANT CHANGE UP (ref 32–36)
MCV RBC AUTO: 89 FL — SIGNIFICANT CHANGE UP (ref 80–100)
MONOCYTES # BLD AUTO: 0.95 K/UL — HIGH (ref 0–0.9)
MONOCYTES NFR BLD AUTO: 7.9 % — SIGNIFICANT CHANGE UP (ref 2–14)
NEUTROPHILS # BLD AUTO: 9.46 K/UL — HIGH (ref 1.8–7.4)
NEUTROPHILS NFR BLD AUTO: 78.3 % — HIGH (ref 43–77)
NRBC # BLD: 0 /100 WBCS — SIGNIFICANT CHANGE UP (ref 0–0)
PLATELET # BLD AUTO: 174 K/UL — SIGNIFICANT CHANGE UP (ref 150–400)
POTASSIUM SERPL-MCNC: 4.2 MMOL/L — SIGNIFICANT CHANGE UP (ref 3.5–5.3)
POTASSIUM SERPL-SCNC: 4.2 MMOL/L — SIGNIFICANT CHANGE UP (ref 3.5–5.3)
PROT SERPL-MCNC: 7.9 G/DL — SIGNIFICANT CHANGE UP (ref 6–8.3)
PROTHROM AB SERPL-ACNC: 13.5 SEC — SIGNIFICANT CHANGE UP (ref 10.6–13.6)
RBC # BLD: 4.46 M/UL — SIGNIFICANT CHANGE UP (ref 4.2–5.8)
RBC # FLD: 12.6 % — SIGNIFICANT CHANGE UP (ref 10.3–14.5)
SARS-COV-2 RNA SPEC QL NAA+PROBE: SIGNIFICANT CHANGE UP
SODIUM SERPL-SCNC: 139 MMOL/L — SIGNIFICANT CHANGE UP (ref 135–145)
TROPONIN I, HIGH SENSITIVITY RESULT: 8.6 NG/L — SIGNIFICANT CHANGE UP
WBC # BLD: 12.1 K/UL — HIGH (ref 3.8–10.5)
WBC # FLD AUTO: 12.1 K/UL — HIGH (ref 3.8–10.5)

## 2021-12-12 PROCEDURE — 99285 EMERGENCY DEPT VISIT HI MDM: CPT

## 2021-12-12 PROCEDURE — 74176 CT ABD & PELVIS W/O CONTRAST: CPT | Mod: 26,MA

## 2021-12-12 PROCEDURE — 52332 CYSTOSCOPY AND TREATMENT: CPT | Mod: 50

## 2021-12-12 RX ORDER — HYDROMORPHONE HYDROCHLORIDE 2 MG/ML
1 INJECTION INTRAMUSCULAR; INTRAVENOUS; SUBCUTANEOUS
Refills: 0 | Status: DISCONTINUED | OUTPATIENT
Start: 2021-12-12 | End: 2021-12-12

## 2021-12-12 RX ORDER — MORPHINE SULFATE 50 MG/1
4 CAPSULE, EXTENDED RELEASE ORAL ONCE
Refills: 0 | Status: DISCONTINUED | OUTPATIENT
Start: 2021-12-12 | End: 2021-12-12

## 2021-12-12 RX ORDER — ONDANSETRON 8 MG/1
4 TABLET, FILM COATED ORAL EVERY 6 HOURS
Refills: 0 | Status: DISCONTINUED | OUTPATIENT
Start: 2021-12-12 | End: 2021-12-13

## 2021-12-12 RX ORDER — HYDROMORPHONE HYDROCHLORIDE 2 MG/ML
1 INJECTION INTRAMUSCULAR; INTRAVENOUS; SUBCUTANEOUS EVERY 4 HOURS
Refills: 0 | Status: DISCONTINUED | OUTPATIENT
Start: 2021-12-12 | End: 2021-12-13

## 2021-12-12 RX ORDER — SODIUM CHLORIDE 9 MG/ML
1000 INJECTION, SOLUTION INTRAVENOUS
Refills: 0 | Status: DISCONTINUED | OUTPATIENT
Start: 2021-12-12 | End: 2021-12-12

## 2021-12-12 RX ORDER — SODIUM CHLORIDE 9 MG/ML
1000 INJECTION, SOLUTION INTRAVENOUS ONCE
Refills: 0 | Status: COMPLETED | OUTPATIENT
Start: 2021-12-12 | End: 2021-12-12

## 2021-12-12 RX ORDER — HYDROMORPHONE HYDROCHLORIDE 2 MG/ML
1 INJECTION INTRAMUSCULAR; INTRAVENOUS; SUBCUTANEOUS ONCE
Refills: 0 | Status: DISCONTINUED | OUTPATIENT
Start: 2021-12-12 | End: 2021-12-12

## 2021-12-12 RX ORDER — SODIUM CHLORIDE 9 MG/ML
1000 INJECTION, SOLUTION INTRAVENOUS
Refills: 0 | Status: COMPLETED | OUTPATIENT
Start: 2021-12-12 | End: 2021-12-12

## 2021-12-12 RX ORDER — TAMSULOSIN HYDROCHLORIDE 0.4 MG/1
0.4 CAPSULE ORAL AT BEDTIME
Refills: 0 | Status: DISCONTINUED | OUTPATIENT
Start: 2021-12-12 | End: 2021-12-13

## 2021-12-12 RX ORDER — SODIUM CHLORIDE 9 MG/ML
1000 INJECTION INTRAMUSCULAR; INTRAVENOUS; SUBCUTANEOUS ONCE
Refills: 0 | Status: COMPLETED | OUTPATIENT
Start: 2021-12-12 | End: 2021-12-12

## 2021-12-12 RX ORDER — HEPARIN SODIUM 5000 [USP'U]/ML
5000 INJECTION INTRAVENOUS; SUBCUTANEOUS EVERY 8 HOURS
Refills: 0 | Status: DISCONTINUED | OUTPATIENT
Start: 2021-12-12 | End: 2021-12-13

## 2021-12-12 RX ORDER — TAMSULOSIN HYDROCHLORIDE 0.4 MG/1
0.4 CAPSULE ORAL ONCE
Refills: 0 | Status: COMPLETED | OUTPATIENT
Start: 2021-12-12 | End: 2021-12-12

## 2021-12-12 RX ORDER — HYDROMORPHONE HYDROCHLORIDE 2 MG/ML
0.5 INJECTION INTRAMUSCULAR; INTRAVENOUS; SUBCUTANEOUS
Refills: 0 | Status: DISCONTINUED | OUTPATIENT
Start: 2021-12-12 | End: 2021-12-12

## 2021-12-12 RX ADMIN — HYDROMORPHONE HYDROCHLORIDE 1 MILLIGRAM(S): 2 INJECTION INTRAMUSCULAR; INTRAVENOUS; SUBCUTANEOUS at 11:58

## 2021-12-12 RX ADMIN — HYDROMORPHONE HYDROCHLORIDE 1 MILLIGRAM(S): 2 INJECTION INTRAMUSCULAR; INTRAVENOUS; SUBCUTANEOUS at 12:28

## 2021-12-12 RX ADMIN — TAMSULOSIN HYDROCHLORIDE 0.4 MILLIGRAM(S): 0.4 CAPSULE ORAL at 22:25

## 2021-12-12 RX ADMIN — TAMSULOSIN HYDROCHLORIDE 0.4 MILLIGRAM(S): 0.4 CAPSULE ORAL at 14:24

## 2021-12-12 RX ADMIN — MORPHINE SULFATE 4 MILLIGRAM(S): 50 CAPSULE, EXTENDED RELEASE ORAL at 07:32

## 2021-12-12 RX ADMIN — HEPARIN SODIUM 5000 UNIT(S): 5000 INJECTION INTRAVENOUS; SUBCUTANEOUS at 14:24

## 2021-12-12 RX ADMIN — HEPARIN SODIUM 5000 UNIT(S): 5000 INJECTION INTRAVENOUS; SUBCUTANEOUS at 21:20

## 2021-12-12 RX ADMIN — SODIUM CHLORIDE 1000 MILLILITER(S): 9 INJECTION INTRAMUSCULAR; INTRAVENOUS; SUBCUTANEOUS at 06:51

## 2021-12-12 RX ADMIN — SODIUM CHLORIDE 120 MILLILITER(S): 9 INJECTION, SOLUTION INTRAVENOUS at 18:20

## 2021-12-12 RX ADMIN — SODIUM CHLORIDE 1000 MILLILITER(S): 9 INJECTION, SOLUTION INTRAVENOUS at 14:32

## 2021-12-12 RX ADMIN — SODIUM CHLORIDE 1000 MILLILITER(S): 9 INJECTION INTRAMUSCULAR; INTRAVENOUS; SUBCUTANEOUS at 07:51

## 2021-12-12 RX ADMIN — MORPHINE SULFATE 4 MILLIGRAM(S): 50 CAPSULE, EXTENDED RELEASE ORAL at 08:02

## 2021-12-12 NOTE — PATIENT PROFILE ADULT - FALL HARM RISK - HARM RISK INTERVENTIONS

## 2021-12-12 NOTE — ED PROVIDER NOTE - CARE PLAN
1 Principal Discharge DX:	Renal colic   Principal Discharge DX:	BECKY (acute kidney injury)  Secondary Diagnosis:	Renal colic

## 2021-12-12 NOTE — ED PROVIDER NOTE - NS ED ROS FT
(+) abd pain, constipation  + decr urination  (-) fevers, chills  (-) dizziness, lightheadedness, vision changes  (-) neck pain, stiffness  (-) cp, palpitations  (-) sob, cough, hemoptysis  (-) n/v/d  (-) urinary sxs, back pain  (-) weakness, paresthesias

## 2021-12-12 NOTE — ED PROVIDER NOTE - CLINICAL SUMMARY MEDICAL DECISION MAKING FREE TEXT BOX
55yo M p/w abd pain  was recently seen in ER and dc'd with ureteral stone  pain now migrated from testicle and flank to generalized abd  +constipation and decr urination  bladder scan showing no urine in bladder  labs, ivf, ct, pain control, reassess

## 2021-12-12 NOTE — ED PROVIDER NOTE - PATIENT PORTAL LINK FT
You can access the FollowMyHealth Patient Portal offered by [facility] by registering at the following website: [email or web address]. By joining [employer]’s FollowMyHealth portal, you will also be able to view your health information using other applications (apps) compatible with our system.

## 2021-12-12 NOTE — H&P ADULT - ASSESSMENT
55yo m with PMHx of HTN presents to the ED with diffuse, crampy to very sharp and persistent lt flank pain often referred to the lt inguinal area into the scrotum .Vitals are stable, mild leucocytosis, BECKY with Cr -4 from 1.55 (9days ago). CT shows b/l ureteral stones with hydronephrosis. Will admit     Admit to Dr Hamilton   OR-cystoscopy with stent placement this PM  NPO, IVF  Pain/Nausea control   Flomax now and qhs  f/up labs   DVT PPx

## 2021-12-12 NOTE — BRIEF OPERATIVE NOTE - NSICDXBRIEFPOSTOP_GEN_ALL_CORE_FT
POST-OP DIAGNOSIS:  Ureteral stone 12-Dec-2021 17:07:52  Salena Hamilton  BECKY (acute kidney injury) 12-Dec-2021 17:08:00  Salena Hamilton  Anuria 12-Dec-2021 17:08:08  Salena Hamilton

## 2021-12-12 NOTE — ED PROVIDER NOTE - NSFOLLOWUPINSTRUCTIONS_ED_ALL_ED_FT
Cólico renal    LO QUE NECESITA SABER:    ¿Qué es un cólico renal?Un cólico renal es un dolor intenso en la parte inferior de la espalda o en los costados. Usualmente el dolor se siente en un costado, cat podría presentarse en ambos lados de la parte inferior de burgos espalda. El cólico renal podría comenzar rápidamente, ir y venir, y empeorar con el paso del tiempo.    Aparato urinario femenino                ¿Qué provoca un cólico renal?Un cólico renal es causado por david obstrucción en el tracto urinario. El tracto urinario incluye a los riñones, uréteres, vejiga y uretra. Los uréteres llevan la orina de los riñones a la vejiga. La uretra transporta la orina hacia el exterior cuando usted orina. La causa más común de david obstrucción en el tracto urinario es david zully en el riñón. Los coágulos de abraham, espasmos en los uréteres y tejido muerto también podrían obstruir burgos tracto urinario.    ¿Qué otros signos y síntomas podrían ocurrir con un cólico renal?  •Dolor intenso en la parte inferior de la espalda, en el abdomen o en la josue      •Dolor al orinar      •Náuseas y vómitos      •Sentir la necesidad de orinar frecuentemente, o inmediatamente      •Orinar menos de lo que es normal para usted, o no está orinando en lo absoluto      •Fiebre      ¿Cómo se diagnostica un cólico renal?  •Exámenes de abraham y orinapodrían mostrar david infección o la función del riñón.      •David radiografía, un ultrasonido, david tomografía computarizada o david imagen por resonancia magnética (IRM)podrían mostrar david zully en el riñón u otras causas de burgos dolor. Es probable que le administren un medio de contraste para que burgos tracto urinario se aprecie mejor en las imágenes. Dígale al médico si usted alguna vez ha tenido david reacción alérgica al líquido de contraste. No entre a la pro donde se realiza la resonancia magnética con algo de metal. El metal puede causar lesiones serias. Dígale al médico si usted tiene algo de metal dentro de burgos cuerpo o por encima.      ¿Cómo se trata un cólico renal?  •Los medicamentospodrían ayudar a disminuir el dolor y los espasmos musculares. También podría necesitar medicación para calmar el estómago y dejar de vomitar.      •La cirugíapodría ser necesaria para quitar david obstrucción. La cirugía también podría ser necesaria si ron riñones no están funcionando correctamente.      ¿Cómo puedo controlar los síntomas?  •Holley líquidos cathleen se le haya indicado.[place] ayudará a disminuir el dolor y a desechar las obstrucciones del tracto urinario. Pregunte cuánto líquido debe anival cada día y cuáles líquidos son los más adecuados para usted. Es posible que necesite anival alrededor de 3 litros (12 vasos) de líquidos cada día. La mitad de la cantidad total de líquido que jaden a diario debe ser agua. Limite la cantidad de café, té y gaseosas que jaden a 2 vasos al día. La orina debería estar pálida y lucie.      •Filtre burgos orina cada vez que orine.Orine en un colador (embudo con david aneta ana en la parte inferior) o un recipiente de mery para recoger los cálculos renales. Entregue las piedras del riñón a burgos médico en la próxima omkar.  Buscar piedras en el filtro           •Consuma alimentos saludables y variados.Los alimentos saludables incluyen frutas, verduras, pan integral, productos lácteos bajos en grasa, frijoles, hesham magras y pescado. Es posible que necesite aumentar la cantidad de cítricos que consume, cathleen las naranjas. Pregunte a burgos médico cuánta sal, calcio y proteína usted debería consumir.  Alimentos saludables           •Evite realizar actividades en temperaturas altas.El calor podría provocar que se deshidrate y que orine menos.      ¿Cuándo yonis buscar atención inmediata?  •Usted no puede dejar de vomitar.      •Usted nota sangrado nuevo o en aumento cuando orina.      •Usted está orinando menos que de costumbre, o nada en lo absoluto.      •Burgos dolor no mejora aún después del tratamiento.      ¿Cuándo yonis llamar a mi médico?  •Usted tiene fiebre.      •Usted necesita orinar con más frecuencia de lo normal o inmediatamente.      •Usted nota un cálculo en el filtro de la orina después de orinar.      •Usted tiene preguntas o inquietudes acerca de burgos condición o cuidado.      ACUERDOS SOBRE BURGOS CUIDADO:    Usted tiene el derecho de ayudar a planear burgos cuidado. Aprenda todo lo que pueda sobre burgos condición y cathleen darle tratamiento. Discuta ron opciones de tratamiento con ron médicos para decidir el cuidado que usted desea recibir. Usted siempre tiene el derecho de rechazar el tratamiento.

## 2021-12-12 NOTE — ED PROVIDER NOTE - PROGRESS NOTE DETAILS
katherine reviewed ct scan  pain meds sent to pharmacy  will follow up with pmd and urology katherine reviewed ct scan  pt with obstructive renal colic with BECKY will admit

## 2021-12-12 NOTE — H&P ADULT - NSHPPHYSICALEXAM_GEN_ALL_CORE
Vital Signs Last 24 Hrs  T(C): 37.4 (12 Dec 2021 12:25), Max: 37.4 (12 Dec 2021 12:25)  T(F): 99.3 (12 Dec 2021 12:25), Max: 99.3 (12 Dec 2021 12:25)  HR: 66 (12 Dec 2021 12:25) (61 - 66)  BP: 118/70 (12 Dec 2021 12:25) (118/70 - 190/90)  BP(mean): --  RR: 18 (12 Dec 2021 12:25) (16 - 18)  SpO2: 95% (12 Dec 2021 12:25) (95% - 97%)    General:  A&Ox3,Appears stated age, No acute distress,  Head: NC/AT  EENT: PERRLA. EOMI. Conjunctiva and sclera clear. Pharynx clear.  Neck: Supple. No JVD  Lungs: CTA B/l. Nonlabored Respirations  CV: +S1S2, RRR  Abdomen: Soft, Nondistended,  Nontender, no guarding, no rebound  Back: Lt flank CVA tenderness , no midline tenderness   Extremities: Warm and well perfused. 2+ peripheral pulses b/l. Calf soft, nontender b/l. No pedal edema.

## 2021-12-12 NOTE — BRIEF OPERATIVE NOTE - NSICDXBRIEFPREOP_GEN_ALL_CORE_FT
PRE-OP DIAGNOSIS:  Ureteral stone 12-Dec-2021 17:07:22  Salena Hamilton  BECKY (acute kidney injury) 12-Dec-2021 17:07:33  Salena Hamilton  Anuria 12-Dec-2021 17:07:40  Salena Hamilton

## 2021-12-12 NOTE — H&P ADULT - ATTENDING COMMENTS
Pt seen and examined. Agree with note as written above    - OR urgently for bilateral ureteral stent placement

## 2021-12-12 NOTE — H&P ADULT - HISTORY OF PRESENT ILLNESS
57yo m with PMHx of HTN presents to the ED with Lt flank pain x 2 days. Pain started suddenly in the lt flank described as diffuse, crampy to very sharp and persistent often referred to the lt inguinal area into the scrotum . Admits nausea, but denies vomiting , chills, fever . Patient was seen in the ER 9 days ago for testicular and flank pain, found to have ureteral stone and discharged with no complications.  Also endorses minimal urination in past day and no BM in past two days. Denies  cp, sob, lightheadedness and any other complaints at this time.

## 2021-12-12 NOTE — ED PROVIDER NOTE - PHYSICAL EXAMINATION
Sohrawardy:   VS reviewed  aaox3  rrr, s1s2  normal resp effort  lungs ctab, no w/r/r  abdomen nondistended, soft, +ttp diffusely with voluntary guarding, no rebound  no cva tenderness b/l  no neuro deficits

## 2021-12-12 NOTE — H&P ADULT - NSHPLABSRESULTS_GEN_ALL_CORE
12.9   12.10 )-----------( 174      ( 12 Dec 2021 07:03 )             39.7   12-12    139  |  111<H>  |  43<H>  ----------------------------<  112<H>  4.2   |  19<L>  |  4.84<H>    Ca    8.5      12 Dec 2021 07:03    TPro  7.9  /  Alb  3.6  /  TBili  0.9  /  DBili  0.2  /  AST  18  /  ALT  16  /  AlkPhos  54  12-12      < from: CT Abdomen and Pelvis No Cont (12.12.21 @ 09:26) >    FINDINGS:  LOWER CHEST: Calcified inferior hilar nodes. Stable bilateral groundglass   opacities.    LIVER: Within normallimits.  BILE DUCTS: Normal caliber.  GALLBLADDER: Within normal limits.  SPLEEN: Within normal limits.  PANCREAS: Within normal limits.  ADRENALS: Bilateral adrenal adenomas, approximate 1 cm on the LEFT and   1.3 cm on the RIGHT  KIDNEYS/URETERS: Minimal RIGHT hydronephrosis and proximal hydroureter   with an obstructing calculus at the RIGHT ureteropelvic junction   measuring 4 mm. Additional nonobstructing stone is seen in the interpolar   region of the RIGHT kidney.  Previously seen intrarenal stone on the LEFT is now located at the LEFT   ureteropelvic junction and measures 6 mm in size. There is mild   left-sided hydronephrosis and proximal hydroureter.    BLADDER: Collapsed.  REPRODUCTIVE ORGANS: Normal prostate    BOWEL: No bowel obstruction. Appendix is not visualized. No evidence of   inflammation in the pericecal region.  PERITONEUM: No ascites.  VESSELS: Within normal limits.  RETROPERITONEUM/LYMPH NODES: No lymphadenopathy.  ABDOMINAL WALL: Within normal limits.  BONES: Within normal limits.    IMPRESSION:  Bilateral renal obstruction:  1.  New left-sided hydronephrosis and hydroureter due to obstructing 6 mm   stone at the LEFT ureteropelvic junction.  2.  Stable RIGHT UPJ stone with mild RIGHT hydronephrosis and hydroureter.  Bilateral stable adrenal adenomas.    < end of copied text >

## 2021-12-12 NOTE — BRIEF OPERATIVE NOTE - NSICDXBRIEFPROCEDURE_GEN_ALL_CORE_FT
PROCEDURES:  Cystoscopy with bilateral retrograde pyelography with insertion of ureteral stent 12-Dec-2021 17:06:54  Salena Hamilton

## 2021-12-13 ENCOUNTER — TRANSCRIPTION ENCOUNTER (OUTPATIENT)
Age: 56
End: 2021-12-13

## 2021-12-13 VITALS
SYSTOLIC BLOOD PRESSURE: 137 MMHG | TEMPERATURE: 99 F | HEART RATE: 66 BPM | OXYGEN SATURATION: 95 % | RESPIRATION RATE: 18 BRPM | DIASTOLIC BLOOD PRESSURE: 77 MMHG

## 2021-12-13 LAB
ANION GAP SERPL CALC-SCNC: 8 MMOL/L — SIGNIFICANT CHANGE UP (ref 5–17)
BASOPHILS # BLD AUTO: 0.02 K/UL — SIGNIFICANT CHANGE UP (ref 0–0.2)
BASOPHILS NFR BLD AUTO: 0.2 % — SIGNIFICANT CHANGE UP (ref 0–2)
BUN SERPL-MCNC: 41 MG/DL — HIGH (ref 7–18)
CALCIUM SERPL-MCNC: 8.5 MG/DL — SIGNIFICANT CHANGE UP (ref 8.4–10.5)
CHLORIDE SERPL-SCNC: 111 MMOL/L — HIGH (ref 96–108)
CO2 SERPL-SCNC: 23 MMOL/L — SIGNIFICANT CHANGE UP (ref 22–31)
CREAT SERPL-MCNC: 2.85 MG/DL — HIGH (ref 0.5–1.3)
EOSINOPHIL # BLD AUTO: 0.13 K/UL — SIGNIFICANT CHANGE UP (ref 0–0.5)
EOSINOPHIL NFR BLD AUTO: 1.5 % — SIGNIFICANT CHANGE UP (ref 0–6)
GLUCOSE SERPL-MCNC: 101 MG/DL — HIGH (ref 70–99)
HCT VFR BLD CALC: 36.4 % — LOW (ref 39–50)
HGB BLD-MCNC: 11.8 G/DL — LOW (ref 13–17)
IMM GRANULOCYTES NFR BLD AUTO: 0.5 % — SIGNIFICANT CHANGE UP (ref 0–1.5)
LYMPHOCYTES # BLD AUTO: 1.64 K/UL — SIGNIFICANT CHANGE UP (ref 1–3.3)
LYMPHOCYTES # BLD AUTO: 18.9 % — SIGNIFICANT CHANGE UP (ref 13–44)
MAGNESIUM SERPL-MCNC: 2.5 MG/DL — SIGNIFICANT CHANGE UP (ref 1.6–2.6)
MCHC RBC-ENTMCNC: 29.2 PG — SIGNIFICANT CHANGE UP (ref 27–34)
MCHC RBC-ENTMCNC: 32.4 GM/DL — SIGNIFICANT CHANGE UP (ref 32–36)
MCV RBC AUTO: 90.1 FL — SIGNIFICANT CHANGE UP (ref 80–100)
MONOCYTES # BLD AUTO: 0.63 K/UL — SIGNIFICANT CHANGE UP (ref 0–0.9)
MONOCYTES NFR BLD AUTO: 7.3 % — SIGNIFICANT CHANGE UP (ref 2–14)
NEUTROPHILS # BLD AUTO: 6.22 K/UL — SIGNIFICANT CHANGE UP (ref 1.8–7.4)
NEUTROPHILS NFR BLD AUTO: 71.6 % — SIGNIFICANT CHANGE UP (ref 43–77)
NRBC # BLD: 0 /100 WBCS — SIGNIFICANT CHANGE UP (ref 0–0)
PHOSPHATE SERPL-MCNC: 5.1 MG/DL — HIGH (ref 2.5–4.5)
PLATELET # BLD AUTO: 172 K/UL — SIGNIFICANT CHANGE UP (ref 150–400)
POTASSIUM SERPL-MCNC: 4 MMOL/L — SIGNIFICANT CHANGE UP (ref 3.5–5.3)
POTASSIUM SERPL-SCNC: 4 MMOL/L — SIGNIFICANT CHANGE UP (ref 3.5–5.3)
RBC # BLD: 4.04 M/UL — LOW (ref 4.2–5.8)
RBC # FLD: 12.8 % — SIGNIFICANT CHANGE UP (ref 10.3–14.5)
SODIUM SERPL-SCNC: 142 MMOL/L — SIGNIFICANT CHANGE UP (ref 135–145)
WBC # BLD: 8.68 K/UL — SIGNIFICANT CHANGE UP (ref 3.8–10.5)
WBC # FLD AUTO: 8.68 K/UL — SIGNIFICANT CHANGE UP (ref 3.8–10.5)

## 2021-12-13 PROCEDURE — C2625: CPT

## 2021-12-13 PROCEDURE — 83690 ASSAY OF LIPASE: CPT

## 2021-12-13 PROCEDURE — 83605 ASSAY OF LACTIC ACID: CPT

## 2021-12-13 PROCEDURE — 74176 CT ABD & PELVIS W/O CONTRAST: CPT | Mod: MA

## 2021-12-13 PROCEDURE — 85610 PROTHROMBIN TIME: CPT

## 2021-12-13 PROCEDURE — 96374 THER/PROPH/DIAG INJ IV PUSH: CPT

## 2021-12-13 PROCEDURE — 84100 ASSAY OF PHOSPHORUS: CPT

## 2021-12-13 PROCEDURE — 86901 BLOOD TYPING SEROLOGIC RH(D): CPT

## 2021-12-13 PROCEDURE — 76000 FLUOROSCOPY <1 HR PHYS/QHP: CPT

## 2021-12-13 PROCEDURE — C2617: CPT

## 2021-12-13 PROCEDURE — 86850 RBC ANTIBODY SCREEN: CPT

## 2021-12-13 PROCEDURE — 83735 ASSAY OF MAGNESIUM: CPT

## 2021-12-13 PROCEDURE — 85730 THROMBOPLASTIN TIME PARTIAL: CPT

## 2021-12-13 PROCEDURE — 84484 ASSAY OF TROPONIN QUANT: CPT

## 2021-12-13 PROCEDURE — 96361 HYDRATE IV INFUSION ADD-ON: CPT

## 2021-12-13 PROCEDURE — 87635 SARS-COV-2 COVID-19 AMP PRB: CPT

## 2021-12-13 PROCEDURE — 99232 SBSQ HOSP IP/OBS MODERATE 35: CPT

## 2021-12-13 PROCEDURE — 86900 BLOOD TYPING SEROLOGIC ABO: CPT

## 2021-12-13 PROCEDURE — 36415 COLL VENOUS BLD VENIPUNCTURE: CPT

## 2021-12-13 PROCEDURE — 85025 COMPLETE CBC W/AUTO DIFF WBC: CPT

## 2021-12-13 PROCEDURE — 99285 EMERGENCY DEPT VISIT HI MDM: CPT

## 2021-12-13 PROCEDURE — 80076 HEPATIC FUNCTION PANEL: CPT

## 2021-12-13 PROCEDURE — 87086 URINE CULTURE/COLONY COUNT: CPT

## 2021-12-13 PROCEDURE — 80048 BASIC METABOLIC PNL TOTAL CA: CPT

## 2021-12-13 RX ADMIN — HYDROMORPHONE HYDROCHLORIDE 1 MILLIGRAM(S): 2 INJECTION INTRAMUSCULAR; INTRAVENOUS; SUBCUTANEOUS at 13:59

## 2021-12-13 RX ADMIN — HEPARIN SODIUM 5000 UNIT(S): 5000 INJECTION INTRAVENOUS; SUBCUTANEOUS at 13:56

## 2021-12-13 RX ADMIN — HEPARIN SODIUM 5000 UNIT(S): 5000 INJECTION INTRAVENOUS; SUBCUTANEOUS at 05:53

## 2021-12-13 NOTE — DISCHARGE NOTE PROVIDER - NSDCCPCAREPLAN_GEN_ALL_CORE_FT
PRINCIPAL DISCHARGE DIAGNOSIS  Diagnosis: BECKY (acute kidney injury)  Assessment and Plan of Treatment:       SECONDARY DISCHARGE DIAGNOSES  Diagnosis: Renal colic  Assessment and Plan of Treatment:

## 2021-12-13 NOTE — PROGRESS NOTE ADULT - ASSESSMENT
56y Male who is s/p cystoscopy with b/l ureteral stent placement POD #1    -- BMP pending  -- TOV today  -- pain control  -- regular diet  -- monitor outputs  -- discharge planning     Discussed with Dr. Hamilton

## 2021-12-13 NOTE — PROGRESS NOTE ADULT - SUBJECTIVE AND OBJECTIVE BOX
POST-OPERATIVE NOTE    Subjective:   56y Male s/p cystoscopy with b/l ureteral stent placement POD #0 seen and examined at bed side . Denies nausea, vomiting, chest pain, sob, fevers chills. Pain is well controlled. . Voiding .    Vital Signs Last 24 Hrs  T(C): 36.5 (12 Dec 2021 17:47), Max: 37.4 (12 Dec 2021 12:25)  T(F): 97.7 (12 Dec 2021 17:47), Max: 99.3 (12 Dec 2021 12:25)  HR: 63 (12 Dec 2021 17:47) (61 - 78)  BP: 142/69 (12 Dec 2021 17:47) (118/70 - 190/90)  BP(mean): 86 (12 Dec 2021 17:35) (86 - 87)  RR: 18 (12 Dec 2021 17:47) (10 - 18)  SpO2: 98% (12 Dec 2021 17:47) (95% - 100%)  I&O's Detail      Physical Exam:  General: NAD, resting comfortably in bed  Pulmonary: Nonlabored breathing, no respiratory distress  Cardiovascular: NSR, S1, S2  Abdominal: soft, NT/ND,   Extremities: no edema, no calf tenderness, distal pulses are palpable     LABS:                        12.9   12.10 )-----------( 174      ( 12 Dec 2021 07:03 )             39.7     12-12    139  |  111<H>  |  43<H>  ----------------------------<  112<H>  4.2   |  19<L>  |  4.84<H>    Ca    8.5      12 Dec 2021 07:03    TPro  7.9  /  Alb  3.6  /  TBili  0.9  /  DBili  0.2  /  AST  18  /  ALT  16  /  AlkPhos  54  12-12    LIVER FUNCTIONS - ( 12 Dec 2021 07:03 )  Alb: 3.6 g/dL / Pro: 7.9 g/dL / ALK PHOS: 54 U/L / ALT: 16 U/L DA / AST: 18 U/L / GGT: x             MEDICATIONS  (STANDING):  dextrose 5% + sodium chloride 0.45%. 1000 milliLiter(s) (120 mL/Hr) IV Continuous <Continuous>  heparin   Injectable 5000 Unit(s) SubCutaneous every 8 hours  tamsulosin 0.4 milliGRAM(s) Oral at bedtime    MEDICATIONS  (PRN):  HYDROmorphone  Injectable 1 milliGRAM(s) IV Push every 4 hours PRN Moderate Pain (4 - 6)  ondansetron Injectable 4 milliGRAM(s) IV Push every 6 hours PRN Nausea      Assessment:   56y Male who is s/p cystoscopy with b/l ureteral stent placement POD #0. Stable    Plan:  - Pain control prn  -Dressing change prn   - Reg diet and DC IV fluids  - Incentive Spirometry  - OOB and ambulating as tolerated  - F/u AM labs  - DVT ppx
Subjective    Patient seen and examined. States he feels well, pain controlled, denies fevers/chills.    Objective    Vital signs  T(F): , Max: 99.3 (12-12-21 @ 12:25)  HR: 59 (12-13-21 @ 05:57)  BP: 132/68 (12-13-21 @ 05:57)  SpO2: 95% (12-13-21 @ 05:57)  Wt(kg): --    Output     12-12 @ 07:01  -  12-13 @ 07:00  --------------------------------------------------------  IN: 0 mL / OUT: 3200 mL / NET: -3200 mL        General: NAD  Abdomen: soft/non-tender/non-distended  : isaacs in place draining clear yellow urine    Labs      12-13 @ 06:12    WBC 8.68  / Hct 36.4  / SCr 2.85     12-12 @ 07:03    WBC 12.10 / Hct 39.7  / SCr 4.84       Urine Cx: pending    Imaging: no new imaging for review

## 2021-12-13 NOTE — DISCHARGE NOTE PROVIDER - NSDCFUADDINST_GEN_ALL_CORE_FT
Resume home medications as instructed by prescriptions; a prescription for Augmentin (antibiotic) was sent to your pharmacy, please take as prescribed. For pain, you may take alternating ibuprofen and tylenol every 6 hours. Do not exceed greater than 1000 mg acetaminophen per day.     You may have intermittent pink tinged urine and slight flank pain when you urinate.  This is normal and due to the stents in your ureters.   If your urine becomes bright red or with clots, please call the office.    Please follow up with Dr. Hamilton in x1 week after discharge from the hospital. You may call [phone] to schedule an appointment.     Follow-up with primary care doctor as well.     Call [phone] and return to the ED for chest pain, shortness of breath, significant increase in pain, or significant change in color of surgical sites.

## 2021-12-13 NOTE — DISCHARGE NOTE PROVIDER - NSDCCPTREATMENT_GEN_ALL_CORE_FT
PRINCIPAL PROCEDURE  Procedure: Cystoscopy with bilateral retrograde pyelography with insertion of ureteral stent  Findings and Treatment:

## 2021-12-13 NOTE — DISCHARGE NOTE PROVIDER - HOSPITAL COURSE
55yo m with PMHx of HTN presents to the ED with Lt flank pain x 2 days. Pain started suddenly in the lt flank described as diffuse, crampy to very sharp and persistent often referred to the lt inguinal area into the scrotum . Admits nausea, but denies vomiting , chills, fever . Patient was seen in the ER 9 days ago for testicular and flank pain, found to have ureteral stone and discharged with no complications.  Also endorses minimal urination in past day and no BM in past two days. Denies  cp, sob, lightheadedness and any other complaints at this time.   Patient underwent cystoscopy with bilateral retrograde pyelography with insertion of ureteral stents 12/13,  On POD1, patient was hemodynamically stable, pain controlled and isaacs was removed.     **incomplete    At the time of discharge, the patient was hemodynamically stable, was tolerating PO diet, was voiding urine and passing stool, was ambulating, and was comfortable with adequate pain control. The patient was instructed to follow up with Dr. Mayorga within 1-2 weeks after discharge from the hospital. The patient/family felt comfortable with discharge. The patient was discharged to home/rehab. The patient had no other issues.  57yo m with PMHx of HTN presents to the ED with Lt flank pain x 2 days. Pain started suddenly in the lt flank described as diffuse, crampy to very sharp and persistent often referred to the lt inguinal area into the scrotum . Admits nausea, but denies vomiting , chills, fever . Patient was seen in the ER 9 days ago for testicular and flank pain, found to have ureteral stone and discharged with no complications.  Also endorses minimal urination in past day and no BM in past two days. Denies  cp, sob, lightheadedness and any other complaints at this time.   Patient underwent cystoscopy with bilateral retrograde pyelography with insertion of ureteral stents 12/13,  On POD1, patient was hemodynamically stable, pain controlled and isaacs was removed and patient voided without issue.     At the time of discharge, the patient was hemodynamically stable, was tolerating PO diet, was voiding urine and passing stool, was ambulating, and was comfortable with adequate pain control. The patient was instructed to follow up with Dr. Hamilton within 1-2 weeks after discharge from the hospital. The patient felt comfortable with discharge. The patient was discharged to home. The patient had no other issues.

## 2021-12-13 NOTE — DISCHARGE NOTE NURSING/CASE MANAGEMENT/SOCIAL WORK - PATIENT PORTAL LINK FT
You can access the FollowMyHealth Patient Portal offered by [facility] by registering at the following website: [email or web address]. By joining [employer]s FollowMyHealth portal, you will also be able to view your health information using other applications (apps) compatible with our system.

## 2021-12-13 NOTE — DISCHARGE NOTE PROVIDER - CARE PROVIDER_API CALL
Salena Hamilton; MPH)  Urology  [address] [place] [address]  Phone: [phone]  Fax: [phone]  Follow Up Time: 1 week

## 2021-12-13 NOTE — DISCHARGE NOTE NURSING/CASE MANAGEMENT/SOCIAL WORK - NSDCPEFALRISK_GEN_ALL_CORE
For information on Fall & Injury Prevention, visit: [email or web address] OR  [email or web address] OR  https://www.cdc.gov/steadi/patient.html

## 2021-12-13 NOTE — DISCHARGE NOTE PROVIDER - NSDCMRMEDTOKEN_GEN_ALL_CORE_FT
acetaminophen 325 mg oral tablet: 2 tab(s) orally every 6 hours, As needed, Temp greater or equal to 38C (100.4F), Mild Pain (1 - 3)  amLODIPine 10 mg oral tablet: 1 tab(s) orally once a day   mg oral tablet: 1 tab(s) orally every 8 hours, As Needed - for moderate pain  ocular lubricant ophthalmic solution: 1 drop(s) to each affected eye every 4 hours, As needed, Dry Eyes  oxycodone-acetaminophen 5 mg-325 mg oral tablet: 1 tab(s) orally every 6 hours, As Needed -for moderate pain MDD:3  tamsulosin 0.4 mg oral capsule: 1 cap(s) orally once a day   Xarelto 10 mg oral tablet: 1 tab(s) orally once a day    acetaminophen 325 mg oral tablet: 2 tab(s) orally every 6 hours, As needed, Temp greater or equal to 38C (100.4F), Mild Pain (1 - 3)  amLODIPine 10 mg oral tablet: 1 tab(s) orally once a day  amoxicillin-clavulanate 500 mg-125 mg oral tablet: 1 tab(s) orally 2 times a day MDD:2   mg oral tablet: 1 tab(s) orally every 8 hours, As Needed - for moderate pain  ocular lubricant ophthalmic solution: 1 drop(s) to each affected eye every 4 hours, As needed, Dry Eyes  tamsulosin 0.4 mg oral capsule: 1 cap(s) orally once a day   Xarelto 10 mg oral tablet: 1 tab(s) orally once a day

## 2021-12-14 ENCOUNTER — INPATIENT (INPATIENT)
Facility: HOSPITAL | Age: 56
LOS: 3 days | Discharge: HOME | DRG: 418 | End: 2021-12-18
Attending: SURGERY | Admitting: SURGERY
Payer: MEDICAID

## 2021-12-14 VITALS
RESPIRATION RATE: 18 BRPM | HEIGHT: 63 IN | WEIGHT: 165.35 LBS | DIASTOLIC BLOOD PRESSURE: 89 MMHG | OXYGEN SATURATION: 98 % | SYSTOLIC BLOOD PRESSURE: 143 MMHG | HEART RATE: 65 BPM | TEMPERATURE: 98 F

## 2021-12-14 LAB
CULTURE RESULTS: SIGNIFICANT CHANGE UP
SPECIMEN SOURCE: SIGNIFICANT CHANGE UP

## 2021-12-14 PROCEDURE — 99285 EMERGENCY DEPT VISIT HI MDM: CPT

## 2021-12-14 RX ORDER — MORPHINE SULFATE 50 MG/1
4 CAPSULE, EXTENDED RELEASE ORAL ONCE
Refills: 0 | Status: DISCONTINUED | OUTPATIENT
Start: 2021-12-14 | End: 2021-12-14

## 2021-12-14 NOTE — ED PROVIDER NOTE - OBJECTIVE STATEMENT
57yo M with hx HTN and kidney stone 1day s/p ureteral stent presents with abdominal pain. Reports onset of pain prior to discharge yesterday, reports pain worsened today over epigastric area and radiates down his abdomen and back. Reports similar pain when he was diagnosed with kidney stone a few days ago. Reports nausea but no vomiting, denies diarrhea. Denies fever but reports chills. Reports pinkish urine, denies dysuria. Reports he did take antibiotic prescribed. 57yo M with hx HTN and kidney stone 1day s/p ureteral stent presents with abdominal pain. Reports onset of pain prior to discharge yesterday, reports pain worsened today over epigastric area and radiates down his abdomen and back. Reports similar pain when he was diagnosed with kidney stone a few days ago. Reports nausea but no vomiting, denies diarrhea. Denies fever but reports chills. Reports pinkish urine, denies dysuria. Reports he did take antibiotic prescribed.  Evaluation performed in Maltese language by me/MD.

## 2021-12-14 NOTE — ED ADULT NURSE NOTE - OBJECTIVE STATEMENT
Patient presented to the ED with c/o abdominal pain, hematuria and dysuria. Denies nausea and vomiting

## 2021-12-14 NOTE — ED PROVIDER NOTE - CLINICAL SUMMARY MEDICAL DECISION MAKING FREE TEXT BOX
55yo M with hx HTN and kidney stone 1day s/p ureteral stent presents with abdominal pain. Will obtain labs, UA, CT. given morphine for pain, will reassess. 55yo M with hx HTN and kidney stone 1day s/p ureteral stent presents with abdominal pain. Will obtain labs, UA, CT. given morphine for pain, will reassess.    labs show elevated LFTS and lipase, triglyceries 260, CT shows There is new circumferential thickening involving the second and third portions of the duodenum with marked adjacent fatty stranding and edema which also surrounds portions of the pancreas and proximal jejunum. The differential diagnosis includes, but not limited to, duodenitis/enteritis, and underlying duodenal ulceration cannot be excluded. Pancreatitis is also in the differential diagnosis. There is no gross free intraperitoneal air.There is been interval placement of bilateral nephroureteral stents with interval resolution of previously demonstrated bilateral hydronephrosis. In addition, previously demonstrated obstructing bilateral UPJ stones have refluxed back into their respective renal collecting systems.  Discussed above with patient who agrees with plan for admission.

## 2021-12-15 DIAGNOSIS — K85.90 ACUTE PANCREATITIS WITHOUT NECROSIS OR INFECTION, UNSPECIFIED: ICD-10-CM

## 2021-12-15 DIAGNOSIS — Z29.9 ENCOUNTER FOR PROPHYLACTIC MEASURES, UNSPECIFIED: ICD-10-CM

## 2021-12-15 DIAGNOSIS — K29.80 DUODENITIS WITHOUT BLEEDING: ICD-10-CM

## 2021-12-15 DIAGNOSIS — R93.89 ABNORMAL FINDINGS ON DIAGNOSTIC IMAGING OF OTHER SPECIFIED BODY STRUCTURES: ICD-10-CM

## 2021-12-15 DIAGNOSIS — R74.01 ELEVATION OF LEVELS OF LIVER TRANSAMINASE LEVELS: ICD-10-CM

## 2021-12-15 DIAGNOSIS — I10 ESSENTIAL (PRIMARY) HYPERTENSION: ICD-10-CM

## 2021-12-15 DIAGNOSIS — Z96.0 PRESENCE OF UROGENITAL IMPLANTS: Chronic | ICD-10-CM

## 2021-12-15 DIAGNOSIS — N20.0 CALCULUS OF KIDNEY: ICD-10-CM

## 2021-12-15 DIAGNOSIS — E78.5 HYPERLIPIDEMIA, UNSPECIFIED: ICD-10-CM

## 2021-12-15 LAB
A1C WITH ESTIMATED AVERAGE GLUCOSE RESULT: 5.8 % — HIGH (ref 4–5.6)
ALBUMIN SERPL ELPH-MCNC: 2.9 G/DL — LOW (ref 3.5–5)
ALBUMIN SERPL ELPH-MCNC: 3.3 G/DL — LOW (ref 3.5–5)
ALP SERPL-CCNC: 200 U/L — HIGH (ref 40–120)
ALP SERPL-CCNC: 205 U/L — HIGH (ref 40–120)
ALT FLD-CCNC: 446 U/L DA — HIGH (ref 10–60)
ALT FLD-CCNC: 610 U/L DA — HIGH (ref 10–60)
ANION GAP SERPL CALC-SCNC: 6 MMOL/L — SIGNIFICANT CHANGE UP (ref 5–17)
ANION GAP SERPL CALC-SCNC: 6 MMOL/L — SIGNIFICANT CHANGE UP (ref 5–17)
APPEARANCE UR: CLEAR — SIGNIFICANT CHANGE UP
AST SERPL-CCNC: 640 U/L — HIGH (ref 10–40)
AST SERPL-CCNC: 772 U/L — HIGH (ref 10–40)
BASOPHILS # BLD AUTO: 0.01 K/UL — SIGNIFICANT CHANGE UP (ref 0–0.2)
BASOPHILS # BLD AUTO: 0.02 K/UL — SIGNIFICANT CHANGE UP (ref 0–0.2)
BASOPHILS NFR BLD AUTO: 0.1 % — SIGNIFICANT CHANGE UP (ref 0–2)
BASOPHILS NFR BLD AUTO: 0.3 % — SIGNIFICANT CHANGE UP (ref 0–2)
BILIRUB DIRECT SERPL-MCNC: 1.3 MG/DL — HIGH (ref 0–0.3)
BILIRUB SERPL-MCNC: 2.3 MG/DL — HIGH (ref 0.2–1.2)
BILIRUB SERPL-MCNC: 2.5 MG/DL — HIGH (ref 0.2–1.2)
BILIRUB UR-MCNC: NEGATIVE — SIGNIFICANT CHANGE UP
BUN SERPL-MCNC: 36 MG/DL — HIGH (ref 7–18)
BUN SERPL-MCNC: 38 MG/DL — HIGH (ref 7–18)
CALCIUM SERPL-MCNC: 8.7 MG/DL — SIGNIFICANT CHANGE UP (ref 8.4–10.5)
CALCIUM SERPL-MCNC: 9.1 MG/DL — SIGNIFICANT CHANGE UP (ref 8.4–10.5)
CHLORIDE SERPL-SCNC: 105 MMOL/L — SIGNIFICANT CHANGE UP (ref 96–108)
CHLORIDE SERPL-SCNC: 111 MMOL/L — HIGH (ref 96–108)
CHLORIDE UR-SCNC: 98 MMOL/L — SIGNIFICANT CHANGE UP
CHOLEST SERPL-MCNC: 140 MG/DL — SIGNIFICANT CHANGE UP
CO2 SERPL-SCNC: 25 MMOL/L — SIGNIFICANT CHANGE UP (ref 22–31)
CO2 SERPL-SCNC: 28 MMOL/L — SIGNIFICANT CHANGE UP (ref 22–31)
COLOR SPEC: YELLOW — SIGNIFICANT CHANGE UP
CREAT ?TM UR-MCNC: 28 MG/DL — SIGNIFICANT CHANGE UP
CREAT SERPL-MCNC: 1.47 MG/DL — HIGH (ref 0.5–1.3)
CREAT SERPL-MCNC: 1.63 MG/DL — HIGH (ref 0.5–1.3)
DIFF PNL FLD: ABNORMAL
EOSINOPHIL # BLD AUTO: 0.03 K/UL — SIGNIFICANT CHANGE UP (ref 0–0.5)
EOSINOPHIL # BLD AUTO: 0.08 K/UL — SIGNIFICANT CHANGE UP (ref 0–0.5)
EOSINOPHIL NFR BLD AUTO: 0.3 % — SIGNIFICANT CHANGE UP (ref 0–6)
EOSINOPHIL NFR BLD AUTO: 1.2 % — SIGNIFICANT CHANGE UP (ref 0–6)
ESTIMATED AVERAGE GLUCOSE: 120 MG/DL — HIGH (ref 68–114)
GGT SERPL-CCNC: 563 U/L — HIGH (ref 9–50)
GLUCOSE SERPL-MCNC: 116 MG/DL — HIGH (ref 70–99)
GLUCOSE SERPL-MCNC: 152 MG/DL — HIGH (ref 70–99)
GLUCOSE UR QL: NEGATIVE — SIGNIFICANT CHANGE UP
HCT VFR BLD CALC: 37.8 % — LOW (ref 39–50)
HCT VFR BLD CALC: 42.5 % — SIGNIFICANT CHANGE UP (ref 39–50)
HDLC SERPL-MCNC: 19 MG/DL — LOW
HGB BLD-MCNC: 12.1 G/DL — LOW (ref 13–17)
HGB BLD-MCNC: 13.9 G/DL — SIGNIFICANT CHANGE UP (ref 13–17)
IMM GRANULOCYTES NFR BLD AUTO: 0.3 % — SIGNIFICANT CHANGE UP (ref 0–1.5)
IMM GRANULOCYTES NFR BLD AUTO: 0.3 % — SIGNIFICANT CHANGE UP (ref 0–1.5)
KETONES UR-MCNC: NEGATIVE — SIGNIFICANT CHANGE UP
LEUKOCYTE ESTERASE UR-ACNC: ABNORMAL
LIDOCAIN IGE QN: HIGH U/L (ref 73–393)
LIPID PNL WITH DIRECT LDL SERPL: 75 MG/DL — SIGNIFICANT CHANGE UP
LYMPHOCYTES # BLD AUTO: 1.31 K/UL — SIGNIFICANT CHANGE UP (ref 1–3.3)
LYMPHOCYTES # BLD AUTO: 1.59 K/UL — SIGNIFICANT CHANGE UP (ref 1–3.3)
LYMPHOCYTES # BLD AUTO: 13.9 % — SIGNIFICANT CHANGE UP (ref 13–44)
LYMPHOCYTES # BLD AUTO: 23.9 % — SIGNIFICANT CHANGE UP (ref 13–44)
MAGNESIUM SERPL-MCNC: 1.9 MG/DL — SIGNIFICANT CHANGE UP (ref 1.6–2.6)
MCHC RBC-ENTMCNC: 29.2 PG — SIGNIFICANT CHANGE UP (ref 27–34)
MCHC RBC-ENTMCNC: 29.2 PG — SIGNIFICANT CHANGE UP (ref 27–34)
MCHC RBC-ENTMCNC: 32 GM/DL — SIGNIFICANT CHANGE UP (ref 32–36)
MCHC RBC-ENTMCNC: 32.7 GM/DL — SIGNIFICANT CHANGE UP (ref 32–36)
MCV RBC AUTO: 89.3 FL — SIGNIFICANT CHANGE UP (ref 80–100)
MCV RBC AUTO: 91.3 FL — SIGNIFICANT CHANGE UP (ref 80–100)
MONOCYTES # BLD AUTO: 0.46 K/UL — SIGNIFICANT CHANGE UP (ref 0–0.9)
MONOCYTES # BLD AUTO: 0.57 K/UL — SIGNIFICANT CHANGE UP (ref 0–0.9)
MONOCYTES NFR BLD AUTO: 4.9 % — SIGNIFICANT CHANGE UP (ref 2–14)
MONOCYTES NFR BLD AUTO: 8.6 % — SIGNIFICANT CHANGE UP (ref 2–14)
NEUTROPHILS # BLD AUTO: 4.37 K/UL — SIGNIFICANT CHANGE UP (ref 1.8–7.4)
NEUTROPHILS # BLD AUTO: 7.57 K/UL — HIGH (ref 1.8–7.4)
NEUTROPHILS NFR BLD AUTO: 65.7 % — SIGNIFICANT CHANGE UP (ref 43–77)
NEUTROPHILS NFR BLD AUTO: 80.5 % — HIGH (ref 43–77)
NITRITE UR-MCNC: NEGATIVE — SIGNIFICANT CHANGE UP
NON HDL CHOLESTEROL: 121 MG/DL — SIGNIFICANT CHANGE UP
NRBC # BLD: 0 /100 WBCS — SIGNIFICANT CHANGE UP (ref 0–0)
NRBC # BLD: 0 /100 WBCS — SIGNIFICANT CHANGE UP (ref 0–0)
PH UR: 5 — SIGNIFICANT CHANGE UP (ref 5–8)
PHOSPHATE SERPL-MCNC: 4.3 MG/DL — SIGNIFICANT CHANGE UP (ref 2.5–4.5)
PLATELET # BLD AUTO: 196 K/UL — SIGNIFICANT CHANGE UP (ref 150–400)
PLATELET # BLD AUTO: 226 K/UL — SIGNIFICANT CHANGE UP (ref 150–400)
POTASSIUM SERPL-MCNC: 3.9 MMOL/L — SIGNIFICANT CHANGE UP (ref 3.5–5.3)
POTASSIUM SERPL-MCNC: 4.1 MMOL/L — SIGNIFICANT CHANGE UP (ref 3.5–5.3)
POTASSIUM SERPL-SCNC: 3.9 MMOL/L — SIGNIFICANT CHANGE UP (ref 3.5–5.3)
POTASSIUM SERPL-SCNC: 4.1 MMOL/L — SIGNIFICANT CHANGE UP (ref 3.5–5.3)
PROT ?TM UR-MCNC: 46 MG/DL — HIGH (ref 0–12)
PROT SERPL-MCNC: 7 G/DL — SIGNIFICANT CHANGE UP (ref 6–8.3)
PROT SERPL-MCNC: 8 G/DL — SIGNIFICANT CHANGE UP (ref 6–8.3)
PROT UR-MCNC: 100
RBC # BLD: 4.14 M/UL — LOW (ref 4.2–5.8)
RBC # BLD: 4.76 M/UL — SIGNIFICANT CHANGE UP (ref 4.2–5.8)
RBC # FLD: 12.7 % — SIGNIFICANT CHANGE UP (ref 10.3–14.5)
RBC # FLD: 12.9 % — SIGNIFICANT CHANGE UP (ref 10.3–14.5)
SARS-COV-2 RNA SPEC QL NAA+PROBE: SIGNIFICANT CHANGE UP
SODIUM SERPL-SCNC: 139 MMOL/L — SIGNIFICANT CHANGE UP (ref 135–145)
SODIUM SERPL-SCNC: 142 MMOL/L — SIGNIFICANT CHANGE UP (ref 135–145)
SODIUM UR-SCNC: 98 MMOL/L — SIGNIFICANT CHANGE UP
SP GR SPEC: 1.02 — SIGNIFICANT CHANGE UP (ref 1.01–1.02)
TRIGL SERPL-MCNC: 230 MG/DL — HIGH
TRIGL SERPL-MCNC: 260 MG/DL — HIGH
UROBILINOGEN FLD QL: 4
WBC # BLD: 6.65 K/UL — SIGNIFICANT CHANGE UP (ref 3.8–10.5)
WBC # BLD: 9.41 K/UL — SIGNIFICANT CHANGE UP (ref 3.8–10.5)
WBC # FLD AUTO: 6.65 K/UL — SIGNIFICANT CHANGE UP (ref 3.8–10.5)
WBC # FLD AUTO: 9.41 K/UL — SIGNIFICANT CHANGE UP (ref 3.8–10.5)

## 2021-12-15 PROCEDURE — 74181 MRI ABDOMEN W/O CONTRAST: CPT | Mod: 26

## 2021-12-15 PROCEDURE — 74176 CT ABD & PELVIS W/O CONTRAST: CPT | Mod: 26,MA

## 2021-12-15 PROCEDURE — 99221 1ST HOSP IP/OBS SF/LOW 40: CPT

## 2021-12-15 PROCEDURE — 99223 1ST HOSP IP/OBS HIGH 75: CPT | Mod: GC

## 2021-12-15 RX ORDER — SODIUM CHLORIDE 9 MG/ML
1000 INJECTION INTRAMUSCULAR; INTRAVENOUS; SUBCUTANEOUS
Refills: 0 | Status: DISCONTINUED | OUTPATIENT
Start: 2021-12-15 | End: 2021-12-15

## 2021-12-15 RX ORDER — SODIUM CHLORIDE 9 MG/ML
1000 INJECTION, SOLUTION INTRAVENOUS
Refills: 0 | Status: DISCONTINUED | OUTPATIENT
Start: 2021-12-15 | End: 2021-12-18

## 2021-12-15 RX ORDER — AMLODIPINE BESYLATE 2.5 MG/1
10 TABLET ORAL DAILY
Refills: 0 | Status: DISCONTINUED | OUTPATIENT
Start: 2021-12-15 | End: 2021-12-18

## 2021-12-15 RX ORDER — METRONIDAZOLE 500 MG
500 TABLET ORAL EVERY 8 HOURS
Refills: 0 | Status: DISCONTINUED | OUTPATIENT
Start: 2021-12-15 | End: 2021-12-18

## 2021-12-15 RX ORDER — METRONIDAZOLE 500 MG
500 TABLET ORAL EVERY 8 HOURS
Refills: 0 | Status: DISCONTINUED | OUTPATIENT
Start: 2021-12-15 | End: 2021-12-15

## 2021-12-15 RX ORDER — CIPROFLOXACIN LACTATE 400MG/40ML
400 VIAL (ML) INTRAVENOUS EVERY 12 HOURS
Refills: 0 | Status: DISCONTINUED | OUTPATIENT
Start: 2021-12-15 | End: 2021-12-18

## 2021-12-15 RX ORDER — HEPARIN SODIUM 5000 [USP'U]/ML
5000 INJECTION INTRAVENOUS; SUBCUTANEOUS EVERY 8 HOURS
Refills: 0 | Status: DISCONTINUED | OUTPATIENT
Start: 2021-12-15 | End: 2021-12-18

## 2021-12-15 RX ORDER — SODIUM CHLORIDE 9 MG/ML
1000 INJECTION, SOLUTION INTRAVENOUS
Refills: 0 | Status: DISCONTINUED | OUTPATIENT
Start: 2021-12-15 | End: 2021-12-15

## 2021-12-15 RX ORDER — PANTOPRAZOLE SODIUM 20 MG/1
40 TABLET, DELAYED RELEASE ORAL DAILY
Refills: 0 | Status: DISCONTINUED | OUTPATIENT
Start: 2021-12-15 | End: 2021-12-18

## 2021-12-15 RX ORDER — SODIUM CHLORIDE 9 MG/ML
1000 INJECTION INTRAMUSCULAR; INTRAVENOUS; SUBCUTANEOUS ONCE
Refills: 0 | Status: COMPLETED | OUTPATIENT
Start: 2021-12-15 | End: 2021-12-15

## 2021-12-15 RX ORDER — TAMSULOSIN HYDROCHLORIDE 0.4 MG/1
0.4 CAPSULE ORAL AT BEDTIME
Refills: 0 | Status: DISCONTINUED | OUTPATIENT
Start: 2021-12-15 | End: 2021-12-18

## 2021-12-15 RX ADMIN — TAMSULOSIN HYDROCHLORIDE 0.4 MILLIGRAM(S): 0.4 CAPSULE ORAL at 21:59

## 2021-12-15 RX ADMIN — MORPHINE SULFATE 4 MILLIGRAM(S): 50 CAPSULE, EXTENDED RELEASE ORAL at 00:09

## 2021-12-15 RX ADMIN — AMLODIPINE BESYLATE 10 MILLIGRAM(S): 2.5 TABLET ORAL at 14:11

## 2021-12-15 RX ADMIN — Medication 500 MILLIGRAM(S): at 07:39

## 2021-12-15 RX ADMIN — Medication 500 MILLIGRAM(S): at 14:00

## 2021-12-15 RX ADMIN — Medication 200 MILLIGRAM(S): at 18:50

## 2021-12-15 RX ADMIN — Medication 200 MILLIGRAM(S): at 06:19

## 2021-12-15 RX ADMIN — PANTOPRAZOLE SODIUM 40 MILLIGRAM(S): 20 TABLET, DELAYED RELEASE ORAL at 13:59

## 2021-12-15 RX ADMIN — SODIUM CHLORIDE 150 MILLILITER(S): 9 INJECTION, SOLUTION INTRAVENOUS at 20:24

## 2021-12-15 RX ADMIN — SODIUM CHLORIDE 150 MILLILITER(S): 9 INJECTION, SOLUTION INTRAVENOUS at 14:11

## 2021-12-15 RX ADMIN — Medication 500 MILLIGRAM(S): at 21:59

## 2021-12-15 RX ADMIN — HEPARIN SODIUM 5000 UNIT(S): 5000 INJECTION INTRAVENOUS; SUBCUTANEOUS at 13:59

## 2021-12-15 RX ADMIN — MORPHINE SULFATE 4 MILLIGRAM(S): 50 CAPSULE, EXTENDED RELEASE ORAL at 00:39

## 2021-12-15 RX ADMIN — HEPARIN SODIUM 5000 UNIT(S): 5000 INJECTION INTRAVENOUS; SUBCUTANEOUS at 06:19

## 2021-12-15 RX ADMIN — SODIUM CHLORIDE 1000 MILLILITER(S): 9 INJECTION INTRAMUSCULAR; INTRAVENOUS; SUBCUTANEOUS at 03:33

## 2021-12-15 RX ADMIN — SODIUM CHLORIDE 150 MILLILITER(S): 9 INJECTION, SOLUTION INTRAVENOUS at 06:31

## 2021-12-15 RX ADMIN — HEPARIN SODIUM 5000 UNIT(S): 5000 INJECTION INTRAVENOUS; SUBCUTANEOUS at 21:59

## 2021-12-15 NOTE — H&P ADULT - NSICDXPASTMEDICALHX_GEN_ALL_CORE_FT
PAST MEDICAL HISTORY:  COVID-19     HLD (hyperlipidemia)     HTN (hypertension)     Renal stone

## 2021-12-15 NOTE — CHART NOTE - NSCHARTNOTEFT_GEN_A_CORE
EVENT: seen and examine, verbalizing improvement in abd pain     OBJECTIVE:  Vital Signs Last 24 Hrs  T(C): 36.7 (15 Dec 2021 16:52), Max: 37.2 (15 Dec 2021 05:57)  T(F): 98.1 (15 Dec 2021 16:52), Max: 98.9 (15 Dec 2021 05:57)  HR: 55 (15 Dec 2021 16:52) (52 - 65)  BP: 137/80 (15 Dec 2021 16:52) (121/73 - 153/90)  BP(mean): --  RR: 18 (15 Dec 2021 16:52) (18 - 18)  SpO2: 95% (15 Dec 2021 16:52) (95% - 99%)    REVIEW OF SYSTEMS:  CONSTITUTIONAL: No fever, chills  NECK: No pain or stiffness  RESPIRATORY: No cough, SOB  CARDIOVASCULAR: No chest pain, palpitations  GASTROINTESTINAL: +ve intermittent abdominal pain. No nausea, vomiting, or diarrhea  GENITOURINARY: No dysuria  NEUROLOGICAL: No HA  MUSCULOSKELETAL: No joint pain or swelling; No muscle, back, or extremity pain    PHYSICAL EXAM:  GENERAL: NAD  EYES: clear conjunctiva  NECK: Supple, No JVD  CHEST/LUNG: Clear to auscultation bilaterally; No rales, rhonchi, wheezing, or rubs  HEART: S1, S2, Regular rate and rhythm  ABDOMEN: Soft, +ve diffuse tenderness, Nondistended; Bowel sounds present  NEURO: Alert & Oriented X3  EXTREMITIES: No LE edema, no calf tenderness      LABS:                        12.1   6.65  )-----------( 196      ( 15 Dec 2021 05:54 )             37.8     12-15    142  |  111<H>  |  38<H>  ----------------------------<  116<H>  3.9   |  25  |  1.47<H>    Ca    8.7      15 Dec 2021 05:54  Phos  4.3     12-15  Mg     1.9     12-15    TPro  x   /  Alb  x   /  TBili  x   /  DBili  1.3<H>  /  AST  x   /  ALT  x   /  AlkPhos  x   12-15    < from: CT Abdomen and Pelvis No Cont (12.15.21 @ 00:11) >      ACC: [id number] EXAM:  CT ABDOMEN AND PELVIS                          PROCEDURE DATE:  12/15/2021          INTERPRETATION:  CLINICAL INFORMATION: Right upper quadrant/epigastric   pain. Status post ureteral stent placement one day ago.    COMPARISON:CT abdomen and pelvis 12/12/2021.    CONTRAST/COMPLICATIONS:  IV Contrast: NONE  Oral Contrast: NONE  Complications: None reported at time of study completion    PROCEDURE:  Axial CT images were acquired through the abdomen and pelvis during the   portal venous phase.  Coronal and sagittal reformatted images provided.      FINDINGS: The examination is limited without oral and intravenous   contrast.      LOWER CHEST: Mild interval clearing of patchy bibasilar groundglass   opacities. Calcified inferior hilar lymph nodes again seen.  LIVER: Within normal limits.  BILE DUCTS: Normal caliber.  GALLBLADDER: Distended without calcified gallstone.  SPLEEN: Within normal limits.  PANCREAS: There is peripancreatic fatty stranding/edema seen..  ADRENALS: Stable 1.3 cm right adrenal gland adenoma. Stable tiny left   adrenal gland nodules measure up to 1 cm on the left.  KIDNEYS/URETERS: Interval placement of bilateral ureteral stents in good   position. Interval decompression of previously seen hydronephrosis,   bilaterally. Previously demonstrated obstructing 6 mm stone in the left   UPJ has refluxed back into the mid left kidney. Previously demonstrated 4   mm stone in the right UPJ has also refluxed back into the collecting   system of the right kidney. Additional stable nonobstructing stone in the   right kidney again demonstrated.      BLADDER: Partially collapsed without gross bladder wall thickening.  REPRODUCTIVE ORGANS: Prostate gland not grossly enlarged. Seminal   vesicles are symmetric.    BOWEL: Evaluation of bowel is limited without distention with oral   contrast, however there is no bowel obstruction. Colonic diverticulosis   without definite acute diverticulitis demonstrated. Appendix not seen.   There is suggestion of thickening involving the second and third portions   of the duodenum with a prominent adjacent fatty stranding seen. There is   also fatty stranding/edema adjacent to the proximal jejunum. Stomach is   distended with debris.  PERITONEUM: No free air. Trace perihepatic ascites..  VESSELS:  Limited without venous contrast, however the abdominal aorta   demonstrates a normal caliber and course..  RETROPERITONEUM: No lymphadenopathy.  ABDOMINAL WALL: Small fat-containing right inguinal hernia..  BONES: Mild degenerative changes of the spine.    IMPRESSION:    There is new circumferential thickening involving the second and third   portions of the duodenum with marked adjacent fatty stranding and edema   which also surrounds portions of the pancreas and proximal jejunum. The   differential diagnosis includes, but not limited to,   duodenitis/enteritis, and underlying duodenal ulceration cannot be   excluded. Pancreatitis is also in the differential diagnosis. There is no   gross free intraperitoneal air.    There is been interval placement of bilateral nephroureteral stents with   interval resolution of previously demonstrated bilateral hydronephrosis.   In addition, previously demonstrated obstructing bilateral UPJ stones   have refluxed back into their respective renal collecting systems.    --- End of Report ---    < end of copied text >      A/P: 55 y/o M with pmh of htn, hld, covid (dec 2020), renal stone s/p stent on 12/12/21 and discharged on 12/14/21 who came back to the ed with worsening abdominal pain    Acute abdominal pain:   -likely in setting of pancreatitis/ duodenitis  -CT abd as above   -cont IVF   -cont Cipro and Flagyl   -tolerating clears   -f/u MRCP   -GI Dr. Ontiveros     Transaminitis:   -avoid hepatotoxins   -hepatology Dr. Recinos   -rest plan as above     Dvt ppx:   -Heparin

## 2021-12-15 NOTE — H&P ADULT - PROBLEM SELECTOR PLAN 5
s/p bilateral stents on 12/12/21  was discharged on Augmentin  UA + for bacteria and wbc, likely reactive to stent?  outpatient follow up with dr. zepeda

## 2021-12-15 NOTE — H&P ADULT - NSHPREVIEWOFSYSTEMS_GEN_ALL_CORE
CONSTITUTIONAL: No fever, weight loss, or fatigue  EYES: No eye pain, visual disturbances, or discharge  ENT:  No difficulty hearing, tinnitus, vertigo; No sinus or throat pain  NECK: No pain or stiffness  RESPIRATORY: No cough, wheezing, chills or hemoptysis; No Shortness of Breath  CARDIOVASCULAR: No chest pain, palpitations, passing out, dizziness, or leg swelling  GASTROINTESTINAL: + abdominal or epigastric pain. No nausea, vomiting, or hematemesis; No diarrhea or constipation. No melena or hematochezia.  GENITOURINARY: No dysuria, frequency, hematuria, or incontinence  NEUROLOGICAL: No headaches, memory loss, loss of strength, numbness, or tremors  SKIN: No itching, burning, rashes, or lesions   LYMPH Nodes: No enlarged glands  ENDOCRINE: No heat or cold intolerance; No hair loss  MUSCULOSKELETAL: No joint pain or swelling; No muscle, back, No extremity pain  PSYCHIATRIC: No depression, anxiety, mood swings, or difficulty sleeping  HEME/LYMPH: No easy bruising, or bleeding gums  ALLERGY AND IMMUNOLOGIC: No hives or eczema

## 2021-12-15 NOTE — CHART NOTE - NSCHARTNOTEFT_GEN_A_CORE
Patient seen and examined.  Says abdominal/epigastric pain has improved since yesterday, feels hungry and wants to try clear liquid diet. Urinating well no blood. MRCP pending for evaluation of biliary tree given transaminitis and elevated bili. Will continue IVF and start CLD. F/u CRP

## 2021-12-15 NOTE — H&P ADULT - NSHPPHYSICALEXAM_GEN_ALL_CORE
GENERAL: NAD, well-groomed, well-developed  HEAD:  Atraumatic, Normocephalic  EYES: EOMI, PERRLA, conjunctiva and sclera clear  ENMT: No tonsillar erythema, exudates, or enlargement; Moist mucous membranes, Good dentition, No lesions  NECK: Supple, normal appearance, No JVD; Normal thyroid; Trachea midline  NERVOUS SYSTEM:  Alert & Oriented X3,  Motor Strength 5/5 B/L upper and lower extremities, sensation intact  CHEST/LUNG: Lungs clear to auscultation bilaterally, No rales, rhonchi, wheezing   HEART: Regular rate and rhythm; No murmurs, rubs, or gallops  ABDOMEN: Soft, Tender in epigastrium and RUq, Nondistended; Bowel sounds present  EXTREMITIES:  2+ Peripheral Pulses, No clubbing, cyanosis, or edema  LYMPH: No lymphadenopathy noted  SKIN: No rashes or lesions;  Good capillary refill

## 2021-12-15 NOTE — H&P ADULT - PROBLEM SELECTOR PLAN 3
pmh of htn   he doesn't know his medications  was discharged on amlodipine 10 at one time  continue for now

## 2021-12-15 NOTE — H&P ADULT - PROBLEM SELECTOR PLAN 1
Pt with abdominal pain, elevated LFTs and lipase of 41739  States he is tolerating po, no nausea or vomiting  CT + for pancreatitis and duodenitis  Triglycerides 260, No evidence of gallstones   will start on clear liquid diet, advance as tolerated  Pain management PRNs  unclear cause of pancreatitis, consider MRCP  GI consult: Dr. Ontiveros

## 2021-12-15 NOTE — CONSULT NOTE ADULT - PROBLEM SELECTOR RECOMMENDATION 2
LFT's uptrending  alk phos 200  tibili 2.3 -direct 1.3  no gallstone on imaging   would get MRCP to evaluate biliary tree

## 2021-12-15 NOTE — H&P ADULT - HISTORY OF PRESENT ILLNESS
57 y/o M with pmh of htn, hld, covid (dec 2020), renal stone s/p stent on 12/12/21 and discharged on 12/14/21 who came back to the ed with worsening abdominal pain. Pt states that he had abdominal pain on the day of discharge, went home, but pain got progressiveley worse. Currently it is 6/10 in the epigastrium radiating down. He denies any N/V/D/C, states that is able to tolerate PO. Patient denies any other complains. In ED pt noted to have transaminitis and elevated lipase. CT abdomen + for inflammation in the duodenum and peripancreatic fluid.

## 2021-12-15 NOTE — H&P ADULT - ATTENDING COMMENTS
Patient seen and examined at bedside. Patient is a 56 y o M with PMH of HTN, HLD, covid (dec 2020), renal stone s/p ureteral stent on 12/12/21 and discharged on 12/14/21 who came back to the ED with worsening abdominal pain in epigastric area, with some nausea, no vomiting tolerating orally. In ED pt noted to have transaminitis Alk phos 205, AST//446 T terry 2.5 and elevated lipase 79768. TG in 200s    CT abdomen showing for inflammation in the duodenum and peripancreatic fluid. new circumferential thickening involving the second and third portions of the duodenum with marked adjacent fatty stranding and edema which also surrounds portions of the pancreas and proximal jejunum concerning for duodenitis and pancreatitis. Distended GB, no cholelithiasis. no billary duct dilation.  Patient had normal lipase day before, wnl LFTs. creatinine improving post ureteral stent.    Patient AAOx3, NAD, afebrile, HD stable  epigastric and RUQ tenderness. BS+    Assessment and plan:  Patient is a 56 y o M with PMH of HTN, HLD, covid (dec 2020), renal stone s/p ureteral stent on 12/12/21 and discharged on 12/14/21 who came back to the ED with worsening abdominal pain in epigastric area, admitted for acute pancreatitis and duodenitis.    Acute abdominal pain  Pancreatitis/ duodenitis  Transaminitis  Nephrolithiasis s/p ureteral stent    - Patient discharged a day before the presentation when admitted for nephrolithiasis s/p ureteral stent.  - p/w abdominal pain, epigastric and RUQ tenderness.  - noted to have worsening transaminitis, Alk phos 205, AST//446 T terry 2.5 and elevated lipase 36451. TG in 200s.   - patient had normal LFTs a day before. follow LFTs, lipase.  - CT showing inflammation in the duodenum and peripancreatic fluid. distended GB no cholelithiasis. no billary duct dilation.  - unclear etio, ? billiary stone, TG not significantly elevated, denies etoh use.  - MRCP for further evaluation, GI consulted Dr. Ontiveros.   - will keep on cipro and flagyl empirically. will keep npo, iv fluids. repeat lipase.  - renal fxn improving post ureteral stent placement, follow bmp, avoid nephrotoxic agents.  - UA positive for large blood, >50 RBC, likely reactive 2/2 recent stent placement.  - Lovenox dvt ppx  iv ppi od.

## 2021-12-15 NOTE — H&P ADULT - PROBLEM SELECTOR PLAN 2
Pt with abdominal pain, elevated LFTs and lipase of 03145  States he is tolerating po, no nausea or vomiting  CT + for pancreatitis and duodenitis, cant rule out duodenal ulcer  will start on clear liquid diet, advance as tolerated  will cover empirically with cipro and flagyl   will start protonix iv  Pain management PRNs  GI consult: Dr. Ontiveros, possible need for endoscopy?

## 2021-12-15 NOTE — H&P ADULT - NSICDXFAMILYHX_GEN_ALL_CORE_FT
FAMILY HISTORY:  Mother  Still living? Unknown  FH: hyperlipidemia, Age at diagnosis: Age Unknown

## 2021-12-15 NOTE — CONSULT NOTE ADULT - SUBJECTIVE AND OBJECTIVE BOX
[facility] GI CONSULTATION    Patient is a 56y old  Male who presents with a chief complaint of abd pain (15 Dec 2021 04:45)    HPI:  55 y/o M with pmh of htn, hld, covid (dec 2020), renal stone s/p stent on 12/12/21 and discharged on 12/14/21 who came back to the ed with worsening abdominal pain. Pt states that he had abdominal pain on the day of discharge, went home, but pain got progressiveley worse. Currently it is 6/10 in the epigastrium radiating down. He denies any N/V/D/C, states that is able to tolerate PO. Patient denies any other complains. In ED pt noted to have transaminitis and elevated lipase. CT abdomen + for inflammation in the duodenum and peripancreatic fluid.  (15 Dec 2021 04:45)    PMH/PSH:  PAST MEDICAL & SURGICAL HISTORY:  COVID-19    HTN (hypertension)    HLD (hyperlipidemia)    Renal stone    S/P ureteral stent placement      FH:  FAMILY HISTORY:  FH: hyperlipidemia (Mother)        MEDS:  MEDICATIONS  (STANDING):  amLODIPine   Tablet 10 milliGRAM(s) Oral daily  ciprofloxacin   IVPB 400 milliGRAM(s) IV Intermittent every 12 hours  heparin   Injectable 5000 Unit(s) SubCutaneous every 8 hours  lactated ringers. 1000 milliLiter(s) (150 mL/Hr) IV Continuous <Continuous>  metroNIDAZOLE    Tablet 500 milliGRAM(s) Oral every 8 hours  pantoprazole  Injectable 40 milliGRAM(s) IV Push daily  tamsulosin 0.4 milliGRAM(s) Oral at bedtime    MEDICATIONS  (PRN):    Allergies    No Known Allergies    Intolerances            CONSTITUTIONAL:  No weight loss, fever, chills, weakness or fatigue.  HEENT:  Eyes:  No visual loss, blurred vision, double vision or yellow sclerae. Ears, Nose, Throat:  No hearing loss, sneezing, congestion, runny nose or sore throat.  SKIN:  No rash or itching.  CARDIOVASCULAR:  No chest pain, chest pressure or chest discomfort. No palpitations or edema.  RESPIRATORY:  No shortness of breath, cough or sputum.  GASTROINTESTINAL:  SEE HPI  GENITOURINARY:  No dysuria, hematuria, urinary frequency  NEUROLOGICAL:  No headache, dizziness, syncope, paralysis, ataxia, numbness or tingling in the extremities. No change in bowel or bladder control.  MUSCULOSKELETAL:  No muscle, back pain, joint pain or stiffness.  HEMATOLOGIC:  No anemia, bleeding or bruising.  LYMPHATICS:  No enlarged nodes. No history of splenectomy.  PSYCHIATRIC:  No history of depression or anxiety.  ENDOCRINOLOGIC:  No reports of sweating, cold or heat intolerance. No polyuria or polydipsia.    GI HPI: Patient seen and examined. Presents with worsening epigastric pain over past 4-5 days. Denies nausea, vomiting, diarrhea, or constipation. No hematemesis, melena or hematochezia. Patient reports having an EGD/colon this year and states he was told to f/u in 2 years.     # [id number]      ______________________________________________________________________  PHYSICAL EXAM:  T(C): 36.9 (12-15-21 @ 08:17), Max: 37.2 (12-15-21 @ 05:57)  HR: 52 (12-15-21 @ 08:17)  BP: 153/90 (12-15-21 @ 08:17)  RR: 18 (12-15-21 @ 08:17)  SpO2: 99% (12-15-21 @ 08:17)  Wt(kg): --      GEN: NAD, normocephalic  CVS: S1S2+  CHEST: clear to auscultation  ABD: soft , +tenderness mid abdominal area on exam, nondistended, bowel sounds present, no rebound, no guarding  EXTR: no cyanosis, no clubbing, no edema  NEURO: Awake and alert; Non-focal exam   SKIN:  warm;  non icteric    ______________________________________________________________________  LABS:                        12.1   6.65  )-----------( 196      ( 15 Dec 2021 05:54 )             37.8     12-15    142  |  111<H>  |  38<H>  ----------------------------<  116<H>  3.9   |  25  |  1.47<H>    Ca    8.7      15 Dec 2021 05:54  Phos  4.3     12-15  Mg     1.9     12-15    TPro  x   /  Alb  x   /  TBili  x   /  DBili  1.3<H>  /  AST  x   /  ALT  x   /  AlkPhos  x   12-15    LIVER FUNCTIONS - ( 15 Dec 2021 05:54 )  Alb: 2.9 g/dL / Pro: 7.0 g/dL / ALK PHOS: 200 U/L / ALT: 610 U/L DA / AST: 772 U/L / GGT: x

## 2021-12-15 NOTE — H&P ADULT - ASSESSMENT
55 y/o M with pmh of htn, hld, covid (dec 2020), renal stone s/p stent on 12/12/21 and discharged on 12/14/21 who came back to the ed with worsening abdominal pain. Admitted for pancreatitis and duodenitis

## 2021-12-15 NOTE — PATIENT PROFILE ADULT - FALL HARM RISK - HARM RISK INTERVENTIONS

## 2021-12-16 ENCOUNTER — TRANSCRIPTION ENCOUNTER (OUTPATIENT)
Age: 56
End: 2021-12-16

## 2021-12-16 LAB
ALBUMIN SERPL ELPH-MCNC: 3 G/DL — LOW (ref 3.5–5)
ALP SERPL-CCNC: 185 U/L — HIGH (ref 40–120)
ALT FLD-CCNC: 392 U/L DA — HIGH (ref 10–60)
ANION GAP SERPL CALC-SCNC: 5 MMOL/L — SIGNIFICANT CHANGE UP (ref 5–17)
AST SERPL-CCNC: 181 U/L — HIGH (ref 10–40)
BILIRUB SERPL-MCNC: 1.2 MG/DL — SIGNIFICANT CHANGE UP (ref 0.2–1.2)
BUN SERPL-MCNC: 18 MG/DL — SIGNIFICANT CHANGE UP (ref 7–18)
CALCIUM SERPL-MCNC: 8.7 MG/DL — SIGNIFICANT CHANGE UP (ref 8.4–10.5)
CHLORIDE SERPL-SCNC: 106 MMOL/L — SIGNIFICANT CHANGE UP (ref 96–108)
CO2 SERPL-SCNC: 29 MMOL/L — SIGNIFICANT CHANGE UP (ref 22–31)
CREAT SERPL-MCNC: 1.15 MG/DL — SIGNIFICANT CHANGE UP (ref 0.5–1.3)
CULTURE RESULTS: NO GROWTH — SIGNIFICANT CHANGE UP
GLUCOSE SERPL-MCNC: 125 MG/DL — HIGH (ref 70–99)
HCT VFR BLD CALC: 38.1 % — LOW (ref 39–50)
HGB BLD-MCNC: 12.5 G/DL — LOW (ref 13–17)
MCHC RBC-ENTMCNC: 29.6 PG — SIGNIFICANT CHANGE UP (ref 27–34)
MCHC RBC-ENTMCNC: 32.8 GM/DL — SIGNIFICANT CHANGE UP (ref 32–36)
MCV RBC AUTO: 90.3 FL — SIGNIFICANT CHANGE UP (ref 80–100)
MRSA PCR RESULT.: SIGNIFICANT CHANGE UP
NRBC # BLD: 0 /100 WBCS — SIGNIFICANT CHANGE UP (ref 0–0)
PLATELET # BLD AUTO: 201 K/UL — SIGNIFICANT CHANGE UP (ref 150–400)
POTASSIUM SERPL-MCNC: 3.9 MMOL/L — SIGNIFICANT CHANGE UP (ref 3.5–5.3)
POTASSIUM SERPL-SCNC: 3.9 MMOL/L — SIGNIFICANT CHANGE UP (ref 3.5–5.3)
PROT SERPL-MCNC: 7.2 G/DL — SIGNIFICANT CHANGE UP (ref 6–8.3)
RBC # BLD: 4.22 M/UL — SIGNIFICANT CHANGE UP (ref 4.2–5.8)
RBC # FLD: 12.9 % — SIGNIFICANT CHANGE UP (ref 10.3–14.5)
S AUREUS DNA NOSE QL NAA+PROBE: DETECTED
SODIUM SERPL-SCNC: 140 MMOL/L — SIGNIFICANT CHANGE UP (ref 135–145)
SPECIMEN SOURCE: SIGNIFICANT CHANGE UP
WBC # BLD: 8.72 K/UL — SIGNIFICANT CHANGE UP (ref 3.8–10.5)
WBC # FLD AUTO: 8.72 K/UL — SIGNIFICANT CHANGE UP (ref 3.8–10.5)

## 2021-12-16 PROCEDURE — 99222 1ST HOSP IP/OBS MODERATE 55: CPT | Mod: 57

## 2021-12-16 PROCEDURE — 99233 SBSQ HOSP IP/OBS HIGH 50: CPT

## 2021-12-16 PROCEDURE — 99232 SBSQ HOSP IP/OBS MODERATE 35: CPT

## 2021-12-16 RX ORDER — CHLORHEXIDINE GLUCONATE 213 G/1000ML
1 SOLUTION TOPICAL ONCE
Refills: 0 | Status: COMPLETED | OUTPATIENT
Start: 2021-12-16 | End: 2021-12-17

## 2021-12-16 RX ORDER — MORPHINE SULFATE 50 MG/1
2 CAPSULE, EXTENDED RELEASE ORAL EVERY 6 HOURS
Refills: 0 | Status: DISCONTINUED | OUTPATIENT
Start: 2021-12-16 | End: 2021-12-18

## 2021-12-16 RX ORDER — SODIUM CHLORIDE 9 MG/ML
1000 INJECTION, SOLUTION INTRAVENOUS
Refills: 0 | Status: COMPLETED | OUTPATIENT
Start: 2021-12-16 | End: 2021-12-17

## 2021-12-16 RX ORDER — ACETAMINOPHEN 500 MG
650 TABLET ORAL EVERY 6 HOURS
Refills: 0 | Status: DISCONTINUED | OUTPATIENT
Start: 2021-12-16 | End: 2021-12-18

## 2021-12-16 RX ADMIN — HEPARIN SODIUM 5000 UNIT(S): 5000 INJECTION INTRAVENOUS; SUBCUTANEOUS at 14:36

## 2021-12-16 RX ADMIN — Medication 200 MILLIGRAM(S): at 05:54

## 2021-12-16 RX ADMIN — SODIUM CHLORIDE 150 MILLILITER(S): 9 INJECTION, SOLUTION INTRAVENOUS at 06:05

## 2021-12-16 RX ADMIN — Medication 500 MILLIGRAM(S): at 23:36

## 2021-12-16 RX ADMIN — Medication 500 MILLIGRAM(S): at 14:36

## 2021-12-16 RX ADMIN — AMLODIPINE BESYLATE 10 MILLIGRAM(S): 2.5 TABLET ORAL at 05:52

## 2021-12-16 RX ADMIN — HEPARIN SODIUM 5000 UNIT(S): 5000 INJECTION INTRAVENOUS; SUBCUTANEOUS at 05:53

## 2021-12-16 RX ADMIN — Medication 200 MILLIGRAM(S): at 17:48

## 2021-12-16 RX ADMIN — Medication 500 MILLIGRAM(S): at 05:53

## 2021-12-16 RX ADMIN — HEPARIN SODIUM 5000 UNIT(S): 5000 INJECTION INTRAVENOUS; SUBCUTANEOUS at 23:37

## 2021-12-16 RX ADMIN — TAMSULOSIN HYDROCHLORIDE 0.4 MILLIGRAM(S): 0.4 CAPSULE ORAL at 23:36

## 2021-12-16 RX ADMIN — SODIUM CHLORIDE 150 MILLILITER(S): 9 INJECTION, SOLUTION INTRAVENOUS at 17:49

## 2021-12-16 RX ADMIN — PANTOPRAZOLE SODIUM 40 MILLIGRAM(S): 20 TABLET, DELAYED RELEASE ORAL at 11:27

## 2021-12-16 NOTE — PROGRESS NOTE ADULT - PROBLEM SELECTOR PLAN 1
Pt with abdominal pain, elevated LFTs and lipase of 25836  States he is tolerating po, no nausea or vomiting  CT + for pancreatitis and duodenitis  Triglycerides 260, No evidence of gallstones   will start on clear liquid diet, advance as tolerated  Pain management PRNs  unclear cause of pancreatitis, consider MRCP  GI consult: Dr. Ontiveros lipase of [id number] on admission  CT confirms   placed on clear liquid diet and tolerating well  S/P MRCP suggestive of acute benny   likely pancreatitis  GI consult: Dr. Rama MATTSON consulted  pending HIDA scan

## 2021-12-16 NOTE — PROGRESS NOTE ADULT - PROBLEM SELECTOR PLAN 3
Non-contrast CT a/p showing new circumferential thickening involving the second and third portions of the duodenum   patient states he had a normal EGD/colon this year  try to obtain records Non-contrast CT a/p showing new circumferential thickening involving the second and third portions of the duodenum   patient states he had a normal EGD/colon this year  try to obtain records from Dr. Gaffney [phone]- office was closed today

## 2021-12-16 NOTE — PROGRESS NOTE ADULT - ASSESSMENT
55 y/o M with pmh of htn, hld, covid (dec 2020), renal stone s/p stent on 12/12/21 admitted with pancreatitis.

## 2021-12-16 NOTE — PROGRESS NOTE ADULT - PROBLEM SELECTOR PLAN 2
Pt with abdominal pain, elevated LFTs and lipase of 24180  States he is tolerating po, no nausea or vomiting  CT + for pancreatitis and duodenitis, cant rule out duodenal ulcer  will start on clear liquid diet, advance as tolerated  will cover empirically with cipro and flagyl   will start protonix iv  Pain management PRNs  GI consult: Dr. Ontiveros, possible need for endoscopy? CT + for pancreatitis and duodenitis, cant rule out duodenal ulcer  will start on clear liquid diet, advance as tolerated  will cover empirically with cipro and flagyl    protonix iv  Pain management PRNs  GI consult: Dr. Ontiveros

## 2021-12-16 NOTE — PROGRESS NOTE ADULT - PROBLEM SELECTOR PLAN 3
pmh of htn   he doesn't know his medications  was discharged on amlodipine 10 at one time  continue for now Controlled  SBP 110s  continue home dose of amlodipine with parameters  continue to monitor

## 2021-12-16 NOTE — PROGRESS NOTE ADULT - SUBJECTIVE AND OBJECTIVE BOX
NP Note discussed with  Primary Attending    INTERVAL HPI/OVERNIGHT EVENTS: tolerating clear liquid diet, endorses pain control    MEDICATIONS  (STANDING):  amLODIPine   Tablet 10 milliGRAM(s) Oral daily  ciprofloxacin   IVPB 400 milliGRAM(s) IV Intermittent every 12 hours  heparin   Injectable 5000 Unit(s) SubCutaneous every 8 hours  lactated ringers. 1000 milliLiter(s) (150 mL/Hr) IV Continuous <Continuous>  metroNIDAZOLE    Tablet 500 milliGRAM(s) Oral every 8 hours  pantoprazole  Injectable 40 milliGRAM(s) IV Push daily  tamsulosin 0.4 milliGRAM(s) Oral at bedtime    MEDICATIONS  (PRN):  acetaminophen     Tablet .. 650 milliGRAM(s) Oral every 6 hours PRN Temp greater or equal to 38C (100.4F), Mild Pain (1 - 3), Moderate Pain (4 - 6)  morphine  - Injectable 2 milliGRAM(s) IV Push every 6 hours PRN Severe Pain (7 - 10)      __________________________________________________  REVIEW OF SYSTEMS:    CONSTITUTIONAL: No fever,   EYES: no acute visual disturbances  NECK: No pain or stiffness  RESPIRATORY: No cough; No shortness of breath  CARDIOVASCULAR: No chest pain, no palpitations  GASTROINTESTINAL: No pain. No nausea or vomiting; No diarrhea   NEUROLOGICAL: No headache or numbness, no tremors  MUSCULOSKELETAL: No joint pain, no muscle pain  GENITOURINARY: no dysuria, no frequency, no hesitancy  PSYCHIATRY: no depression , no anxiety  ALL OTHER  ROS negative        Vital Signs Last 24 Hrs  T(C): 36.3 (16 Dec 2021 05:25), Max: 37.2 (15 Dec 2021 20:53)  T(F): 97.3 (16 Dec 2021 05:25), Max: 98.9 (15 Dec 2021 20:53)  HR: 59 (16 Dec 2021 05:25) (52 - 59)  BP: 113/72 (16 Dec 2021 05:25) (113/72 - 152/88)  BP(mean): --  RR: 16 (16 Dec 2021 05:25) (16 - 18)  SpO2: 95% (16 Dec 2021 05:25) (95% - 97%)    ________________________________________________  PHYSICAL EXAM:  GENERAL: NAD  HEENT: Normocephalic;  conjunctivae and sclerae clear; moist mucous membranes;   NECK : supple  CHEST/LUNG: Clear to auscultation bilaterally with good air entry   HEART: S1 S2  regular; no murmurs, gallops or rubs  ABDOMEN: Soft, Nontender, Nondistended; Bowel sounds present  EXTREMITIES: no cyanosis; no edema; no calf tenderness  SKIN: warm and dry; no rash  NERVOUS SYSTEM:  Awake and alert; Oriented  to place, person and time ; no new deficits    _________________________________________________  LABS:                        12.5   8.72  )-----------( 201      ( 16 Dec 2021 07:10 )             38.1     12-16    140  |  106  |  18  ----------------------------<  125<H>  3.9   |  29  |  1.15    Ca    8.7      16 Dec 2021 07:10  Phos  4.3     12-15  Mg     1.9     12-15    TPro  7.2  /  Alb  3.0<L>  /  TBili  1.2  /  DBili  x   /  AST  181<H>  /  ALT  392<H>  /  AlkPhos  185<H>  12-16      Urinalysis Basic - ( 15 Dec 2021 00:25 )    Color: Yellow / Appearance: Clear / S.020 / pH: x  Gluc: x / Ketone: Negative  / Bili: Negative / Urobili: 4   Blood: x / Protein: 100 / Nitrite: Negative   Leuk Esterase: Small / RBC: >50 /HPF / WBC 11-25 /HPF   Sq Epi: x / Non Sq Epi: Few /HPF / Bacteria: Few /HPF      CAPILLARY BLOOD GLUCOSE            RADIOLOGY & ADDITIONAL TESTS:    Imaging Personally Reviewed:  YES/NO    Consultant(s) Notes Reviewed:   YES/ No    Care Discussed with Consultants :     Plan of care was discussed with patient and /or primary care giver; all questions and concerns were addressed and care was aligned with patient's wishes.     NP Note discussed with  Primary Attending    INTERVAL HPI/OVERNIGHT EVENTS: tolerating clear liquid diet, endorses pain control    MEDICATIONS  (STANDING):  amLODIPine   Tablet 10 milliGRAM(s) Oral daily  ciprofloxacin   IVPB 400 milliGRAM(s) IV Intermittent every 12 hours  heparin   Injectable 5000 Unit(s) SubCutaneous every 8 hours  lactated ringers. 1000 milliLiter(s) (150 mL/Hr) IV Continuous <Continuous>  metroNIDAZOLE    Tablet 500 milliGRAM(s) Oral every 8 hours  pantoprazole  Injectable 40 milliGRAM(s) IV Push daily  tamsulosin 0.4 milliGRAM(s) Oral at bedtime    MEDICATIONS  (PRN):  acetaminophen     Tablet .. 650 milliGRAM(s) Oral every 6 hours PRN Temp greater or equal to 38C (100.4F), Mild Pain (1 - 3), Moderate Pain (4 - 6)  morphine  - Injectable 2 milliGRAM(s) IV Push every 6 hours PRN Severe Pain (7 - 10)      __________________________________________________  REVIEW OF SYSTEMS:    CONSTITUTIONAL: No fever,   EYES: no acute visual disturbances  NECK: No pain or stiffness  RESPIRATORY: No cough; No shortness of breath  CARDIOVASCULAR: No chest pain, no palpitations  GASTROINTESTINAL: No pain. No nausea or vomiting; No diarrhea   NEUROLOGICAL: No headache or numbness, no tremors  MUSCULOSKELETAL: No joint pain, no muscle pain  GENITOURINARY: no dysuria, no frequency, no hesitancy  PSYCHIATRY: no depression , no anxiety  ALL OTHER  ROS negative        Vital Signs Last 24 Hrs  T(C): 36.3 (16 Dec 2021 05:25), Max: 37.2 (15 Dec 2021 20:53)  T(F): 97.3 (16 Dec 2021 05:25), Max: 98.9 (15 Dec 2021 20:53)  HR: 59 (16 Dec 2021 05:25) (52 - 59)  BP: 113/72 (16 Dec 2021 05:25) (113/72 - 152/88)  BP(mean): --  RR: 16 (16 Dec 2021 05:25) (16 - 18)  SpO2: 95% (16 Dec 2021 05:25) (95% - 97%)    ________________________________________________  PHYSICAL EXAM:  GENERAL: NAD  HEENT: Normocephalic;  conjunctivae and sclerae clear;  NECK : supple  CHEST/LUNG: Clear to auscultation bilaterally with good air entry   HEART: S1 S2  regular; no murmurs, gallops or rubs  ABDOMEN: Soft, , Nondistended mildly tender RUQ and epigastric area Bowel sounds present  EXTREMITIES: no cyanosis; no edema; no calf tenderness  SKIN: warm and dry; no rash  NERVOUS SYSTEM:  Awake and alert; Oriented  to place, person and time    _________________________________________________  LABS:                        12.5   8.72  )-----------( 201      ( 16 Dec 2021 07:10 )             38.1     12-16    140  |  106  |  18  ----------------------------<  125<H>  3.9   |  29  |  1.15    Ca    8.7      16 Dec 2021 07:10  Phos  4.3     12-15  Mg     1.9     12-15    TPro  7.2  /  Alb  3.0<L>  /  TBili  1.2  /  DBili  x   /  AST  181<H>  /  ALT  392<H>  /  AlkPhos  185<H>  12-16      Urinalysis Basic - ( 15 Dec 2021 00:25 )    Color: Yellow / Appearance: Clear / S.020 / pH: x  Gluc: x / Ketone: Negative  / Bili: Negative / Urobili: 4   Blood: x / Protein: 100 / Nitrite: Negative   Leuk Esterase: Small / RBC: >50 /HPF / WBC 11-25 /HPF   Sq Epi: x / Non Sq Epi: Few /HPF / Bacteria: Few /HPF      CAPILLARY BLOOD GLUCOSE            RADIOLOGY & ADDITIONAL TESTS: < from: MR MRCP No Cont (12.15.21 @ 19:21) >    IMPRESSION: The common bile duct measures 4 mm in caliber which is within   normal limits. No evidence for choledocholithiasis.    Cholelithiasis. Mild pericholecystic edema may represent acute   cholecystitis. If clinically indicated, HIDA scan may be pursued for   further evaluation.    Peripancreatic fluid and edema, suggestive of acute pancreatitis.   Clinical correlation with pancreatic enzymes is recommended.    Mild ascites.    Trace right pleural effusion.    < end of copied text >    < from: CT Abdomen and Pelvis No Cont (15. @ 00:11) >      IMPRESSION:    There is new circumferential thickening involving the second and third   portions of the duodenum with marked adjacent fatty stranding and edema   which also surrounds portions of the pancreas and proximal jejunum. The   differential diagnosis includes, but not limited to,   duodenitis/enteritis, and underlying duodenal ulceration cannot be   excluded. Pancreatitis is also in the differential diagnosis. There is no   gross free intraperitoneal air.    There is been interval placement of bilateral nephroureteral stents with   interval resolution of previously demonstrated bilateral hydronephrosis.   In addition, previously demonstrated obstructing bilateral UPJ stones   have refluxed back into their respective renal collecting systems.      < end of copied text >         Imaging Personally Reviewed:  YES    Consultant(s) Notes Reviewed:   YES    Care Discussed with Consultants: GI + SX     Plan of care was discussed with patient and /or primary care giver; all questions and concerns were addressed and care was aligned with patient's wishes.

## 2021-12-16 NOTE — CONSULT NOTE ADULT - ASSESSMENT
56M with gallstone pancreatitis, possible passed stone given transient elevation in Tbili, now with downtrending LFTs    - Recommend transfer to Surgical Service under the care of Dr. Morgan; discussed with Dr. Luis  - Explained findings of imaging and diagnosis to patient; reviewed hospital course and treatment including surgery to remove the gallbladder to prevent recurrence of issue; pt demonstrated understanding and is agreeable to surgery; will plan for lap benny possibly Friday given pt continues to improve  - Discussed with Dr. Morgan, agrees
57 y/o M with pmh of htn, hld, covid (dec 2020), renal stone s/p stent on 12/12/21 and discharged on 12/14/21 who came back to the ed with worsening abdominal pain. In Ed serum lipase was [id number]. Patient admitted with Pancreatitis. GI was consulted for pancreatitis and abnormal imaging.

## 2021-12-16 NOTE — CONSULT NOTE ADULT - SUBJECTIVE AND OBJECTIVE BOX
GENERAL SURGERY CONSULTATION   Patient is a 56y old  Male who presents with a chief complaint of abd pain (16 Dec 2021 11:01)    HPI: 56M with pmhx of HTN, HLD, covid (dec 2020), b/l renal stones s/p b/l ureteral stents on 21 and discharged on 21 who came back to the ED with worsening epigastric abdominal pain. He denies any N/V/D/C, tolerating clear liquid diet. Patient denies any other complaints. CT abdomen done in ED showed new circumferential thickening involving the second and third portions of the duodenum with marked adjacent fatty stranding and edema which also surrounds portions of the pancreas and proximal jejunum. Pt reports history of appendectomy many years ago in the past.     REVIEW OF SYSTEMS:  Negative except stated above in HPI    PAST MEDICAL & SURGICAL HISTORY:  COVID-19  HTN (hypertension)  HLD (hyperlipidemia)  Renal stone  S/P ureteral stent placement    MEDICATIONS  (STANDING):  amLODIPine   Tablet 10 milliGRAM(s) Oral daily  ciprofloxacin   IVPB 400 milliGRAM(s) IV Intermittent every 12 hours  heparin   Injectable 5000 Unit(s) SubCutaneous every 8 hours  lactated ringers. 1000 milliLiter(s) (150 mL/Hr) IV Continuous <Continuous>  lactated ringers. 1000 milliLiter(s) (150 mL/Hr) IV Continuous <Continuous>  metroNIDAZOLE    Tablet 500 milliGRAM(s) Oral every 8 hours  pantoprazole  Injectable 40 milliGRAM(s) IV Push daily  tamsulosin 0.4 milliGRAM(s) Oral at bedtime    MEDICATIONS  (PRN):  acetaminophen     Tablet .. 650 milliGRAM(s) Oral every 6 hours PRN Temp greater or equal to 38C (100.4F), Mild Pain (1 - 3), Moderate Pain (4 - 6)  morphine  - Injectable 2 milliGRAM(s) IV Push every 6 hours PRN Severe Pain (7 - 10)    Allergies  No Known Allergies  Intolerances    Vital Signs Last 24 Hrs  T(C): 36.3 (16 Dec 2021 05:25), Max: 37.2 (15 Dec 2021 20:53)  T(F): 97.3 (16 Dec 2021 05:25), Max: 98.9 (15 Dec 2021 20:53)  HR: 59 (16 Dec 2021 05:25) (52 - 59)  BP: 113/72 (16 Dec 2021 05:25) (113/72 - 152/88)  RR: 16 (16 Dec 2021 05:25) (16 - 18)  SpO2: 95% (16 Dec 2021 05:25) (95% - 97%)    PHYSICAL EXAM:   General: Alert and oriented, not in acute distress  Respiratory: Breathing unlabored  GI: abdomen soft, nondistended, nontender  : No Chris, no dysuria or hematuria  Extremities: No pedal edema    LABS:                      12.5   8.72  )-----------( 201      ( 16 Dec 2021 07:10 )             38.1              12-16    140  |  106  |  18  ----------------------------<  125<H>  3.9   |  29  |  1.15    Ca    8.7      16 Dec 2021 07:10  Phos  4.3     12-15  Mg     1.9     12-15    TPro  7.2  /  Alb  3.0<L>  /  TBili  1.2  /  DBili  x   /  AST  181<H>  /  ALT  392<H>  /  AlkPhos  185<H>  12-16              Urinalysis Basic - ( 15 Dec 2021 00:25 )  Color: Yellow / Appearance: Clear / S.020 / pH: x  Gluc: x / Ketone: Negative  / Bili: Negative / Urobili: 4   Blood: x / Protein: 100 / Nitrite: Negative   Leuk Esterase: Small / RBC: >50 /HPF / WBC 11-25 /HPF   Sq Epi: x / Non Sq Epi: Few /HPF / Bacteria: Few /HPF    RADIOLOGY & ADDITIONAL STUDIES:  < from: CT Abdomen and Pelvis No Cont (12.15.21 @ 00:11) >  FINDINGS: The examination is limited without oral and intravenous   contrast.    LOWER CHEST: Mild interval clearing of patchy bibasilar groundglass   opacities. Calcified inferior hilar lymph nodes again seen.  LIVER: Within normal limits.  BILE DUCTS: Normal caliber.  GALLBLADDER: Distended without calcified gallstone.  SPLEEN: Within normal limits.  PANCREAS: There is peripancreatic fatty stranding/edema seen..  ADRENALS: Stable 1.3 cm right adrenal gland adenoma. Stable tiny left   adrenal gland nodules measure up to 1 cm on the left.  KIDNEYS/URETERS: Interval placement of bilateral ureteral stents in good   position. Interval decompression of previously seen hydronephrosis,   bilaterally. Previously demonstrated obstructing 6 mm stone in the left   UPJ has refluxed back into the mid left kidney. Previously demonstrated 4   mm stone in the right UPJ has also refluxed back into the collecting   system of the right kidney. Additional stable nonobstructing stone in the   right kidney again demonstrated.    BLADDER: Partially collapsed without gross bladder wall thickening.  REPRODUCTIVE ORGANS: Prostate gland not grossly enlarged. Seminal   vesicles are symmetric.    BOWEL: Evaluation of bowel is limited without distention with oral   contrast, however there is no bowel obstruction. Colonic diverticulosis   without definite acute diverticulitis demonstrated. Appendix not seen.   There is suggestion of thickening involving the second and third portions   of the duodenum with a prominent adjacent fatty stranding seen. There is   also fatty stranding/edema adjacent to the proximal jejunum. Stomach is   distended with debris.  PERITONEUM: No free air. Trace perihepatic ascites..  VESSELS:  Limited without venous contrast, however the abdominal aorta   demonstrates a normal caliber and course..  RETROPERITONEUM: No lymphadenopathy.  ABDOMINAL WALL: Small fat-containing right inguinal hernia..  BONES: Mild degenerative changes of the spine.    IMPRESSION:  There is new circumferential thickening involving the second and third   portions of the duodenum with marked adjacent fatty stranding and edema   which also surrounds portions of the pancreas and proximal jejunum. The   differential diagnosis includes, but not limited to,   duodenitis/enteritis, and underlying duodenal ulceration cannot be   excluded. Pancreatitis is also in the differential diagnosis. There is no   gross free intraperitoneal air.    There is been interval placement of bilateral nephroureteral stents with   interval resolution of previously demonstrated bilateral hydronephrosis.   In addition, previously demonstrated obstructing bilateral UPJ stones   have refluxed back into their respective renal collecting systems.  --- End of Report ---  < end of copied text >    < from: MR MRCP No Cont (12.15.21 @ 19:21) >  FINDINGS:  LOWER CHEST: Trace right pleural effusion.    LIVER: Tiny brightly T2 hyperintense lesion in the left hepatic lobe,   suggestive of a cyst or hemangioma..  BILE DUCTS: Normal caliber. The common bile duct measures 4 mm in caliber   which is within normal limits. Noevidence for choledocholithiasis.  GALLBLADDER: Multiple small gallstones in the gallbladder. No evidence   for thickened gallbladder wall. Mild pericholecystic edema may represent   acute cholecystitis. If clinically indicated, HIDA scan may be pursued   for further evaluation.  SPLEEN: Within normal limits.  PANCREAS: Peripancreatic fluid and edema, suggestive of acute   pancreatitis. Clinical correlation with pancreatic enzymes is recommended.  ADRENALS: 1.3 cm right adrenal nodule with signalattenuation on out of   phase T1-weighted gradient echo images, compatible with an adenoma.   Nonspecific 1.0 cm left adrenal nodule.  KIDNEYS/URETERS: Within normal limits.    VISUALIZED PORTIONS:  BOWEL: Colonic diverticulosis  PERITONEUM: Mild ascites.  VESSELS: Within normal limits.  RETROPERITONEUM/LYMPH NODES: No lymphadenopathy.  ABDOMINAL WALL: Within normal limits.  BONES: Within normal limits.    IMPRESSION: The common bile duct measures 4 mm in caliber which is within   normal limits. No evidence for choledocholithiasis.  Cholelithiasis. Mild pericholecystic edema may represent acute   cholecystitis. If clinically indicated, HIDA scan may be pursued for   further evaluation.  Peripancreatic fluid and edema, suggestive of acute pancreatitis.   Clinical correlation with pancreatic enzymes is recommended.  Mild ascites.  Trace right pleural effusion.  --- End of Report ---  < end of copied text >       GENERAL SURGERY CONSULTATION   Patient is a 56y old  Male who presents with a chief complaint of abd pain (16 Dec 2021 11:01)    HPI: 56M with pmhx of HTN, HLD, covid (dec 2020), b/l renal stones s/p b/l ureteral stents on 21 and discharged on 21 who came back to the ED with worsening epigastric abdominal pain. He denies any N/V/D/C, tolerating clear liquid diet. Patient denies any other complaints. CT abdomen done in ED showed new circumferential thickening involving the second and third portions of the duodenum with marked adjacent fatty stranding and edema which also surrounds portions of the pancreas and proximal jejunum. Pt reports history of appendectomy many years ago in the past.      ID#:[id number]    REVIEW OF SYSTEMS:  Negative except stated above in HPI    PAST MEDICAL & SURGICAL HISTORY:  COVID-19  HTN (hypertension)  HLD (hyperlipidemia)  Renal stone  S/P ureteral stent placement    MEDICATIONS  (STANDING):  amLODIPine   Tablet 10 milliGRAM(s) Oral daily  ciprofloxacin   IVPB 400 milliGRAM(s) IV Intermittent every 12 hours  heparin   Injectable 5000 Unit(s) SubCutaneous every 8 hours  lactated ringers. 1000 milliLiter(s) (150 mL/Hr) IV Continuous <Continuous>  lactated ringers. 1000 milliLiter(s) (150 mL/Hr) IV Continuous <Continuous>  metroNIDAZOLE    Tablet 500 milliGRAM(s) Oral every 8 hours  pantoprazole  Injectable 40 milliGRAM(s) IV Push daily  tamsulosin 0.4 milliGRAM(s) Oral at bedtime    MEDICATIONS  (PRN):  acetaminophen     Tablet .. 650 milliGRAM(s) Oral every 6 hours PRN Temp greater or equal to 38C (100.4F), Mild Pain (1 - 3), Moderate Pain (4 - 6)  morphine  - Injectable 2 milliGRAM(s) IV Push every 6 hours PRN Severe Pain (7 - 10)    Allergies  No Known Allergies  Intolerances    Vital Signs Last 24 Hrs  T(C): 36.3 (16 Dec 2021 05:25), Max: 37.2 (15 Dec 2021 20:53)  T(F): 97.3 (16 Dec 2021 05:25), Max: 98.9 (15 Dec 2021 20:53)  HR: 59 (16 Dec 2021 05:25) (52 - 59)  BP: 113/72 (16 Dec 2021 05:25) (113/72 - 152/88)  RR: 16 (16 Dec 2021 05:25) (16 - 18)  SpO2: 95% (16 Dec 2021 05:25) (95% - 97%)    PHYSICAL EXAM:   General: Alert and oriented, not in acute distress  Respiratory: Breathing unlabored  GI: abdomen soft, nondistended, nontender  : No Chris, no dysuria or hematuria  Extremities: No pedal edema    LABS:                      12.5   8.72  )-----------( 201      ( 16 Dec 2021 07:10 )             38.1              12-16    140  |  106  |  18  ----------------------------<  125<H>  3.9   |  29  |  1.15    Ca    8.7      16 Dec 2021 07:10  Phos  4.3     12-15  Mg     1.9     12-15    TPro  7.2  /  Alb  3.0<L>  /  TBili  1.2  /  DBili  x   /  AST  181<H>  /  ALT  392<H>  /  AlkPhos  185<H>  12-16              Urinalysis Basic - ( 15 Dec 2021 00:25 )  Color: Yellow / Appearance: Clear / S.020 / pH: x  Gluc: x / Ketone: Negative  / Bili: Negative / Urobili: 4   Blood: x / Protein: 100 / Nitrite: Negative   Leuk Esterase: Small / RBC: >50 /HPF / WBC 11-25 /HPF   Sq Epi: x / Non Sq Epi: Few /HPF / Bacteria: Few /HPF    RADIOLOGY & ADDITIONAL STUDIES:  < from: CT Abdomen and Pelvis No Cont (12.15.21 @ 00:11) >  FINDINGS: The examination is limited without oral and intravenous   contrast.    LOWER CHEST: Mild interval clearing of patchy bibasilar groundglass   opacities. Calcified inferior hilar lymph nodes again seen.  LIVER: Within normal limits.  BILE DUCTS: Normal caliber.  GALLBLADDER: Distended without calcified gallstone.  SPLEEN: Within normal limits.  PANCREAS: There is peripancreatic fatty stranding/edema seen..  ADRENALS: Stable 1.3 cm right adrenal gland adenoma. Stable tiny left   adrenal gland nodules measure up to 1 cm on the left.  KIDNEYS/URETERS: Interval placement of bilateral ureteral stents in good   position. Interval decompression of previously seen hydronephrosis,   bilaterally. Previously demonstrated obstructing 6 mm stone in the left   UPJ has refluxed back into the mid left kidney. Previously demonstrated 4   mm stone in the right UPJ has also refluxed back into the collecting   system of the right kidney. Additional stable nonobstructing stone in the   right kidney again demonstrated.    BLADDER: Partially collapsed without gross bladder wall thickening.  REPRODUCTIVE ORGANS: Prostate gland not grossly enlarged. Seminal   vesicles are symmetric.    BOWEL: Evaluation of bowel is limited without distention with oral   contrast, however there is no bowel obstruction. Colonic diverticulosis   without definite acute diverticulitis demonstrated. Appendix not seen.   There is suggestion of thickening involving the second and third portions   of the duodenum with a prominent adjacent fatty stranding seen. There is   also fatty stranding/edema adjacent to the proximal jejunum. Stomach is   distended with debris.  PERITONEUM: No free air. Trace perihepatic ascites..  VESSELS:  Limited without venous contrast, however the abdominal aorta   demonstrates a normal caliber and course..  RETROPERITONEUM: No lymphadenopathy.  ABDOMINAL WALL: Small fat-containing right inguinal hernia..  BONES: Mild degenerative changes of the spine.    IMPRESSION:  There is new circumferential thickening involving the second and third   portions of the duodenum with marked adjacent fatty stranding and edema   which also surrounds portions of the pancreas and proximal jejunum. The   differential diagnosis includes, but not limited to,   duodenitis/enteritis, and underlying duodenal ulceration cannot be   excluded. Pancreatitis is also in the differential diagnosis. There is no   gross free intraperitoneal air.    There is been interval placement of bilateral nephroureteral stents with   interval resolution of previously demonstrated bilateral hydronephrosis.   In addition, previously demonstrated obstructing bilateral UPJ stones   have refluxed back into their respective renal collecting systems.  --- End of Report ---  < end of copied text >    < from: MR MRCP No Cont (12.15.21 @ 19:21) >  FINDINGS:  LOWER CHEST: Trace right pleural effusion.    LIVER: Tiny brightly T2 hyperintense lesion in the left hepatic lobe,   suggestive of a cyst or hemangioma..  BILE DUCTS: Normal caliber. The common bile duct measures 4 mm in caliber   which is within normal limits. Noevidence for choledocholithiasis.  GALLBLADDER: Multiple small gallstones in the gallbladder. No evidence   for thickened gallbladder wall. Mild pericholecystic edema may represent   acute cholecystitis. If clinically indicated, HIDA scan may be pursued   for further evaluation.  SPLEEN: Within normal limits.  PANCREAS: Peripancreatic fluid and edema, suggestive of acute   pancreatitis. Clinical correlation with pancreatic enzymes is recommended.  ADRENALS: 1.3 cm right adrenal nodule with signalattenuation on out of   phase T1-weighted gradient echo images, compatible with an adenoma.   Nonspecific 1.0 cm left adrenal nodule.  KIDNEYS/URETERS: Within normal limits.    VISUALIZED PORTIONS:  BOWEL: Colonic diverticulosis  PERITONEUM: Mild ascites.  VESSELS: Within normal limits.  RETROPERITONEUM/LYMPH NODES: No lymphadenopathy.  ABDOMINAL WALL: Within normal limits.  BONES: Within normal limits.    IMPRESSION: The common bile duct measures 4 mm in caliber which is within   normal limits. No evidence for choledocholithiasis.  Cholelithiasis. Mild pericholecystic edema may represent acute   cholecystitis. If clinically indicated, HIDA scan may be pursued for   further evaluation.  Peripancreatic fluid and edema, suggestive of acute pancreatitis.   Clinical correlation with pancreatic enzymes is recommended.  Mild ascites.  Trace right pleural effusion.  --- End of Report ---  < end of copied text >

## 2021-12-16 NOTE — PROGRESS NOTE ADULT - ATTENDING COMMENTS
Patient seen and examined at bedside. No acute events overnight. He was tolerating clear liquid diet with improved epigastric and abdominal pain. Cholestatic transaminitis markedly improved as well. Minimal nausea and vomiting. MRCP performed showing no biliary dilation (no signs of choledocholithiasis), distended gallbladder and pancreatitis. On exam abdominal exam significant for epigastric and RUQ tenderness to palpation. GI consult appreciated.    Given clinical improvement will maintain aggressive IV hydration for pancreatitis & pain control. Etiology unknown, but possibly passed stone. Continue to trend LFTs. Follow up with GI. Given RUQ pain and gallbladder distention will pursue HIDA scan to r/o cholecystitis. Currently on antibiotics for duodenitis which would also cover for cholecystitis.

## 2021-12-16 NOTE — PROGRESS NOTE ADULT - PROBLEM SELECTOR PLAN 4
mild elevated triglyceride   f/u lipid profile  consider starting medication depending on results mild elevated triglyceride

## 2021-12-16 NOTE — PROGRESS NOTE ADULT - ASSESSMENT
55 y/o M with pmh of htn, hld, covid (dec 2020), renal stone s/p stent on 12/12/21 and discharged on 12/14/21 who came back to the ed with worsening abdominal pain. Admitted for pancreatitis and duodenitis 57 y/o M with pmh of htn, hld, covid (dec 2020), renal stone s/p stent on 12/12/21 and discharged on 12/14/21 who came back to the ed with worsening abdominal pain. Found with Lipase over 11K, CT confirmed pancreatitis and duodenitis. Started on cipro and Flagyl GI consulted. s/p MRCP which is suggestive of Acute benny. SX consulted, pending HIDA scan

## 2021-12-16 NOTE — PROGRESS NOTE ADULT - SUBJECTIVE AND OBJECTIVE BOX
GI PROGRESS NOTE    Patient is a 56y old  Male who presents with a chief complaint of abd pain (16 Dec 2021 10:09)      HPI:  55 y/o M with pmh of htn, hld, covid (dec 2020), renal stone s/p stent on 12/12/21 and discharged on 12/14/21 who came back to the ed with worsening abdominal pain. Pt states that he had abdominal pain on the day of discharge, went home, but pain got progressiveley worse. Currently it is 6/10 in the epigastrium radiating down. He denies any N/V/D/C, states that is able to tolerate PO. Patient denies any other complains. In ED pt noted to have transaminitis and elevated lipase. CT abdomen + for inflammation in the duodenum and peripancreatic fluid.  (15 Dec 2021 04:45)          ______________________________________________________________________  PMH/PSH:  PAST MEDICAL & SURGICAL HISTORY:  COVID-19    HTN (hypertension)    HLD (hyperlipidemia)    Renal stone    S/P ureteral stent placement      ______________________________________________________________________  MEDS:  MEDICATIONS  (STANDING):  amLODIPine   Tablet 10 milliGRAM(s) Oral daily  ciprofloxacin   IVPB 400 milliGRAM(s) IV Intermittent every 12 hours  heparin   Injectable 5000 Unit(s) SubCutaneous every 8 hours  lactated ringers. 1000 milliLiter(s) (150 mL/Hr) IV Continuous <Continuous>  lactated ringers. 1000 milliLiter(s) (150 mL/Hr) IV Continuous <Continuous>  metroNIDAZOLE    Tablet 500 milliGRAM(s) Oral every 8 hours  pantoprazole  Injectable 40 milliGRAM(s) IV Push daily  tamsulosin 0.4 milliGRAM(s) Oral at bedtime    MEDICATIONS  (PRN):  acetaminophen     Tablet .. 650 milliGRAM(s) Oral every 6 hours PRN Temp greater or equal to 38C (100.4F), Mild Pain (1 - 3), Moderate Pain (4 - 6)  morphine  - Injectable 2 milliGRAM(s) IV Push every 6 hours PRN Severe Pain (7 - 10)    ______________________________________________________________________  ALL:   Allergies    No Known Allergies    Intolerances      ______________________________________________________________________  SH: Social History:  Denies tobacco, and recreational drugs  occasional etoh use (15 Dec 2021 04:45)    ______________________________________________________________________  FH:  FAMILY HISTORY:  FH: hyperlipidemia (Mother)      ______________________________________________________________________  ROS:    CONSTITUTIONAL:  No weight loss, fever, chills, weakness or fatigue.    HEENT:  Eyes:  No visual loss, blurred vision, double vision or yellow sclerae. Ears, Nose, Throat:  No hearing loss, sneezing, congestion, runny nose or sore throat.    SKIN:  No rash or itching.    CARDIOVASCULAR:  No chest pain, chest pressure or chest discomfort. No palpitations or edema.    RESPIRATORY:  No shortness of breath, cough or sputum.    GASTROINTESTINAL:  SEE HPI    GENITOURINARY:  No dysuria, hematuria, urinary frequency    NEUROLOGICAL:  No headache, dizziness, syncope, paralysis, ataxia, numbness or tingling in the extremities. No change in bowel or bladder control.    MUSCULOSKELETAL:  No muscle, back pain, joint pain or stiffness.    HEMATOLOGIC:  No anemia, bleeding or bruising.    LYMPHATICS:  No enlarged nodes. No history of splenectomy.    PSYCHIATRIC:  No history of depression or anxiety.    ENDOCRINOLOGIC:  No reports of sweating, cold or heat intolerance. No polyuria or polydipsia.    ALLERGIES:  No history of asthma, hives, eczema or rhinitis.    GI HPI: Patient seen and examined. Resting in NAD. States abdominal pain improving. No nausea or vomiting. Tolerating clear liquid diet  ______________________________________________________________________  PHYSICAL EXAM:  T(C): 36.3 (12-16-21 @ 05:25), Max: 37.2 (12-15-21 @ 20:53)  HR: 59 (12-16-21 @ 05:25)  BP: 113/72 (12-16-21 @ 05:25)  RR: 16 (12-16-21 @ 05:25)  SpO2: 95% (12-16-21 @ 05:25)  Wt(kg): --      GEN: NAD   CVS- S1 S2  ABD: soft mildly tender on exam,  non distended, bowel sounds+ no rebound, no guarding  LUNGS: clear to auscultation  NEURO: non focal neuro exam; Extremities: no cyanosis, no calf tenderness, no edema, no clubbing      ______________________________________________________________________  LABS:                        12.5   8.72  )-----------( 201      ( 16 Dec 2021 07:10 )             38.1     12-16    140  |  106  |  18  ----------------------------<  125<H>  3.9   |  29  |  1.15    Ca    8.7      16 Dec 2021 07:10  Phos  4.3     12-15  Mg     1.9     12-15    TPro  7.2  /  Alb  3.0<L>  /  TBili  1.2  /  DBili  x   /  AST  181<H>  /  ALT  392<H>  /  AlkPhos  185<H>  12-16    LIVER FUNCTIONS - ( 16 Dec 2021 07:10 )  Alb: 3.0 g/dL / Pro: 7.2 g/dL / ALK PHOS: 185 U/L / ALT: 392 U/L DA / AST: 181 U/L / GGT: x           ______________________________________________________________________  IMAGING:    ______________________________________________________________________  ASSESSMENT:  56y Male    PLAN:

## 2021-12-16 NOTE — PROGRESS NOTE ADULT - PROBLEM SELECTOR PLAN 2
LFT's downtrending  s/p MRCP showing No evidence for choledocholithiasis.  but cholelithiasis and mild pericholecystic edema which may represent acute cholecystitis  Need sx to evaluate LFT's downtrending  s/p MRCP showing No evidence for choledocholithiasis.  but cholelithiasis and mild pericholecystic edema which may represent acute cholecystitis  Need sx to evaluate  c/w abx's

## 2021-12-17 ENCOUNTER — RESULT REVIEW (OUTPATIENT)
Age: 56
End: 2021-12-17

## 2021-12-17 LAB
ALBUMIN SERPL ELPH-MCNC: 2.9 G/DL — LOW (ref 3.5–5)
ALP SERPL-CCNC: 145 U/L — HIGH (ref 40–120)
ALT FLD-CCNC: 243 U/L DA — HIGH (ref 10–60)
ANION GAP SERPL CALC-SCNC: 3 MMOL/L — LOW (ref 5–17)
APTT BLD: 34.7 SEC — SIGNIFICANT CHANGE UP (ref 27.5–35.5)
AST SERPL-CCNC: 61 U/L — HIGH (ref 10–40)
BILIRUB SERPL-MCNC: 0.8 MG/DL — SIGNIFICANT CHANGE UP (ref 0.2–1.2)
BLD GP AB SCN SERPL QL: SIGNIFICANT CHANGE UP
BUN SERPL-MCNC: 15 MG/DL — SIGNIFICANT CHANGE UP (ref 7–18)
CALCIUM SERPL-MCNC: 9.5 MG/DL — SIGNIFICANT CHANGE UP (ref 8.4–10.5)
CHLORIDE SERPL-SCNC: 106 MMOL/L — SIGNIFICANT CHANGE UP (ref 96–108)
CO2 SERPL-SCNC: 31 MMOL/L — SIGNIFICANT CHANGE UP (ref 22–31)
CREAT SERPL-MCNC: 1.11 MG/DL — SIGNIFICANT CHANGE UP (ref 0.5–1.3)
GLUCOSE SERPL-MCNC: 103 MG/DL — HIGH (ref 70–99)
HCT VFR BLD CALC: 37.8 % — LOW (ref 39–50)
HGB BLD-MCNC: 12.1 G/DL — LOW (ref 13–17)
INR BLD: 1.27 RATIO — HIGH (ref 0.88–1.16)
LIDOCAIN IGE QN: 302 U/L — SIGNIFICANT CHANGE UP (ref 73–393)
MCHC RBC-ENTMCNC: 28.9 PG — SIGNIFICANT CHANGE UP (ref 27–34)
MCHC RBC-ENTMCNC: 32 GM/DL — SIGNIFICANT CHANGE UP (ref 32–36)
MCV RBC AUTO: 90.2 FL — SIGNIFICANT CHANGE UP (ref 80–100)
NRBC # BLD: 0 /100 WBCS — SIGNIFICANT CHANGE UP (ref 0–0)
PLATELET # BLD AUTO: 211 K/UL — SIGNIFICANT CHANGE UP (ref 150–400)
POTASSIUM SERPL-MCNC: 3.9 MMOL/L — SIGNIFICANT CHANGE UP (ref 3.5–5.3)
POTASSIUM SERPL-SCNC: 3.9 MMOL/L — SIGNIFICANT CHANGE UP (ref 3.5–5.3)
PROT SERPL-MCNC: 7.1 G/DL — SIGNIFICANT CHANGE UP (ref 6–8.3)
PROTHROM AB SERPL-ACNC: 14.9 SEC — HIGH (ref 10.6–13.6)
RBC # BLD: 4.19 M/UL — LOW (ref 4.2–5.8)
RBC # FLD: 12.6 % — SIGNIFICANT CHANGE UP (ref 10.3–14.5)
SODIUM SERPL-SCNC: 140 MMOL/L — SIGNIFICANT CHANGE UP (ref 135–145)
WBC # BLD: 5.88 K/UL — SIGNIFICANT CHANGE UP (ref 3.8–10.5)
WBC # FLD AUTO: 5.88 K/UL — SIGNIFICANT CHANGE UP (ref 3.8–10.5)

## 2021-12-17 PROCEDURE — 47563 LAPARO CHOLECYSTECTOMY/GRAPH: CPT

## 2021-12-17 PROCEDURE — 47563 LAPARO CHOLECYSTECTOMY/GRAPH: CPT | Mod: AS

## 2021-12-17 PROCEDURE — 88304 TISSUE EXAM BY PATHOLOGIST: CPT | Mod: 26

## 2021-12-17 PROCEDURE — 99232 SBSQ HOSP IP/OBS MODERATE 35: CPT

## 2021-12-17 RX ORDER — FENTANYL CITRATE 50 UG/ML
25 INJECTION INTRAVENOUS
Refills: 0 | Status: DISCONTINUED | OUTPATIENT
Start: 2021-12-17 | End: 2021-12-17

## 2021-12-17 RX ORDER — ONDANSETRON 8 MG/1
4 TABLET, FILM COATED ORAL ONCE
Refills: 0 | Status: DISCONTINUED | OUTPATIENT
Start: 2021-12-17 | End: 2021-12-17

## 2021-12-17 RX ORDER — ACETAMINOPHEN 500 MG
1000 TABLET ORAL ONCE
Refills: 0 | Status: DISCONTINUED | OUTPATIENT
Start: 2021-12-17 | End: 2021-12-18

## 2021-12-17 RX ORDER — MUPIROCIN 20 MG/G
1 OINTMENT TOPICAL EVERY 12 HOURS
Refills: 0 | Status: DISCONTINUED | OUTPATIENT
Start: 2021-12-17 | End: 2021-12-17

## 2021-12-17 RX ORDER — FENTANYL CITRATE 50 UG/ML
50 INJECTION INTRAVENOUS
Refills: 0 | Status: DISCONTINUED | OUTPATIENT
Start: 2021-12-17 | End: 2021-12-17

## 2021-12-17 RX ORDER — HYDROMORPHONE HYDROCHLORIDE 2 MG/ML
0.5 INJECTION INTRAMUSCULAR; INTRAVENOUS; SUBCUTANEOUS
Refills: 0 | Status: DISCONTINUED | OUTPATIENT
Start: 2021-12-17 | End: 2021-12-18

## 2021-12-17 RX ORDER — KETOROLAC TROMETHAMINE 30 MG/ML
15 SYRINGE (ML) INJECTION EVERY 6 HOURS
Refills: 0 | Status: DISCONTINUED | OUTPATIENT
Start: 2021-12-17 | End: 2021-12-18

## 2021-12-17 RX ORDER — ONDANSETRON 8 MG/1
4 TABLET, FILM COATED ORAL EVERY 6 HOURS
Refills: 0 | Status: DISCONTINUED | OUTPATIENT
Start: 2021-12-17 | End: 2021-12-18

## 2021-12-17 RX ORDER — MUPIROCIN 20 MG/G
1 OINTMENT TOPICAL EVERY 12 HOURS
Refills: 0 | Status: DISCONTINUED | OUTPATIENT
Start: 2021-12-17 | End: 2021-12-18

## 2021-12-17 RX ORDER — ACETAMINOPHEN 500 MG
650 TABLET ORAL EVERY 6 HOURS
Refills: 0 | Status: DISCONTINUED | OUTPATIENT
Start: 2021-12-17 | End: 2021-12-18

## 2021-12-17 RX ORDER — SODIUM CHLORIDE 9 MG/ML
1000 INJECTION, SOLUTION INTRAVENOUS
Refills: 0 | Status: DISCONTINUED | OUTPATIENT
Start: 2021-12-17 | End: 2021-12-17

## 2021-12-17 RX ORDER — CHLORHEXIDINE GLUCONATE 213 G/1000ML
1 SOLUTION TOPICAL DAILY
Refills: 0 | Status: DISCONTINUED | OUTPATIENT
Start: 2021-12-17 | End: 2021-12-18

## 2021-12-17 RX ADMIN — CHLORHEXIDINE GLUCONATE 1 APPLICATION(S): 213 SOLUTION TOPICAL at 17:17

## 2021-12-17 RX ADMIN — Medication 200 MILLIGRAM(S): at 05:56

## 2021-12-17 RX ADMIN — FENTANYL CITRATE 25 MICROGRAM(S): 50 INJECTION INTRAVENOUS at 21:50

## 2021-12-17 RX ADMIN — FENTANYL CITRATE 25 MICROGRAM(S): 50 INJECTION INTRAVENOUS at 22:20

## 2021-12-17 RX ADMIN — Medication 200 MILLIGRAM(S): at 17:16

## 2021-12-17 RX ADMIN — MUPIROCIN 1 APPLICATION(S): 20 OINTMENT TOPICAL at 17:16

## 2021-12-17 RX ADMIN — CHLORHEXIDINE GLUCONATE 1 APPLICATION(S): 213 SOLUTION TOPICAL at 12:21

## 2021-12-17 RX ADMIN — PANTOPRAZOLE SODIUM 40 MILLIGRAM(S): 20 TABLET, DELAYED RELEASE ORAL at 12:21

## 2021-12-17 RX ADMIN — Medication 500 MILLIGRAM(S): at 05:55

## 2021-12-17 NOTE — PROGRESS NOTE ADULT - ASSESSMENT
56M with gallstone pancreatitis, downtrending LFTs, Tbili wnl     -Plan for laparoscopic cholecystectomy today   -Pre op labs   -NPO  -IVF   -Abx   -Pain medication PRN   -DVT ppx

## 2021-12-17 NOTE — BRIEF OPERATIVE NOTE - VENOUS THROMBOEMBOLISM PROPHYLAXIS THERAPY
[employer]ne boots Patient is a single, unemployed, noncaregiver, childless, 21 yo  female, living at home with mother and brother, father recently moved out (parents ), who was supposed to start [employer] in [place], [place] (said her mother told her to stay in [place]), currently not planning on going to school in [place] or working, who reports unremarkable developmental history, regular classes, denies ever having seen a psychiatrist before, denies formal psychiatric history, admits to hx of self-injurious (cutting behaviors), who called 911 on herself today saying she was suicidal after her brother reportedly told Patient their mother would be better off if she was dead as 'you don't do anything and sleep all day." She got upset and threw her cell across into the neighbor's yard and broke it. She had a burner phone which she also threw and broke. Patient states she had thoughts of self harm briefly today (by punching a wall until she breaks her hand) and when she heard her brother say this, she wanted to kill herself and considered suicide seriously to the point that she called 911 on herself.     Patient states she has a hx of self-harm (cut herself approximately a year ago) but showed Writer several red superficial healed cuts (thin lines) on her left forearm which look much younger than a year old.     Patient said she has been feeling down, depressed and anxious for the last year or so and her parents do not understand mental illness or psychiatry and basically told her that everyone has bad days, etc. patient attributes this to their culture - both are from [place]. patient said that she has been feeling more and more anxious, restless, did not feel like doing anything, anhedonia, poor attention  and concentration with hypersomnia. Patient said that she got some Xanax (yellow pill which is 2mg) from a friend at a party which said helped her a lot, and helped her get along with her parents. Patient said she started using this summer, used one pill a day, sometimes only half a pill. She denies more use; denies using other substances/agents. Patient tried marijuana before at parties but denies chronic use. Patient denies sxs of nurys/psychosis; no access to weapons. Patient wants to feel better and feels like she is at the point where she needs professional help. Patient is a single, unemployed, noncaregiver, childless, 19 yo  female, living at home with mother and brother, father recently moved out (parents ), who was supposed to start [employer] in [place], [place] (said her mother told her to stay in [place]), currently not planning on going to school in [place] or working, who reports unremarkable developmental history, regular classes, denies ever having seen a psychiatrist before, denies formal psychiatric history, admits to hx of self-injurious (cutting behaviors), who called 911 on herself today saying she was suicidal after her brother reportedly told Patient their mother would be better off if she was dead as 'you don't do anything and sleep all day." She got upset and threw her cell across into the neighbor's yard and broke it. She had a burner phone which she also threw and broke. Patient states she had thoughts of self harm briefly today (by punching a wall until she breaks her hand) and when she heard her brother say this, she wanted to kill herself and considered suicide seriously to the point that she called 911 on herself.     Patient states she has a hx of self-harm (cut herself approximately a year ago) but showed Writer several red superficial healed cuts (thin lines) on her left forearm which look much younger than a year old. patient said she told her parents she needs psychiatric help before but they did not take her.     Patient said she has been feeling down, depressed and anxious for the last year or so and her parents do not understand mental illness or psychiatry and basically told her that everyone has bad days, etc. patient attributes this to their culture - both are from [place]. patient said that she has been feeling more and more anxious, restless, did not feel like doing anything, anhedonia, poor attention  and concentration with hypersomnia. Patient said that she got some Xanax (yellow pill which is 2mg) from a friend at a party which said helped her a lot, and helped her get along with her parents. Patient said she started using this summer, used one pill a day, sometimes only half a pill. She denies more use; denies using other substances/agents. Patient tried marijuana before at parties but denies chronic use. Patient denies sxs of nurys/psychosis; no access to weapons. Patient wants to feel better and feels like she is at the point where she needs professional help. Patient is a single, unemployed, noncaregiver, childless, 21 yo  female, living at home with mother and brother, father recently moved out (parents ), reports to be not sexually active, who was supposed to start [employer] [facility] [employer] in [place], [place] (said her mother told her to stay in [place]), currently not planning on going to school in [place] or working, who reports unremarkable developmental history, regular classes, denies ever having seen a psychiatrist before, denies formal psychiatric history, admits to hx of self-injurious (cutting behaviors), who called 911 on herself today saying she was suicidal after her brother reportedly told Patient their mother would be better off if she was dead as 'you don't do anything and sleep all day." She got upset and threw her cell across into the neighbor's yard and broke it. She had a burner phone which she also threw and broke. Patient states she had thoughts of self harm briefly today (by punching a wall until she breaks her hand) and when she heard her brother say this, she wanted to kill herself and considered suicide seriously to the point that she called 911 on herself.     Patient states she has a hx of self-harm (cut herself approximately a year ago) but showed Writer several red superficial healed cuts (thin lines) on her left forearm which look much younger than a year old. patient said she told her parents she needs psychiatric help before but they did not take her.     Patient said she has been feeling down, depressed and anxious for the last year or so and her parents do not understand mental illness or psychiatry and basically told her that everyone has bad days, etc. patient attributes this to their culture - both are from [place]. patient said that she has been feeling more and more anxious, restless, did not feel like doing anything, anhedonia, poor attention  and concentration with hypersomnia. Patient said that she got some Xanax (yellow pill which is 2mg) from a friend at a party which said helped her a lot, and helped her get along with her parents. Patient said she started using this summer, used one pill a day, sometimes only half a pill. She denies more use; denies using other substances/agents. Patient tried marijuana before at parties but denies chronic use. Patient denies sxs of nurys/psychosis; no access to weapons. Patient wants to feel better and feels like she is at the point where she needs professional help.

## 2021-12-17 NOTE — DIETITIAN INITIAL EVALUATION ADULT. - PROBLEM SELECTOR PLAN 2
Pt with abdominal pain, elevated LFTs and lipase of 323[address]  States he is tolerating po, no nausea or vomiting  CT + for pancreatitis and duodenitis, cant rule out duodenal ulcer  will start on clear liquid diet, advance as tolerated  will cover empirically with cipro and flagyl   will start protonix iv  Pain management PRNs  GI consult: Dr. Ontiveros, possible need for endoscopy?

## 2021-12-17 NOTE — PROGRESS NOTE ADULT - SUBJECTIVE AND OBJECTIVE BOX
INTERVAL HPI/OVERNIGHT EVENTS:    Pt seen and examined at bedside. Pain well managed with medication, denies nausea or vomiting.   Pt is NPO    Vital Signs Last 24 Hrs  T(C): 36.6 (17 Dec 2021 05:39), Max: 37.2 (16 Dec 2021 20:40)  T(F): 97.9 (17 Dec 2021 05:39), Max: 99 (16 Dec 2021 20:40)  HR: 63 (17 Dec 2021 05:39) (59 - 63)  BP: 103/64 (17 Dec 2021 05:39) (103/64 - 121/59)  BP(mean): --  RR: 16 (17 Dec 2021 05:39) (16 - 17)  SpO2: 93% (17 Dec 2021 05:39) (93% - 95%)  I&O's Detail    ciprofloxacin   IVPB 400 milliGRAM(s) IV Intermittent every 12 hours  metroNIDAZOLE    Tablet 500 milliGRAM(s) Oral every 8 hours  pantoprazole  Injectable 40 milliGRAM(s) IV Push daily      Physical Exam  General: AAOx3, No acute distress  Skin: No jaundice, no icterus  Abdomen: soft,  nondistended, min epigastric TTP, no rebound tenderness, no guarding, no palpable masses  Extremities: non edematous, no calf pain bilaterally      Labs:                        12.1   5.88  )-----------( 211      ( 17 Dec 2021 06:56 )             37.8     12-17    140  |  106  |  15  ----------------------------<  103<H>  3.9   |  31  |  1.11    Ca    9.5      17 Dec 2021 06:56    TPro  7.1  /  Alb  2.9<L>  /  TBili  0.8  /  DBili  x   /  AST  61<H>  /  ALT  243<H>  /  AlkPhos  145<H>  12-17    PT/INR - ( 17 Dec 2021 06:56 )   PT: 14.9 sec;   INR: 1.27 ratio         PTT - ( 17 Dec 2021 06:56 )  PTT:34.7 sec

## 2021-12-17 NOTE — PROGRESS NOTE ADULT - ASSESSMENT
57 y/o M with pmh of htn, hld, covid (dec 2020), renal stone s/p stent on 12/12/21 admitted with pancreatitis. Pain has greatly improved. Plan for Lab choli today

## 2021-12-17 NOTE — DIETITIAN INITIAL EVALUATION ADULT. - PERTINENT LABORATORY DATA
12-17 Na140 mmol/L Glu 103 mg/dL<H> K+ 3.9 mmol/L Cr  1.11 mg/dL BUN 15 mg/dL   12-15 Phos 4.3 mg/dL   12-17 Alb 2.9 g/dL<L>       12-15 Chol 140 mg/dL LDL --    HDL 19 mg/dL<L> Trig 230 mg/dL<H>  12-15-21 @ 09:43 HgbA1C 5.8 [4.0 - 5.6]

## 2021-12-17 NOTE — PROGRESS NOTE ADULT - SUBJECTIVE AND OBJECTIVE BOX
GI PROGRESS NOTE    Patient is a 56y old  Male who presents with a chief complaint of abd pain (17 Dec 2021 08:41)      HPI:  57 y/o M with pmh of htn, hld, covid (dec 2020), renal stone s/p stent on 12/12/21 and discharged on 12/14/21 who came back to the ed with worsening abdominal pain. Pt states that he had abdominal pain on the day of discharge, went home, but pain got progressiveley worse. Currently it is 6/10 in the epigastrium radiating down. He denies any N/V/D/C, states that is able to tolerate PO. Patient denies any other complains. In ED pt noted to have transaminitis and elevated lipase. CT abdomen + for inflammation in the duodenum and peripancreatic fluid.  (15 Dec 2021 04:45)          ______________________________________________________________________  PMH/PSH:  PAST MEDICAL & SURGICAL HISTORY:  COVID-19    HTN (hypertension)    HLD (hyperlipidemia)    Renal stone    S/P ureteral stent placement      ______________________________________________________________________  MEDS:  MEDICATIONS  (STANDING):  amLODIPine   Tablet 10 milliGRAM(s) Oral daily  chlorhexidine 2% Cloths 1 Application(s) Topical daily  chlorhexidine 4% Liquid 1 Application(s) Topical once  ciprofloxacin   IVPB 400 milliGRAM(s) IV Intermittent every 12 hours  heparin   Injectable 5000 Unit(s) SubCutaneous every 8 hours  lactated ringers. 1000 milliLiter(s) (150 mL/Hr) IV Continuous <Continuous>  lactated ringers. 1000 milliLiter(s) (150 mL/Hr) IV Continuous <Continuous>  metroNIDAZOLE    Tablet 500 milliGRAM(s) Oral every 8 hours  mupirocin 2% Ointment 1 Application(s) Both Nostrils every 12 hours  pantoprazole  Injectable 40 milliGRAM(s) IV Push daily  tamsulosin 0.4 milliGRAM(s) Oral at bedtime    MEDICATIONS  (PRN):  acetaminophen     Tablet .. 650 milliGRAM(s) Oral every 6 hours PRN Temp greater or equal to 38C (100.4F), Mild Pain (1 - 3), Moderate Pain (4 - 6)  morphine  - Injectable 2 milliGRAM(s) IV Push every 6 hours PRN Severe Pain (7 - 10)    ______________________________________________________________________  ALL:   Allergies    No Known Allergies    Intolerances      ______________________________________________________________________  SH: Social History:  Denies tobacco, and recreational drugs  occasional etoh use (15 Dec 2021 04:45)    ______________________________________________________________________  FH:  FAMILY HISTORY:  FH: hyperlipidemia (Mother)      ______________________________________________________________________  ROS:    CONSTITUTIONAL:  No weight loss, fever, chills, weakness or fatigue.    HEENT:  Eyes:  No visual loss, blurred vision, double vision or yellow sclerae. Ears, Nose, Throat:  No hearing loss, sneezing, congestion, runny nose or sore throat.    SKIN:  No rash or itching.    CARDIOVASCULAR:  No chest pain, chest pressure or chest discomfort. No palpitations or edema.    RESPIRATORY:  No shortness of breath, cough or sputum.    GASTROINTESTINAL:  SEE HPI    GENITOURINARY:  No dysuria, hematuria, urinary frequency    NEUROLOGICAL:  No headache, dizziness, syncope, paralysis, ataxia, numbness or tingling in the extremities. No change in bowel or bladder control.    MUSCULOSKELETAL:  No muscle, back pain, joint pain or stiffness.    HEMATOLOGIC:  No anemia, bleeding or bruising.    LYMPHATICS:  No enlarged nodes. No history of splenectomy.    PSYCHIATRIC:  No history of depression or anxiety.    ENDOCRINOLOGIC:  No reports of sweating, cold or heat intolerance. No polyuria or polydipsia.    ALLERGIES:  No history of asthma, hives, eczema or rhinitis.  ______________________________________________________________________  PHYSICAL EXAM:  T(C): 36.6 (12-17-21 @ 05:39), Max: 37.2 (12-16-21 @ 20:40)  HR: 63 (12-17-21 @ 05:39)  BP: 103/64 (12-17-21 @ 05:39)  RR: 16 (12-17-21 @ 05:39)  SpO2: 93% (12-17-21 @ 05:39)  Wt(kg): --      GEN: NAD   CVS- S1 S2  ABD: soft nontender, non distended, bowel sounds+  LUNGS: clear to auscultation  NEURO: noin focal neuro exam; AAO x 3  Extremities: no cyanosis, no calf tenderness, no edema, no clubbing      ______________________________________________________________________  LABS:                        12.1   5.88  )-----------( 211      ( 17 Dec 2021 06:56 )             37.8     12-17    140  |  106  |  15  ----------------------------<  103<H>  3.9   |  31  |  1.11    Ca    9.5      17 Dec 2021 06:56    TPro  7.1  /  Alb  2.9<L>  /  TBili  0.8  /  DBili  x   /  AST  61<H>  /  ALT  243<H>  /  AlkPhos  145<H>  12-17    LIVER FUNCTIONS - ( 17 Dec 2021 06:56 )  Alb: 2.9 g/dL / Pro: 7.1 g/dL / ALK PHOS: 145 U/L / ALT: 243 U/L DA / AST: 61 U/L / GGT: x           ______________________________________________________________________  < from: MR MRCP No Cont (12.15.21 @ 19:21) >    ACC: [id number] EXAM:  MR MRCP                          PROCEDURE DATE:  12/15/2021          INTERPRETATION:  CLINICAL INFORMATION: Transaminitis    COMPARISON: No prior abdominal MR is available for comparison. Reference   is made with a previous abdominal CT dated 12/14/2021    CONTRAST/COMPLICATIONS:  IV Contrast: NONE  Oral Contrast: NONE  Complications: None reported at time of study completion    PROCEDURE:  MRI of the abdomen was performed.  MRCP was performed.    FINDINGS:  LOWER CHEST: Trace right pleural effusion.    LIVER: Tiny brightly T2 hyperintense lesion in the left hepatic lobe,   suggestive of a cyst or hemangioma..  BILE DUCTS: Normal caliber. The common bile duct measures 4 mm in caliber   which is within normal limits. Noevidence for choledocholithiasis.  GALLBLADDER: Multiple small gallstones in the gallbladder. No evidence   for thickened gallbladder wall. Mild pericholecystic edema may represent   acute cholecystitis. If clinically indicated, HIDA scan may be pursued   for further evaluation.  SPLEEN: Within normal limits.  PANCREAS: Peripancreatic fluid and edema, suggestive of acute   pancreatitis. Clinical correlation with pancreatic enzymes is recommended.  ADRENALS: 1.3 cm right adrenal nodule with signalattenuation on out of   phase T1-weighted gradient echo images, compatible with an adenoma.   Nonspecific 1.0 cm left adrenal nodule.  KIDNEYS/URETERS: Within normal limits.    VISUALIZED PORTIONS:  BOWEL: Colonic diverticulosis  PERITONEUM: Mild ascites.  VESSELS: Within normal limits.  RETROPERITONEUM/LYMPH NODES: No lymphadenopathy.  ABDOMINAL WALL: Within normal limits.  BONES: Within normal limits.    IMPRESSION: The common bile duct measures 4 mm in caliber which is within   normal limits. No evidence for choledocholithiasis.    Cholelithiasis. Mild pericholecystic edema may represent acute   cholecystitis. If clinically indicated, HIDA scan may be pursued for   further evaluation.    Peripancreatic fluid and edema, suggestive of acute pancreatitis.   Clinical correlation with pancreatic enzymes is recommended.    Mild ascites.    Trace right pleural effusion.    --- End of Report ---      < end of copied text >  < from: CT Abdomen and Pelvis No Cont (12.15.21 @ 00:11) >    ACC: [id number] EXAM:  CT ABDOMEN AND PELVIS                          PROCEDURE DATE:  12/15/2021          INTERPRETATION:  CLINICAL INFORMATION: Right upper quadrant/epigastric   pain. Status post ureteral stent placement one day ago.    COMPARISON:CT abdomen and pelvis 12/12/2021.    CONTRAST/COMPLICATIONS:  IV Contrast: NONE  Oral Contrast: NONE  Complications: None reported at time of study completion    PROCEDURE:  Axial CT images were acquired through the abdomen and pelvis during the   portal venous phase.  Coronal and sagittal reformatted images provided.      FINDINGS: The examination is limited without oral and intravenous   contrast.      LOWER CHEST: Mild interval clearing of patchy bibasilar groundglass   opacities. Calcified inferior hilar lymph nodes again seen.  LIVER: Within normal limits.  BILE DUCTS: Normal caliber.  GALLBLADDER: Distended without calcified gallstone.  SPLEEN: Within normal limits.  PANCREAS: There is peripancreatic fatty stranding/edema seen..  ADRENALS: Stable 1.3 cm right adrenal gland adenoma. Stable tiny left   adrenal gland nodules measure up to 1 cm on the left.  KIDNEYS/URETERS: Interval placement of bilateral ureteral stents in good   position. Interval decompression of previously seen hydronephrosis,   bilaterally. Previously demonstrated obstructing 6 mm stone in the left   UPJ has refluxed back into the mid left kidney. Previously demonstrated 4   mm stone in the right UPJ has also refluxed back into the collecting   system of the right kidney. Additional stable nonobstructing stone in the   right kidney again demonstrated.      BLADDER: Partially collapsed without gross bladder wall thickening.  REPRODUCTIVE ORGANS: Prostate gland not grossly enlarged. Seminal   vesicles are symmetric.    BOWEL: Evaluation of bowel is limited without distention with oral   contrast, however there is no bowel obstruction. Colonic diverticulosis   without definite acute diverticulitis demonstrated. Appendix not seen.   There is suggestion of thickening involving the second and third portions   of the duodenum with a prominent adjacent fatty stranding seen. There is   also fatty stranding/edema adjacent to the proximal jejunum. Stomach is   distended with debris.  PERITONEUM: No free air. Trace perihepatic ascites..  VESSELS:  Limited without venous contrast, however the abdominal aorta   demonstrates a normal caliber and course..  RETROPERITONEUM: No lymphadenopathy.  ABDOMINAL WALL: Small fat-containing right inguinal hernia..  BONES: Mild degenerative changes of the spine.    IMPRESSION:    There is new circumferential thickening involving the second and third   portions of the duodenum with marked adjacent fatty stranding and edema   which also surrounds portions of the pancreas and proximal jejunum. The   differential diagnosis includes, but not limited to,   duodenitis/enteritis, and underlying duodenal ulceration cannot be   excluded. Pancreatitis is also in the differential diagnosis. There is no   gross free intraperitoneal air.    There is been interval placement of bilateral nephroureteral stents with   interval resolution of previously demonstrated bilateral hydronephrosis.   In addition, previously demonstrated obstructing bilateral UPJ stones   have refluxed back into their respective renal collecting systems.    --- End of Report ---          < end of copied text >

## 2021-12-17 NOTE — PROGRESS NOTE ADULT - PROBLEM SELECTOR PLAN 3
Non-contrast CT a/p showing new circumferential thickening involving the second and third portions of the duodenum   -patient never has a EGD  -per outpatient GI Dr. Gaffney patient colon was dirty, noticed diverticulosis and recommended repeat in 3 months but patient did not show up.   -try to obtain records from Dr. Gaffney - report will be faxed today Non-contrast CT a/p showing new circumferential thickening involving the second and third portions of the duodenum   -patient never has a EGD  -per outpatient GI Dr. Gaffney patient colon was dirty, noticed diverticulosis and recommended repeat in 3 months but patient did not show up.   -Patient will need outpatient EGD to assess CT findings   -f/u with Dr. Fletcher in 1-2 weeks   -try to obtain records from Dr. Gaffney - report will be faxed today

## 2021-12-17 NOTE — DIETITIAN INITIAL EVALUATION ADULT. - PERTINENT MEDS FT
MEDICATIONS:  acetaminophen     Tablet .. 650 every 6 hours PRN  amLODIPine   Tablet 10 daily  chlorhexidine 2% Cloths 1 daily  chlorhexidine 4% Liquid 1 once  ciprofloxacin   IVPB 400 every 12 hours  heparin   Injectable 5000 every 8 hours  lactated ringers. 1000 <Continuous>  lactated ringers. 1000 <Continuous>  metroNIDAZOLE    Tablet 500 every 8 hours  morphine  - Injectable 2 every 6 hours PRN  mupirocin 2% Ointment 1 every 12 hours  pantoprazole  Injectable 40 daily  tamsulosin 0.4 at bedtime

## 2021-12-17 NOTE — BRIEF OPERATIVE NOTE - NSICDXBRIEFPROCEDURE_GEN_ALL_CORE_FT
PROCEDURES:  Laparoscopic cholecystectomy with intraoperative cholangiography 17-Dec-2021 20:19:23  Marilee Ring

## 2021-12-17 NOTE — DIETITIAN INITIAL EVALUATION ADULT. - PROBLEM SELECTOR PLAN 1
Pt with abdominal pain, elevated LFTs and lipase of 83919  States he is tolerating po, no nausea or vomiting  CT + for pancreatitis and duodenitis  Triglycerides 260, No evidence of gallstones   will start on clear liquid diet, advance as tolerated  Pain management PRNs  unclear cause of pancreatitis, consider MRCP  GI consult: Dr. Ontiveros

## 2021-12-17 NOTE — DIETITIAN INITIAL EVALUATION ADULT. - OTHER INFO
Pt visited. Pt seen for NPO /Clear liquid = 3 days. Pt is Syriac speaking but able to make his needs known in  English Pt states  his appetite is Good PTA, NKFA Reported. Pt  is  schedule for  cholecystectomy today . Pt with Gallstones. Labs noted . Per PT HT 5 feet ,  lb BMI 33

## 2021-12-17 NOTE — PROGRESS NOTE ADULT - PROBLEM SELECTOR PLAN 2
-LFT's downtrending  -s/p MRCP showing No evidence for choledocholithiasis.  but cholelithiasis and mild pericholecystic edema which may represent acute cholecystitis  -lab choli today per surgery  -c/w abx's

## 2021-12-18 ENCOUNTER — TRANSCRIPTION ENCOUNTER (OUTPATIENT)
Age: 56
End: 2021-12-18

## 2021-12-18 VITALS
HEART RATE: 62 BPM | TEMPERATURE: 98 F | SYSTOLIC BLOOD PRESSURE: 153 MMHG | DIASTOLIC BLOOD PRESSURE: 80 MMHG | RESPIRATION RATE: 18 BRPM

## 2021-12-18 PROCEDURE — 83036 HEMOGLOBIN GLYCOSYLATED A1C: CPT

## 2021-12-18 PROCEDURE — 36415 COLL VENOUS BLD VENIPUNCTURE: CPT

## 2021-12-18 PROCEDURE — 87086 URINE CULTURE/COLONY COUNT: CPT

## 2021-12-18 PROCEDURE — 85610 PROTHROMBIN TIME: CPT

## 2021-12-18 PROCEDURE — 82977 ASSAY OF GGT: CPT

## 2021-12-18 PROCEDURE — 87640 STAPH A DNA AMP PROBE: CPT

## 2021-12-18 PROCEDURE — 87641 MR-STAPH DNA AMP PROBE: CPT

## 2021-12-18 PROCEDURE — 74181 MRI ABDOMEN W/O CONTRAST: CPT

## 2021-12-18 PROCEDURE — 86901 BLOOD TYPING SEROLOGIC RH(D): CPT

## 2021-12-18 PROCEDURE — 99285 EMERGENCY DEPT VISIT HI MDM: CPT | Mod: 25

## 2021-12-18 PROCEDURE — 87635 SARS-COV-2 COVID-19 AMP PRB: CPT

## 2021-12-18 PROCEDURE — 76000 FLUOROSCOPY <1 HR PHYS/QHP: CPT

## 2021-12-18 PROCEDURE — 82570 ASSAY OF URINE CREATININE: CPT

## 2021-12-18 PROCEDURE — 81001 URINALYSIS AUTO W/SCOPE: CPT

## 2021-12-18 PROCEDURE — C1889: CPT

## 2021-12-18 PROCEDURE — 82436 ASSAY OF URINE CHLORIDE: CPT

## 2021-12-18 PROCEDURE — 88304 TISSUE EXAM BY PATHOLOGIST: CPT

## 2021-12-18 PROCEDURE — 74176 CT ABD & PELVIS W/O CONTRAST: CPT | Mod: MA

## 2021-12-18 PROCEDURE — 84156 ASSAY OF PROTEIN URINE: CPT

## 2021-12-18 PROCEDURE — 84478 ASSAY OF TRIGLYCERIDES: CPT

## 2021-12-18 PROCEDURE — 84300 ASSAY OF URINE SODIUM: CPT

## 2021-12-18 PROCEDURE — 96374 THER/PROPH/DIAG INJ IV PUSH: CPT

## 2021-12-18 PROCEDURE — 85025 COMPLETE CBC W/AUTO DIFF WBC: CPT

## 2021-12-18 PROCEDURE — 83690 ASSAY OF LIPASE: CPT

## 2021-12-18 PROCEDURE — 93005 ELECTROCARDIOGRAM TRACING: CPT

## 2021-12-18 PROCEDURE — 80061 LIPID PANEL: CPT

## 2021-12-18 PROCEDURE — 86850 RBC ANTIBODY SCREEN: CPT

## 2021-12-18 PROCEDURE — 84100 ASSAY OF PHOSPHORUS: CPT

## 2021-12-18 PROCEDURE — 80053 COMPREHEN METABOLIC PANEL: CPT

## 2021-12-18 PROCEDURE — 85730 THROMBOPLASTIN TIME PARTIAL: CPT

## 2021-12-18 PROCEDURE — 82248 BILIRUBIN DIRECT: CPT

## 2021-12-18 PROCEDURE — 86900 BLOOD TYPING SEROLOGIC ABO: CPT

## 2021-12-18 PROCEDURE — 83735 ASSAY OF MAGNESIUM: CPT

## 2021-12-18 PROCEDURE — 85027 COMPLETE CBC AUTOMATED: CPT

## 2021-12-18 RX ADMIN — Medication 200 MILLIGRAM(S): at 05:24

## 2021-12-18 RX ADMIN — Medication 500 MILLIGRAM(S): at 08:13

## 2021-12-18 RX ADMIN — Medication 15 MILLIGRAM(S): at 06:10

## 2021-12-18 RX ADMIN — Medication 500 MILLIGRAM(S): at 00:01

## 2021-12-18 RX ADMIN — AMLODIPINE BESYLATE 10 MILLIGRAM(S): 2.5 TABLET ORAL at 05:24

## 2021-12-18 RX ADMIN — SODIUM CHLORIDE 150 MILLILITER(S): 9 INJECTION, SOLUTION INTRAVENOUS at 00:04

## 2021-12-18 RX ADMIN — HEPARIN SODIUM 5000 UNIT(S): 5000 INJECTION INTRAVENOUS; SUBCUTANEOUS at 00:01

## 2021-12-18 RX ADMIN — Medication 15 MILLIGRAM(S): at 05:43

## 2021-12-18 RX ADMIN — MUPIROCIN 1 APPLICATION(S): 20 OINTMENT TOPICAL at 05:24

## 2021-12-18 RX ADMIN — HEPARIN SODIUM 5000 UNIT(S): 5000 INJECTION INTRAVENOUS; SUBCUTANEOUS at 08:13

## 2021-12-18 RX ADMIN — TAMSULOSIN HYDROCHLORIDE 0.4 MILLIGRAM(S): 0.4 CAPSULE ORAL at 00:01

## 2021-12-18 NOTE — PROGRESS NOTE ADULT - SUBJECTIVE AND OBJECTIVE BOX
INTERVAL HPI/OVERNIGHT EVENTS:  No acute events overnight. s/p lap benny pod#1, no acute complaints. Denies N/V    MEDICATIONS  (STANDING):  acetaminophen   IVPB .. 1000 milliGRAM(s) IV Intermittent once  amLODIPine   Tablet 10 milliGRAM(s) Oral daily  chlorhexidine 2% Cloths 1 Application(s) Topical daily  ciprofloxacin   IVPB 400 milliGRAM(s) IV Intermittent every 12 hours  heparin   Injectable 5000 Unit(s) SubCutaneous every 8 hours  lactated ringers. 1000 milliLiter(s) (150 mL/Hr) IV Continuous <Continuous>  metroNIDAZOLE    Tablet 500 milliGRAM(s) Oral every 8 hours  mupirocin 2% Ointment 1 Application(s) Both Nostrils every 12 hours  pantoprazole  Injectable 40 milliGRAM(s) IV Push daily  tamsulosin 0.4 milliGRAM(s) Oral at bedtime    MEDICATIONS  (PRN):  acetaminophen     Tablet .. 650 milliGRAM(s) Oral every 6 hours PRN Temp greater or equal to 38C (100.4F), Mild Pain (1 - 3), Moderate Pain (4 - 6)  acetaminophen     Tablet .. 650 milliGRAM(s) Oral every 6 hours PRN Temp greater or equal to 38C (100.4F), Mild Pain (1 - 3)  HYDROmorphone  Injectable 0.5 milliGRAM(s) IV Push every 3 hours PRN Severe Pain (7 - 10)  ketorolac   Injectable 15 milliGRAM(s) IV Push every 6 hours PRN Moderate Pain (4 - 6)  morphine  - Injectable 2 milliGRAM(s) IV Push every 6 hours PRN Severe Pain (7 - 10)  ondansetron Injectable 4 milliGRAM(s) IV Push every 6 hours PRN Nausea      Vital Signs Last 24 Hrs  T(C): 36.8 (18 Dec 2021 05:22), Max: 36.8 (18 Dec 2021 05:22)  T(F): 98.2 (18 Dec 2021 05:22), Max: 98.2 (18 Dec 2021 05:22)  HR: 62 (18 Dec 2021 05:22) (55 - 72)  BP: 153/80 (18 Dec 2021 05:22) (110/74 - 153/80)  BP(mean): 92 (17 Dec 2021 21:07) (88 - 92)  RR: 18 (18 Dec 2021 05:22) (12 - 18)  SpO2: 94% (18 Dec 2021 00:20) (94% - 100%)    Physical:  General: Alert and oriented, not in acute distress  Resp: Breathing unlabored  Abdomen: Soft, nondistended, appropriate incisional tenderness; incision sites c/d/i  : No isaacs catheter, no dysuria or hematuria  Extremities: No pedal edema    I&O's Detail    17 Dec 2021 07:01  -  18 Dec 2021 07:00  --------------------------------------------------------  IN:    Lactated Ringers: 115 mL    Lactated Ringers Bolus: 1500 mL  Total IN: 1615 mL    OUT:    Voided (mL): 300 mL  Total OUT: 300 mL    Total NET: 1315 mL          LABS:                        12.1   5.88  )-----------( 211      ( 17 Dec 2021 06:56 )             37.8             12-17    140  |  106  |  15  ----------------------------<  103<H>  3.9   |  31  |  1.11    Ca    9.5      17 Dec 2021 06:56    TPro  7.1  /  Alb  2.9<L>  /  TBili  0.8  /  DBili  x   /  AST  61<H>  /  ALT  243<H>  /  AlkPhos  145<H>  12-17

## 2021-12-18 NOTE — DISCHARGE NOTE PROVIDER - CARE PROVIDER_API CALL
Harsh Morgan (MD)  Surgery  [address] [place] [address]  Phone: [phone]  Fax: [phone]  Follow Up Time:

## 2021-12-18 NOTE — DISCHARGE NOTE NURSING/CASE MANAGEMENT/SOCIAL WORK - PATIENT PORTAL LINK FT
You can access the FollowMyHealth Patient Portal offered by [facility] by registering at the following website: [email or web address]. By joining [facility]’s FollowMyHealth portal, you will also be able to view your health information using other applications (apps) compatible with our system.

## 2021-12-18 NOTE — DISCHARGE NOTE PROVIDER - NSDCCPCAREPLAN_GEN_ALL_CORE_FT
PRINCIPAL DISCHARGE DIAGNOSIS  Diagnosis: Gallstone pancreatitis  Assessment and Plan of Treatment: Please follow-up with your surgeon in 1 week. Drink plenty of fluids and rest as needed. Call for any fever over 101, nausea, vomiting, severe pain, no passing of gas or bowel movement.   DIET: You may resume your regular diet as normal.   SURGICAL SITES: Keep the white steri strips on, they will fall off on their own. You may shower 48 hours post-operatively but do not bathe or soak in the water for 1-2 weeks; pat dry. If you notice any signs of surgical site infection (ie. redness, swelling, pain, pus drainage), please seek medical care immediately.   ACTIVITY: Do not lift anything heavier than 10 pounds for 2 weeks and avoid strenuous activity for 4-6 weeks.   PAIN CONTROL: You may take Motrin 600mg-800mg (with food) every 6 hours or Tylenol 650mg-1000mg every 6 hours as needed for mild pain. Stagger one medication 3 hours after the other for maximum pain control. Maximum daily dose of Tylenol should not exceed 4000mg/day.      SECONDARY DISCHARGE DIAGNOSES  Diagnosis: Elevated LFTs  Assessment and Plan of Treatment:

## 2021-12-18 NOTE — DISCHARGE NOTE PROVIDER - HOSPITAL COURSE
56M with pmhx of HTN, HLD, COVID (dec 2020), renal stone s/p stent on 12/12/21 and discharged on 12/14/21 who came back to the ed with worsening abdominal pain. Pt states that he had abdominal pain on the day of discharge, went home, but pain got progressively worse. Currently it is 6/10 in the epigastrium radiating down. He denies any N/V/D/C, states that is able to tolerate PO. Patient denies any other complains. In ED pt noted to have transaminitis and elevated lipase. CT abdomen + for inflammation in the duodenum and peripancreatic fluid.   56M with pmhx of HTN, HLD, COVID (dec 2020), renal stone s/p stent on 12/12/21 and discharged on 12/14/21 who came back to the ed with worsening abdominal pain. Pt states that he had abdominal pain on the day of discharge, went home, but pain got progressively worse. Currently it is 6/10 in the epigastrium radiating down. He denies any N/V/D/C, states that is able to tolerate PO. Patient denies any other complains. In ED pt noted to have transaminitis and elevated lipase. CT abdomen + for inflammation in the duodenum and peripancreatic fluid. Pt underwent an MRCP that showed the common bile duct measures 4 mm in caliber which is within normal limits. No evidence for choledocholithiasis.  Cholelithiasis. Mild pericholecystic edema may represent acute cholecystitis. If clinically indicated, HIDA scan may be pursued for further evaluation.  Peripancreatic fluid and edema, suggestive of acute pancreatitis. Pt underwent a laparoscopic cholecystectomy on 12/17/21. Pt tolerated the procedure well and was stable for discharge with appropriate follow-up.

## 2021-12-18 NOTE — DISCHARGE NOTE NURSING/CASE MANAGEMENT/SOCIAL WORK - NSDCPEFALRISK_GEN_ALL_CORE
For information on Fall & Injury Prevention, visit: [email or web address] OR  [email or web address] OR  https://www.cdc.gov/steadi/patient.html

## 2021-12-18 NOTE — DISCHARGE NOTE PROVIDER - NSDCMRMEDTOKEN_GEN_ALL_CORE_FT
amLODIPine 10 mg oral tablet: 1 tab(s) orally once a day  tamsulosin 0.4 mg oral capsule: 1 cap(s) orally once a day

## 2021-12-20 ENCOUNTER — APPOINTMENT (OUTPATIENT)
Age: 56
End: 2021-12-20
Payer: MEDICAID

## 2021-12-20 VITALS
HEART RATE: 64 BPM | DIASTOLIC BLOOD PRESSURE: 81 MMHG | RESPIRATION RATE: 14 BRPM | BODY MASS INDEX: 33.23 KG/M2 | TEMPERATURE: 98.4 F | SYSTOLIC BLOOD PRESSURE: 136 MMHG | HEIGHT: 61 IN | WEIGHT: 176 LBS

## 2021-12-20 DIAGNOSIS — Z86.79 PERSONAL HISTORY OF OTHER DISEASES OF THE CIRCULATORY SYSTEM: ICD-10-CM

## 2021-12-20 PROBLEM — I10 ESSENTIAL (PRIMARY) HYPERTENSION: Chronic | Status: ACTIVE | Noted: 2021-12-15

## 2021-12-20 PROBLEM — Z00.00 ENCOUNTER FOR PREVENTIVE HEALTH EXAMINATION: Status: ACTIVE | Noted: 2021-12-20

## 2021-12-20 PROBLEM — E78.5 HYPERLIPIDEMIA, UNSPECIFIED: Chronic | Status: ACTIVE | Noted: 2021-12-15

## 2021-12-20 PROBLEM — N20.0 CALCULUS OF KIDNEY: Chronic | Status: ACTIVE | Noted: 2021-12-15

## 2021-12-20 PROCEDURE — 99214 OFFICE O/P EST MOD 30 MIN: CPT

## 2021-12-20 RX ORDER — TAMSULOSIN HYDROCHLORIDE 0.4 MG/1
0.4 CAPSULE ORAL
Qty: 7 | Refills: 0 | Status: COMPLETED | COMMUNITY
Start: 2021-12-02

## 2021-12-20 RX ORDER — IBUPROFEN 600 MG/1
600 TABLET, FILM COATED ORAL
Qty: 30 | Refills: 0 | Status: COMPLETED | COMMUNITY
Start: 2021-12-02

## 2021-12-20 RX ORDER — AMOXICILLIN AND CLAVULANATE POTASSIUM 500; 125 MG/1; MG/1
500-125 TABLET, FILM COATED ORAL
Qty: 6 | Refills: 0 | Status: COMPLETED | COMMUNITY
Start: 2021-12-13

## 2021-12-20 RX ORDER — CIPROFLOXACIN HYDROCHLORIDE 500 MG/1
500 TABLET, FILM COATED ORAL
Qty: 20 | Refills: 0 | Status: COMPLETED | COMMUNITY
Start: 2021-11-29

## 2021-12-20 RX ORDER — FERROUS SULFATE TAB 325 MG (65 MG ELEMENTAL FE) 325 (65 FE) MG
325 (65 FE) TAB ORAL
Qty: 90 | Refills: 0 | Status: COMPLETED | COMMUNITY
Start: 2021-10-25

## 2021-12-20 RX ORDER — AMLODIPINE BESYLATE 10 MG/1
10 TABLET ORAL
Qty: 30 | Refills: 0 | Status: ACTIVE | COMMUNITY
Start: 2021-01-20

## 2021-12-22 NOTE — HISTORY OF PRESENT ILLNESS
[FreeTextEntry1] : 57 yo Maori speaking M presented to the ER recently with abdominal pain\par Found to have bilateral obstructing ureteral stones with BECKY\par Underwent urgent bilateral stent placement\par After discharge, pt had some persistent abdominal pain and returned to the hospital\par Found to have acute gallstone pancreatitis and underwent lap whole \par Drinks 1L of water, 1 cup of coffee,no soda\par Some stent discomfort

## 2021-12-22 NOTE — ASSESSMENT
[FreeTextEntry1] : 57 yo M with bilateral nephrolithiasis\par \par -I discussed the different treatment modalities for nephrolithiasis with the patient, including medical management, spontaneous stone passage, percutaneous stone extraction, extracorporeal shock wave lithotripsy, and ureteroscopy with laser lithotripsy and stone extraction. Given the size and location of the stone, I recommend and the patient opted to proceed with ureteroscopy. The risks, benefits, and alternatives to ureteroscopy were discussed with the patient, including but not limited to pain, infection, bleeding, bladder injury, ureteral injury, renal injury, and treatment failure. I also discussed the possible need for a temporary ureteral stent. I discussed the possible side effects of a temporary ureteral stent, including flank pain, hematuria, and bladder spasms. The patient understands that a stent is a temporary implant that must be removed in the future. The patient wishes to proceed and we will schedule the surgery for the near future. \par - Medical clearance

## 2021-12-27 LAB — SURGICAL PATHOLOGY STUDY: SIGNIFICANT CHANGE UP

## 2022-01-06 ENCOUNTER — OUTPATIENT (OUTPATIENT)
Dept: OUTPATIENT SERVICES | Facility: HOSPITAL | Age: 57
LOS: 1 days | End: 2022-01-06
Payer: MEDICAID

## 2022-01-06 ENCOUNTER — RESULT REVIEW (OUTPATIENT)
Age: 57
End: 2022-01-06

## 2022-01-06 VITALS
OXYGEN SATURATION: 98 % | DIASTOLIC BLOOD PRESSURE: 81 MMHG | SYSTOLIC BLOOD PRESSURE: 128 MMHG | TEMPERATURE: 98 F | WEIGHT: 166.89 LBS | HEART RATE: 62 BPM | RESPIRATION RATE: 18 BRPM | HEIGHT: 60 IN

## 2022-01-06 DIAGNOSIS — Z91.89 OTHER SPECIFIED PERSONAL RISK FACTORS, NOT ELSEWHERE CLASSIFIED: ICD-10-CM

## 2022-01-06 DIAGNOSIS — Z96.0 PRESENCE OF UROGENITAL IMPLANTS: Chronic | ICD-10-CM

## 2022-01-06 DIAGNOSIS — Z01.818 ENCOUNTER FOR OTHER PREPROCEDURAL EXAMINATION: ICD-10-CM

## 2022-01-06 DIAGNOSIS — N20.0 CALCULUS OF KIDNEY: ICD-10-CM

## 2022-01-06 DIAGNOSIS — I10 ESSENTIAL (PRIMARY) HYPERTENSION: ICD-10-CM

## 2022-01-06 LAB
ALBUMIN SERPL ELPH-MCNC: 4.1 G/DL — SIGNIFICANT CHANGE UP (ref 3.5–5)
ALP SERPL-CCNC: 89 U/L — SIGNIFICANT CHANGE UP (ref 40–120)
ALT FLD-CCNC: 37 U/L DA — SIGNIFICANT CHANGE UP (ref 10–60)
ANION GAP SERPL CALC-SCNC: 7 MMOL/L — SIGNIFICANT CHANGE UP (ref 5–17)
APTT BLD: 34.8 SEC — SIGNIFICANT CHANGE UP (ref 27.5–35.5)
AST SERPL-CCNC: 17 U/L — SIGNIFICANT CHANGE UP (ref 10–40)
BILIRUB SERPL-MCNC: 0.7 MG/DL — SIGNIFICANT CHANGE UP (ref 0.2–1.2)
BUN SERPL-MCNC: 21 MG/DL — HIGH (ref 7–18)
CALCIUM SERPL-MCNC: 9.7 MG/DL — SIGNIFICANT CHANGE UP (ref 8.4–10.5)
CHLORIDE SERPL-SCNC: 107 MMOL/L — SIGNIFICANT CHANGE UP (ref 96–108)
CO2 SERPL-SCNC: 26 MMOL/L — SIGNIFICANT CHANGE UP (ref 22–31)
CREAT SERPL-MCNC: 1.02 MG/DL — SIGNIFICANT CHANGE UP (ref 0.5–1.3)
GLUCOSE SERPL-MCNC: 82 MG/DL — SIGNIFICANT CHANGE UP (ref 70–99)
HCT VFR BLD CALC: 43 % — SIGNIFICANT CHANGE UP (ref 39–50)
HGB BLD-MCNC: 13.7 G/DL — SIGNIFICANT CHANGE UP (ref 13–17)
INR BLD: 1.01 RATIO — SIGNIFICANT CHANGE UP (ref 0.88–1.16)
MCHC RBC-ENTMCNC: 28.8 PG — SIGNIFICANT CHANGE UP (ref 27–34)
MCHC RBC-ENTMCNC: 31.9 GM/DL — LOW (ref 32–36)
MCV RBC AUTO: 90.3 FL — SIGNIFICANT CHANGE UP (ref 80–100)
NRBC # BLD: 0 /100 WBCS — SIGNIFICANT CHANGE UP (ref 0–0)
PLATELET # BLD AUTO: 237 K/UL — SIGNIFICANT CHANGE UP (ref 150–400)
POTASSIUM SERPL-MCNC: 4.3 MMOL/L — SIGNIFICANT CHANGE UP (ref 3.5–5.3)
POTASSIUM SERPL-SCNC: 4.3 MMOL/L — SIGNIFICANT CHANGE UP (ref 3.5–5.3)
PROT SERPL-MCNC: 9.1 G/DL — HIGH (ref 6–8.3)
PROTHROM AB SERPL-ACNC: 12 SEC — SIGNIFICANT CHANGE UP (ref 10.6–13.6)
RBC # BLD: 4.76 M/UL — SIGNIFICANT CHANGE UP (ref 4.2–5.8)
RBC # FLD: 13.2 % — SIGNIFICANT CHANGE UP (ref 10.3–14.5)
SODIUM SERPL-SCNC: 140 MMOL/L — SIGNIFICANT CHANGE UP (ref 135–145)
WBC # BLD: 7.9 K/UL — SIGNIFICANT CHANGE UP (ref 3.8–10.5)
WBC # FLD AUTO: 7.9 K/UL — SIGNIFICANT CHANGE UP (ref 3.8–10.5)

## 2022-01-06 PROCEDURE — 85027 COMPLETE CBC AUTOMATED: CPT

## 2022-01-06 PROCEDURE — 36415 COLL VENOUS BLD VENIPUNCTURE: CPT

## 2022-01-06 PROCEDURE — G0463: CPT

## 2022-01-06 PROCEDURE — 71046 X-RAY EXAM CHEST 2 VIEWS: CPT

## 2022-01-06 PROCEDURE — 80053 COMPREHEN METABOLIC PANEL: CPT

## 2022-01-06 PROCEDURE — 85610 PROTHROMBIN TIME: CPT

## 2022-01-06 PROCEDURE — 71046 X-RAY EXAM CHEST 2 VIEWS: CPT | Mod: 26

## 2022-01-06 PROCEDURE — 85730 THROMBOPLASTIN TIME PARTIAL: CPT

## 2022-01-06 RX ORDER — SODIUM CHLORIDE 9 MG/ML
3 INJECTION INTRAMUSCULAR; INTRAVENOUS; SUBCUTANEOUS EVERY 8 HOURS
Refills: 0 | Status: DISCONTINUED | OUTPATIENT
Start: 2022-01-11 | End: 2022-01-18

## 2022-01-06 NOTE — H&P PST ADULT - HISTORY OF PRESENT ILLNESS
57 y/o male with PMH of HTN, HLD covid 19 viral infection (12/2020 - fully recovered, is diagnosed with calculus of kidney s/p stent on 12/12/21. Continues to c/o mild pain with urination and hematuria on and off since 12/2020. He is scheduled for cystoscopy, bilateral retrograde pyelogram, bilateral ureteroscopy with holmium laser lithotripsy, bilateral ureteral stent exchange 1/11/2022

## 2022-01-06 NOTE — H&P PST ADULT - CARDIOVASCULAR COMMENTS
h/o HTN compliant on amlodipine as prescribed h/o HTN compliant on amlodipine as prescribed, HLD diet controlled

## 2022-01-06 NOTE — H&P PST ADULT - PROBLEM SELECTOR PLAN 1
Scheduled for cystoscopy, bilateral retrograde pyelogram, bilateral ureteroscopy with holmium laser lithotripsy, bilateral ureteral stent exchange 1/11/2022  Preoperative instructions discussed and given to patient.   Instructed to call [phone] to schedule COVID 19 test 3 days prior to surgery.   Discussed preprocedure skin preparation using dial soap and or chlorhexidine gluconate 4% solution the day of surgery.   Instructed patient to avoid aspirin and aspirin products, over the counter medications such as vitamins and herbal medications, one week prior to surgery  Patient verbalized understanding of instructions and is in agreement with plan of care.  Labs/EKG/CXR here   Medical optimization to be done by PCP - scheduled today 1/6/22

## 2022-01-06 NOTE — H&P PST ADULT - NEGATIVE GENERAL SYMPTOMS
no fever/no chills/no sweating/no anorexia/no weight loss/no polyphagia/no polyuria/no polydipsia/no malaise

## 2022-01-06 NOTE — H&P PST ADULT - ASSESSMENT
57 y/o male with PMH of HTN compliant on amlodipine as prescribed,  HLD (diet controlled), covid 19 viral infection (12/2020 - fully recovered, is diagnosed with calculus of kidney. Patient's STOP BANG Score is 3

## 2022-01-06 NOTE — H&P PST ADULT - PROBLEM SELECTOR PLAN 3
Patient denies history of TIANNA  Patient never did sleep study  TIANNA STOP BANG Score 3  Maintain perioperative precautions

## 2022-01-06 NOTE — H&P PST ADULT - NSICDXPASTMEDICALHX_GEN_ALL_CORE_FT
PAST MEDICAL HISTORY:  COVID-19 12/2020    HLD (hyperlipidemia)     HTN (hypertension)     Renal stone

## 2022-01-06 NOTE — H&P PST ADULT - PROBLEM SELECTOR PLAN 2
Instructed to take amlodipine as prescribed with a sip of water the morning of surgery  Follow up with PCP for management of HTN postoperatively

## 2022-01-10 ENCOUNTER — TRANSCRIPTION ENCOUNTER (OUTPATIENT)
Age: 57
End: 2022-01-10

## 2022-01-11 ENCOUNTER — APPOINTMENT (OUTPATIENT)
Age: 57
End: 2022-01-11

## 2022-01-11 ENCOUNTER — OUTPATIENT (OUTPATIENT)
Dept: OUTPATIENT SERVICES | Facility: HOSPITAL | Age: 57
LOS: 1 days | End: 2022-01-11
Payer: MEDICAID

## 2022-01-11 ENCOUNTER — RESULT REVIEW (OUTPATIENT)
Age: 57
End: 2022-01-11

## 2022-01-11 VITALS
DIASTOLIC BLOOD PRESSURE: 70 MMHG | TEMPERATURE: 98 F | HEART RATE: 62 BPM | OXYGEN SATURATION: 98 % | SYSTOLIC BLOOD PRESSURE: 125 MMHG | RESPIRATION RATE: 18 BRPM | HEIGHT: 60 IN | WEIGHT: 166.89 LBS

## 2022-01-11 VITALS
RESPIRATION RATE: 16 BRPM | SYSTOLIC BLOOD PRESSURE: 153 MMHG | DIASTOLIC BLOOD PRESSURE: 85 MMHG | HEART RATE: 62 BPM | TEMPERATURE: 98 F | OXYGEN SATURATION: 97 %

## 2022-01-11 DIAGNOSIS — Z01.818 ENCOUNTER FOR OTHER PREPROCEDURAL EXAMINATION: ICD-10-CM

## 2022-01-11 DIAGNOSIS — Z96.0 PRESENCE OF UROGENITAL IMPLANTS: Chronic | ICD-10-CM

## 2022-01-11 DIAGNOSIS — N20.0 CALCULUS OF KIDNEY: ICD-10-CM

## 2022-01-11 PROCEDURE — 52332 CYSTOSCOPY AND TREATMENT: CPT | Mod: 50

## 2022-01-11 PROCEDURE — 52352 CYSTOURETERO W/STONE REMOVE: CPT

## 2022-01-11 PROCEDURE — C1758: CPT

## 2022-01-11 PROCEDURE — 82365 CALCULUS SPECTROSCOPY: CPT

## 2022-01-11 PROCEDURE — 88300 SURGICAL PATH GROSS: CPT

## 2022-01-11 PROCEDURE — 88300 SURGICAL PATH GROSS: CPT | Mod: 26

## 2022-01-11 PROCEDURE — C2617: CPT

## 2022-01-11 PROCEDURE — 52352 CYSTOURETERO W/STONE REMOVE: CPT | Mod: 50

## 2022-01-11 PROCEDURE — C1725: CPT

## 2022-01-11 PROCEDURE — C1769: CPT

## 2022-01-11 PROCEDURE — C1889: CPT

## 2022-01-11 PROCEDURE — 74420 UROGRAPHY RTRGR +-KUB: CPT | Mod: 26

## 2022-01-11 PROCEDURE — 76000 FLUOROSCOPY <1 HR PHYS/QHP: CPT

## 2022-01-11 DEVICE — CATH URET 5FR 70CM: Type: IMPLANTABLE DEVICE | Site: BILATERAL | Status: FUNCTIONAL

## 2022-01-11 DEVICE — URETERAL SHEATH NAVIGATOR HD 12/14FR X 36CM: Type: IMPLANTABLE DEVICE | Site: BILATERAL | Status: FUNCTIONAL

## 2022-01-11 DEVICE — IMPLANTABLE DEVICE: Type: IMPLANTABLE DEVICE | Site: BILATERAL | Status: FUNCTIONAL

## 2022-01-11 DEVICE — STENT URETHRAL PIGTL DBL 6FRX22CM: Type: IMPLANTABLE DEVICE | Site: BILATERAL | Status: FUNCTIONAL

## 2022-01-11 DEVICE — BASKET ZEROTIP NITINOL 1.9FR 120CM X 12MM 4 WIRES: Type: IMPLANTABLE DEVICE | Site: BILATERAL | Status: FUNCTIONAL

## 2022-01-11 DEVICE — STENT URET PERCFLX PLUS 6F 2MM 24CM: Type: IMPLANTABLE DEVICE | Site: BILATERAL | Status: FUNCTIONAL

## 2022-01-11 DEVICE — GWIRE SENS NIT 0.035INX150CM: Type: IMPLANTABLE DEVICE | Site: BILATERAL | Status: FUNCTIONAL

## 2022-01-11 DEVICE — GUIDEWIRE AMPLATZ SUPER STIFF: Type: IMPLANTABLE DEVICE | Site: BILATERAL | Status: FUNCTIONAL

## 2022-01-11 DEVICE — STENT URET PERCFLX PLUS 6X26 W/O GDWIRE: Type: IMPLANTABLE DEVICE | Site: BILATERAL | Status: FUNCTIONAL

## 2022-01-11 RX ORDER — CEPHALEXIN 500 MG
1 CAPSULE ORAL
Qty: 6 | Refills: 0
Start: 2022-01-11 | End: 2022-01-13

## 2022-01-11 NOTE — ASU DISCHARGE PLAN (ADULT/PEDIATRIC) - NS MD DC FALL RISK RISK
For information on Fall & Injury Prevention, visit: [email or web address] OR  [email or web address] OR  https://www.cdc.gov/steadi/patient.html

## 2022-01-11 NOTE — BRIEF OPERATIVE NOTE - OPERATION/FINDINGS
Bilateral ureteroscopy and basket extraction of bilateral renal stones.   Exchange of ureteral stents.  Retrograde pyelography.

## 2022-01-11 NOTE — ASU PATIENT PROFILE, ADULT - CAREGIVER PHONE NUMBER
[phone] Abdomen soft, some guarding, and non-distended without organomegaly or masses. Normal bowel sounds.

## 2022-01-11 NOTE — ASU DISCHARGE PLAN (ADULT/PEDIATRIC) - CARE PROVIDER_API CALL
Salena Hamilton; MPH)  Urology  [address] [facility] [address] [place] [address]  Phone: [phone]  Fax: [phone]  Follow Up Time: 1 week

## 2022-01-11 NOTE — ASU PATIENT PROFILE, ADULT - FALL HARM RISK - HARM RISK INTERVENTIONS

## 2022-01-11 NOTE — ASU DISCHARGE PLAN (ADULT/PEDIATRIC) - ASU DC SPECIAL INSTRUCTIONSFT
STENT: You may have an internal stent (a hollow tube that runs from the kidney to your bladder) after your procedure, which helps urine drain from the kidney to your bladder. Some patients experience urinary frequency, burning, or even back pain (especially with urination). These sensations will gradually get better. Increasing your fluid intake can also improve these symptoms. While the stent is in place, your urine may continue to be bloody. This stent is temporary and must be removed by your urologist as an outpatient with in 3 months unless otherwise specified. If your stent is on a string, it is secured to your leg or genitalia with an adhesive bandage. Do not pull on the string, do not remove the bandage, do not insert anything intravaginally/intraurethrally, and do not engage in sexual intercourse until after the stent is removed at your post-operative appointment.  GENERAL: It is common to have blood in your urine after your procedure. It may be pink or even red; inform your doctor if you have a significant amount of clot in the urine or if you are unable to void at all. The urine may clear and then become bloody again especially as you are more physically active.  BATHING: You may shower or bathe.  DIET: You may resume your regular diet and regular medication regimen.  PAIN: You may take Tylenol (acetaminophen) 650-975mg and/or Motrin (ibuprofen) 400-600mg, both available over the counter, for pain every 6 hours as needed. Do not exceed 4000mg of Tylenol (acetaminophen) daily. You may alternate these medications such that you take one or the other every 3 hours for around the clock pain coverage. If you have a stent, the following medications may have been sent to your pharmacy for stent related discomfort: Flomax (tamsulosin) 0.4mg at bedtime until stent removed, Ditropan (oxybutynin) 5mg every 8 hours as needed for bladder spasms, and Pyridium (phenasopyridine) 100mg every 8 hours as needed for kidney/bladder discomfort for max 3 days (Pyridium will make your urine orange).  ANTIBIOTICS: You were given a prescription for an antibiotic, please take this medication as instructed and be sure to complete the entire course.  STOOL SOFTENERS: Do not allow yourself to become constipated as straining may cause bleeding. Take stool softeners or a laxative (ex. Miralax, Colace, Senokot, ExLax, etc), available over the counter, if needed.  ACTIVITY: No heavy lifting or strenuous exercise until you are evaluated at your post-operative appointment. Otherwise, you may return to your usual level of physical activity.  ANTICOAGULATION: If you are taking any blood thinning medications, please discuss with your urologist prior to restarting these medications unless otherwise specified.  FOLLOW-UP: If you did not already schedule your post-operative appointment, please call your urologist to schedule and follow-up appointment for stent removal in the office in 1 week.   CALL YOUR UROLOGIST IF: You have any bleeding that does not stop, inability to void >8 hours, fever over 100.4 F, chills, persistent nausea/vomiting, changes in your incision concerning for infection, or if your pain is not controlled on your discharge pain medications.

## 2022-01-14 LAB
NIDUS STONE QN: SIGNIFICANT CHANGE UP
NIDUS STONE QN: SIGNIFICANT CHANGE UP

## 2022-01-18 LAB — SURGICAL PATHOLOGY STUDY: SIGNIFICANT CHANGE UP

## 2022-01-24 ENCOUNTER — APPOINTMENT (OUTPATIENT)
Age: 57
End: 2022-01-24
Payer: MEDICAID

## 2022-01-24 ENCOUNTER — APPOINTMENT (OUTPATIENT)
Dept: UROLOGY | Facility: CLINIC | Age: 57
End: 2022-01-24

## 2022-01-24 VITALS — TEMPERATURE: 97 F

## 2022-01-24 PROBLEM — U07.1 COVID-19: Chronic | Status: ACTIVE | Noted: 2020-11-15

## 2022-01-24 PROCEDURE — 52310 CYSTOSCOPY AND TREATMENT: CPT

## 2022-02-24 ENCOUNTER — APPOINTMENT (OUTPATIENT)
Dept: UROLOGY | Facility: CLINIC | Age: 57
End: 2022-02-24
Payer: MEDICAID

## 2022-02-24 DIAGNOSIS — N20.0 CALCULUS OF KIDNEY: ICD-10-CM

## 2022-02-24 DIAGNOSIS — N17.9 ACUTE KIDNEY FAILURE, UNSPECIFIED: ICD-10-CM

## 2022-02-24 PROCEDURE — 99214 OFFICE O/P EST MOD 30 MIN: CPT

## 2022-02-25 PROBLEM — N20.0 NEPHROLITHIASIS: Status: ACTIVE | Noted: 2021-12-22

## 2022-02-25 PROBLEM — N17.9 AKI (ACUTE KIDNEY INJURY): Status: RESOLVED | Noted: 2021-12-22 | Resolved: 2022-02-25

## 2022-02-25 NOTE — HISTORY OF PRESENT ILLNESS
[FreeTextEntry1] : 55 yo Martiniquais speaking M presented to the ER recently with abdominal pain\par Found to have bilateral obstructing ureteral stones with BECKY\par Underwent urgent bilateral stent placement\par After discharge, pt had some persistent abdominal pain and returned to the hospital\par Found to have acute gallstone pancreatitis and underwent lap whole \par Drinks 1L of water, 1 cup of coffee,no soda\par Some stent discomfort\par \par 2/24/22 Interval history: Pt is s/p bilateral URS/HLL with subsequent stent removal\par Doing better since stent removal\par Stone analysis showed 100% uric acid stones\par

## 2022-02-25 NOTE — ASSESSMENT
[FreeTextEntry1] : 56 yo M with history of bilateral nephrolithiasis and BECKY\par \par - Reviewed stone analysis results with pt. Discussed diet modifications for uric acid stone prevention\par - Given bilateral stones, will plan for metabolic stone workup - litholink, parathyroid hormone, uric acid\par - BMP to assess for resolution of BECKY\par - FU in 3 months

## 2022-06-27 ENCOUNTER — APPOINTMENT (OUTPATIENT)
Age: 57
End: 2022-06-27

## 2022-10-21 NOTE — CONSULT NOTE ADULT - CONSULT REQUESTED BY NAME
Called patient to reminder her appt for 10/24. Patient states she is now living in [place]. Advised patient to let us know when she has a pcp out there so we can update her chart.
rehab medicine

## 2022-12-05 NOTE — CONSULT NOTE ADULT - PROBLEM SELECTOR RECOMMENDATION 3
BMI 50  DVT ppx: SCDs;  Lovenox 40mg BID Non-contrast CT a/p showing new circumferential thickening involving the second and third portions of the duodenum   patient states he had a normal EGD/colon this year

## 2022-12-06 NOTE — PROGRESS NOTE ADULT - PROBLEM SELECTOR PROBLEM 2
Pneumonia due to COVID-19 virus (4) rarely moist Acute respiratory failure with hypoxia and hypercapnia

## 2023-09-11 NOTE — ED PROVIDER NOTE - CARDIAC, MLM
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 Final    RDW 11/27/2022 14.1  12.3 - 15.4 % Final    RDW-SD 11/27/2022 45.0  37.0 - 54.0 fl Final    MPV 11/27/2022 12.3 (H)  6.0 - 12.0 fL Final    Platelets 11/27/2022 152  140 - 450 10*3/mm3 Final    Glucose 11/27/2022 128 (H)  65 - 99 mg/dL Final    BUN 11/27/2022 11  6 - 20 mg/dL Final    Creatinine 11/27/2022 0.68  0.57 - 1.00 mg/dL Final    Sodium 11/27/2022 139  136 - 145 mmol/L Final    Potassium 11/27/2022 4.5  3.5 - 5.2 mmol/L Final    Chloride 11/27/2022 102  98 - 107 mmol/L Final    CO2 11/27/2022 27.2  22.0 - 29.0 mmol/L Final    Calcium 11/27/2022 9.9  8.6 - 10.5 mg/dL Final    Total Protein 11/27/2022 7.5  6.0 - 8.5 g/dL Final    Albumin 11/27/2022 4.20  3.50 - 5.20 g/dL Final    ALT (SGPT) 11/27/2022 24  1 - 33 U/L Final    AST (SGOT) 11/27/2022 20  1 - 32 U/L Final    Alkaline Phosphatase 11/27/2022 109  39 - 117 U/L Final    Total Bilirubin 11/27/2022 0.5  0.0 - 1.2 mg/dL Final    Globulin 11/27/2022 3.3  gm/dL Final    A/G Ratio 11/27/2022 1.3  g/dL Final    BUN/Creatinine Ratio 11/27/2022 16.2  7.0 - 25.0 Final    Anion Gap 11/27/2022 9.8  5.0 - 15.0 mmol/L Final    eGFR 11/27/2022 103.6  >60.0 mL/min/1.73 Final    National Kidney Foundation and American Society of Nephrology (ASN) Task Force recommended calculation based on the Chronic Kidney Disease Epidemiology Collaboration  (CKD-EPI) equation refit without adjustment for race.       EKG Results:  No orders to display       Imaging Results:  CT Angiogram Neck    Result Date: 11/26/2022    1. No hemodynamically significant stenosis of the major cervical arteries, as above 2. Heterogeneous appearance of the thyroid may be secondary to a history of thyroiditis and or small nodules. 3. Multiple cervical lymph nodes bilaterally at the upper limit of normal for size.     Manolo Vigil M.D.       Electronically Signed and Approved By: Manolo Vigil M.D. on 11/26/2022 at 8:50             MRI Brain Without Contrast    Result Date: 1/10/2023   Scattered foci of increased T2 and FLAIR signal consistent with chronic microvascular ischemia.  No significant change.  No acute intracranial abnormality.      EMILI WILSON MD       Electronically Signed and Approved By: EMILI WILSON MD on 1/10/2023 at 1:06             MRI Brain Without Contrast    Result Date: 11/26/2022    1. No acute infarction or intracranial hemorrhage 2. Mild signal abnormality in the cerebral white matter , nonspecific in appearance but most likely representing small vessel ischemic disease in a patient of this age. 3. Prominent mucosal inflammatory changes in the bilateral ethmoid and right maxillary sinuses.      Manolo Vigil M.D.       Electronically Signed and Approved By: Manolo Vigil M.D. on 11/26/2022 at 14:59             XR Chest 1 View    Result Date: 11/26/2022    1. No acute cardiopulmonary disease       Manolo Vigil M.D.       Electronically Signed and Approved By: Manolo Vigil M.D. on 11/26/2022 at 8:32             XR Pelvis 1 or 2 View    Result Date: 11/26/2022    1. No acute finding 2. Mild bilateral hip osteoarthritis      Manolo Vigil M.D.       Electronically Signed and Approved By: Manolo Vigil M.D. on 11/26/2022 at 13:31             MRI Hip Right Arthrogram    Result Date: 1/13/2023    1. Mild bilateral hip osteoarthritis 2. No internal derangement of the  right hip joint     Manolo Vigil M.D.       Electronically Signed and Approved By: Manolo Vigil M.D. on 1/13/2023 at 14:21             CT Head Without Contrast Stroke Protocol    Result Date: 11/26/2022    1. Mild small vessel ischemic changes in the white matter     Manolo Vigil M.D.       Electronically Signed and Approved By: Manolo Vigil M.D. on 11/26/2022 at 7:49             CT Angiogram Head w AI Analysis of LVO    Result Date: 11/26/2022    1. Relatively mild atherosclerotic calcification involving the intracranial segments of both internal carotid arteries. 2. No hemodynamically significant stenosis of the intracranial vasculature.     Manolo Vigil M.D.       Electronically Signed and Approved By: Manool Vigil M.D. on 11/26/2022 at 9:18             CT CEREBRAL PERFUSION WITH & WITHOUT CONTRAST    Result Date: 11/26/2022    1. Study within normal limits.      Manolo Vigil M.D.       Electronically Signed and Approved By: Manolo Vigil M.D. on 11/26/2022 at 7:57             [place] Contrast Injection CT / MRI    Result Date: 1/13/2023   Right hip Arthrogram was performed without complication, patient tolerated procedure.  The patient was instructed to obtain follow up care from the referring physician.   The above procedure was directly supervised by Dr. Vigil.   LINDSEY HUDSON       Electronically Signed and Approved By: Manolo Vigil M.D. on 1/13/2023 at 12:08                 Assessment & Plan   Diagnoses and all orders for this visit:    1. Major depressive disorder, recurrent episode, moderate (Primary)    2. Generalized anxiety disorder    3. Insomnia due to mental disorder    Continue mirtazapine and duloxetine to target depression and anxiety. Continue hydroxyzine as needed for anxiety. Sleep hygiene education for insomnia. Supportive psychotherapy with goal to strengthen defenses, promote problems solving, restore adaptive functioning and provide symptom relief. Stressors: Physical.  Coping skills  utilized: Mindfulness. Current goal: Talk to a person in your support system. Esteem building was enhanced through praise, reassurance, normalizing and encouragement.  Assisted patient in identifying risk factors which would indicate the need for higher level of care including thoughts to harm self or others and/or self-harming behavior and encouraged patient to contact this office, call 911, or present to the nearest emergency room should any of these events occur. Patient given education on medication side effects, diagnosis/illness and relapse symptoms.  Plan to continue supportive psychotherapy in next appointment to provide symptom relief. At least 20 minutes of coping skill utilization recommended per day. 17 minutes of supportive psychotherapy. 4 weeks    Diagnoses: as above  Symptoms: as above  Functional status: good  Mental Status Exam: as above     Treatment plan: medication management and supportive psychotherapy  Prognosis: good  Progress: Depression s/sx and Anxiety s/sx    Visit Diagnoses:    ICD-10-CM ICD-9-CM   1. Major depressive disorder, recurrent episode, moderate  F33.1 296.32   2. Generalized anxiety disorder  F41.1 300.02   3. Insomnia due to mental disorder  F51.05 300.9     327.02       PLAN:  Safety: No acute safety concerns.   Therapy: Trudi Almeida  Risk Assessment: Risk of self-harm acutely is moderate.  Risk factors include anxiety disorder, mood disorder, and recent psychosocial stressors (pandemic). Protective factors include no family history, denies access to guns/weapons, no present SI, no history of suicide attempts or self-harm in the past, minimal AODA, healthcare seeking, future orientation, willingness to engage in care.  Risk of self-harm chronically is also moderate, but could be further elevated in the event of treatment noncompliance and/or AODA.  Medications: Continue mirtazapine 7.5mg po qhs to target depression and anxiety. Risks, benefits, alternatives discussed  with patient including GI upset, sedation, dizziness with falls risk, increased appetite.  After discussion of these risks and benefits, the patient voiced understanding and agreed to proceed.Continue hydroxyzine 25mg po qday prn anxiety. Risks, benefits, alternatives discussed with patient including sedation, dizziness, fall risk, GI upset, and risk of increased CNS depression and elevated heart rate if taken with other antihistamines.  After discussion of these risks and benefits, the patient voiced understanding and agreed to proceed. Continue duloxetine 30mg po qday to target depression and anxiety. Discussed all risks, benefits, alternatives, and side effects of Duloxetine including but not limited to GI upset, sexual dysfunction, bleeding risk, seizure risk, weight loss, insomnia, diaphoresis, drowsiness, headache, dizziness, fatigue, activation of kimberlee or hypomania, increased fragility fracture risk, hyponatremia, increased BP, hepatotoxicity, ocular effects, withdrawal syndrome following abrupt discontinuation, serotonin syndrome, and activation of suicidal ideation and behavior.  Pt educated on the need to practice safe sex while taking this med. Discussed the need for pt to immediately call the office for any new or worsening symptoms, such as worsening depression; feeling nervous or restless; suicidal thoughts or actions; or other changes changes in mood or behavior, and all other concerns. Pt educated on med compliance and the risks of suddenly stopping this medication or missing doses. Pt verbalized understanding and is agreeable to taking Duloxetine. Addressed all questions and concerns.  Labs/studies: No labs/studies ordered at this time  Follow-up: 4 weeks    Patient screened positive for depression based on a PHQ-9 score of  on . Follow-up recommendations include: Suicide Risk Assessment performed.         TREATMENT PLAN/GOALS: Continue supportive psychotherapy efforts and medications as indicated.  Treatment and medication options discussed during today's visit. Patient ackowledged and verbally consented to continue with current treatment plan and was educated on the importance of compliance with treatment and follow-up appointments.      MEDICATION ISSUES:  OSIEL reviewed as expected.  Discussed medication options and treatment plan of prescribed medication as well as the risks, benefits, and side effects including potential falls, possible impaired driving and metabolic adversities among others. Patient is agreeable to call the office with any worsening of symptoms or onset of side effects. Patient is agreeable to call 911 or go to the nearest ER should he/she begin having SI/HI. No medication side effects or related complaints today.     MEDS ORDERED DURING VISIT:  No orders of the defined types were placed in this encounter.      No follow-ups on file.         This document has been electronically signed by ZAYNAB Sinclair  September 11, 2023 13:39 EDT      Part of this note may be an electronic transcription/translation of spoken language to printed text using the Dragon Dictation System.   Normal rate, regular rhythm.  Heart sounds S1, S2.  No murmurs, rubs or gallops.

## 2024-01-23 NOTE — PHYSICAL THERAPY INITIAL EVALUATION ADULT - PHYSICAL ASSIST/NONPHYSICAL ASSIST: SCOOT/BRIDGE, REHAB EVAL
Susan A Weiland is a 63 year old female presenting for   Chief Complaint   Patient presents with    Sinus Problem     Intermittent sinus congestion 4-5 months tenderness above her eyes and below eyes. Blow out green    Cough     Cough up green     Denies Latex allergy or sensitivity.    Medication verified and med list updated  Refills needed today: No    Health Maintenance Due   Topic Date Due    COVID-19 Vaccine (1) Never done    Shingles Vaccine (1 of 2) Never done    Cervical Cancer Screening  Never done    Pneumococcal Vaccine 0-64 (3 of 3 - PCV) 10/12/2016    Colorectal Cancer Risk - Colonoscopy  02/03/2017    DTaP/Tdap/Td Vaccine (2 - Td or Tdap) 04/10/2017    Influenza Vaccine (1) 09/01/2023    Depression Screening  09/22/2023    Breast Cancer Screening  10/06/2023       Patient is due for topics as listed above but is not proceeding with Immunization(s) COVID-19 and Shingles at this time.           Last lab results:   Hemoglobin A1C (%)   Date Value   03/09/2022 6.2 (H)     Cholesterol (mg/dL)   Date Value   02/09/2022 233 (H)     HDL (mg/dL)   Date Value   02/09/2022 83     Triglycerides (mg/dL)   Date Value   02/09/2022 111     LDL (mg/dL)   Date Value   02/09/2022 128     No results found for: \"URMIC\", \"UCR\", \"MALBCR\"  No results found for: \"IFOB\"                 Depression Screening:  Review Flowsheet  More data exists         9/22/2022   PHQ 2/9 Score   Adult PHQ 2 Score 0   Adult PHQ 2 Interpretation No further screening needed   Little interest or pleasure in activity? Not at all   Feeling down, depressed or hopeless? Not at all        Advance Directives:  on file   verbal cues/2 person assist/set-up required/1 person assist

## 2024-05-30 NOTE — PATIENT PROFILE ADULT - TRANSPORTATION
Medication to be refilled: Tresiba FlexTouch 200 unit/ml pen injector  Directions: Inject 50 units SC every morning  Last Refill: 6/8/23  Quantity: 18 ml, 5 refills   Last Office Visit: 5/22/2023; no show on 11/17/23  Next Office Visit: None scheduled   Preferred pharmacy: [facility] in [place]    Telephone call placed to patient at this time, wireless caller not available, phone went to busy signal.    Insulins Refill Protocol - 12 Month Protocol [id number]/2024 09:35 AM   Protocol Details Seen by prescribing provider or same department within the last 12 months or has a future appt in 3 months - IF FAILED PLEASE LOOK AT CHART REVIEW FOR LAST VISIT AND PROCEED ACCORDINGLY    Hgb A1c less than 8 within last 6 months looking at last value  -- IF CRITERIA FAILED REFER TO PROTOCOL DETAILS    Medication (including dose and sig) on current meds list       
Noted and lab orders entered  
Patient called back.  Almost out of medication.  Let patient know that is due for Appointment with PCP.  Patient is agreeable to being seen.  Unable to schedule at this time.  Patient is working and  not available.      Will refill one month.      PSR to call patient back to make appt.    
Patient now scheduled for an appointment on 7/5/2024 with Dr Carrillo. Lab appointment is scheduled for 6/26/24.  
no

## 2024-10-19 NOTE — PROGRESS NOTE ADULT - SUBJECTIVE AND OBJECTIVE BOX
Problem: Adult Inpatient Plan of Care  Goal: Plan of Care Review  Outcome: Progressing  Flowsheets (Taken 10/19/2024 1816)  Progress: no change  Outcome Evaluation: Patient continues to report right leg/foot pain with activity. At rest, patient denies any need for PRN pain management. X-ray of knee and ankle complete. Ortho consult to evaluate effusion. Dopplers of lower extremities also completed. IV fluids discontinued. Patient tolerating diet. Patient mostly oriented, but had a period of confusion this evening. Patient's spouse at bedside. Plan of care ongoing.  Plan of Care Reviewed With:   patient   spouse      Patient is a 55y old  Male who presents with a chief complaint of Acute Hypoxic Respiratory failure (2020 12:21)      INTERVAL HPI/OVERNIGHT EVENTS:  ICU Vital Signs Last 24 Hrs  T(C): 36.3 (2020 07:00), Max: 37.3 (2020 20:00)  T(F): 97.4 (2020 07:00), Max: 99.2 (2020 20:00)  HR: 64 (2020 09:15) (63 - 84)  BP: 116/71 (:15) (80/55 - 129/81)  BP(mean): 82 (:15) (61 - 92)  ABP: --  ABP(mean): --  RR: 25 (:15) (16 - 25)  SpO2: 97% (:15) (94% - 100%)    I&O's Summary    :  -  2020 07:00  --------------------------------------------------------  IN: 4100.2 mL / OUT: 995 mL / NET: 3105.2 mL    2020 07:01  -  2020 10:44  --------------------------------------------------------  IN: 283.1 mL / OUT: 50 mL / NET: 233.1 mL      Mode: AC/ CMV (Assist Control/ Continuous Mandatory Ventilation)  RR (machine): 25  FiO2: 70  PEEP: 10  ITime: 1.1  MAP: 19  PIP: 31      LABS:                        10.7   11.56 )-----------( 237      ( 2020 03:57 )             35.1     1117    142  |  110<H>  |  56<H>  ----------------------------<  150<H>  4.6   |  24  |  3.33<H>    Ca    6.7<L>      2020 03:57  Phos  5.6     11  Mg     2.6         TPro  5.8<L>  /  Alb  1.9<L>  /  TBili  0.3  /  DBili  x   /  AST  169<H>  /  ALT  145<H>  /  AlkPhos  75  11-17    PT/INR - ( 15 Nov 2020 18:39 )   PT: 14.2 sec;   INR: 1.20 ratio         PTT - ( 15 Nov 2020 18:39 )  PTT:35.1 sec  Urinalysis Basic - ( 2020 01:30 )    Color: Yellow / Appearance: Clear / S.020 / pH: x  Gluc: x / Ketone: Negative  / Bili: Negative / Urobili: Negative   Blood: x / Protein: 100 / Nitrite: Negative   Leuk Esterase: Negative / RBC: 2-5 /HPF / WBC 0-2 /HPF   Sq Epi: x / Non Sq Epi: Few /HPF / Bacteria: Moderate /HPF      CAPILLARY BLOOD GLUCOSE      POCT Blood Glucose.: 150 mg/dL (2020 05:16)  POCT Blood Glucose.: 192 mg/dL (2020 00:03)  POCT Blood Glucose.: 169 mg/dL (2020 16:27)  POCT Blood Glucose.: 173 mg/dL (2020 12:54)    ABG - ( 2020 03:35 )  pH, Arterial: 7.30  pH, Blood: x     /  pCO2: 47    /  pO2: 92    / HCO3: 22    / Base Excess: -3.8  /  SaO2: 97                  RADIOLOGY & ADDITIONAL TESTS:    Consultant(s) Notes Reviewed:  [x ] YES  [ ] NO    MEDICATIONS  (STANDING):  azithromycin  IVPB 500 milliGRAM(s) IV Intermittent every 24 hours  azithromycin  IVPB      cefTRIAXone   IVPB 1000 milliGRAM(s) IV Intermittent every 24 hours  chlorhexidine 2% Cloths 1 Application(s) Topical <User Schedule>  cisatracurium Infusion 3 MICROgram(s)/kG/Min (13.9 mL/Hr) IV Continuous <Continuous>  dexAMETHasone  Injectable 6 milliGRAM(s) IV Push daily  ergocalciferol Drops 43594 Unit(s) Enteral Tube <User Schedule>  fentaNYL   Infusion. 5 MICROgram(s)/kG/Hr (38.6 mL/Hr) IV Continuous <Continuous>  heparin  Infusion.  Unit(s)/Hr (15 mL/Hr) IV Continuous <Continuous>  insulin lispro (ADMELOG) corrective regimen sliding scale   SubCutaneous every 6 hours  midazolam Infusion 0.1 mG/kG/Hr (8.54 mL/Hr) IV Continuous <Continuous>  pantoprazole  Injectable 40 milliGRAM(s) IV Push daily  polyethylene glycol 3350 17 Gram(s) Oral daily  senna Syrup 10 milliLiter(s) Oral at bedtime  sodium chloride 0.9%. 1000 milliLiter(s) (75 mL/Hr) IV Continuous <Continuous>  zinc sulfate 220 milliGRAM(s) Oral daily    MEDICATIONS  (PRN):  acetaminophen    Suspension .. 650 milliGRAM(s) Enteral Tube every 6 hours PRN Temp greater or equal to 38C (100.4F)  artificial  tears Solution 1 Drop(s) Both EYES every 4 hours PRN Dry Eyes  sodium chloride 0.9% lock flush 10 milliLiter(s) IV Push every 1 hour PRN Pre/post blood products, medications, blood draw, and to maintain line patency      PHYSICAL EXAM:  GENERAL: well built, well nourished  HEAD:  Atraumatic, Normocephalic  EYES: EOMI, PERRLA, conjunctiva and sclera clear  ENT: No tonsillar erythema, exudates, or enlargement; Moist mucous membranes, Good dentition, No lesions  NECK: Supple, No JVD, Normal thyroid, no enlarged nodes  NERVOUS SYSTEM:  Alert & Oriented X3, Good concentration; Motor Strength 5/5 B/L upper and lower extremities; DTRs 2+ intact and symmetric, sensory intact  CHEST/LUNG: B/L good air entry; No rales, rhonchi, or wheezing  HEART: S1S2 normal, no S3, Regular rate and rhythm; No murmurs, rubs, or gallops  ABDOMEN: Soft, Nontender, Nondistended; Bowel sounds present  EXTREMITIES:  2+ Peripheral Pulses, No clubbing, cyanosis, or edema  LYMPH: No lymphadenopathy noted  SKIN: No rashes or lesions    Care Discussed with Consultants/Other Providers [ x] YES  [ ] NO 55M from home without PMH with progressively worsening SOB and +COVID test x5 days prior to admission presented with SpO2 64% at home.  Started on 15L NRB in ED 2/2 SpO2s 85-90%, desaturated to low 80s and was placed on HFNC, continued to desaturate, was intubated and admitted to ICU.     INTERVAL HPI/OVERNIGHT EVENTS: NAEON. SBPs with high 90s/low 100s overnight, SpO2 mid-90s. Vent 20/350/70/12, ABG 7.3/92/22/47. Phenylephrine stopped overnight, continuing on fentanyl, versed, and nimbex. Mild troponemia overnight to 2.59, improved to 2.11, no EKG changes. Given x1 dose Lasix 2/2 noted eye edema, further doses held as some edema expected with PEEP. TTE ordered. Cr worsened to 3.33, lovenox changed to heparin, dose increased to therapeutic range given elevated d-dimer. Nephro Dr. Camara consulted. OG placed, TF started.     ICU Vital Signs Last 24 Hrs  T(C): 36.3 (2020 07:00), Max: 37.3 (2020 20:00)  T(F): 97.4 (2020 07:00), Max: 99.2 (2020 20:00)  HR: 64 (2020 09:15) (63 - 84)  BP: 116/71 (2020 09:15) (80/55 - 129/81)  BP(mean): 82 (2020 09:15) (61 - 92)  ABP: --  ABP(mean): --  RR: 25 (2020 09:15) (16 - 25)  SpO2: 97% (2020 09:15) (94% - 100%)    I&O's Summary    2020 07:01  -  2020 07:00  --------------------------------------------------------  IN: 4100.2 mL / OUT: 995 mL / NET: 3105.2 mL    2020 07:01  -  2020 10:44  --------------------------------------------------------  IN: 283.1 mL / OUT: 50 mL / NET: 233.1 mL      Mode: AC/ CMV (Assist Control/ Continuous Mandatory Ventilation)  RR (machine): 25  FiO2: 70  PEEP: 10  ITime: 1.1  MAP: 19  PIP: 31      LABS:                        10.7   11.56 )-----------( 237      ( 2020 03:57 )             35.1     1117    142  |  110<H>  |  56<H>  ----------------------------<  150<H>  4.6   |  24  |  3.33<H>    Ca    6.7<L>      2020 03:57  Phos  5.6       Mg     2.6     17    TPro  5.8<L>  /  Alb  1.9<L>  /  TBili  0.3  /  DBili  x   /  AST  169<H>  /  ALT  145<H>  /  AlkPhos  75  11-17    PT/INR - ( 15 Nov 2020 18:39 )   PT: 14.2 sec;   INR: 1.20 ratio         PTT - ( 15 Nov 2020 18:39 )  PTT:35.1 sec  Urinalysis Basic - ( 2020 01:30 )    Color: Yellow / Appearance: Clear / S.020 / pH: x  Gluc: x / Ketone: Negative  / Bili: Negative / Urobili: Negative   Blood: x / Protein: 100 / Nitrite: Negative   Leuk Esterase: Negative / RBC: 2-5 /HPF / WBC 0-2 /HPF   Sq Epi: x / Non Sq Epi: Few /HPF / Bacteria: Moderate /HPF      CAPILLARY BLOOD GLUCOSE      POCT Blood Glucose.: 150 mg/dL (2020 05:16)  POCT Blood Glucose.: 192 mg/dL (2020 00:03)  POCT Blood Glucose.: 169 mg/dL (2020 16:27)  POCT Blood Glucose.: 173 mg/dL (2020 12:54)    ABG - ( 2020 03:35 )  pH, Arterial: 7.30  pH, Blood: x     /  pCO2: 47    /  pO2: 92    / HCO3: 22    / Base Excess: -3.8  /  SaO2: 97          RADIOLOGY & ADDITIONAL TESTS:    Consultant(s) Notes Reviewed:  [x ] YES  [ ] NO    MEDICATIONS  (STANDING):  azithromycin  IVPB 500 milliGRAM(s) IV Intermittent every 24 hours  azithromycin  IVPB      cefTRIAXone   IVPB 1000 milliGRAM(s) IV Intermittent every 24 hours  chlorhexidine 2% Cloths 1 Application(s) Topical <User Schedule>  cisatracurium Infusion 3 MICROgram(s)/kG/Min (13.9 mL/Hr) IV Continuous <Continuous>  dexAMETHasone  Injectable 6 milliGRAM(s) IV Push daily  ergocalciferol Drops 01408 Unit(s) Enteral Tube <User Schedule>  fentaNYL   Infusion. 5 MICROgram(s)/kG/Hr (38.6 mL/Hr) IV Continuous <Continuous>  heparin  Infusion.  Unit(s)/Hr (15 mL/Hr) IV Continuous <Continuous>  insulin lispro (ADMELOG) corrective regimen sliding scale   SubCutaneous every 6 hours  midazolam Infusion 0.1 mG/kG/Hr (8.54 mL/Hr) IV Continuous <Continuous>  pantoprazole  Injectable 40 milliGRAM(s) IV Push daily  polyethylene glycol 3350 17 Gram(s) Oral daily  senna Syrup 10 milliLiter(s) Oral at bedtime  sodium chloride 0.9%. 1000 milliLiter(s) (75 mL/Hr) IV Continuous <Continuous>  zinc sulfate 220 milliGRAM(s) Oral daily    MEDICATIONS  (PRN):  acetaminophen    Suspension .. 650 milliGRAM(s) Enteral Tube every 6 hours PRN Temp greater or equal to 38C (100.4F)  artificial  tears Solution 1 Drop(s) Both EYES every 4 hours PRN Dry Eyes  sodium chloride 0.9% lock flush 10 milliLiter(s) IV Push every 1 hour PRN Pre/post blood products, medications, blood draw, and to maintain line patency    ROS: unable to obtain 2/2 sedation     PHYSICAL EXAM:  GENERAL: sedated, +ETT, +OG  HEAD:  Atraumatic, Normocephalic  EYES: b/l edema noted  ENT: moist mucous membranes  NECK: Supple  NERVOUS SYSTEM: sedated  CHEST/LUNG: B/L good air entry  HEART: S1S2 normal, RRR  ABDOMEN: Soft, Nontender, distended 2/2 body habitus, Bowel sounds present  EXTREMITIES:  2+ Peripheral Pulses, b/l 1+ edema  SKIN: No rashes or lesions noted    Care Discussed with Consultants/Other Providers [ x] YES  [ ] NO 55M from home without PMH with progressively worsening SOB and +COVID test x5 days prior to admission presented following SpO2 64% at home.  Started on 15L NRB in ED 2/2 SpO2s 85-90%, desaturated to low 80s and was placed on HFNC, continued to desaturate, was intubated and admitted to ICU.     INTERVAL HPI/OVERNIGHT EVENTS: NAEON. SBPs with high 90s/low 100s overnight, SpO2 mid-90s. Vent 20/350/70/12, ABG 7.3/92/22/47. Phenylephrine stopped overnight, continuing on fentanyl, versed, and nimbex. Mild troponemia overnight to 2.59, improved to 2.11, no EKG changes. Given x1 dose Lasix 2/2 noted eye edema, further doses held as some edema expected with PEEP. TTE ordered. Cr worsened to 3.33, lovenox changed to heparin, dose increased to therapeutic range given elevated d-dimer. Nephro Dr. Camara consulted. OG placed, TF started.     ICU Vital Signs Last 24 Hrs  T(C): 36.3 (2020 07:00), Max: 37.3 (2020 20:00)  T(F): 97.4 (2020 07:00), Max: 99.2 (2020 20:00)  HR: 64 (2020 09:15) (63 - 84)  BP: 116/71 (2020 09:15) (80/55 - 129/81)  BP(mean): 82 (2020 09:15) (61 - 92)  ABP: --  ABP(mean): --  RR: 25 (2020 09:15) (16 - 25)  SpO2: 97% (2020 09:15) (94% - 100%)    I&O's Summary    2020 07:01  -  2020 07:00  --------------------------------------------------------  IN: 4100.2 mL / OUT: 995 mL / NET: 3105.2 mL    2020 07:01  -  2020 10:44  --------------------------------------------------------  IN: 283.1 mL / OUT: 50 mL / NET: 233.1 mL      Mode: AC/ CMV (Assist Control/ Continuous Mandatory Ventilation)  RR (machine): 25  FiO2: 70  PEEP: 10  ITime: 1.1  MAP: 19  PIP: 31      LABS:                        10.7   11.56 )-----------( 237      ( 2020 03:57 )             35.1     1117    142  |  110<H>  |  56<H>  ----------------------------<  150<H>  4.6   |  24  |  3.33<H>    Ca    6.7<L>      2020 03:57  Phos  5.6       Mg     2.6     17    TPro  5.8<L>  /  Alb  1.9<L>  /  TBili  0.3  /  DBili  x   /  AST  169<H>  /  ALT  145<H>  /  AlkPhos  75  11-17    PT/INR - ( 15 Nov 2020 18:39 )   PT: 14.2 sec;   INR: 1.20 ratio         PTT - ( 15 Nov 2020 18:39 )  PTT:35.1 sec  Urinalysis Basic - ( 2020 01:30 )    Color: Yellow / Appearance: Clear / S.020 / pH: x  Gluc: x / Ketone: Negative  / Bili: Negative / Urobili: Negative   Blood: x / Protein: 100 / Nitrite: Negative   Leuk Esterase: Negative / RBC: 2-5 /HPF / WBC 0-2 /HPF   Sq Epi: x / Non Sq Epi: Few /HPF / Bacteria: Moderate /HPF      CAPILLARY BLOOD GLUCOSE      POCT Blood Glucose.: 150 mg/dL (2020 05:16)  POCT Blood Glucose.: 192 mg/dL (2020 00:03)  POCT Blood Glucose.: 169 mg/dL (2020 16:27)  POCT Blood Glucose.: 173 mg/dL (2020 12:54)    ABG - ( 2020 03:35 )  pH, Arterial: 7.30  pH, Blood: x     /  pCO2: 47    /  pO2: 92    / HCO3: 22    / Base Excess: -3.8  /  SaO2: 97          RADIOLOGY & ADDITIONAL TESTS:    Consultant(s) Notes Reviewed:  [x ] YES  [ ] NO    MEDICATIONS  (STANDING):  azithromycin  IVPB 500 milliGRAM(s) IV Intermittent every 24 hours  azithromycin  IVPB      cefTRIAXone   IVPB 1000 milliGRAM(s) IV Intermittent every 24 hours  chlorhexidine 2% Cloths 1 Application(s) Topical <User Schedule>  cisatracurium Infusion 3 MICROgram(s)/kG/Min (13.9 mL/Hr) IV Continuous <Continuous>  dexAMETHasone  Injectable 6 milliGRAM(s) IV Push daily  ergocalciferol Drops 75055 Unit(s) Enteral Tube <User Schedule>  fentaNYL   Infusion. 5 MICROgram(s)/kG/Hr (38.6 mL/Hr) IV Continuous <Continuous>  heparin  Infusion.  Unit(s)/Hr (15 mL/Hr) IV Continuous <Continuous>  insulin lispro (ADMELOG) corrective regimen sliding scale   SubCutaneous every 6 hours  midazolam Infusion 0.1 mG/kG/Hr (8.54 mL/Hr) IV Continuous <Continuous>  pantoprazole  Injectable 40 milliGRAM(s) IV Push daily  polyethylene glycol 3350 17 Gram(s) Oral daily  senna Syrup 10 milliLiter(s) Oral at bedtime  sodium chloride 0.9%. 1000 milliLiter(s) (75 mL/Hr) IV Continuous <Continuous>  zinc sulfate 220 milliGRAM(s) Oral daily    MEDICATIONS  (PRN):  acetaminophen    Suspension .. 650 milliGRAM(s) Enteral Tube every 6 hours PRN Temp greater or equal to 38C (100.4F)  artificial  tears Solution 1 Drop(s) Both EYES every 4 hours PRN Dry Eyes  sodium chloride 0.9% lock flush 10 milliLiter(s) IV Push every 1 hour PRN Pre/post blood products, medications, blood draw, and to maintain line patency    ROS: unable to obtain 2/2 sedation     PHYSICAL EXAM:  GENERAL: sedated, +ETT, +OG  HEAD:  Atraumatic, Normocephalic  EYES: b/l edema noted  ENT: moist mucous membranes  NECK: Supple  NERVOUS SYSTEM: sedated  CHEST/LUNG: B/L good air entry  HEART: S1S2 normal, RRR  ABDOMEN: Soft, Nontender, distended 2/2 body habitus, Bowel sounds present  EXTREMITIES:  2+ Peripheral Pulses, b/l 1+ edema  SKIN: No rashes or lesions noted    Care Discussed with Consultants/Other Providers [ x] YES  [ ] NO

## 2024-12-05 NOTE — PATIENT PROFILE ADULT - FUNCTIONAL ASSESSMENT - BASIC MOBILITY 1.
Quality 226: Preventive Care And Screening: Tobacco Use: Screening And Cessation Intervention: Patient screened for tobacco use and is an ex/non-smoker Quality 130: Documentation Of Current Medications In The Medical Record: Current Medications Documented Detail Level: Detailed Quality 431: Preventive Care And Screening: Unhealthy Alcohol Use - Screening: Patient not identified as an unhealthy alcohol user when screened for unhealthy alcohol use using a systematic screening method 4 = No assist / stand by assistance

## 2025-01-10 NOTE — DISCHARGE NOTE PROVIDER - NSDCCPCAREPLAN_GEN_ALL_CORE_FT
Pre-Procedural Saturation was taken from the Right Hand. Sat result is 95% PRINCIPAL DISCHARGE DIAGNOSIS  Diagnosis: Acute respiratory failure with hypoxia and hypercapnia  Assessment and Plan of Treatment: You developed acute respiratory failure secondary to COVID-19 infection. You needed to be mechanically ventilated until you were able to breath on your on. You may need oxygen supplementation for a short period of time. Monitor your oxygen saturation      SECONDARY DISCHARGE DIAGNOSES  Diagnosis: Pneumonia due to COVID-19 virus  Assessment and Plan of Treatment: Your COVID infection was treated with antivirals and steroids. You no longer require these medications.    Diagnosis: Adult respiratory distress syndrome  Assessment and Plan of Treatment: Adult respiratory distress syndrome  Developed Adult respiratory distress Syndrome secondary to prolonged intubation. You may require oxygen supplementation when discharged. Monitor your oxygen supplementation. Follow up with Dr Chau when discharged from rehab.    Diagnosis: Acute renal failure  Assessment and Plan of Treatment: Acute renal failure  You developed Acute renal failure secondary to the COVID virus. Your kidney function has since returned to normal.    Diagnosis: Sepsis  Assessment and Plan of Treatment: Developed from COVID and aspiration pneumonia. You were treated with antibiotics for your infection. You no longer require any antibiotics.    Diagnosis: Severe protein-calorie malnutrition  Assessment and Plan of Treatment: Severe protein-calorie malnutrition  Please follow diet aas instructed.    Diagnosis: Anemia  Assessment and Plan of Treatment: Anemia  Have your blood count monitored as an outpatient.    Diagnosis: Transaminitis  Assessment and Plan of Treatment: Transaminitis  Have your liver enzymes monitor as an outpatient.     PRINCIPAL DISCHARGE DIAGNOSIS  Diagnosis: Acute respiratory failure with hypoxia and hypercapnia  Assessment and Plan of Treatment: You developed acute respiratory failure secondary to COVID-19 infection. You needed to be mechanically ventilated until you were able to breath on your on. You may need oxygen supplementation for a short period of time. Your condition has improved and no longer require oxygen at present but can also change if you notice any changes with your respiration or becoming short of breath.      SECONDARY DISCHARGE DIAGNOSES  Diagnosis: Transaminitis  Assessment and Plan of Treatment: Transaminitis  Have your liver enzymes monitor as an outpatient.    Diagnosis: Severe protein-calorie malnutrition  Assessment and Plan of Treatment: Severe protein-calorie malnutrition  Please follow diet aas instructed.    Diagnosis: Anemia  Assessment and Plan of Treatment: Anemia  Have your blood count monitored as an outpatient.    Diagnosis: Pneumonia due to COVID-19 virus  Assessment and Plan of Treatment: Your COVID infection was treated with antivirals and steroids. You no longer require these medications.    Diagnosis: Acute renal failure  Assessment and Plan of Treatment: Acute renal failure  You developed Acute renal failure secondary to the COVID virus. Your kidney function has since returned to normal.    Diagnosis: Adult respiratory distress syndrome  Assessment and Plan of Treatment: Adult respiratory distress syndrome  Developed Adult respiratory distress Syndrome secondary to prolonged intubation. You may require oxygen supplementation when discharged. Monitor your oxygen supplementation. Follow up with Dr Chau when discharged from rehab.    Diagnosis: Sepsis  Assessment and Plan of Treatment: Developed from COVID and aspiration pneumonia. You were treated with antibiotics for your infection. You no longer require any antibiotics.

## 2025-04-24 NOTE — BRIEF OPERATIVE NOTE - NSICDXBRIEFPROCEDURE_GEN_ALL_CORE_FT
PROCEDURES:  Ureteroscopy, with bilateral calculus manipulation 11-Jan-2022 13:40:06  Hannah Rodriguez   Never

## 2025-05-21 NOTE — DIETITIAN INITIAL EVALUATION ADULT. - ORAL INTAKE PTA/DIET HISTORY
Adequate
[FreeTextEntry1] : Family history: Maternal grandmother was bipolar (she had a suicide attempt, survived)

## (undated) DEVICE — URETEROSCOPE LITHOVUE DISP

## (undated) DEVICE — TUBING SET TUR BLADDER IRRIGATION Y-TYPE 81"

## (undated) DEVICE — SYR LUER LOK 20CC

## (undated) DEVICE — GLV 6.5 PROTEXIS

## (undated) DEVICE — FOLEY TRAY 16FR 5CC LF UMETER CLOSED

## (undated) DEVICE — SOL IRR POUR H2O 1500ML

## (undated) DEVICE — SPONGE LAP PAD W RING 18X18"

## (undated) DEVICE — PACK CYSTO

## (undated) DEVICE — ELCTR GROUNDING PAD ADULT COVIDIEN

## (undated) DEVICE — BLANKET WARMER UPPER ADULT

## (undated) DEVICE — WRAP COMPRESSION CALF MED

## (undated) DEVICE — GOWN XL

## (undated) DEVICE — DRAPE C ARM UNIVERSAL

## (undated) DEVICE — SYR LUER LOK 10CC

## (undated) DEVICE — SOL IRR BAG NS 0.9% 3000ML